# Patient Record
Sex: MALE | Race: BLACK OR AFRICAN AMERICAN | NOT HISPANIC OR LATINO
[De-identification: names, ages, dates, MRNs, and addresses within clinical notes are randomized per-mention and may not be internally consistent; named-entity substitution may affect disease eponyms.]

---

## 2022-12-13 PROBLEM — Z00.00 ENCOUNTER FOR PREVENTIVE HEALTH EXAMINATION: Status: ACTIVE | Noted: 2022-12-13

## 2023-03-08 ENCOUNTER — TRANSCRIPTION ENCOUNTER (OUTPATIENT)
Age: 68
End: 2023-03-08

## 2023-03-08 ENCOUNTER — APPOINTMENT (OUTPATIENT)
Dept: NEUROLOGY | Facility: CLINIC | Age: 68
End: 2023-03-08
Payer: MEDICARE

## 2023-03-08 VITALS
HEART RATE: 67 BPM | HEIGHT: 66 IN | OXYGEN SATURATION: 94 % | TEMPERATURE: 97.9 F | SYSTOLIC BLOOD PRESSURE: 130 MMHG | DIASTOLIC BLOOD PRESSURE: 70 MMHG

## 2023-03-08 PROCEDURE — 99205 OFFICE O/P NEW HI 60 MIN: CPT

## 2023-03-15 ENCOUNTER — APPOINTMENT (OUTPATIENT)
Dept: NEUROSURGERY | Facility: CLINIC | Age: 68
End: 2023-03-15
Payer: MEDICARE

## 2023-03-15 ENCOUNTER — APPOINTMENT (OUTPATIENT)
Dept: NEUROLOGY | Facility: CLINIC | Age: 68
End: 2023-03-15
Payer: MEDICARE

## 2023-03-15 VITALS
HEIGHT: 66 IN | DIASTOLIC BLOOD PRESSURE: 87 MMHG | SYSTOLIC BLOOD PRESSURE: 131 MMHG | OXYGEN SATURATION: 97 % | HEART RATE: 68 BPM

## 2023-03-15 VITALS
SYSTOLIC BLOOD PRESSURE: 133 MMHG | HEIGHT: 66 IN | DIASTOLIC BLOOD PRESSURE: 87 MMHG | OXYGEN SATURATION: 98 % | HEART RATE: 60 BPM | TEMPERATURE: 98.4 F

## 2023-03-15 DIAGNOSIS — G93.6 CEREBRAL EDEMA: ICD-10-CM

## 2023-03-15 PROCEDURE — 99204 OFFICE O/P NEW MOD 45 MIN: CPT

## 2023-03-15 NOTE — HISTORY OF PRESENT ILLNESS
[FreeTextEntry1] : Patient here for follow up of his parkinsons disease for 8 years. \par Slowly got worse, First time the tumor was discovered was 2019. Hospitalized in [facility] in November 26th 2023 for not being able to walk. family was told that the tumor got bigger. \par \par Parkinsons disease  - Carbidopa /levodopa increased to three times a day.  - 8 am / 2 pm and 8 pm before bed. \par Takes with a full stomach \par \par \par last seizure was in November.

## 2023-03-15 NOTE — REVIEW OF SYSTEMS
[de-identified] : does not walk. Positive falls. feeds himslef. Dressinhg neeeds 100 % help \par bathing 100% dependent/.

## 2023-03-15 NOTE — ASSESSMENT
[FreeTextEntry1] : Sunil needs better titration of medication .Increase levodopa carbidopa to qid and take on empty stomach  - 25/200 8/12/4/8\par continue entacapone for now. \par Take on empty stomach unless he does not tolerate it. \par consider parkinsosn rehab after meenioma addressed. \par \par meningoma - arranged for patient to see dr. D'Amico today on the 3rd floor. \par \par Seizure - will repeat EEG \par If better will start to reduce his keppra to 750 BID and then slowly taper.

## 2023-03-15 NOTE — PHYSICAL EXAM
[FreeTextEntry1] : mask like facies \par bradykinesia and bradyphrenia\par Rigidity legs greater than arms with cogwheeling\par UE 4/5\par LE 3 +/5 proximally and 4/5 distally

## 2023-03-15 NOTE — REASON FOR VISIT
[New Patient Visit] : a new patient visit [Referred By: _________] : Patient was referred by [facility] [Family Member] : family member

## 2023-03-16 PROBLEM — G93.6 VASOGENIC BRAIN EDEMA: Status: ACTIVE | Noted: 2023-03-15

## 2023-03-16 NOTE — HISTORY OF PRESENT ILLNESS
[< 3 months] : less than 3 months [de-identified] : 69 yo right handed male with PMH Parkinsonswith tremors Left hand tremors

## 2023-03-16 NOTE — ASSESSMENT
[FreeTextEntry1] : 68M with history of poorly controlled parkinson's with progressive inability to ambulate since november found to have large left frontal dural based lesion concerning for meningioma with associated mass effect and edema. We discussed that his decline is likely multifactorial and includes parkinson's symptoms and mass effect from the tumor. Given its size and accessibility as well as the edema, I have recommended surgical resection of the lesion and optimization of his parkinson's treatment. The family wishes to consider the options and will let us know if they wish to proceed. I also explained that the patient should come to the ER if the patient develops seizures, worsening weakness, mental status changes. The family demonstrated an excellent understanding. \par \par plan:\par \par - Will touch base with family after they discuss. \par - repeat MRI w/wo contrast if decision to not proceed with intervention. \par \par \par A total of 45 minutes was spent reviewing the labs, imaging and physical examination of the patient. We discussed the diagnosis, and the plan. The patient's questions were answered. The patient demonstrated an excellent understanding of the plan.

## 2023-03-22 ENCOUNTER — APPOINTMENT (OUTPATIENT)
Dept: NEUROSURGERY | Facility: CLINIC | Age: 68
End: 2023-03-22

## 2023-03-25 ENCOUNTER — OUTPATIENT (OUTPATIENT)
Dept: OUTPATIENT SERVICES | Facility: HOSPITAL | Age: 68
LOS: 1 days | End: 2023-03-25

## 2023-03-25 ENCOUNTER — APPOINTMENT (OUTPATIENT)
Dept: MRI IMAGING | Facility: CLINIC | Age: 68
End: 2023-03-25
Payer: MEDICARE

## 2023-03-25 PROCEDURE — 70553 MRI BRAIN STEM W/O & W/DYE: CPT | Mod: 26,MH

## 2023-03-29 ENCOUNTER — APPOINTMENT (OUTPATIENT)
Dept: NEUROSURGERY | Facility: CLINIC | Age: 68
End: 2023-03-29
Payer: MEDICARE

## 2023-03-29 PROCEDURE — 99443: CPT | Mod: 95

## 2023-03-29 NOTE — REASON FOR VISIT
[Follow-Up: _____] : a [unfilled] follow-up visit [Home] : at home, [unfilled] , at the time of the visit. [Medical Office: ([facility])___] : at the medical office located in  [Verbal consent obtained from patient] : the patient, [unfilled] [Family Member] : family member [FreeTextEntry1] : brain mass, review MRI

## 2023-03-29 NOTE — ASSESSMENT
[FreeTextEntry1] : 68M with history of poorly controlled parkinson's with progressive inability to ambulate since november found to have large left frontal dural based lesion concerning for meningioma with associated mass effect and edema. We discussed that his decline is likely multifactorial and includes parkinson's symptoms and mass effect from the tumor. Given its size and accessibility as well as the edema. Repeat MRI with worsening mass effect and increase in size. I have again recommended surgical resection of the lesion and optimization of his parkinson's treatment. The family wishes to consider surgery on April 21. I also explained that the patient should come to the ER if the patient develops seizures, worsening weakness, mental status changes. The family demonstrated an excellent understanding. \par \par After detailed imaging review and patient examination, Dr. D’Amico discussed surgical intervention with patient. Potential surgical risks vs benefits and alternatives discussed with time allotted for questions and answers given. \par Patient verbalizes understanding and wishes to proceed with surgical intervention. \par \par Date of surgery: 4/21/23\par \par Pt will need medical and cardiac clearance. \par Pre-op surgical packet and medical clearance packets reviewed and given to patient.\par Patient in understanding that covid nasal pcr test must be completed 3 days prior to surgery at [facility]. Contact phone number for testing centers provided.\par  \par \par A total of 45 minutes was spent reviewing the labs, imaging and physical examination of the patient. We discussed the diagnosis, and the plan. The patient's questions were answered. The patient demonstrated an excellent understanding of the plan.

## 2023-03-29 NOTE — HISTORY OF PRESENT ILLNESS
[FreeTextEntry1] : 67 y/o male with PMHx of idiopathic Parkinsons, found to have large left frontal dural based brain lesion during workup for progressive inability to ambulate since November 2022. \par \par Pt referred by neurologist Dr. Ambrosio and evaluated 3/15/23. \par Plan made for him to repeat MRI. \par \par TODAY pt presents for f/u and MRI review. His son is present for today's phone call f/u. \par

## 2023-04-07 ENCOUNTER — NON-APPOINTMENT (OUTPATIENT)
Age: 68
End: 2023-04-07

## 2023-04-07 ENCOUNTER — APPOINTMENT (OUTPATIENT)
Dept: INTERNAL MEDICINE | Facility: CLINIC | Age: 68
End: 2023-04-07
Payer: MEDICARE

## 2023-04-07 ENCOUNTER — OUTPATIENT (OUTPATIENT)
Dept: OUTPATIENT SERVICES | Facility: HOSPITAL | Age: 68
LOS: 1 days | End: 2023-04-07
Payer: MEDICARE

## 2023-04-07 VITALS
OXYGEN SATURATION: 98 % | TEMPERATURE: 98.9 F | SYSTOLIC BLOOD PRESSURE: 150 MMHG | RESPIRATION RATE: 16 BRPM | DIASTOLIC BLOOD PRESSURE: 82 MMHG | HEART RATE: 61 BPM

## 2023-04-07 DIAGNOSIS — G47.00 INSOMNIA, UNSPECIFIED: ICD-10-CM

## 2023-04-07 PROCEDURE — 71046 X-RAY EXAM CHEST 2 VIEWS: CPT | Mod: 26

## 2023-04-07 PROCEDURE — 93000 ELECTROCARDIOGRAM COMPLETE: CPT

## 2023-04-07 PROCEDURE — 99204 OFFICE O/P NEW MOD 45 MIN: CPT

## 2023-04-10 ENCOUNTER — NON-APPOINTMENT (OUTPATIENT)
Age: 68
End: 2023-04-10

## 2023-04-10 ENCOUNTER — APPOINTMENT (OUTPATIENT)
Dept: HEART AND VASCULAR | Facility: CLINIC | Age: 68
End: 2023-04-10
Payer: MEDICARE

## 2023-04-10 VITALS
TEMPERATURE: 97.8 F | HEIGHT: 66 IN | BODY MASS INDEX: 36.96 KG/M2 | DIASTOLIC BLOOD PRESSURE: 70 MMHG | OXYGEN SATURATION: 94 % | HEART RATE: 68 BPM | SYSTOLIC BLOOD PRESSURE: 130 MMHG | WEIGHT: 229.99 LBS

## 2023-04-10 DIAGNOSIS — R06.02 SHORTNESS OF BREATH: ICD-10-CM

## 2023-04-10 DIAGNOSIS — Z01.810 ENCOUNTER FOR PREPROCEDURAL CARDIOVASCULAR EXAMINATION: ICD-10-CM

## 2023-04-10 PROCEDURE — 93000 ELECTROCARDIOGRAM COMPLETE: CPT | Mod: NC

## 2023-04-10 PROCEDURE — 93306 TTE W/DOPPLER COMPLETE: CPT

## 2023-04-10 PROCEDURE — 99204 OFFICE O/P NEW MOD 45 MIN: CPT

## 2023-04-11 ENCOUNTER — TRANSCRIPTION ENCOUNTER (OUTPATIENT)
Age: 68
End: 2023-04-11

## 2023-04-12 LAB
ALBUMIN SERPL ELPH-MCNC: 4.5 G/DL
ALP BLD-CCNC: 64 U/L
ALT SERPL-CCNC: 22 U/L
ANION GAP SERPL CALC-SCNC: 15 MMOL/L
APTT BLD: 34.5 SEC
AST SERPL-CCNC: 16 U/L
BASOPHILS # BLD AUTO: 0.02 K/UL
BASOPHILS NFR BLD AUTO: 0.4 %
BILIRUB SERPL-MCNC: 0.6 MG/DL
BUN SERPL-MCNC: 20 MG/DL
CALCIUM SERPL-MCNC: 10.6 MG/DL
CHLORIDE SERPL-SCNC: 103 MMOL/L
CHOLEST SERPL-MCNC: 174 MG/DL
CO2 SERPL-SCNC: 26 MMOL/L
CREAT SERPL-MCNC: 1.56 MG/DL
EGFR: 47 ML/MIN/1.73M2
EOSINOPHIL # BLD AUTO: 0.21 K/UL
EOSINOPHIL NFR BLD AUTO: 4.7 %
ESTIMATED AVERAGE GLUCOSE: 131 MG/DL
GLUCOSE SERPL-MCNC: 82 MG/DL
HBA1C MFR BLD HPLC: 6.2 %
HCT VFR BLD CALC: 48.4 %
HDLC SERPL-MCNC: 54 MG/DL
HGB BLD-MCNC: 15.5 G/DL
IMM GRANULOCYTES NFR BLD AUTO: 0.2 %
INR PPP: 1.04 RATIO
LDLC SERPL CALC-MCNC: 102 MG/DL
LYMPHOCYTES # BLD AUTO: 2.49 K/UL
LYMPHOCYTES NFR BLD AUTO: 56 %
MAN DIFF?: NORMAL
MCHC RBC-ENTMCNC: 30.7 PG
MCHC RBC-ENTMCNC: 32 GM/DL
MCV RBC AUTO: 95.8 FL
MONOCYTES # BLD AUTO: 0.42 K/UL
MONOCYTES NFR BLD AUTO: 9.4 %
NEUTROPHILS # BLD AUTO: 1.3 K/UL
NEUTROPHILS NFR BLD AUTO: 29.3 %
NONHDLC SERPL-MCNC: 120 MG/DL
PLATELET # BLD AUTO: 146 K/UL
POTASSIUM SERPL-SCNC: 4.6 MMOL/L
PROT SERPL-MCNC: 7.6 G/DL
PT BLD: 12.1 SEC
RBC # BLD: 5.05 M/UL
RBC # FLD: 15 %
SODIUM SERPL-SCNC: 144 MMOL/L
TRIGL SERPL-MCNC: 91 MG/DL
WBC # FLD AUTO: 4.45 K/UL

## 2023-04-14 VITALS
HEART RATE: 60 BPM | HEIGHT: 66.14 IN | DIASTOLIC BLOOD PRESSURE: 91 MMHG | RESPIRATION RATE: 18 BRPM | TEMPERATURE: 97 F | OXYGEN SATURATION: 98 % | WEIGHT: 229.28 LBS | SYSTOLIC BLOOD PRESSURE: 151 MMHG

## 2023-04-14 LAB — SARS-COV-2 N GENE NPH QL NAA+PROBE: NOT DETECTED

## 2023-04-14 RX ORDER — CARBIDOPA AND LEVODOPA 25; 100 MG/1; MG/1
1 TABLET ORAL
Refills: 0 | DISCHARGE

## 2023-04-14 NOTE — PATIENT PROFILE ADULT - FALL HARM RISK - RISK INTERVENTIONS
nonweight-bearing Communicate Fall Risk and Risk Factors to all staff, patient, and family/Reinforce activity limits and safety measures with patient and family/Visual Cue: Yellow wristband/Bed in lowest position, wheels locked, appropriate side rails in place/Call bell, personal items and telephone in reach/Instruct patient to call for assistance before getting out of bed or chair/Non-slip footwear when patient is out of bed/[place] to call system/Physically safe environment - no spills, clutter or unnecessary equipment/Purposeful Proactive Rounding/Room/bathroom lighting operational, light cord in reach

## 2023-04-16 RX ORDER — POVIDONE-IODINE 5 %
1 AEROSOL (ML) TOPICAL ONCE
Refills: 0 | Status: COMPLETED | OUTPATIENT
Start: 2023-04-17 | End: 2023-04-17

## 2023-04-17 ENCOUNTER — APPOINTMENT (OUTPATIENT)
Dept: NEUROSURGERY | Facility: HOSPITAL | Age: 68
End: 2023-04-17

## 2023-04-17 ENCOUNTER — INPATIENT (INPATIENT)
Facility: HOSPITAL | Age: 68
LOS: 3 days | Discharge: NURSING, REHAB OR LONG-TERM CARE | DRG: 25 | End: 2023-04-21
Attending: NEUROLOGICAL SURGERY | Admitting: NEUROLOGICAL SURGERY
Payer: MEDICARE

## 2023-04-17 ENCOUNTER — RESULT REVIEW (OUTPATIENT)
Age: 68
End: 2023-04-17

## 2023-04-17 ENCOUNTER — TRANSCRIPTION ENCOUNTER (OUTPATIENT)
Age: 68
End: 2023-04-17

## 2023-04-17 LAB
ALBUMIN SERPL ELPH-MCNC: 4.2 G/DL — SIGNIFICANT CHANGE UP (ref 3.3–5)
ALP SERPL-CCNC: 61 U/L — SIGNIFICANT CHANGE UP (ref 40–120)
ALT FLD-CCNC: 6 U/L — LOW (ref 10–45)
ANION GAP SERPL CALC-SCNC: 10 MMOL/L — SIGNIFICANT CHANGE UP (ref 5–17)
APTT BLD: 35.3 SEC — SIGNIFICANT CHANGE UP (ref 27.5–35.5)
AST SERPL-CCNC: 14 U/L — SIGNIFICANT CHANGE UP (ref 10–40)
BASE EXCESS BLDA CALC-SCNC: 0.3 MMOL/L — SIGNIFICANT CHANGE UP (ref -2–3)
BASOPHILS # BLD AUTO: 0 K/UL — SIGNIFICANT CHANGE UP (ref 0–0.2)
BASOPHILS NFR BLD AUTO: 0 % — SIGNIFICANT CHANGE UP (ref 0–2)
BILIRUB SERPL-MCNC: 0.4 MG/DL — SIGNIFICANT CHANGE UP (ref 0.2–1.2)
BLD GP AB SCN SERPL QL: NEGATIVE — SIGNIFICANT CHANGE UP
BUN SERPL-MCNC: 17 MG/DL — SIGNIFICANT CHANGE UP (ref 7–23)
CA-I BLDA-SCNC: 1.18 MMOL/L — SIGNIFICANT CHANGE UP (ref 1.15–1.33)
CALCIUM SERPL-MCNC: 8.9 MG/DL — SIGNIFICANT CHANGE UP (ref 8.4–10.5)
CHLORIDE SERPL-SCNC: 108 MMOL/L — SIGNIFICANT CHANGE UP (ref 96–108)
CO2 BLDA-SCNC: 27 MMOL/L — HIGH (ref 19–24)
CO2 SERPL-SCNC: 24 MMOL/L — SIGNIFICANT CHANGE UP (ref 22–31)
COHGB MFR BLDA: 2.7 % — SIGNIFICANT CHANGE UP
CREAT SERPL-MCNC: 1.24 MG/DL — SIGNIFICANT CHANGE UP (ref 0.5–1.3)
EGFR: 63 ML/MIN/1.73M2 — SIGNIFICANT CHANGE UP
EOSINOPHIL # BLD AUTO: 0.05 K/UL — SIGNIFICANT CHANGE UP (ref 0–0.5)
EOSINOPHIL NFR BLD AUTO: 1.3 % — SIGNIFICANT CHANGE UP (ref 0–6)
GLUCOSE BLDA-MCNC: 97 MG/DL — SIGNIFICANT CHANGE UP (ref 70–99)
GLUCOSE BLDC GLUCOMTR-MCNC: 113 MG/DL — HIGH (ref 70–99)
GLUCOSE BLDC GLUCOMTR-MCNC: 125 MG/DL — HIGH (ref 70–99)
GLUCOSE SERPL-MCNC: 161 MG/DL — HIGH (ref 70–99)
HCO3 BLDA-SCNC: 25 MMOL/L — SIGNIFICANT CHANGE UP (ref 21–28)
HCT VFR BLD CALC: 46.8 % — SIGNIFICANT CHANGE UP (ref 39–50)
HGB BLD-MCNC: 15.3 G/DL — SIGNIFICANT CHANGE UP (ref 13–17)
HGB BLDA-MCNC: 14.5 G/DL — SIGNIFICANT CHANGE UP (ref 12.6–17.4)
IMM GRANULOCYTES NFR BLD AUTO: 0 % — SIGNIFICANT CHANGE UP (ref 0–0.9)
INR BLD: 1.09 — SIGNIFICANT CHANGE UP (ref 0.88–1.16)
LYMPHOCYTES # BLD AUTO: 1.41 K/UL — SIGNIFICANT CHANGE UP (ref 1–3.3)
LYMPHOCYTES # BLD AUTO: 37.8 % — SIGNIFICANT CHANGE UP (ref 13–44)
MAGNESIUM SERPL-MCNC: 2.2 MG/DL — SIGNIFICANT CHANGE UP (ref 1.6–2.6)
MCHC RBC-ENTMCNC: 30.9 PG — SIGNIFICANT CHANGE UP (ref 27–34)
MCHC RBC-ENTMCNC: 32.7 GM/DL — SIGNIFICANT CHANGE UP (ref 32–36)
MCV RBC AUTO: 94.5 FL — SIGNIFICANT CHANGE UP (ref 80–100)
METHGB MFR BLDA: 0.7 % — SIGNIFICANT CHANGE UP
MONOCYTES # BLD AUTO: 0.13 K/UL — SIGNIFICANT CHANGE UP (ref 0–0.9)
MONOCYTES NFR BLD AUTO: 3.5 % — SIGNIFICANT CHANGE UP (ref 2–14)
NEUTROPHILS # BLD AUTO: 2.14 K/UL — SIGNIFICANT CHANGE UP (ref 1.8–7.4)
NEUTROPHILS NFR BLD AUTO: 57.4 % — SIGNIFICANT CHANGE UP (ref 43–77)
NRBC # BLD: 0 /100 WBCS — SIGNIFICANT CHANGE UP (ref 0–0)
OXYHGB MFR BLDA: 96.5 % — HIGH (ref 90–95)
PCO2 BLDA: 42 MMHG — SIGNIFICANT CHANGE UP (ref 35–48)
PH BLDA: 7.39 — SIGNIFICANT CHANGE UP (ref 7.35–7.45)
PHOSPHATE SERPL-MCNC: 3.1 MG/DL — SIGNIFICANT CHANGE UP (ref 2.5–4.5)
PLATELET # BLD AUTO: 147 K/UL — LOW (ref 150–400)
PO2 BLDA: 272 MMHG — HIGH (ref 83–108)
POTASSIUM BLDA-SCNC: 3.8 MMOL/L — SIGNIFICANT CHANGE UP (ref 3.5–5.1)
POTASSIUM SERPL-MCNC: 4 MMOL/L — SIGNIFICANT CHANGE UP (ref 3.5–5.3)
POTASSIUM SERPL-SCNC: 4 MMOL/L — SIGNIFICANT CHANGE UP (ref 3.5–5.3)
PROT SERPL-MCNC: 7.1 G/DL — SIGNIFICANT CHANGE UP (ref 6–8.3)
PROTHROM AB SERPL-ACNC: 13 SEC — SIGNIFICANT CHANGE UP (ref 10.5–13.4)
RBC # BLD: 4.95 M/UL — SIGNIFICANT CHANGE UP (ref 4.2–5.8)
RBC # FLD: 14.5 % — SIGNIFICANT CHANGE UP (ref 10.3–14.5)
RH IG SCN BLD-IMP: POSITIVE — SIGNIFICANT CHANGE UP
SAO2 % BLDA: 100 % — HIGH (ref 94–98)
SODIUM BLDA-SCNC: 136 MMOL/L — SIGNIFICANT CHANGE UP (ref 136–145)
SODIUM SERPL-SCNC: 142 MMOL/L — SIGNIFICANT CHANGE UP (ref 135–145)
WBC # BLD: 3.72 K/UL — LOW (ref 3.8–10.5)
WBC # FLD AUTO: 3.72 K/UL — LOW (ref 3.8–10.5)

## 2023-04-17 PROCEDURE — 69990 MICROSURGERY ADD-ON: CPT | Mod: 59

## 2023-04-17 PROCEDURE — 99291 CRITICAL CARE FIRST HOUR: CPT

## 2023-04-17 PROCEDURE — 82962 GLUCOSE BLOOD TEST: CPT

## 2023-04-17 PROCEDURE — 99292 CRITICAL CARE ADDL 30 MIN: CPT

## 2023-04-17 PROCEDURE — 61583 CRANIOFACIAL APPROACH SKULL: CPT

## 2023-04-17 PROCEDURE — 88341 IMHCHEM/IMCYTCHM EA ADD ANTB: CPT | Mod: 26

## 2023-04-17 PROCEDURE — 61601 RESECT/EXCISE CRANIAL LESION: CPT

## 2023-04-17 PROCEDURE — 88307 TISSUE EXAM BY PATHOLOGIST: CPT | Mod: 26

## 2023-04-17 PROCEDURE — 88360 TUMOR IMMUNOHISTOCHEM/MANUAL: CPT | Mod: 26

## 2023-04-17 PROCEDURE — 71046 X-RAY EXAM CHEST 2 VIEWS: CPT

## 2023-04-17 PROCEDURE — 61781 SCAN PROC CRANIAL INTRA: CPT

## 2023-04-17 PROCEDURE — 88342 IMHCHEM/IMCYTCHM 1ST ANTB: CPT | Mod: 26,59

## 2023-04-17 PROCEDURE — 88331 PATH CONSLTJ SURG 1 BLK 1SPC: CPT | Mod: 26

## 2023-04-17 DEVICE — CEMENT HYDROSET INJ 5CC: Type: IMPLANTABLE DEVICE | Site: LEFT | Status: FUNCTIONAL

## 2023-04-17 DEVICE — SURGICEL 4 X 8": Type: IMPLANTABLE DEVICE | Site: LEFT | Status: FUNCTIONAL

## 2023-04-17 DEVICE — SCREW UN3 AXS SELF DRILL 1.5X4MM: Type: IMPLANTABLE DEVICE | Site: LEFT | Status: FUNCTIONAL

## 2023-04-17 DEVICE — DURASEAL: Type: IMPLANTABLE DEVICE | Site: LEFT | Status: FUNCTIONAL

## 2023-04-17 DEVICE — PLATE COVER BURRHOLE UN3 W/TAB 14MM: Type: IMPLANTABLE DEVICE | Site: LEFT | Status: FUNCTIONAL

## 2023-04-17 DEVICE — PLATE UN3 2 HOLE: Type: IMPLANTABLE DEVICE | Site: LEFT | Status: FUNCTIONAL

## 2023-04-17 DEVICE — MATRIX DURAGEN PLUS DURAL REGENERATION 3X3: Type: IMPLANTABLE DEVICE | Site: LEFT | Status: FUNCTIONAL

## 2023-04-17 DEVICE — LUKENS BONE WAX 2.5G: Type: IMPLANTABLE DEVICE | Site: LEFT | Status: FUNCTIONAL

## 2023-04-17 DEVICE — SURGIFOAM PAD 8CM X 12.5CM X 10MM (100): Type: IMPLANTABLE DEVICE | Site: LEFT | Status: FUNCTIONAL

## 2023-04-17 DEVICE — SURGIFLO HEMOSTATIC MATRIX KIT: Type: IMPLANTABLE DEVICE | Site: LEFT | Status: FUNCTIONAL

## 2023-04-17 RX ORDER — CARBIDOPA AND LEVODOPA 25; 100 MG/1; MG/1
1 TABLET ORAL
Refills: 0 | Status: DISCONTINUED | OUTPATIENT
Start: 2023-04-17 | End: 2023-04-17

## 2023-04-17 RX ORDER — ACETAMINOPHEN 500 MG
1000 TABLET ORAL EVERY 8 HOURS
Refills: 0 | Status: DISCONTINUED | OUTPATIENT
Start: 2023-04-17 | End: 2023-04-20

## 2023-04-17 RX ORDER — DEXAMETHASONE 0.5 MG/5ML
2 ELIXIR ORAL EVERY 6 HOURS
Refills: 0 | Status: DISCONTINUED | OUTPATIENT
Start: 2023-04-21 | End: 2023-04-21

## 2023-04-17 RX ORDER — DEXAMETHASONE 0.5 MG/5ML
2 ELIXIR ORAL EVERY 12 HOURS
Refills: 0 | Status: CANCELLED | OUTPATIENT
Start: 2023-04-23 | End: 2023-04-21

## 2023-04-17 RX ORDER — AMLODIPINE BESYLATE 2.5 MG/1
5 TABLET ORAL DAILY
Refills: 0 | Status: DISCONTINUED | OUTPATIENT
Start: 2023-04-18 | End: 2023-04-21

## 2023-04-17 RX ORDER — APREPITANT 80 MG/1
40 CAPSULE ORAL ONCE
Refills: 0 | Status: COMPLETED | OUTPATIENT
Start: 2023-04-17 | End: 2023-04-17

## 2023-04-17 RX ORDER — DEXAMETHASONE 0.5 MG/5ML
ELIXIR ORAL
Refills: 0 | Status: DISCONTINUED | OUTPATIENT
Start: 2023-04-17 | End: 2023-04-21

## 2023-04-17 RX ORDER — SENNA PLUS 8.6 MG/1
2 TABLET ORAL AT BEDTIME
Refills: 0 | Status: DISCONTINUED | OUTPATIENT
Start: 2023-04-17 | End: 2023-04-21

## 2023-04-17 RX ORDER — GABAPENTIN 400 MG/1
400 CAPSULE ORAL
Refills: 0 | Status: DISCONTINUED | OUTPATIENT
Start: 2023-04-17 | End: 2023-04-21

## 2023-04-17 RX ORDER — CARBIDOPA AND LEVODOPA 25; 100 MG/1; MG/1
1 TABLET ORAL
Refills: 0 | Status: DISCONTINUED | OUTPATIENT
Start: 2023-04-17 | End: 2023-04-21

## 2023-04-17 RX ORDER — CHLORHEXIDINE GLUCONATE 213 G/1000ML
1 SOLUTION TOPICAL ONCE
Refills: 0 | Status: COMPLETED | OUTPATIENT
Start: 2023-04-17 | End: 2023-04-17

## 2023-04-17 RX ORDER — CEFAZOLIN SODIUM 1 G
2000 VIAL (EA) INJECTION EVERY 8 HOURS
Refills: 0 | Status: COMPLETED | OUTPATIENT
Start: 2023-04-17 | End: 2023-04-18

## 2023-04-17 RX ORDER — ACETAMINOPHEN 500 MG
1000 TABLET ORAL ONCE
Refills: 0 | Status: COMPLETED | OUTPATIENT
Start: 2023-04-17 | End: 2023-04-17

## 2023-04-17 RX ORDER — DEXAMETHASONE 0.5 MG/5ML
4 ELIXIR ORAL EVERY 6 HOURS
Refills: 0 | Status: DISCONTINUED | OUTPATIENT
Start: 2023-04-17 | End: 2023-04-17

## 2023-04-17 RX ORDER — INSULIN LISPRO 100/ML
VIAL (ML) SUBCUTANEOUS
Refills: 0 | Status: DISCONTINUED | OUTPATIENT
Start: 2023-04-17 | End: 2023-04-18

## 2023-04-17 RX ORDER — LEVETIRACETAM 250 MG/1
1000 TABLET, FILM COATED ORAL DAILY
Refills: 0 | Status: DISCONTINUED | OUTPATIENT
Start: 2023-04-17 | End: 2023-04-17

## 2023-04-17 RX ORDER — ACETAMINOPHEN 500 MG
1000 TABLET ORAL EVERY 8 HOURS
Refills: 0 | Status: DISCONTINUED | OUTPATIENT
Start: 2023-04-17 | End: 2023-04-17

## 2023-04-17 RX ORDER — DEXAMETHASONE 0.5 MG/5ML
4 ELIXIR ORAL EVERY 6 HOURS
Refills: 0 | Status: COMPLETED | OUTPATIENT
Start: 2023-04-17 | End: 2023-04-19

## 2023-04-17 RX ORDER — DEXAMETHASONE 0.5 MG/5ML
4 ELIXIR ORAL EVERY 8 HOURS
Refills: 0 | Status: COMPLETED | OUTPATIENT
Start: 2023-04-20 | End: 2023-04-20

## 2023-04-17 RX ORDER — OXYCODONE HYDROCHLORIDE 5 MG/1
5 TABLET ORAL EVERY 4 HOURS
Refills: 0 | Status: DISCONTINUED | OUTPATIENT
Start: 2023-04-17 | End: 2023-04-17

## 2023-04-17 RX ORDER — SODIUM CHLORIDE 9 MG/ML
1000 INJECTION INTRAMUSCULAR; INTRAVENOUS; SUBCUTANEOUS
Refills: 0 | Status: DISCONTINUED | OUTPATIENT
Start: 2023-04-17 | End: 2023-04-17

## 2023-04-17 RX ORDER — LEVETIRACETAM 250 MG/1
1000 TABLET, FILM COATED ORAL
Refills: 0 | Status: DISCONTINUED | OUTPATIENT
Start: 2023-04-17 | End: 2023-04-21

## 2023-04-17 RX ORDER — DEXAMETHASONE 0.5 MG/5ML
2 ELIXIR ORAL EVERY 8 HOURS
Refills: 0 | Status: DISCONTINUED | OUTPATIENT
Start: 2023-04-22 | End: 2023-04-21

## 2023-04-17 RX ORDER — OXYCODONE HYDROCHLORIDE 5 MG/1
10 TABLET ORAL EVERY 4 HOURS
Refills: 0 | Status: DISCONTINUED | OUTPATIENT
Start: 2023-04-17 | End: 2023-04-17

## 2023-04-17 RX ORDER — NICARDIPINE HYDROCHLORIDE 30 MG/1
5 CAPSULE, EXTENDED RELEASE ORAL
Qty: 40 | Refills: 0 | Status: ACTIVE | OUTPATIENT
Start: 2023-04-17 | End: 2024-03-15

## 2023-04-17 RX ORDER — DEXAMETHASONE 0.5 MG/5ML
2 ELIXIR ORAL DAILY
Refills: 0 | Status: CANCELLED | OUTPATIENT
Start: 2023-04-24 | End: 2023-04-21

## 2023-04-17 RX ORDER — AMLODIPINE BESYLATE 2.5 MG/1
2.5 TABLET ORAL ONCE
Refills: 0 | Status: COMPLETED | OUTPATIENT
Start: 2023-04-17 | End: 2023-04-17

## 2023-04-17 RX ORDER — ONDANSETRON 8 MG/1
4 TABLET, FILM COATED ORAL EVERY 6 HOURS
Refills: 0 | Status: DISCONTINUED | OUTPATIENT
Start: 2023-04-17 | End: 2023-04-21

## 2023-04-17 RX ORDER — OXYCODONE HYDROCHLORIDE 5 MG/1
5 TABLET ORAL EVERY 6 HOURS
Refills: 0 | Status: DISCONTINUED | OUTPATIENT
Start: 2023-04-17 | End: 2023-04-21

## 2023-04-17 RX ORDER — PANTOPRAZOLE SODIUM 20 MG/1
40 TABLET, DELAYED RELEASE ORAL
Refills: 0 | Status: DISCONTINUED | OUTPATIENT
Start: 2023-04-17 | End: 2023-04-21

## 2023-04-17 RX ORDER — AMLODIPINE BESYLATE 2.5 MG/1
2.5 TABLET ORAL DAILY
Refills: 0 | Status: DISCONTINUED | OUTPATIENT
Start: 2023-04-17 | End: 2023-04-17

## 2023-04-17 RX ORDER — CHLORHEXIDINE GLUCONATE 213 G/1000ML
1 SOLUTION TOPICAL EVERY 12 HOURS
Refills: 0 | Status: ACTIVE | OUTPATIENT
Start: 2023-04-17 | End: 2023-04-18

## 2023-04-17 RX ORDER — POLYETHYLENE GLYCOL 3350 17 G/17G
17 POWDER, FOR SOLUTION ORAL DAILY
Refills: 0 | Status: DISCONTINUED | OUTPATIENT
Start: 2023-04-17 | End: 2023-04-21

## 2023-04-17 RX ADMIN — Medication 4 MILLIGRAM(S): at 19:19

## 2023-04-17 RX ADMIN — CHLORHEXIDINE GLUCONATE 1 APPLICATION(S): 213 SOLUTION TOPICAL at 19:19

## 2023-04-17 RX ADMIN — CARBIDOPA AND LEVODOPA 1 TABLET(S): 25; 100 TABLET ORAL at 19:18

## 2023-04-17 RX ADMIN — AMLODIPINE BESYLATE 2.5 MILLIGRAM(S): 2.5 TABLET ORAL at 22:26

## 2023-04-17 RX ADMIN — GABAPENTIN 400 MILLIGRAM(S): 400 CAPSULE ORAL at 18:18

## 2023-04-17 RX ADMIN — Medication 4 MILLIGRAM(S): at 13:38

## 2023-04-17 RX ADMIN — Medication 1 APPLICATION(S): at 07:37

## 2023-04-17 RX ADMIN — Medication 400 MILLIGRAM(S): at 13:39

## 2023-04-17 RX ADMIN — SENNA PLUS 2 TABLET(S): 8.6 TABLET ORAL at 21:20

## 2023-04-17 RX ADMIN — APREPITANT 40 MILLIGRAM(S): 80 CAPSULE ORAL at 07:27

## 2023-04-17 RX ADMIN — Medication 1000 MILLIGRAM(S): at 16:00

## 2023-04-17 RX ADMIN — Medication 100 MILLIGRAM(S): at 16:42

## 2023-04-17 RX ADMIN — Medication 1000 MILLIGRAM(S): at 20:24

## 2023-04-17 RX ADMIN — Medication 1000 MILLIGRAM(S): at 19:37

## 2023-04-17 RX ADMIN — CARBIDOPA AND LEVODOPA 1 TABLET(S): 25; 100 TABLET ORAL at 16:03

## 2023-04-17 RX ADMIN — Medication 1000 MILLIGRAM(S): at 07:27

## 2023-04-17 RX ADMIN — LEVETIRACETAM 1000 MILLIGRAM(S): 250 TABLET, FILM COATED ORAL at 18:18

## 2023-04-17 RX ADMIN — CHLORHEXIDINE GLUCONATE 1 APPLICATION(S): 213 SOLUTION TOPICAL at 07:37

## 2023-04-17 NOTE — PROGRESS NOTE ADULT - SUBJECTIVE AND OBJECTIVE BOX
***********************************************  ADULT NSICU PROGRESS NOTE  MAHDI EDGE [id number] [facility] [id number]  ***********************************************    HPI: 69 yo M with poorly controlled Parkinson's disease and inability to walk now admitted to NSICU s/p elective L. crani for resection of meningioma.      ROS: negative except per mentioned above in 24h interval events.      VITALS:    ICU Vital Signs Last 24 Hrs  T(C): 36.2 (17 Apr 2023 07:39), Max: 36.2 (17 Apr 2023 07:39)  T(F): --  HR: 60 (17 Apr 2023 07:39) (60 - 60)  BP: 151/91 (17 Apr 2023 07:39) (151/91 - 151/91)  BP(mean): --  ABP: --  ABP(mean): --  RR: 18 (17 Apr 2023 07:39) (18 - 18)  SpO2: 98% (17 Apr 2023 07:39) (98% - 98%)            I&O's Summary      EXAM:     Joe Coma Scale: 4/2/1    General: normocephalic, head wrap, laying in bed w/ eyes open not attending  Neuro     MS: Eyes open but not attending examiner, not cooperative with examination right now, hypo/bradyphonic but seems to utter his own name    CN: PERRL, gaze conjugate and midline, face symmetric, hearing grossly intact    Mot: bulk normal, (+)rigidity in bilateral UE L > R, no movement bilateral upper/lower extremities    Coord: (+)bilateral UE L > R parkinsonian tremor  Chest: nonlabored respirations, distant lung sounds in all auscultated lung fields, heart regular rate/rhythm, present S1/S2, no murmurs or rubs  Abdomen: nondistended, soft and nontender without peritoneal signs, normoactive bowel sounds  Extremities: no clubbing, well-perfused, no edema    LABORATORY DATA:    ABG - ( 17 Apr 2023 08:38 )  pH, Arterial: 7.39  pH, Blood: x     /  pCO2: 42    /  pO2: 272   / HCO3: 25    / Base Excess: 0.3   /  SaO2: x                                       15.3   3.72  )-----------( 147      ( 17 Apr 2023 11:55 )             46.8                 IMAGING DATA:    CARDIOLOGY DATA:    MICROBIOLOGY DATA:        MEDICATIONS  (STANDING):  acetaminophen     Tablet .. 1000 milliGRAM(s) Oral every 8 hours  amLODIPine   Tablet 2.5 milliGRAM(s) Oral daily  carbidopa/levodopa  25/250 1 Tablet(s) Oral <User Schedule>  ceFAZolin   IVPB 2000 milliGRAM(s) IV Intermittent every 8 hours  chlorhexidine 2% Cloths 1 Application(s) Topical every 12 hours  dexAMETHasone  Injectable 4 milliGRAM(s) IV Push every 6 hours  gabapentin 400 milliGRAM(s) Oral four times a day  levETIRAcetam 1000 milliGRAM(s) Oral daily  polyethylene glycol 3350 17 Gram(s) Oral daily  senna 2 Tablet(s) Oral at bedtime    MEDICATIONS  (PRN):  acetaminophen   IVPB .. 1000 milliGRAM(s) IV Intermittent once PRN Mild Pain (1 - 3)  ondansetron Injectable 4 milliGRAM(s) IV Push every 6 hours PRN Nausea and/or Vomiting      ***********************************************  ASSESSMENT AND PLAN  ***********************************************  This is a case of ***    NEURO - admit NSICU, Q1h neuro checks / Q1h vital signs  PULM - SpO2 goal > 92%, supplemental O2 and pulm toileting as needed  CARDIO - BP goal   GI - bowel regimen, stool count, diet:    /RENAL - monitor UOP/volume status, BUN/SCr  HEME - maintain Hb > 7.0, PLT > ***  ID - monitor for infectious s/s, fever curve, leukocytosis  ENDO - SGlu goal < 200 ***********************************************  ADULT NSICU PROGRESS NOTE  MAHDI EDGE [id number] [facility] [id number]  ***********************************************    HPI: 67 yo M with seizure disorder, HTN, poorly controlled Parkinson's disease and inability to walk now admitted to NSICU s/p elective L. crani for resection of meningioma.      ROS: negative except per mentioned above in 24h interval events.      VITALS:    ICU Vital Signs Last 24 Hrs  T(C): 36.2 (17 Apr 2023 07:39), Max: 36.2 (17 Apr 2023 07:39)  T(F): --  HR: 60 (17 Apr 2023 07:39) (60 - 60)  BP: 151/91 (17 Apr 2023 07:39) (151/91 - 151/91)  BP(mean): --  ABP: --  ABP(mean): --  RR: 18 (17 Apr 2023 07:39) (18 - 18)  SpO2: 98% (17 Apr 2023 07:39) (98% - 98%)            I&O's Summary      EXAM:     Thornton Coma Scale: 4/2/1    General: normocephalic, head wrap, laying in bed w/ eyes open not attending  Neuro     MS: Eyes open but not attending examiner, not cooperative with examination right now, hypo/bradyphonic but seems to utter his own name    CN: PERRL, gaze conjugate and midline, face symmetric, hearing grossly intact    Mot: bulk normal, (+)rigidity in bilateral UE L > R, no movement bilateral upper/lower extremities    Coord: (+)bilateral UE L > R parkinsonian tremor  Chest: nonlabored respirations, distant lung sounds in all auscultated lung fields, heart regular rate/rhythm, present S1/S2, no murmurs or rubs  Abdomen: nondistended, soft and nontender without peritoneal signs, normoactive bowel sounds  Extremities: no clubbing, well-perfused, no edema    LABORATORY DATA:    ABG - ( 17 Apr 2023 08:38 )  pH, Arterial: 7.39  pH, Blood: x     /  pCO2: 42    /  pO2: 272   / HCO3: 25    / Base Excess: 0.3   /  SaO2: x                                       15.3   3.72  )-----------( 147      ( 17 Apr 2023 11:55 )             46.8                 IMAGING DATA:    CARDIOLOGY DATA:    MICROBIOLOGY DATA:        MEDICATIONS  (STANDING):  acetaminophen     Tablet .. 1000 milliGRAM(s) Oral every 8 hours  amLODIPine   Tablet 2.5 milliGRAM(s) Oral daily  carbidopa/levodopa  25/250 1 Tablet(s) Oral <User Schedule>  ceFAZolin   IVPB 2000 milliGRAM(s) IV Intermittent every 8 hours  chlorhexidine 2% Cloths 1 Application(s) Topical every 12 hours  dexAMETHasone  Injectable 4 milliGRAM(s) IV Push every 6 hours  gabapentin 400 milliGRAM(s) Oral four times a day  levETIRAcetam 1000 milliGRAM(s) Oral daily  polyethylene glycol 3350 17 Gram(s) Oral daily  senna 2 Tablet(s) Oral at bedtime    MEDICATIONS  (PRN):  acetaminophen   IVPB .. 1000 milliGRAM(s) IV Intermittent once PRN Mild Pain (1 - 3)  ondansetron Injectable 4 milliGRAM(s) IV Push every 6 hours PRN Nausea and/or Vomiting      ***********************************************  ASSESSMENT AND PLAN  ***********************************************    #S/p L. crani for resection of meningioma  #History of epilepsy, parkinson's disease (non-ambulatory)  #History of HTN    NEURO - admit NSICU, Q1h neuro checks / Q1h vital signs, postoperative care, CTH now, MRI brain w/wo (nonurgent), home LEV, dex 4q6, f/u final path, consider neuro consultation, PD home medications  PULM - SpO2 goal > 92%, supplemental O2 and pulm toileting as needed  CARDIO - BP goal < 140  GI - bowel regimen, stool count, diet: pending swallow evaluation  /RENAL - monitor UOP/volume status, BUN/SCr, d/c matthew  HEME - maintain Hb > 7.0, PLT > 100,000  ID - monitor for infectious s/s, fever curve, leukocytosis, perioperative ancef  ENDO - SGlu goal < 200

## 2023-04-17 NOTE — PROGRESS NOTE ADULT - SUBJECTIVE AND OBJECTIVE BOX
NSCU ATTENDING -- ADDITIONAL PROGRESS NOTE    Nighttime rounds were performed -- please refer to earlier Progress Note for HPI details.      ICU Vital Signs Last 24 Hrs  T(C): 36.7 (17 Apr 2023 17:57), Max: 36.7 (17 Apr 2023 17:57)  T(F): 98.1 (17 Apr 2023 17:57), Max: 98.1 (17 Apr 2023 17:57)  HR: 82 (17 Apr 2023 20:00) (60 - 95)  BP: 116/62 (17 Apr 2023 15:00) (116/62 - 151/91)  BP(mean): 84 (17 Apr 2023 15:00) (84 - 114)  ABP: 145/67 (17 Apr 2023 20:00) (114/58 - 160/88)  ABP(mean): 93 (17 Apr 2023 20:00) (76 - 121)  RR: 18 (17 Apr 2023 20:00) (16 - 22)  SpO2: 100% (17 Apr 2023 20:00) (98% - 100%)      04-17-23 @ 07:01  -  04-17-23 @ 21:08  --------------------------------------------------------  IN: 1570 mL / OUT: 1300 mL / NET: 270 mL      EXAM:   NEURO:   MS: Eyes open, hypo/bradyphonic  CN: PERRL 3mm and reactive, gaze conjugate, no facial droop.  Motor:  (+) rigidity in bilateral UE L > R, +parkinson tremors, antigravity movement bilateral upper/lower extremities- no drift      MEDICATIONS:   acetaminophen     Tablet .. 1000 milliGRAM(s) Oral every 8 hours  amLODIPine   Tablet 2.5 milliGRAM(s) Oral daily  carbidopa/levodopa  25/250 1 Tablet(s) Oral <User Schedule>  ceFAZolin   IVPB 2000 milliGRAM(s) IV Intermittent every 8 hours  chlorhexidine 2% Cloths 1 Application(s) Topical every 12 hours  dexAMETHasone  Injectable 4 milliGRAM(s) IV Push every 6 hours  gabapentin 400 milliGRAM(s) Oral four times a day  insulin lispro (ADMELOG) corrective regimen sliding scale   SubCutaneous Before meals and at bedtime  levETIRAcetam 1000 milliGRAM(s) Oral two times a day  niCARdipine Infusion 5 mG/Hr (25 mL/Hr) IV Continuous <Continuous>  ondansetron Injectable 4 milliGRAM(s) IV Push every 6 hours PRN  pantoprazole    Tablet 40 milliGRAM(s) Oral before breakfast  polyethylene glycol 3350 17 Gram(s) Oral daily  senna 2 Tablet(s) Oral at bedtime    LABS:                       15.3   3.72  )-----------( 147      ( 17 Apr 2023 11:55 )             46.8     04-17    142  |  108  |  17  ----------------------------<  161<H>  4.0   |  24  |  1.24    Ca    8.9      17 Apr 2023 11:55  Phos  3.1     04-17  Mg     2.2     04-17    TPro  7.1  /  Alb  4.2  /  TBili  0.4  /  DBili  x   /  AST  14  /  ALT  6<L>  /  AlkPhos  61  04-17    LIVER FUNCTIONS - ( 17 Apr 2023 11:55 )  Alb: 4.2 g/dL / Pro: 7.1 g/dL / ALK PHOS: 61 U/L / ALT: 6 U/L / AST: 14 U/L / GGT: x           ABG - ( 17 Apr 2023 08:38 )  pH, Arterial: 7.39  pH, Blood: x     /  pCO2: 42    /  pO2: 272   / HCO3: 25    / Base Excess: 0.3   /  SaO2: x           ASSESSMENT:   S/P L. crani for resection of meningioma  History of epilepsy, parkinson's disease (non-ambulatory)  History of HTN    PLAN:   Post op MRI brain w/wo post op, continue Keppra, decadron, analgesia.  Continue sinemet. Neurology consult for optimization of PD medications  SBP goal <140; on cardene; increase amlodipine- wean gtt.   Passed speech/swallow.  SQL 4/18  Continue care per daytime intensivist BRIDGETTE Belcher.    Patient remains critically ill.

## 2023-04-17 NOTE — H&P ADULT - ASSESSMENT
68M hx poorly controlled PD w/ prog inability to walk since Nov 2022. Found to have large L frontal dural based lesion c/f meningioma w/ associated mass effect and edema. Presenting today for elective resection of mass.   -OR today for elective resection of brain mass  -NSCU after OR

## 2023-04-17 NOTE — H&P ADULT - HISTORY OF PRESENT ILLNESS
68M hx poorly controlled PD w/ prog inability to walk since Nov 2022. Found to have large L frontal dural based lesion c/f meningioma w/ associated mass effect and edema. Presenting today for elective resection of mass.     ICU Vital Signs Last 24 Hrs  T(C): --  T(F): --  HR: --  BP: --  BP(mean): --  ABP: --  ABP(mean): --  RR: --  SpO2: --

## 2023-04-17 NOTE — BRIEF OPERATIVE NOTE - NSICDXBRIEFPROCEDURE_GEN_ALL_CORE_FT
PROCEDURES:  Craniotomy for resection of tumor of left side of brain 17-Apr-2023 11:29:57  Yeimy Acuña

## 2023-04-17 NOTE — PROGRESS NOTE ADULT - NSPROGADDITIONALINFOA_GEN_ALL_CORE
I have personally reviewed the above clinical note and all of its components and:  [ ] agree with the above assessment and plan without modifications.  [x] agree with the above with modifications made to the assessment and/or plan of care.  [ ] disagree with the above with significant modifications as listed below:

## 2023-04-18 LAB
A1C WITH ESTIMATED AVERAGE GLUCOSE RESULT: 5.8 % — HIGH (ref 4–5.6)
ALBUMIN SERPL ELPH-MCNC: 3.6 G/DL — SIGNIFICANT CHANGE UP (ref 3.3–5)
ALP SERPL-CCNC: 53 U/L — SIGNIFICANT CHANGE UP (ref 40–120)
ALT FLD-CCNC: 7 U/L — LOW (ref 10–45)
ANION GAP SERPL CALC-SCNC: 10 MMOL/L — SIGNIFICANT CHANGE UP (ref 5–17)
AST SERPL-CCNC: 11 U/L — SIGNIFICANT CHANGE UP (ref 10–40)
BILIRUB DIRECT SERPL-MCNC: 0.2 MG/DL — SIGNIFICANT CHANGE UP (ref 0–0.3)
BILIRUB INDIRECT FLD-MCNC: 0.4 MG/DL — SIGNIFICANT CHANGE UP (ref 0.2–1)
BILIRUB SERPL-MCNC: 0.6 MG/DL — SIGNIFICANT CHANGE UP (ref 0.2–1.2)
BUN SERPL-MCNC: 18 MG/DL — SIGNIFICANT CHANGE UP (ref 7–23)
CALCIUM SERPL-MCNC: 9.2 MG/DL — SIGNIFICANT CHANGE UP (ref 8.4–10.5)
CHLORIDE SERPL-SCNC: 106 MMOL/L — SIGNIFICANT CHANGE UP (ref 96–108)
CO2 SERPL-SCNC: 24 MMOL/L — SIGNIFICANT CHANGE UP (ref 22–31)
CREAT SERPL-MCNC: 1.31 MG/DL — HIGH (ref 0.5–1.3)
EGFR: 59 ML/MIN/1.73M2 — LOW
ESTIMATED AVERAGE GLUCOSE: 120 MG/DL — HIGH (ref 68–114)
FOLATE SERPL-MCNC: 3.9 NG/ML — LOW
GLUCOSE BLDC GLUCOMTR-MCNC: 115 MG/DL — HIGH (ref 70–99)
GLUCOSE BLDC GLUCOMTR-MCNC: 128 MG/DL — HIGH (ref 70–99)
GLUCOSE BLDC GLUCOMTR-MCNC: 132 MG/DL — HIGH (ref 70–99)
GLUCOSE BLDC GLUCOMTR-MCNC: 159 MG/DL — HIGH (ref 70–99)
GLUCOSE SERPL-MCNC: 142 MG/DL — HIGH (ref 70–99)
HCT VFR BLD CALC: 40.6 % — SIGNIFICANT CHANGE UP (ref 39–50)
HCV AB S/CO SERPL IA: 0.11 S/CO — SIGNIFICANT CHANGE UP (ref 0–0.99)
HCV AB SERPL-IMP: SIGNIFICANT CHANGE UP
HGB BLD-MCNC: 13.3 G/DL — SIGNIFICANT CHANGE UP (ref 13–17)
MAGNESIUM SERPL-MCNC: 2.2 MG/DL — SIGNIFICANT CHANGE UP (ref 1.6–2.6)
MCHC RBC-ENTMCNC: 30.9 PG — SIGNIFICANT CHANGE UP (ref 27–34)
MCHC RBC-ENTMCNC: 32.8 GM/DL — SIGNIFICANT CHANGE UP (ref 32–36)
MCV RBC AUTO: 94.4 FL — SIGNIFICANT CHANGE UP (ref 80–100)
NRBC # BLD: 0 /100 WBCS — SIGNIFICANT CHANGE UP (ref 0–0)
PHOSPHATE SERPL-MCNC: 4.5 MG/DL — SIGNIFICANT CHANGE UP (ref 2.5–4.5)
PLATELET # BLD AUTO: 152 K/UL — SIGNIFICANT CHANGE UP (ref 150–400)
POTASSIUM SERPL-MCNC: 4.2 MMOL/L — SIGNIFICANT CHANGE UP (ref 3.5–5.3)
POTASSIUM SERPL-SCNC: 4.2 MMOL/L — SIGNIFICANT CHANGE UP (ref 3.5–5.3)
PROT SERPL-MCNC: 6.4 G/DL — SIGNIFICANT CHANGE UP (ref 6–8.3)
RBC # BLD: 4.3 M/UL — SIGNIFICANT CHANGE UP (ref 4.2–5.8)
RBC # FLD: 14.4 % — SIGNIFICANT CHANGE UP (ref 10.3–14.5)
SODIUM SERPL-SCNC: 140 MMOL/L — SIGNIFICANT CHANGE UP (ref 135–145)
VIT B12 SERPL-MCNC: 580 PG/ML — SIGNIFICANT CHANGE UP (ref 232–1245)
WBC # BLD: 11.06 K/UL — HIGH (ref 3.8–10.5)
WBC # FLD AUTO: 11.06 K/UL — HIGH (ref 3.8–10.5)

## 2023-04-18 PROCEDURE — 71045 X-RAY EXAM CHEST 1 VIEW: CPT | Mod: 26

## 2023-04-18 PROCEDURE — 99222 1ST HOSP IP/OBS MODERATE 55: CPT

## 2023-04-18 PROCEDURE — 99221 1ST HOSP IP/OBS SF/LOW 40: CPT

## 2023-04-18 PROCEDURE — 70553 MRI BRAIN STEM W/O & W/DYE: CPT | Mod: 26

## 2023-04-18 PROCEDURE — 99233 SBSQ HOSP IP/OBS HIGH 50: CPT

## 2023-04-18 RX ORDER — HEPARIN SODIUM 5000 [USP'U]/ML
5000 INJECTION INTRAVENOUS; SUBCUTANEOUS EVERY 8 HOURS
Refills: 0 | Status: DISCONTINUED | OUTPATIENT
Start: 2023-04-18 | End: 2023-04-18

## 2023-04-18 RX ORDER — HEPARIN SODIUM 5000 [USP'U]/ML
7500 INJECTION INTRAVENOUS; SUBCUTANEOUS EVERY 8 HOURS
Refills: 0 | Status: DISCONTINUED | OUTPATIENT
Start: 2023-04-18 | End: 2023-04-21

## 2023-04-18 RX ORDER — HEPARIN SODIUM 5000 [USP'U]/ML
7500 INJECTION INTRAVENOUS; SUBCUTANEOUS EVERY 8 HOURS
Refills: 0 | Status: DISCONTINUED | OUTPATIENT
Start: 2023-04-18 | End: 2023-04-18

## 2023-04-18 RX ORDER — DEXTROSE 50 % IN WATER 50 %
15 SYRINGE (ML) INTRAVENOUS ONCE
Refills: 0 | Status: DISCONTINUED | OUTPATIENT
Start: 2023-04-18 | End: 2023-04-18

## 2023-04-18 RX ORDER — ENTACAPONE 200 MG/1
200 TABLET, FILM COATED ORAL EVERY 8 HOURS
Refills: 0 | Status: DISCONTINUED | OUTPATIENT
Start: 2023-04-18 | End: 2023-04-21

## 2023-04-18 RX ORDER — ENOXAPARIN SODIUM 100 MG/ML
40 INJECTION SUBCUTANEOUS
Refills: 0 | Status: DISCONTINUED | OUTPATIENT
Start: 2023-04-18 | End: 2023-04-18

## 2023-04-18 RX ORDER — IPRATROPIUM/ALBUTEROL SULFATE 18-103MCG
3 AEROSOL WITH ADAPTER (GRAM) INHALATION ONCE
Refills: 0 | Status: COMPLETED | OUTPATIENT
Start: 2023-04-18 | End: 2023-04-18

## 2023-04-18 RX ORDER — INSULIN LISPRO 100/ML
VIAL (ML) SUBCUTANEOUS
Refills: 0 | Status: DISCONTINUED | OUTPATIENT
Start: 2023-04-18 | End: 2023-04-21

## 2023-04-18 RX ADMIN — Medication 1000 MILLIGRAM(S): at 19:23

## 2023-04-18 RX ADMIN — Medication 4 MILLIGRAM(S): at 05:06

## 2023-04-18 RX ADMIN — Medication 2: at 11:19

## 2023-04-18 RX ADMIN — Medication 100 MILLIGRAM(S): at 01:10

## 2023-04-18 RX ADMIN — CARBIDOPA AND LEVODOPA 1 TABLET(S): 25; 100 TABLET ORAL at 16:12

## 2023-04-18 RX ADMIN — Medication 4 MILLIGRAM(S): at 00:12

## 2023-04-18 RX ADMIN — Medication 3 MILLILITER(S): at 14:11

## 2023-04-18 RX ADMIN — Medication 1000 MILLIGRAM(S): at 10:31

## 2023-04-18 RX ADMIN — GABAPENTIN 400 MILLIGRAM(S): 400 CAPSULE ORAL at 00:12

## 2023-04-18 RX ADMIN — Medication 4 MILLIGRAM(S): at 23:24

## 2023-04-18 RX ADMIN — HEPARIN SODIUM 7500 UNIT(S): 5000 INJECTION INTRAVENOUS; SUBCUTANEOUS at 23:23

## 2023-04-18 RX ADMIN — CARBIDOPA AND LEVODOPA 1 TABLET(S): 25; 100 TABLET ORAL at 23:24

## 2023-04-18 RX ADMIN — Medication 1000 MILLIGRAM(S): at 18:50

## 2023-04-18 RX ADMIN — LEVETIRACETAM 1000 MILLIGRAM(S): 250 TABLET, FILM COATED ORAL at 18:50

## 2023-04-18 RX ADMIN — SENNA PLUS 2 TABLET(S): 8.6 TABLET ORAL at 23:24

## 2023-04-18 RX ADMIN — ENTACAPONE 200 MILLIGRAM(S): 200 TABLET, FILM COATED ORAL at 23:23

## 2023-04-18 RX ADMIN — CARBIDOPA AND LEVODOPA 1 TABLET(S): 25; 100 TABLET ORAL at 12:12

## 2023-04-18 RX ADMIN — AMLODIPINE BESYLATE 5 MILLIGRAM(S): 2.5 TABLET ORAL at 05:06

## 2023-04-18 RX ADMIN — GABAPENTIN 400 MILLIGRAM(S): 400 CAPSULE ORAL at 23:24

## 2023-04-18 RX ADMIN — GABAPENTIN 400 MILLIGRAM(S): 400 CAPSULE ORAL at 12:11

## 2023-04-18 RX ADMIN — Medication 1000 MILLIGRAM(S): at 11:46

## 2023-04-18 RX ADMIN — Medication 4 MILLIGRAM(S): at 12:11

## 2023-04-18 RX ADMIN — GABAPENTIN 400 MILLIGRAM(S): 400 CAPSULE ORAL at 18:49

## 2023-04-18 RX ADMIN — POLYETHYLENE GLYCOL 3350 17 GRAM(S): 17 POWDER, FOR SOLUTION ORAL at 12:11

## 2023-04-18 RX ADMIN — HEPARIN SODIUM 7500 UNIT(S): 5000 INJECTION INTRAVENOUS; SUBCUTANEOUS at 14:08

## 2023-04-18 RX ADMIN — PANTOPRAZOLE SODIUM 40 MILLIGRAM(S): 20 TABLET, DELAYED RELEASE ORAL at 07:19

## 2023-04-18 RX ADMIN — Medication 4 MILLIGRAM(S): at 18:50

## 2023-04-18 NOTE — OCCUPATIONAL THERAPY INITIAL EVALUATION ADULT - DIAGNOSIS, OT EVAL
Pt is s/p L frontal crani meningioma resection (4/17), with history of Parkinson's, presents with impaired balance, decreased trunk control, generalized weakness, +BUE resting and intention tremors (L>R), and decreased activity tolerance impacting overall ease of completing ADLs and functional mobility tasks at [facility].

## 2023-04-18 NOTE — PHYSICAL THERAPY INITIAL EVALUATION ADULT - IMPAIRMENTS FOUND, PT EVAL
aerobic capacity/endurance/decreased midline orientation/gait, locomotion, and balance/muscle strength/neuromotor development and sensory integration/posture

## 2023-04-18 NOTE — OCCUPATIONAL THERAPY INITIAL EVALUATION ADULT - MODALITIES TREATMENT COMMENTS
Cranial Nerves II - XII: II: PERRLA; visual fields are full to confrontation III, IV, VI: EOMI, no nystagmus appreciated V: facial sensation intact to light touch V1-V3 b/l VII: no ptosis, no facial droop, difficulty raising eyebrows 2/2 crani wrap dressing VIII: hearing intact to finger rub b/l  XI: head turning limited when turning towards (L) side and weak L shoulder shrug XII: tongue protrusion midline. Vision Quadrant Test: grossly intact, pt with difficulty following directions/attention

## 2023-04-18 NOTE — PHYSICAL THERAPY INITIAL EVALUATION ADULT - ADDITIONAL COMMENTS
History provided by son, Lauer, at bedside. Pt's son is home with patient most of the time. Pt has a hospital bed, wheelchair and has assist from son for all ADLs. Pt only leaves home for doctor's appointment. Pt primarily uses cane to ambulate and has had multiple falls, per son.

## 2023-04-18 NOTE — PHYSICAL THERAPY INITIAL EVALUATION ADULT - GAIT DEVIATIONS NOTED, PT EVAL
decreased step length/decreased stride length/decreased weight-shifting ability decreased mita/decreased step length/decreased stride length/decreased weight-shifting ability

## 2023-04-18 NOTE — PHYSICAL THERAPY INITIAL EVALUATION ADULT - MODALITIES TREATMENT COMMENTS
I spoke with patient and scheduled a f/u x2 person assistance/maximum assist (25% patients effort) Cranial Nerves II - XII: II: visual fields are full to confrontation III, IV, VI: EOMI, no nystagmus appreciated. Vision H-Test: bilateral tracking intact until R temporal field. Convergence/Divergence: not intact; Vision Quadrant Test: intact V: facial sensation intact to light touch V1-V3 b/l VII: no ptosis, no facial droop, symmetric eyebrow raise and closure VIII: hearing intact to finger rub b/l  XI:decreased L head turning and L shoulder shrug intact b/l XII: tongue protrusion midline.

## 2023-04-18 NOTE — PROGRESS NOTE ADULT - SUBJECTIVE AND OBJECTIVE BOX
HPI:  68M hx poorly controlled PD w/ prog inability to walk since Nov 2022. Found to have large L frontal dural based lesion c/f meningioma w/ associated mass effect and edema. Presenting today for elective resection of mass.        (17 Apr 2023 06:59)    Hospital course:  4/17: POD 0 L crani for tumor resection.   4/18: POD1 L crani tumor resection. Norvasc increased to 5mg daily. Neuro exam stabe.    Vital Signs Last 24 Hrs  T(C): 37.2 (17 Apr 2023 21:54), Max: 37.2 (17 Apr 2023 21:54)  T(F): 99 (17 Apr 2023 21:54), Max: 99 (17 Apr 2023 21:54)  HR: 90 (17 Apr 2023 23:00) (60 - 95)  BP: 116/62 (17 Apr 2023 15:00) (116/62 - 151/91)  BP(mean): 84 (17 Apr 2023 15:00) (84 - 114)  RR: 18 (17 Apr 2023 23:00) (16 - 22)  SpO2: 100% (17 Apr 2023 23:00) (98% - 100%)    Parameters below as of 17 Apr 2023 23:00  Patient On (Oxygen Delivery Method): room air        I&O's Summary    17 Apr 2023 07:01  -  18 Apr 2023 00:02  --------------------------------------------------------  IN: 1810 mL / OUT: 1300 mL / NET: 510 mL        PHYSICAL EXAM:  General: NAD, pt is comfortably sitting up in bed, on room air  Cardiovascular: RRR, normal S1 and S2   Respiratory: lungs CTAB, no wheezing, rhonchi, or crackles   GI: normoactive BS to auscultation, abd soft, NTND   Neuro: AAOx3, slow speech at baseline, FC  CNII-CXII: PERRL 3mm briskly reactive, EOMI b/l, face symmetric, tongue midline  Motor: RAMIREZ x4 spontaneously, 5/5 strength in UE throughout B/L, 3/5 HF B/L otherwise 5/5 throughout  Sensation intact to light touch throughout  Extremities: distal pulses 2+ x4 symmetric  Wound/incision: Left cranial incision intact. Wound covered with xeroform and gauze head wrap.     TUBES/LINES:  [] Nelson  [] Lumbar Drain  [] Wound Drains  [] Others    DIET:  [] NPO  [x] Mechanical  [] Tube feeds    LABS:                        15.3   3.72  )-----------( 147      ( 17 Apr 2023 11:55 )             46.8     04-17    142  |  108  |  17  ----------------------------<  161<H>  4.0   |  24  |  1.24    Ca    8.9      17 Apr 2023 11:55  Phos  3.1     04-17  Mg     2.2     04-17    TPro  7.1  /  Alb  4.2  /  TBili  0.4  /  DBili  x   /  AST  14  /  ALT  6<L>  /  AlkPhos  61  04-17    PT/INR - ( 17 Apr 2023 11:55 )   PT: 13.0 sec;   INR: 1.09          PTT - ( 17 Apr 2023 11:55 )  PTT:35.3 sec        CAPILLARY BLOOD GLUCOSE      POCT Blood Glucose.: 125 mg/dL (17 Apr 2023 21:13)  POCT Blood Glucose.: 113 mg/dL (17 Apr 2023 16:44)      Drug Levels: [] N/A    CSF Analysis: [] N/A      Allergies    No Known Allergies    Intolerances      MEDICATIONS:  Antibiotics:  ceFAZolin   IVPB 2000 milliGRAM(s) IV Intermittent every 8 hours    Neuro:  acetaminophen     Tablet .. 1000 milliGRAM(s) Oral every 8 hours  carbidopa/levodopa  25/250 1 Tablet(s) Oral <User Schedule>  gabapentin 400 milliGRAM(s) Oral four times a day  levETIRAcetam 1000 milliGRAM(s) Oral two times a day  ondansetron Injectable 4 milliGRAM(s) IV Push every 6 hours PRN  oxyCODONE    IR 5 milliGRAM(s) Oral every 6 hours PRN    Anticoagulation:    OTHER:  chlorhexidine 2% Cloths 1 Application(s) Topical every 12 hours  dexAMETHasone     Tablet   Oral   dexAMETHasone     Tablet 4 milliGRAM(s) Oral every 6 hours  insulin lispro (ADMELOG) corrective regimen sliding scale   SubCutaneous Before meals and at bedtime  niCARdipine Infusion 5 mG/Hr IV Continuous <Continuous>  pantoprazole    Tablet 40 milliGRAM(s) Oral before breakfast  polyethylene glycol 3350 17 Gram(s) Oral daily  senna 2 Tablet(s) Oral at bedtime    IVF:    CULTURES:    RADIOLOGY & ADDITIONAL TESTS:      ASSESSMENT:  68M hx poorly controlled Parkinson's disease w/ prog inability to walk since Nov 2022. Found to have large L frontal dural based lesion c/f meningioma w/ associated mass effect and edema. Now s/p L crani for meningioma resection (4/17).    D32.9    Handoff    Parkinsons    HTN (hypertension)    History of seizures    Brain mass    Impaired gait    Meningioma    History of insomnia    Brain tumor    Brain tumor    Craniotomy for resection of tumor of left side of brain    No significant past surgical history    SysAdmin_VstLnk      NEURO:  -neuro/vitals q1h  - decadron 4q6, taper over 1 week to off  - continue home keppra 1g BID x7d  - continue home sinemet (25/250) 4 times/day  - continue home gabapentin 400mg 4 times/day  - MR brain w/wo post op pending  - pain control, cranial ERAS (tylenol standing), oxy prn  - pending neurology evaluation    CARDIOVASCULAR:  - -140  - cardene gtt  - continue home amlodipine 5mg qd    PULMONARY:  - room air  - incentive spirometry     RENAL:  - IVL  - TOV  - strict I/Os    GI:  - ADAT  - bowel regimen  - PPI on steroids    HEME:  - SCDs for DVT ppx  - LE dopplers pending    ID:  - post op Ancef    ENDO:  - ISS    DISPO:  - full code, NSCU, pending PT/OT    Discussed with Dr. D'Amico and Dr. Grimaldo

## 2023-04-18 NOTE — OCCUPATIONAL THERAPY INITIAL EVALUATION ADULT - MODIFIED CLINICAL TEST OF SENSORY INTEGRATION IN BALANCE TEST
Pt tolerated sitting at EOB for ~10 minutes with SBA-mod A to maintain as pt with retropulsive lean as he fatigues. Max x2 assist for sit<>stand with RW, performed weight shifting in standing, pt leaning towards (R) side, unable to take any side steps as pt with difficulty weight shifting to L to maneuver RLE.

## 2023-04-18 NOTE — CONSULT NOTE ADULT - ASSESSMENT
68 year old gentleman with PMHx of Parkinson's disease diagnosed 2012, found to have a large (4.6cm) L frontal convexity dural based enhancing mass representing a frontal lob meningioma, now s/p L crani s/p meningioma resection (4/17/23).   Neurology is consulted to assess the patient for possible PD medication optimization.   He is diagnosed with PD on 2012, progressed over time, he is wheelchair bond since about 2-3 years ago. Per son he has episodes of OFF-time phenomenons around noon which caused him few falls. His Sinemet was adjusted in January from BID to 4 times a day as 8AM, 12, 4 and 8PM. He likely experiencing some REM sleep disorder as he has difficulty starting and maintaining the sleep. On exam increased course tremor of b/l UEs noticed at both rest and with intention, hypomimia, hypophonia, bradykinesia and rigidity also prominent. He is also On Keppra 1gr BID s/p Ncx.     Recommendations:   ·	Continue with current dose of sinemet 25/20mg with current schedule.   ·	Please avoid Haldol, Zyprexa, or any other medications affecting DA pathway.   ·	Patient can be a good candidate for [facility] re[facility] for parkinson's disease, and we updated movement specialist team regarding this patient.   ·	Remaining care at discretion of primary team    ***Preliminary Note***  68 year old gentleman with PMHx of Parkinson's disease diagnosed 2012, found to have a large (4.6cm) L frontal convexity dural based enhancing mass representing a frontal lob meningioma, now s/p L crani s/p meningioma resection (4/17/23).   Neurology is consulted to assess the patient for possible PD medication optimization.   He is diagnosed with PD on 2012, progressed over time, he is wheelchair bond since about 2-3 years ago. Per son he has episodes of OFF-time phenomenons around noon which caused him few falls. His Sinemet was adjusted in January from BID to 4 times a day as 8AM, 12, 4 and 8PM. He likely experiencing some REM sleep disorder as he has difficulty starting and maintaining the sleep. On exam increased course tremor of b/l UEs noticed at both rest and with intention, hypomimia, hypophonia, bradykinesia and rigidity also prominent. He is also On Keppra 1gr BID s/p Ncx.     Recommendations:   ·	Continue with current dose of sinemet 25/20mg with current schedule.   ·	Please avoid Haldol, Zyprexa, or any other medications affecting DA pathway.   ·	Patient can be a good candidate for [facility] re[facility] for parkinson's disease, and we updated movement specialist team regarding this patient.   ·	Confirm his ant PD meds since he was on Entacapone noticed on outpatient notes as well.   ·	Remaining care at discretion of primary team    ***Preliminary Note***  68 year old gentleman with PMHx of Parkinson's disease diagnosed 2012, found to have a large (4.6cm) L frontal convexity dural based enhancing mass representing a frontal lob meningioma, now s/p L crani s/p meningioma resection (4/17/23).   Neurology is consulted to assess the patient for possible PD medication optimization.   He is diagnosed with PD on 2012, progressed over time, he is wheelchair bond since about 2-3 years ago. Per son he has episodes of OFF-time phenomenons around noon which caused him few falls. His Sinemet was adjusted in January from BID to 4 times a day as 8AM, 12, 4 and 8PM. He likely experiencing some REM sleep disorder as he has difficulty starting and maintaining the sleep. On exam increased course tremor of b/l UEs noticed at both rest and with intention, hypomimia, hypophonia, bradykinesia and rigidity also prominent. He is also On Keppra 1gr BID s/p Ncx.     Recommendations:   ·	Continue with current dose of sinemet 25/20mg with current schedule.   ·	Please avoid Haldol, Zyprexa, or any other medications affecting DA pathway.   ·	Patient can be a good candidate for [facility] re[facility] for parkinson's disease, and we updated movement specialist team regarding this patient.   ·	Confirm his anti PD meds since he was on Entacapone noticed on outpatient notes as well.   ·	PT/OT/PMNR  ·	SLP eval.   ·	Remaining care at discretion of primary team   68 year old gentleman with PMHx of Parkinson's disease diagnosed 2012, found to have a large (4.6cm) L frontal convexity dural based enhancing mass representing a frontal lob meningioma, now s/p L crani s/p meningioma resection (4/17/23).   Neurology is consulted to assess the patient for possible PD medication optimization.   He is diagnosed with PD on 2012, progressed over time, he is wheelchair bond since about 2-3 years ago. Per son he has episodes of OFF-time phenomenons around noon which caused him few falls. His Sinemet was adjusted in January from BID to 4 times a day as 8AM, 12, 4 and 8PM. He likely experiencing some REM sleep disorder as he has difficulty starting and maintaining the sleep. On exam increased course tremor of b/l UEs noticed at both rest and with intention, hypomimia, hypophonia, bradykinesia and rigidity also prominent. He is also On Keppra 1gr BID s/p Ncx.     Recommendations:   ·	Continue with current dose of sinemet 25/20mg with current schedule.   ·	Please avoid Haldol, Zyprexa, or any other medications affecting DA pathway.   ·	Patient can be a good candidate for [facility] re[facility] for parkinson's disease, and we updated movement specialist team regarding this patient.   ·	Confirm his anti PD meds since he was on Entacapone noticed on outpatient notes as well.   ·	PT/OT/PMNR  ·	SLP eval.   ·	[facility] pending movement team evaluation tomorrow morning.    ·	Remaining care at discretion of primary team

## 2023-04-18 NOTE — CONSULT NOTE ADULT - SUBJECTIVE AND OBJECTIVE BOX
NEUROLOGY CONSULT    HPI:  68M hx poorly controlled PD w/ prog inability to walk since Nov 2022. Found to have large L frontal dural based lesion c/f meningioma w/ associated mass effect and edema. Presenting for elective resection of mass. now s/p L crani for meningioma resection yesterday (4/17/23).   Now s/p     ICU Vital Signs Last 24 Hrs  T(C): --  T(F): --  HR: --  BP: --  BP(mean): --  ABP: --  ABP(mean): --  RR: --  SpO2: --     (17 Apr 2023 06:59)     MEDICATIONS  Home Medications:  amLODIPine 2.5 mg oral tablet: 1 orally once a day (17 Apr 2023 06:54)  carbidopa-levodopa 25 mg-250 mg oral tablet: 1 orally 4 times a day (17 Apr 2023 06:54)  gabapentin 400 mg oral capsule: 1 orally 4 times a day (17 Apr 2023 06:54)  levETIRAcetam 1000 mg oral tablet: 1 orally once a day (17 Apr 2023 06:54)    MEDICATIONS  (STANDING):  acetaminophen     Tablet .. 1000 milliGRAM(s) Oral every 8 hours  amLODIPine   Tablet 5 milliGRAM(s) Oral daily  carbidopa/levodopa  25/250 1 Tablet(s) Oral <User Schedule>  dexAMETHasone     Tablet 4 milliGRAM(s) Oral every 6 hours  dexAMETHasone     Tablet   Oral   gabapentin 400 milliGRAM(s) Oral four times a day  heparin   Injectable 7500 Unit(s) SubCutaneous every 8 hours  insulin lispro (ADMELOG) corrective regimen sliding scale   SubCutaneous Before meals and at bedtime  levETIRAcetam 1000 milliGRAM(s) Oral two times a day  pantoprazole    Tablet 40 milliGRAM(s) Oral before breakfast  polyethylene glycol 3350 17 Gram(s) Oral daily  senna 2 Tablet(s) Oral at bedtime    MEDICATIONS  (PRN):  ondansetron Injectable 4 milliGRAM(s) IV Push every 6 hours PRN Nausea and/or Vomiting  oxyCODONE    IR 5 milliGRAM(s) Oral every 6 hours PRN Severe Pain (7 - 10)      FAMILY HISTORY:    SOCIAL HISTORY: negative for tobacco, alcohol, or ilicit drug use.    Allergies    No Known Allergies    Intolerances        GEN: NAD, pleasant, cooperative    NEURO:   MENTAL STATUS: AAOx3  LANG/SPEECH: Fluent, intact naming, repetition & comprehension  CRANIAL NERVES:  II: Pupils equal and reactive, no RAPD.  III, IV, VI: EOM intact, no gaze preference or deviation  V: normal  VII: Mild nasolabial asymmetry   VIII: normal hearing to speech  MOTOR: 4-/5 in b/l upper and 3/5 b/l LE extremities  REFLEXES: 2/4 UE, 1+ b/l LE throughout, bilateral flexor plantars  SENSORY: Normal to touch in all extremities  COORD: Slow finger to nose but not point passing and dysmetria.  Gait: Unable  Hypomimia bradykinesia of both UE and LE, b/l UE resting and intention tremor, hypophonia, increased rigidity of UEs L>R,     LABS:                        13.3   11.06 )-----------( 152      ( 18 Apr 2023 05:30 )             40.6     04-18    140  |  106  |  18  ----------------------------<  142<H>  4.2   |  24  |  1.31<H>    Ca    9.2      18 Apr 2023 05:30  Phos  4.5     04-18  Mg     2.2     04-18    TPro  6.4  /  Alb  3.6  /  TBili  0.6  /  DBili  0.2  /  AST  11  /  ALT  7<L>  /  AlkPhos  53  04-18    Hemoglobin A1C:   Vitamin B12   PT/INR - ( 17 Apr 2023 11:55 )   PT: 13.0 sec;   INR: 1.09       PTT - ( 17 Apr 2023 11:55 )  PTT:35.3 sec  CAPILLARY BLOOD GLUCOSE  POCT Blood Glucose.: 115 mg/dL (18 Apr 2023 06:06)  ABG - ( 17 Apr 2023 08:38 )  pH, Arterial: 7.39  pH, Blood: x     /  pCO2: 42    /  pO2: 272   / HCO3: 25    / Base Excess: 0.3   /  SaO2: x       Microbiology:  RADIOLOGY, EKG AND ADDITIONAL TESTS: Reviewed.  < from: MR Head w/wo IV Cont (03.25.23 @ 12:55) >  IMPRESSION:  4.6 cm left frontal convexity dural based extra-axial avidly avidly   enhancing mass, representing a frontal meningioma however the possibility   of a hemangiopericytoma  cannot be completely excluded. There is moderate   vasogenic edema and mass effect with subfalcine herniation and   left-to-right midline shift of 12 mm. These findings are grossly   unchanged since prior exam on 11/28/2022.    --- End of Report ---      < end of copied text >   NEUROLOGY CONSULT    HPI:  68M hx poorly controlled PD w/ prog inability to walk since Nov 2022. Found to have large L frontal dural based lesion c/f meningioma w/ associated mass effect and edema. Presenting for elective resection of mass. now s/p L crani for meningioma resection yesterday (4/17/23).   Now s/p        (17 Apr 2023 06:59)     MEDICATIONS  Home Medications:  amLODIPine 2.5 mg oral tablet: 1 orally once a day (17 Apr 2023 06:54)  carbidopa-levodopa 25 mg-250 mg oral tablet: 1 orally 4 times a day (17 Apr 2023 06:54)  gabapentin 400 mg oral capsule: 1 orally 4 times a day (17 Apr 2023 06:54)  levETIRAcetam 1000 mg oral tablet: 1 orally once a day (17 Apr 2023 06:54)    MEDICATIONS  (STANDING):  acetaminophen     Tablet .. 1000 milliGRAM(s) Oral every 8 hours  amLODIPine   Tablet 5 milliGRAM(s) Oral daily  carbidopa/levodopa  25/250 1 Tablet(s) Oral <User Schedule>  dexAMETHasone     Tablet 4 milliGRAM(s) Oral every 6 hours  dexAMETHasone     Tablet   Oral   gabapentin 400 milliGRAM(s) Oral four times a day  heparin   Injectable 7500 Unit(s) SubCutaneous every 8 hours  insulin lispro (ADMELOG) corrective regimen sliding scale   SubCutaneous Before meals and at bedtime  levETIRAcetam 1000 milliGRAM(s) Oral two times a day  pantoprazole    Tablet 40 milliGRAM(s) Oral before breakfast  polyethylene glycol 3350 17 Gram(s) Oral daily  senna 2 Tablet(s) Oral at bedtime    MEDICATIONS  (PRN):  ondansetron Injectable 4 milliGRAM(s) IV Push every 6 hours PRN Nausea and/or Vomiting  oxyCODONE    IR 5 milliGRAM(s) Oral every 6 hours PRN Severe Pain (7 - 10)      FAMILY HISTORY:    SOCIAL HISTORY: negative for tobacco, alcohol, or ilicit drug use.    Allergies    No Known Allergies    Intolerances        GEN: NAD, pleasant, cooperative    NEURO:   MENTAL STATUS: AAOx3  LANG/SPEECH: Fluent, intact naming, repetition & comprehension  CRANIAL NERVES:  II: Pupils equal and reactive, no RAPD.  III, IV, VI: EOM intact, no gaze preference or deviation  V: normal  VII: Mild nasolabial asymmetry   VIII: normal hearing to speech  MOTOR: 4-/5 in b/l upper and 3/5 b/l LE extremities.   REFLEXES: 2/4 UE, 1+ b/l LE throughout, bilateral flexor plantars  SENSORY: Normal to touch in all extremities  COORD: Slow finger to nose but not point passing and dysmetria.  Gait: Can stand with assist.   Hypomimia bradykinesia of both UE and LE, b/l UE resting and intention tremor, hypophonia, increased rigidity of UEs R>L.    LABS:                        13.3   11.06 )-----------( 152      ( 18 Apr 2023 05:30 )             40.6     04-18    140  |  106  |  18  ----------------------------<  142<H>  4.2   |  24  |  1.31<H>    Ca    9.2      18 Apr 2023 05:30  Phos  4.5     04-18  Mg     2.2     04-18    TPro  6.4  /  Alb  3.6  /  TBili  0.6  /  DBili  0.2  /  AST  11  /  ALT  7<L>  /  AlkPhos  53  04-18    Hemoglobin A1C:   Vitamin B12   PT/INR - ( 17 Apr 2023 11:55 )   PT: 13.0 sec;   INR: 1.09       PTT - ( 17 Apr 2023 11:55 )  PTT:35.3 sec  CAPILLARY BLOOD GLUCOSE  POCT Blood Glucose.: 115 mg/dL (18 Apr 2023 06:06)  ABG - ( 17 Apr 2023 08:38 )  pH, Arterial: 7.39  pH, Blood: x     /  pCO2: 42    /  pO2: 272   / HCO3: 25    / Base Excess: 0.3   /  SaO2: x       Microbiology:  RADIOLOGY, EKG AND ADDITIONAL TESTS: Reviewed.  < from: MR Head w/wo IV Cont (03.25.23 @ 12:55) >  IMPRESSION:  4.6 cm left frontal convexity dural based extra-axial avidly avidly   enhancing mass, representing a frontal meningioma however the possibility   of a hemangiopericytoma  cannot be completely excluded. There is moderate   vasogenic edema and mass effect with subfalcine herniation and   left-to-right midline shift of 12 mm. These findings are grossly   unchanged since prior exam on 11/28/2022.    --- End of Report ---      < end of copied text >

## 2023-04-18 NOTE — CONSULT NOTE ADULT - SUBJECTIVE AND OBJECTIVE BOX
HPI:  68M hx poorly controlled PD w/ prog inability to walk since Nov 2022. Found to have large L frontal dural based lesion c/f meningioma w/ associated mass effect and edema. Presenting today for elective resection of mass.     ICU Vital Signs Last 24 Hrs  T(C): --  T(F): --  HR: --  BP: --  BP(mean): --  ABP: --  ABP(mean): --  RR: --  SpO2: --     (17 Apr 2023 06:59)      ROS: A 10-point review of systems was otherwise negative.    PAST MEDICAL & SURGICAL HISTORY:  Parkinsons      HTN (hypertension)      History of seizures      Brain mass      Impaired gait      Meningioma      History of insomnia      No significant past surgical history        SOCIAL HISTORY:  FAMILY HISTORY:    ALLERGIES: 	  No Known Allergies          MEDICATIONS:  acetaminophen     Tablet .. 1000 milliGRAM(s) Oral every 8 hours  amLODIPine   Tablet 5 milliGRAM(s) Oral daily  carbidopa/levodopa  25/250 1 Tablet(s) Oral <User Schedule>  dexAMETHasone     Tablet   Oral   dexAMETHasone     Tablet 4 milliGRAM(s) Oral every 6 hours  gabapentin 400 milliGRAM(s) Oral four times a day  heparin   Injectable 7500 Unit(s) SubCutaneous every 8 hours  insulin lispro (ADMELOG) corrective regimen sliding scale   SubCutaneous Before meals and at bedtime  levETIRAcetam 1000 milliGRAM(s) Oral two times a day  ondansetron Injectable 4 milliGRAM(s) IV Push every 6 hours PRN  oxyCODONE    IR 5 milliGRAM(s) Oral every 6 hours PRN  pantoprazole    Tablet 40 milliGRAM(s) Oral before breakfast  polyethylene glycol 3350 17 Gram(s) Oral daily  senna 2 Tablet(s) Oral at bedtime      PHYSICAL EXAM:  T(C): 37.1 (04-18-23 @ 10:56), Max: 37.2 (04-17-23 @ 21:54)  HR: 83 (04-18-23 @ 10:56) (75 - 95)  BP: 129/64 (04-18-23 @ 10:56) (116/62 - 129/64)  RR: 17 (04-18-23 @ 10:56) (15 - 20)  SpO2: 95% (04-18-23 @ 10:56) (95% - 100%)  Wt(kg): --    GEN: Awake, comfortable. NAD.   HEENT: NCAT, PERRL, EOMI. Mucosa moist. No JVD.   RESP: CTA b/l  CV: RRR, normal s1/s2. No m/r/g.  ABD: Soft, NTND. BS+  EXT: Warm. No edema, clubbing, or cyanosis.   NEURO: AAOx3. No focal deficits.    I&O's Summary    17 Apr 2023 07:01  -  18 Apr 2023 07:00  --------------------------------------------------------  IN: 2700 mL / OUT: 2000 mL / NET: 700 mL    18 Apr 2023 07:01  -  18 Apr 2023 14:32  --------------------------------------------------------  IN: 0 mL / OUT: 250 mL / NET: -250 mL        LABS:	 	                        13.3   11.06 )-----------( 152      ( 18 Apr 2023 05:30 )             40.6     04-18    140  |  106  |  18  ----------------------------<  142<H>  4.2   |  24  |  1.31<H>    Ca    9.2      18 Apr 2023 05:30  Phos  4.5     04-18  Mg     2.2     04-18    TPro  6.4  /  Alb  3.6  /  TBili  0.6  /  DBili  0.2  /  AST  11  /  ALT  7<L>  /  AlkPhos  53  04-18        proBNP:   Lipid Profile:   HgA1c:   TSH:     TELEMETRY: 	    ECG:  	  RADIOLOGY:   ECHO:  STRESS:  CATH:   HPI: 68M with PMH of seizure disorder, HTN, and Parkinson's disease who presented to [facility] for elective resection of large L frontal meningioma with associated mass effect and edema. Cardiology consulted for observed episode of VT on telemetry.    Subjective: Patient reports headache after his surgery. He denies chest pain or palpitations now or in his post-op period.       ROS: A 10-point review of systems was otherwise negative.    PAST MEDICAL & SURGICAL HISTORY:  Parkinsons      HTN (hypertension)      History of seizures      Brain mass      Impaired gait      Meningioma      History of insomnia      No significant past surgical history        SOCIAL HISTORY:  FAMILY HISTORY:    ALLERGIES: 	  No Known Allergies          MEDICATIONS:  acetaminophen     Tablet .. 1000 milliGRAM(s) Oral every 8 hours  amLODIPine   Tablet 5 milliGRAM(s) Oral daily  carbidopa/levodopa  25/250 1 Tablet(s) Oral <User Schedule>  dexAMETHasone     Tablet   Oral   dexAMETHasone     Tablet 4 milliGRAM(s) Oral every 6 hours  gabapentin 400 milliGRAM(s) Oral four times a day  heparin   Injectable 7500 Unit(s) SubCutaneous every 8 hours  insulin lispro (ADMELOG) corrective regimen sliding scale   SubCutaneous Before meals and at bedtime  levETIRAcetam 1000 milliGRAM(s) Oral two times a day  ondansetron Injectable 4 milliGRAM(s) IV Push every 6 hours PRN  oxyCODONE    IR 5 milliGRAM(s) Oral every 6 hours PRN  pantoprazole    Tablet 40 milliGRAM(s) Oral before breakfast  polyethylene glycol 3350 17 Gram(s) Oral daily  senna 2 Tablet(s) Oral at bedtime      PHYSICAL EXAM:  T(C): 37.1 (04-18-23 @ 10:56), Max: 37.2 (04-17-23 @ 21:54)  HR: 83 (04-18-23 @ 10:56) (75 - 95)  BP: 129/64 (04-18-23 @ 10:56) (116/62 - 129/64)  RR: 17 (04-18-23 @ 10:56) (15 - 20)  SpO2: 95% (04-18-23 @ 10:56) (95% - 100%)  Wt(kg): --    GEN: Awake, head bandaged, in no distress.   HEENT: EOMI. Mucosa moist. No JVD.   RESP: CTA b/l  CV: RRR, normal s1/s2. No m/r/g.  ABD: Soft, NTND. BS+  EXT: Warm. No edema, clubbing, or cyanosis.   NEURO: No focal deficits. Arms with parkinsonian movements/tremor.    I&O's Summary    17 Apr 2023 07:01  -  18 Apr 2023 07:00  --------------------------------------------------------  IN: 2700 mL / OUT: 2000 mL / NET: 700 mL    18 Apr 2023 07:01  -  18 Apr 2023 14:32  --------------------------------------------------------  IN: 0 mL / OUT: 250 mL / NET: -250 mL        LABS:	 	                        13.3   11.06 )-----------( 152      ( 18 Apr 2023 05:30 )             40.6     04-18    140  |  106  |  18  ----------------------------<  142<H>  4.2   |  24  |  1.31<H>    Ca    9.2      18 Apr 2023 05:30  Phos  4.5     04-18  Mg     2.2     04-18    TPro  6.4  /  Alb  3.6  /  TBili  0.6  /  DBili  0.2  /  AST  11  /  ALT  7<L>  /  AlkPhos  53  04-18    HgA1c:  5.8    TELEMETRY: Reviewed. Short run of artifact appearing as wide-complex tachycardia superimposed on regular narrow QRS complexes marching through.

## 2023-04-18 NOTE — PHYSICAL THERAPY INITIAL EVALUATION ADULT - GENERAL OBSERVATIONS, REHAB EVAL
PT IE Completed. Pt received semi-supine in bed, A&Ox4, son at bedside, flat affect, c/d/i crani dressing, +RA,+heplock,  in NAD and agreeable to work with PT, PUSHPA Martin notified. Pt s/p L craniotomy on 4/17. PT exam shows decreased strength LUE/LLE more affected, L blurry vision, and poor balance sitting/standing.. Pt completed bed mob, STS and take steps with LLE; 2 person assist.  Pt left as found, +bed alarm, +call franklin within reach, PUSHPA Martin notified. Pt can benefit from PT in order to improve quality of life by increasing strength/endurance/balance with functional mobility and ADLS.

## 2023-04-18 NOTE — PHYSICAL THERAPY INITIAL EVALUATION ADULT - KNEE EXTENSION MMT, REHAB EVAL
Appointment source: New Patient  Patient name: Shon Sargent  Urology Staff: Sundar Chvaes MD    Seen at the request of Dr. Sandhu    Subjective: This is a 56 year old year old male complaining of elevated PSA.    Family history of prostate cancer (grandfather) underwent RRP but did not die of prostate cancer.    Mild/moderate symptoms of bladder outlet obstruction. Tamsulosin not tolerated.    Review of systems: a comprehensive 10 point ROS was obtained and was otherwise negative except for that outlined above.    Problem list and histories reviewed & adjusted, as indicated.  Additional history: as documented    Objective:    Examination:    Healthy male  HEENT: anicteric sclera, normal extraocular movements  Respiratory: normal, non-labored breathing  Musculoskeletal: Normal muscular movements  Skin normal temperature, no rash  Psychiatric: appropriate affect    Abdomen benign  No evidence of inguinal hernia  No evidence of inguinal adenopathy  Phallus without lesion.  Scrotal contents normal  Rectal examination normal  Prostate benign to palpation    PSA:  2019 4.76 ng/mL, 2013: 3.54 ng/mL, 2012 3.38 ng/mL, 2008 2.35 ng/mL, 2007 2.66 ng/mL    Assessment:  Mildly elevated PSA and family history of prostate cancer.    Plan:  Repeat PSA in 6 months and consider prostate MR if PSA continues to rise.         (2-) poor minus, left/(2) poor, right

## 2023-04-18 NOTE — PHYSICAL THERAPY INITIAL EVALUATION ADULT - PERTINENT HX OF CURRENT PROBLEM, REHAB EVAL
67yo M hx poorly controlled PD w/ prog inability to walk since Nov 2022. Found to have large L frontal dural based lesion c/f meningioma w/ associated mass effect and edema. Presenting today for elective resection of mass.

## 2023-04-18 NOTE — OCCUPATIONAL THERAPY INITIAL EVALUATION ADULT - GENERAL OBSERVATIONS, REHAB EVAL
Pt received semi-supine in bed, +heplock, +crani wrap C/D/I, +SCDs, in NAD and agreeable to OT. Cleared by PUSHPA Martin to see.

## 2023-04-18 NOTE — CONSULT NOTE ADULT - ATTENDING COMMENTS
Initial attending contact date   4/18/23   . See resident note written above for details. I reviewed the resident documentation. I have personally seen and examined this patient. I reviewed vitals, labs, medications, cardiac studies, and additional imaging. I agree with the above residnet's findings and plans as written above with the following additions/statements.    -Cardiology consulted for VT noted on tele earlier this am  -Review of tele strip shows artifact, not VT  -Pls reconsult as needed
patient and son report he is close to baseline in terms of parkinsonism.  appears he was not getting entecapone on admission.  would re-start home entecapone 200mg TID, continue current sinemet.  we also have reached out to movement disorders to see if he is a good candidate for the [place] PD unit

## 2023-04-18 NOTE — PHYSICAL THERAPY INITIAL EVALUATION ADULT - GAIT PATTERN USED, PT EVAL
3-point gait Pt requires manual assist to weight shift and to advance LLE./3-point gait Pt requires manual assist to weight shift and to advance LLE. Left lateral shift./3-point gait

## 2023-04-18 NOTE — CONSULT NOTE ADULT - ASSESSMENT
68M with PMH of seizure disorder, HTN, and Parkinsons presenting to [facility] for elective removal of L front meningioma. Cardiology consulted for post-op telemetry strip appearing as WCT, during which time patient was reportedly asymptomatic. Upon telemetry review, there is a short run of artifact appearing as wide-complex tachycardia superimposed on regular narrow QRS complexes marching through. No cardiac symptoms reported by patient now or in his post-op period.  - re-consult cardiology as needed

## 2023-04-18 NOTE — OCCUPATIONAL THERAPY INITIAL EVALUATION ADULT - PERTINENT HX OF CURRENT PROBLEM, REHAB EVAL
69yo M hx poorly controlled PD w/ prog inability to walk since Nov 2022. Found to have large L frontal dural based lesion c/f meningioma w/ associated mass effect and edema. Presenting today for elective resection of mass.

## 2023-04-18 NOTE — PROGRESS NOTE ADULT - SUBJECTIVE AND OBJECTIVE BOX
***********************************************  ADULT NSICU PROGRESS NOTE  MAHDI EDGE [id number] [facility] [id number]  ***********************************************    INTERVALI:     ROS: negative except per mentioned above in 24h interval events.      VITALS:    ICU Vital Signs Last 24 Hrs  T(C): 36.2 (17 Apr 2023 07:39), Max: 36.2 (17 Apr 2023 07:39)  T(F): --  HR: 60 (17 Apr 2023 07:39) (60 - 60)  BP: 151/91 (17 Apr 2023 07:39) (151/91 - 151/91)  BP(mean): --  ABP: --  ABP(mean): --  RR: 18 (17 Apr 2023 07:39) (18 - 18)  SpO2: 98% (17 Apr 2023 07:39) (98% - 98%)            I&O's Summary      EXAM:     Joe Coma Scale: 4/2/1    General: normocephalic, head wrap, laying in bed w/ eyes open not attending  Neuro     MS: Eyes open but not attending examiner, not cooperative with examination right now, hypo/bradyphonic but seems to utter his own name    CN: PERRL, gaze conjugate and midline, face symmetric, hearing grossly intact    Mot: bulk normal, (+)rigidity in bilateral UE L > R, no movement bilateral upper/lower extremities    Coord: (+)bilateral UE L > R parkinsonian tremor  Chest: nonlabored respirations, distant lung sounds in all auscultated lung fields, heart regular rate/rhythm, present S1/S2, no murmurs or rubs  Abdomen: nondistended, soft and nontender without peritoneal signs, normoactive bowel sounds  Extremities: no clubbing, well-perfused, no edema    LABORATORY DATA:    ABG - ( 17 Apr 2023 08:38 )  pH, Arterial: 7.39  pH, Blood: x     /  pCO2: 42    /  pO2: 272   / HCO3: 25    / Base Excess: 0.3   /  SaO2: x                                       15.3   3.72  )-----------( 147      ( 17 Apr 2023 11:55 )             46.8                 IMAGING DATA:    CARDIOLOGY DATA:    MICROBIOLOGY DATA:        MEDICATIONS  (STANDING):  acetaminophen     Tablet .. 1000 milliGRAM(s) Oral every 8 hours  amLODIPine   Tablet 2.5 milliGRAM(s) Oral daily  carbidopa/levodopa  25/250 1 Tablet(s) Oral <User Schedule>  ceFAZolin   IVPB 2000 milliGRAM(s) IV Intermittent every 8 hours  chlorhexidine 2% Cloths 1 Application(s) Topical every 12 hours  dexAMETHasone  Injectable 4 milliGRAM(s) IV Push every 6 hours  gabapentin 400 milliGRAM(s) Oral four times a day  levETIRAcetam 1000 milliGRAM(s) Oral daily  polyethylene glycol 3350 17 Gram(s) Oral daily  senna 2 Tablet(s) Oral at bedtime    MEDICATIONS  (PRN):  acetaminophen   IVPB .. 1000 milliGRAM(s) IV Intermittent once PRN Mild Pain (1 - 3)  ondansetron Injectable 4 milliGRAM(s) IV Push every 6 hours PRN Nausea and/or Vomiting      ***********************************************  ASSESSMENT AND PLAN  ***********************************************    #S/p L. crani for resection of meningioma  #History of epilepsy, parkinson's disease (non-ambulatory)  #History of HTN    NEURO - admit NSICU, Q1h neuro checks / Q1h vital signs, postoperative care, CTH now, MRI brain w/wo (nonurgent), home LEV, dex 4q6, f/u final path, consider neuro consultation, PD home medications  PULM - SpO2 goal > 92%, supplemental O2 and pulm toileting as needed  CARDIO - BP goal < 140  GI - bowel regimen, stool count, diet: pending swallow evaluation  /RENAL - monitor UOP/volume status, BUN/SCr, d/c matthew  HEME - maintain Hb > 7.0, PLT > 100,000  ID - monitor for infectious s/s, fever curve, leukocytosis, perioperative ancef  ENDO - SGlu goal < 200 ***********************************************  ADULT NSICU PROGRESS NOTE  MAHDI EDGE [id number] [facility] [id number] [id number]  ***********************************************    INTERVALI: episode of VT this morning, possible sustained    ROS: negative except per mentioned above in 24h interval events.      VITALS:    ICU Vital Signs Last 24 Hrs  T(C): 37.1 (18 Apr 2023 10:56), Max: 37.2 (17 Apr 2023 21:54)  T(F): 98.7 (18 Apr 2023 10:56), Max: 99 (17 Apr 2023 21:54)  HR: 83 (18 Apr 2023 10:56) (68 - 95)  BP: 129/64 (18 Apr 2023 10:56) (116/62 - 141/82)  BP(mean): 88 (18 Apr 2023 09:00) (84 - 114)  ABP: 135/66 (18 Apr 2023 07:00) (102/54 - 160/88)  ABP(mean): 90 (18 Apr 2023 07:00) (72 - 121)  RR: 17 (18 Apr 2023 10:56) (15 - 22)  SpO2: 95% (18 Apr 2023 10:56) (95% - 100%)    O2 Parameters below as of 18 Apr 2023 10:56  Patient On (Oxygen Delivery Method): room air            EXAM:     Joe Coma Scale: 4/5/6    General: normocephalic, head wrap, laying in bed w/ eyes   Neuro     MS: Eyes open, cooperative, comprehension intact, naming intact    CN: PERRL, gaze conjugate and midline, face symmetric, hearing grossly intact    Mot: bulk normal, (+)rigidity in bilateral UE L > R, antigravity in bilateral upper/lower extremities    Coord: (+)bilateral UE L > R parkinsonian tremor  Chest: nonlabored respirations, distant lung sounds in all auscultated lung fields, heart regular rate/rhythm, present S1/S2, no murmurs or rubs  Abdomen: nondistended, soft and nontender without peritoneal signs, normoactive bowel sounds  Extremities: no clubbing, well-perfused, no edema    LABORATORY DATA:    ABG - ( 17 Apr 2023 08:38 )  pH, Arterial: 7.39  pH, Blood: x     /  pCO2: 42    /  pO2: 272   / HCO3: 25    / Base Excess: 0.3   /  SaO2: x                                       13.3   11.06 )-----------( 152      ( 18 Apr 2023 05:30 )             40.6     04-18    140  |  106  |  18  ----------------------------<  142<H>  4.2   |  24  |  1.31<H>    Ca    9.2      18 Apr 2023 05:30  Phos  4.5     04-18  Mg     2.2     04-18    TPro  6.4  /  Alb  3.6  /  TBili  0.6  /  DBili  0.2  /  AST  11  /  ALT  7<L>  /  AlkPhos  53  04-18      MEDICATIONS  (STANDING):  acetaminophen     Tablet .. 1000 milliGRAM(s) Oral every 8 hours  amLODIPine   Tablet 5 milliGRAM(s) Oral daily  carbidopa/levodopa  25/250 1 Tablet(s) Oral <User Schedule>  dexAMETHasone     Tablet   Oral   dexAMETHasone     Tablet 4 milliGRAM(s) Oral every 6 hours  gabapentin 400 milliGRAM(s) Oral four times a day  heparin   Injectable 7500 Unit(s) SubCutaneous every 8 hours  insulin lispro (ADMELOG) corrective regimen sliding scale   SubCutaneous Before meals and at bedtime  levETIRAcetam 1000 milliGRAM(s) Oral two times a day  pantoprazole    Tablet 40 milliGRAM(s) Oral before breakfast  polyethylene glycol 3350 17 Gram(s) Oral daily  senna 2 Tablet(s) Oral at bedtime    MEDICATIONS  (PRN):  ondansetron Injectable 4 milliGRAM(s) IV Push every 6 hours PRN Nausea and/or Vomiting  oxyCODONE    IR 5 milliGRAM(s) Oral every 6 hours PRN Severe Pain (7 - 10)      ***********************************************  ASSESSMENT AND PLAN  ***********************************************    #S/p L. crani for resection of meningioma  #History of epilepsy, parkinson's disease (non-ambulatory)  #History of HTN  #Ventricular tachycardia, morning of 4/18/23    NEURO - admit NSICU, stepdown today, postoperative care, MRI brain w/wo (nonurgent), home LEV, dex 4q6, f/u final path, PD home medications  PULM - SpO2 goal > 92%, supplemental O2 and pulm toileting as needed  CARDIO - BP goal < 140, cards consultation for VT  GI - bowel regimen, stool count, diet: pending swallow evaluation  /RENAL - monitor UOP/volume status, BUN/SCr, d/c matthew  HEME - maintain Hb > 7.0, PLT > 100,000  ID - monitor for infectious s/s, fever curve, leukocytosis, perioperative ancef  ENDO - SGlu goal < 200      ICU stepdown Checklist:    [X] hemodynamically stable – VS WNL and stable x 24hours, UO adequate  [X] if  previously on HDA - off pressors x 24h with stable neuro exam   [X] no new symptoms x 24h (i.e. new fever, new-onset nausea/vomiting)  [X] stable labs: (i.e. WBC not rising, sodium not dropping)  [X] patient not at high risk for aspiration, if high risk then:                  [ ] should have definitive plans for trach/PEG (alternative option is to discharge from ICU to facilty)                  [ ] stepdown to bed close to nurse’s station  [X] low suctioning requirements (i.e. q4h or less)  [X] sign-off from primary RN*  [X] drains do not require ICU level of care  [X] if patient previously agitated or with behavioral issues – controlled  [X] pain controlled

## 2023-04-18 NOTE — OCCUPATIONAL THERAPY INITIAL EVALUATION ADULT - ADDITIONAL COMMENTS
Pt lives with his son in an apartment with elevator access. Prior to admit, son reports pt was able to ambulate short distances with 1 person assist using SC, owns a wheelchair (at bedside) and a hospital bed. Son states family rotates to assist in caring for patient, is never home alone. Pt requires assist for most ADLs (able to eat independently with setup), has a walk-in shower with grab bars. Pt has glasses but states they are old. Son states patient has frequent falls

## 2023-04-19 LAB
ANION GAP SERPL CALC-SCNC: 9 MMOL/L — SIGNIFICANT CHANGE UP (ref 5–17)
BUN SERPL-MCNC: 27 MG/DL — HIGH (ref 7–23)
CALCIUM SERPL-MCNC: 8.7 MG/DL — SIGNIFICANT CHANGE UP (ref 8.4–10.5)
CHLORIDE SERPL-SCNC: 105 MMOL/L — SIGNIFICANT CHANGE UP (ref 96–108)
CO2 SERPL-SCNC: 26 MMOL/L — SIGNIFICANT CHANGE UP (ref 22–31)
CREAT SERPL-MCNC: 1.36 MG/DL — HIGH (ref 0.5–1.3)
EGFR: 56 ML/MIN/1.73M2 — LOW
GLUCOSE BLDC GLUCOMTR-MCNC: 114 MG/DL — HIGH (ref 70–99)
GLUCOSE BLDC GLUCOMTR-MCNC: 117 MG/DL — HIGH (ref 70–99)
GLUCOSE BLDC GLUCOMTR-MCNC: 121 MG/DL — HIGH (ref 70–99)
GLUCOSE BLDC GLUCOMTR-MCNC: 140 MG/DL — HIGH (ref 70–99)
GLUCOSE SERPL-MCNC: 124 MG/DL — HIGH (ref 70–99)
HCT VFR BLD CALC: 41.4 % — SIGNIFICANT CHANGE UP (ref 39–50)
HGB BLD-MCNC: 13.5 G/DL — SIGNIFICANT CHANGE UP (ref 13–17)
MAGNESIUM SERPL-MCNC: 2.4 MG/DL — SIGNIFICANT CHANGE UP (ref 1.6–2.6)
MCHC RBC-ENTMCNC: 31.6 PG — SIGNIFICANT CHANGE UP (ref 27–34)
MCHC RBC-ENTMCNC: 32.6 GM/DL — SIGNIFICANT CHANGE UP (ref 32–36)
MCV RBC AUTO: 97 FL — SIGNIFICANT CHANGE UP (ref 80–100)
NRBC # BLD: 0 /100 WBCS — SIGNIFICANT CHANGE UP (ref 0–0)
PHOSPHATE SERPL-MCNC: 3.2 MG/DL — SIGNIFICANT CHANGE UP (ref 2.5–4.5)
PLATELET # BLD AUTO: 154 K/UL — SIGNIFICANT CHANGE UP (ref 150–400)
POTASSIUM SERPL-MCNC: 4.5 MMOL/L — SIGNIFICANT CHANGE UP (ref 3.5–5.3)
POTASSIUM SERPL-SCNC: 4.5 MMOL/L — SIGNIFICANT CHANGE UP (ref 3.5–5.3)
RBC # BLD: 4.27 M/UL — SIGNIFICANT CHANGE UP (ref 4.2–5.8)
RBC # FLD: 14.7 % — HIGH (ref 10.3–14.5)
SODIUM SERPL-SCNC: 140 MMOL/L — SIGNIFICANT CHANGE UP (ref 135–145)
WBC # BLD: 15.5 K/UL — HIGH (ref 3.8–10.5)
WBC # FLD AUTO: 15.5 K/UL — HIGH (ref 3.8–10.5)

## 2023-04-19 PROCEDURE — 99222 1ST HOSP IP/OBS MODERATE 55: CPT

## 2023-04-19 PROCEDURE — 99024 POSTOP FOLLOW-UP VISIT: CPT

## 2023-04-19 RX ADMIN — Medication 4 MILLIGRAM(S): at 06:57

## 2023-04-19 RX ADMIN — GABAPENTIN 400 MILLIGRAM(S): 400 CAPSULE ORAL at 06:56

## 2023-04-19 RX ADMIN — GABAPENTIN 400 MILLIGRAM(S): 400 CAPSULE ORAL at 17:59

## 2023-04-19 RX ADMIN — HEPARIN SODIUM 7500 UNIT(S): 5000 INJECTION INTRAVENOUS; SUBCUTANEOUS at 06:56

## 2023-04-19 RX ADMIN — Medication 1000 MILLIGRAM(S): at 06:57

## 2023-04-19 RX ADMIN — Medication 1000 MILLIGRAM(S): at 15:13

## 2023-04-19 RX ADMIN — GABAPENTIN 400 MILLIGRAM(S): 400 CAPSULE ORAL at 23:19

## 2023-04-19 RX ADMIN — ENTACAPONE 200 MILLIGRAM(S): 200 TABLET, FILM COATED ORAL at 23:23

## 2023-04-19 RX ADMIN — Medication 1000 MILLIGRAM(S): at 07:57

## 2023-04-19 RX ADMIN — HEPARIN SODIUM 7500 UNIT(S): 5000 INJECTION INTRAVENOUS; SUBCUTANEOUS at 23:20

## 2023-04-19 RX ADMIN — HEPARIN SODIUM 7500 UNIT(S): 5000 INJECTION INTRAVENOUS; SUBCUTANEOUS at 14:13

## 2023-04-19 RX ADMIN — Medication 1000 MILLIGRAM(S): at 23:19

## 2023-04-19 RX ADMIN — AMLODIPINE BESYLATE 5 MILLIGRAM(S): 2.5 TABLET ORAL at 06:56

## 2023-04-19 RX ADMIN — LEVETIRACETAM 1000 MILLIGRAM(S): 250 TABLET, FILM COATED ORAL at 17:59

## 2023-04-19 RX ADMIN — LEVETIRACETAM 1000 MILLIGRAM(S): 250 TABLET, FILM COATED ORAL at 06:57

## 2023-04-19 RX ADMIN — CARBIDOPA AND LEVODOPA 1 TABLET(S): 25; 100 TABLET ORAL at 12:12

## 2023-04-19 RX ADMIN — Medication 1000 MILLIGRAM(S): at 14:13

## 2023-04-19 RX ADMIN — ENTACAPONE 200 MILLIGRAM(S): 200 TABLET, FILM COATED ORAL at 14:13

## 2023-04-19 RX ADMIN — Medication 4 MILLIGRAM(S): at 12:12

## 2023-04-19 RX ADMIN — CARBIDOPA AND LEVODOPA 1 TABLET(S): 25; 100 TABLET ORAL at 08:55

## 2023-04-19 RX ADMIN — POLYETHYLENE GLYCOL 3350 17 GRAM(S): 17 POWDER, FOR SOLUTION ORAL at 12:13

## 2023-04-19 RX ADMIN — SENNA PLUS 2 TABLET(S): 8.6 TABLET ORAL at 23:19

## 2023-04-19 RX ADMIN — CARBIDOPA AND LEVODOPA 1 TABLET(S): 25; 100 TABLET ORAL at 16:26

## 2023-04-19 RX ADMIN — Medication 4 MILLIGRAM(S): at 17:59

## 2023-04-19 RX ADMIN — CARBIDOPA AND LEVODOPA 1 TABLET(S): 25; 100 TABLET ORAL at 19:17

## 2023-04-19 RX ADMIN — GABAPENTIN 400 MILLIGRAM(S): 400 CAPSULE ORAL at 12:12

## 2023-04-19 RX ADMIN — PANTOPRAZOLE SODIUM 40 MILLIGRAM(S): 20 TABLET, DELAYED RELEASE ORAL at 06:57

## 2023-04-19 NOTE — SPEECH LANGUAGE PATHOLOGY EVALUATION - SLP DIAGNOSIS
The patient's auditory comprehension, cognitive-linguistic, motor speech, reading, and writing skills appear to be intact. The pt presents with moderate nonfluent expressive aphasia marked by word retrieval deficits, sentence initiation deficits, and overall reduced ability to participate in conversation. Pt is able to communicate basic wants/needs in single words and short phrases. Pt's automatic speech and repetition skills appear to be intact. Pt benefits from phonemic cues and semantic cues for word retrieval. Recommend use of visuals when possible to optimize pt-provider communication. Pt will require outpatient speech therapy services if aphasia persists. SLP to follow up for tx and pt/family education.

## 2023-04-19 NOTE — PROGRESS NOTE ADULT - SUBJECTIVE AND OBJECTIVE BOX
HPI:  68M hx poorly controlled PD w/ prog inability to walk since Nov 2022. Found to have large L frontal dural based lesion c/f meningioma w/ associated mass effect and edema. Presenting today for elective resection of mass.     Subjective:  Patient denies any acute complaints.    Hospital course:  4/17: POD 0 L crani for tumor resection.   4/18: POD1 L crani tumor resection. Norvasc increased to 5mg daily. Neuro exam stable. Neurology consulted, reocmmended maintaining current parkinson's medication regimen. Had asymptomatic episode of VTACH read on telemetry, hemodynamically stable, returned back to baseline, EKG obtained negative. Cardiology consulted, reported rhythm strip was sinus, likely artifact from tremors in arms, NTD. Pt c/o worsened difficulty breathing, satting well on RA. Given duoneb treatment and CXR completed.  Postop MRI completed in evening.   4/19: POD2, SIRIA overnight,  neuro stable    Vital Signs Last 24 Hrs  T(C): 36.8 (18 Apr 2023 20:14), Max: 37.2 (18 Apr 2023 09:03)  T(F): 98.2 (18 Apr 2023 20:14), Max: 98.9 (18 Apr 2023 09:03)  HR: 77 (18 Apr 2023 20:14) (75 - 94)  BP: 144/76 (18 Apr 2023 20:14) (117/66 - 144/76)  BP(mean): 88 (18 Apr 2023 09:00) (87 - 88)  RR: 17 (18 Apr 2023 20:14) (16 - 18)  SpO2: 97% (18 Apr 2023 20:14) (95% - 100%)    Parameters below as of 18 Apr 2023 20:14  Patient On (Oxygen Delivery Method): room air        I&O's Summary    17 Apr 2023 07:01  -  18 Apr 2023 07:00  --------------------------------------------------------  IN: 2700 mL / OUT: 2000 mL / NET: 700 mL    18 Apr 2023 07:01  -  19 Apr 2023 02:54  --------------------------------------------------------  IN: 0 mL / OUT: 1475 mL / NET: -1475 mL        PHYSICAL EXAM:  General: patient seen laying supine in bed in NAD  Neuro: AAOx3, follows commands, opens eyes spontaneously, speech fragmented, CNII-XI grossly intact, face symmetric, no pronator drift,, strength 5/5 b/l upper extremities and 3/5 b/l lower extremities, sensation intact to light touch throughout, b/l UE tremors and rigidity  HEENT: PERRL, EOMI  Neck: supple  Cardiac: RRR, S1S2  Pulmonary: chest rise symmetric  Abdomen: soft, nontender, nondistended  Ext: perfusing well  Skin: warm, dry  Wound: headwrap c/d/i      DIET:  [] NPO  [x] Mechanical  [] Tube feeds    LABS:                        13.3   11.06 )-----------( 152      ( 18 Apr 2023 05:30 )             40.6     04-18    140  |  106  |  18  ----------------------------<  142<H>  4.2   |  24  |  1.31<H>    Ca    9.2      18 Apr 2023 05:30  Phos  4.5     04-18  Mg     2.2     04-18    TPro  6.4  /  Alb  3.6  /  TBili  0.6  /  DBili  0.2  /  AST  11  /  ALT  7<L>  /  AlkPhos  53  04-18    PT/INR - ( 17 Apr 2023 11:55 )   PT: 13.0 sec;   INR: 1.09          PTT - ( 17 Apr 2023 11:55 )  PTT:35.3 sec        CAPILLARY BLOOD GLUCOSE      POCT Blood Glucose.: 132 mg/dL (18 Apr 2023 22:43)  POCT Blood Glucose.: 128 mg/dL (18 Apr 2023 16:51)  POCT Blood Glucose.: 159 mg/dL (18 Apr 2023 10:27)  POCT Blood Glucose.: 115 mg/dL (18 Apr 2023 06:06)      Drug Levels: [] N/A    CSF Analysis: [] N/A      Allergies    No Known Allergies    Intolerances      MEDICATIONS:  Antibiotics:    Neuro:  acetaminophen     Tablet .. 1000 milliGRAM(s) Oral every 8 hours  carbidopa/levodopa  25/250 1 Tablet(s) Oral <User Schedule>  entacapone 200 milliGRAM(s) Oral every 8 hours  gabapentin 400 milliGRAM(s) Oral four times a day  levETIRAcetam 1000 milliGRAM(s) Oral two times a day  ondansetron Injectable 4 milliGRAM(s) IV Push every 6 hours PRN  oxyCODONE    IR 5 milliGRAM(s) Oral every 6 hours PRN    Anticoagulation:  heparin   Injectable 7500 Unit(s) SubCutaneous every 8 hours    OTHER:  amLODIPine   Tablet 5 milliGRAM(s) Oral daily  dexAMETHasone     Tablet   Oral   dexAMETHasone     Tablet 4 milliGRAM(s) Oral every 6 hours  insulin lispro (ADMELOG) corrective regimen sliding scale   SubCutaneous Before meals and at bedtime  pantoprazole    Tablet 40 milliGRAM(s) Oral before breakfast  polyethylene glycol 3350 17 Gram(s) Oral daily  senna 2 Tablet(s) Oral at bedtime    IVF:    CULTURES:    RADIOLOGY & ADDITIONAL TESTS:    D32.9    Handoff    MEWS Score    Parkinsons    HTN (hypertension)    History of seizures    Brain mass    Impaired gait    Meningioma    History of insomnia    Brain tumor    Brain tumor    Craniotomy for resection of tumor of left side of brain    No significant past surgical history    SysAdmin_VstLnk        ASSESSMENT:  68M hx poorly controlled Parkinson's disease w/ prog inability to walk since Nov 2022. Found to have large L frontal dural based lesion c/f meningioma w/ associated mass effect and edema. Now s/p L crani for meningioma resection (4/17).    NEURO:  -neuro/vitals q4h  - decadron taper over 1 week to off  - keppra 1g BID x7d  - continue home sinemet (25/250) 4 times/day, entacapone tid, gabapentin   - continue home gabapentin 400mg 4 times/day  - MR brain w/wo post op completed 4/18  - pain control, cranial ERAS (tylenol standing), oxy prn  - pending neurology evaluation  - pending speech consult    CARDIOVASCULAR:  - -140  - cont norvasc 5    PULMONARY:  - room air  - incentive spirometry     RENAL:  - IVL  - voiding     GI:  - regular diet   - bowel regimen  - PPI on steroids  -  4/17     HEME:  - SCDs/SQH dvt ppx    ID:  - afebrile    ENDO:  - ISS while on steroids  - a1c 5.8    DISPO:  - full code, regional, pending AR    Discussed with Dr. D'Amico     Assessment:  Present when checked    []  GCS  E   V  M     Heart Failure: []Acute, [] acute on chronic , []chronic  Heart Failure:  [] Diastolic (HFpEF), [] Systolic (HFrEF), []Combined (HFpEF and HFrEF), [] RHF, [] Pulm HTN, [] Other    [] NINFA, [] ATN, [] AIN, [] other  [] CKD1, [] CKD2, [] CKD 3, [] CKD 4, [] CKD 5, []ESRD    Encephalopathy: [] Metabolic, [] Hepatic, [] toxic, [] Neurological, [] Other    Abnormal Nurtitional Status: [] malnurtition (see nutrition note), [ ]underweight: BMI < 19, [] morbid obesity: BMI >40, [] Cachexia    [] Sepsis  [] hypovolemic shock,[] cardiogenic shock, [] hemorrhagic shock, [] neuogenic shock  [] Acute Respiratory Failure  []Cerebral edema, [] Brain compression/ herniation,   [] Functional quadriplegia  [] Acute blood loss anemia

## 2023-04-19 NOTE — SPEECH LANGUAGE PATHOLOGY EVALUATION - COMMENTS
Pt's auditory comprehension skills appear intact and consistent with prior level. Pt is R hand dominant and writing skills appear to be preserved The patient presents with moderate nonfluent expressive aphasia marked by word retrieval deficits, sentence initiation deficits, and overall reduced ability to participate in conversation. Pt's automatic speech (i.e., numbers, days of the week, months of the year) and repetition skills appear to be intact. Pt benefited from phonemic cues and semantic cues for word retrieval.   Long-Term Goal: Patient will independently communicate basic wants/needs in complete sentences.  Short-Term Verbal Expression Goal: Patient will name/describe objects/pictures with 90% accuracy and minimal phonemic/semantic cueing. Pt received upright in bed, alert and oriented x4, aphasic, room air, no report of pain, pt reports blurry vision in L eye, amenable to evaluation.

## 2023-04-19 NOTE — CONSULT NOTE ADULT - ASSESSMENT
#L frontal meningioma with mass effect and edema s/p resection 4/17  - plan per NSGY  -MR brain reviewed from 4/18 showing post resectional changes  - c/w keppra and decadron. monitor FS    #headaches  - please start tylenol 975mg q8h ATC    #HTN  - continue with amlodipine    #CKD  - Cr uptrending, follow Cr, may need dose adjustment of gabapentin if Cr continues to uptrend.    #PD with bradykinesia, rigidity and resting tremor  - continue sinemet and entacapone

## 2023-04-19 NOTE — SPEECH LANGUAGE PATHOLOGY EVALUATION - SLP PERTINENT HISTORY OF CURRENT PROBLEM
The patient is a 68 y.o. male with PMHx of poorly controlled PD with progressive inability to walk since 11/2022. Pt was found to have large L frontal dural based lesion c/f meningioma with associated mass effect and edema. Pt presented for elective L crani for tumor resection.

## 2023-04-19 NOTE — CHART NOTE - NSCHARTNOTEFT_GEN_A_CORE
POD2, SIRIA overnight,  neuro stable, SLP state may benefit from outpt speech therapy will continue to follow

## 2023-04-19 NOTE — CONSULT NOTE ADULT - SUBJECTIVE AND OBJECTIVE BOX
MEDICINE CO-MANAGEMENT CONSULT NOTE    HPI:  68M hx poorly controlled PD w/ prog inability to walk since Nov 2022. Found to have large L frontal dural based lesion c/f meningioma w/ associated mass effect and edema. Presenting for elective resection of mass.     Now s/p L crani for meningioma resection (4/17).      PAST MEDICAL & SURGICAL HISTORY:  Parkinsons  HTN (hypertension)  History of seizures  Brain mass  Impaired gait  Meningioma  History of insomnia  No significant past surgical history    Home Meds: Home Medications:  amLODIPine 2.5 mg oral tablet: 1 orally once a day (17 Apr 2023 06:54)  carbidopa-levodopa 25 mg-250 mg oral tablet: 1 orally 4 times a day (17 Apr 2023 06:54)  gabapentin 400 mg oral capsule: 1 orally 4 times a day (17 Apr 2023 06:54)  levETIRAcetam 1000 mg oral tablet: 1 orally once a day (17 Apr 2023 06:54)      FAMILY HISTORY:  HLD in both maternal and paternal      Social History:  denies smoking or alcohol use      Allergies    No Known Allergies    Intolerances        REVIEW OF SYSTEMS:    GENERAL: Denies fevers, chills, night sweats, weight loss    RESPIRATORY: Denies SOB, coughing, wheezing    CARDIOVASCULAR: Denies chest pain, palpitations, lower extremity edema    GASTROINTESTINAL: Denies abdominal pain, nausea, vomiting    NEUROLOGICAL: Denies facial drooping, slurred speech, vertigo, visual disturbances, unilateral weakness, numbness or tingling    PHYSICAL EXAM:    T(C): 36.8 (04-19-23 @ 16:20), Max: 36.8 (04-18-23 @ 20:14)  HR: 69 (04-19-23 @ 16:20) (68 - 77)  BP: 136/80 (04-19-23 @ 16:20) (136/80 - 155/82)  RR: 16 (04-19-23 @ 16:20) (16 - 17)  SpO2: 96% (04-19-23 @ 16:20) (95% - 97%)  General: NAD  HENMT: +MMM, no LAD  RESPIRATORY: no increased WOB, CTAB  CVS: RRR, no m/r/g, extremities warm and well perfused, DP 2+ bilaterally  ABD: +BS, NT/ND  EXT: no pitting edema  Neuro: alert and oriented to person, place, time and situation masked facies, bradykinesia and stuttering. also resting tremor. CN II-XII intact, 4/5 motor upper and lower extremity strength bilaterally. Sensation grossly equal and intact in upper and lower [id number]    Labs:  CAPILLARY BLOOD GLUCOSE      POCT Blood Glucose.: 121 mg/dL (19 Apr 2023 17:46)  POCT Blood Glucose.: 114 mg/dL (19 Apr 2023 12:35)  POCT Blood Glucose.: 117 mg/dL (19 Apr 2023 06:12)  POCT Blood Glucose.: 132 mg/dL (18 Apr 2023 22:43)                          13.5   15.50 )-----------( 154      ( 19 Apr 2023 08:23 )             41.4         04-19    140  |  105  |  27<H>  ----------------------------<  124<H>  4.5   |  26  |  1.36<H>      Calcium, Total Serum: 8.7 mg/dL (04-19-23 @ 08:23)      LFTs:             6.4  | 0.6  | 11       ------------------[53      ( 18 Apr 2023 05:30 )  3.6  | 0.2  | 7           Lipase:x      Amylase:x           ABG - ( 17 Apr 2023 08:38 )  pH: 7.39  /  pCO2: 42    /  pO2: 272   / HCO3: 25    / Base Excess: 0.3   /  SaO2: x             Coags:    IMAGING  < from: MR Head w/wo IV Cont (04.18.23 @ 22:19) >    IMPRESSION:  1.   Since the prior study of 03/25/2023, status post left frontal   craniotomy for resection of the left frontal lobe extra-axial mass. No   evidence of residual tumor.    2. Associated postsurgical changes with focal restricted diffusion   surrounding the surgical resection cavity compatible with devitalized   brain parenchyma.    < end of copied text >

## 2023-04-20 LAB
ANION GAP SERPL CALC-SCNC: 9 MMOL/L — SIGNIFICANT CHANGE UP (ref 5–17)
BUN SERPL-MCNC: 26 MG/DL — HIGH (ref 7–23)
CALCIUM SERPL-MCNC: 9.1 MG/DL — SIGNIFICANT CHANGE UP (ref 8.4–10.5)
CHLORIDE SERPL-SCNC: 106 MMOL/L — SIGNIFICANT CHANGE UP (ref 96–108)
CO2 SERPL-SCNC: 27 MMOL/L — SIGNIFICANT CHANGE UP (ref 22–31)
CREAT SERPL-MCNC: 1.3 MG/DL — SIGNIFICANT CHANGE UP (ref 0.5–1.3)
EGFR: 59 ML/MIN/1.73M2 — LOW
GLUCOSE BLDC GLUCOMTR-MCNC: 112 MG/DL — HIGH (ref 70–99)
GLUCOSE BLDC GLUCOMTR-MCNC: 120 MG/DL — HIGH (ref 70–99)
GLUCOSE BLDC GLUCOMTR-MCNC: 135 MG/DL — HIGH (ref 70–99)
GLUCOSE BLDC GLUCOMTR-MCNC: 95 MG/DL — SIGNIFICANT CHANGE UP (ref 70–99)
GLUCOSE SERPL-MCNC: 120 MG/DL — HIGH (ref 70–99)
HCT VFR BLD CALC: 42.2 % — SIGNIFICANT CHANGE UP (ref 39–50)
HGB BLD-MCNC: 14.2 G/DL — SIGNIFICANT CHANGE UP (ref 13–17)
MAGNESIUM SERPL-MCNC: 2.4 MG/DL — SIGNIFICANT CHANGE UP (ref 1.6–2.6)
MCHC RBC-ENTMCNC: 32.1 PG — SIGNIFICANT CHANGE UP (ref 27–34)
MCHC RBC-ENTMCNC: 33.6 GM/DL — SIGNIFICANT CHANGE UP (ref 32–36)
MCV RBC AUTO: 95.3 FL — SIGNIFICANT CHANGE UP (ref 80–100)
NRBC # BLD: 0 /100 WBCS — SIGNIFICANT CHANGE UP (ref 0–0)
PHOSPHATE SERPL-MCNC: 2.9 MG/DL — SIGNIFICANT CHANGE UP (ref 2.5–4.5)
PLATELET # BLD AUTO: 153 K/UL — SIGNIFICANT CHANGE UP (ref 150–400)
POTASSIUM SERPL-MCNC: 4.2 MMOL/L — SIGNIFICANT CHANGE UP (ref 3.5–5.3)
POTASSIUM SERPL-SCNC: 4.2 MMOL/L — SIGNIFICANT CHANGE UP (ref 3.5–5.3)
RBC # BLD: 4.43 M/UL — SIGNIFICANT CHANGE UP (ref 4.2–5.8)
RBC # FLD: 14.3 % — SIGNIFICANT CHANGE UP (ref 10.3–14.5)
SARS-COV-2 RNA SPEC QL NAA+PROBE: SIGNIFICANT CHANGE UP
SODIUM SERPL-SCNC: 142 MMOL/L — SIGNIFICANT CHANGE UP (ref 135–145)
WBC # BLD: 14.22 K/UL — HIGH (ref 3.8–10.5)
WBC # FLD AUTO: 14.22 K/UL — HIGH (ref 3.8–10.5)

## 2023-04-20 PROCEDURE — 99232 SBSQ HOSP IP/OBS MODERATE 35: CPT

## 2023-04-20 PROCEDURE — 99222 1ST HOSP IP/OBS MODERATE 55: CPT

## 2023-04-20 PROCEDURE — 99024 POSTOP FOLLOW-UP VISIT: CPT

## 2023-04-20 RX ORDER — ACETAMINOPHEN 500 MG
650 TABLET ORAL EVERY 6 HOURS
Refills: 0 | Status: DISCONTINUED | OUTPATIENT
Start: 2023-04-20 | End: 2023-04-21

## 2023-04-20 RX ADMIN — CARBIDOPA AND LEVODOPA 1 TABLET(S): 25; 100 TABLET ORAL at 12:54

## 2023-04-20 RX ADMIN — Medication 1000 MILLIGRAM(S): at 08:02

## 2023-04-20 RX ADMIN — Medication 4 MILLIGRAM(S): at 21:38

## 2023-04-20 RX ADMIN — Medication 1000 MILLIGRAM(S): at 07:02

## 2023-04-20 RX ADMIN — AMLODIPINE BESYLATE 5 MILLIGRAM(S): 2.5 TABLET ORAL at 07:00

## 2023-04-20 RX ADMIN — HEPARIN SODIUM 7500 UNIT(S): 5000 INJECTION INTRAVENOUS; SUBCUTANEOUS at 07:03

## 2023-04-20 RX ADMIN — HEPARIN SODIUM 7500 UNIT(S): 5000 INJECTION INTRAVENOUS; SUBCUTANEOUS at 13:03

## 2023-04-20 RX ADMIN — CARBIDOPA AND LEVODOPA 1 TABLET(S): 25; 100 TABLET ORAL at 19:09

## 2023-04-20 RX ADMIN — Medication 1000 MILLIGRAM(S): at 00:19

## 2023-04-20 RX ADMIN — Medication 4 MILLIGRAM(S): at 13:03

## 2023-04-20 RX ADMIN — ENTACAPONE 200 MILLIGRAM(S): 200 TABLET, FILM COATED ORAL at 13:04

## 2023-04-20 RX ADMIN — CARBIDOPA AND LEVODOPA 1 TABLET(S): 25; 100 TABLET ORAL at 07:06

## 2023-04-20 RX ADMIN — PANTOPRAZOLE SODIUM 40 MILLIGRAM(S): 20 TABLET, DELAYED RELEASE ORAL at 07:01

## 2023-04-20 RX ADMIN — HEPARIN SODIUM 7500 UNIT(S): 5000 INJECTION INTRAVENOUS; SUBCUTANEOUS at 21:40

## 2023-04-20 RX ADMIN — CARBIDOPA AND LEVODOPA 1 TABLET(S): 25; 100 TABLET ORAL at 17:30

## 2023-04-20 RX ADMIN — SENNA PLUS 2 TABLET(S): 8.6 TABLET ORAL at 21:38

## 2023-04-20 RX ADMIN — Medication 4 MILLIGRAM(S): at 07:00

## 2023-04-20 RX ADMIN — GABAPENTIN 400 MILLIGRAM(S): 400 CAPSULE ORAL at 12:54

## 2023-04-20 RX ADMIN — ENTACAPONE 200 MILLIGRAM(S): 200 TABLET, FILM COATED ORAL at 07:02

## 2023-04-20 RX ADMIN — LEVETIRACETAM 1000 MILLIGRAM(S): 250 TABLET, FILM COATED ORAL at 17:31

## 2023-04-20 RX ADMIN — ENTACAPONE 200 MILLIGRAM(S): 200 TABLET, FILM COATED ORAL at 21:37

## 2023-04-20 RX ADMIN — GABAPENTIN 400 MILLIGRAM(S): 400 CAPSULE ORAL at 17:31

## 2023-04-20 RX ADMIN — LEVETIRACETAM 1000 MILLIGRAM(S): 250 TABLET, FILM COATED ORAL at 07:02

## 2023-04-20 RX ADMIN — GABAPENTIN 400 MILLIGRAM(S): 400 CAPSULE ORAL at 07:01

## 2023-04-20 NOTE — PROGRESS NOTE ADULT - SUBJECTIVE AND OBJECTIVE BOX
S: patient seen bedside. States his headache is still present but improving. His tremors are better/at baseline. Tolerating PO. Pain is tolerated. No trouble breathing. Otherwise stable, no issues      Vital Signs Last 24 Hrs  T(C): 36.4 (20 Apr 2023 08:16), Max: 37.2 (20 Apr 2023 04:35)  T(F): 97.6 (20 Apr 2023 08:16), Max: 99 (20 Apr 2023 04:35)  HR: 59 (20 Apr 2023 08:16) (59 - 69)  BP: 146/85 (20 Apr 2023 08:16) (130/76 - 157/83)  BP(mean): --  RR: 17 (20 Apr 2023 08:16) (16 - 17)  SpO2: 97% (20 Apr 2023 08:16) (94% - 97%)    Parameters below as of 20 Apr 2023 08:16  Patient On (Oxygen Delivery Method): room air        PE  Gen: pleasant male resting in bed NAD  HEENT: EOMI  Neck: no jvd  Lungs: non labored breathing  CV: RRR S1S2  GI: +BS, non tender  LE: no edema  Psych: pleasant, cooperative          Labs reviewed

## 2023-04-20 NOTE — CONSULT NOTE ADULT - ASSESSMENT
Neurosurgery    68 y o M hx poorly controlled Parkinson's disease w/ prog inability to walk since Nov 2022. Found to have large L frontal dural based lesion c/f meningioma w/ associated mass effect and edema. Now s/p L crani for meningioma resection (4/17).    NEURO:  -neuro/vitals q4h  - decadron taper over 1 week to off  - keppra 1g BID x7d  - continue home sinemet (25/250) 4 times/day, entacapone tid, gabapentin   - continue home gabapentin 400mg 4 times/day  - MR brain w/wo post op completed 4/18  - pain control, cranial ERAS (tylenol standing), oxy prn  - neurology following for Parkinson's disease  - speech language consulted    CARDIOVASCULAR:  - -140  - cont norvasc 5    PULMONARY:  - room air  - incentive spirometry     RENAL:  - IVL  - voiding     GI:  - regular diet   - bowel regimen  - PPI on steroids  -  4/17     HEME:  - SCDs/SQH dvt ppx    ID:  - afebrile    ENDO:  - ISS while on steroids  - a1c 5.8    DISPO:  - full code, regional, pending AR

## 2023-04-20 NOTE — PROGRESS NOTE ADULT - ASSESSMENT
#L frontal meningioma with mass effect and edema s/p resection 4/17  - plan per NSGY  -MR brain reviewed from 4/18 showing post resectional changes  - c/w keppra and decadron. monitor FS  #Post op state  -Encourage IS (if cleared from neuro surg). OOB to chair as tolerated. Pain control prn. DVT ppx a/p primary      #headaches  - please start tylenol 975mg q8h ATC. Continue this through today, reassess tomorrow    #HTN  - continue with amlodipine    #CKD  - Cr stable.    #PD with bradykinesia, rigidity and resting tremor  - continue sinemet and entacapone        Dispo: pending rehab. SW consult

## 2023-04-20 NOTE — PROGRESS NOTE ADULT - SUBJECTIVE AND OBJECTIVE BOX
HPI:  68M hx poorly controlled PD w/ prog inability to walk since Nov 2022. Found to have large L frontal dural based lesion c/f meningioma w/ associated mass effect and edema. Presenting today for elective resection of mass.     Subjective:  Patient denies pain or any acute complaints.    Hospital course:  4/17: POD 0 L crani for tumor resection.   4/18: POD1 L crani tumor resection. Norvasc increased to 5mg daily. Neuro exam stable. Neurology consulted, reocmmended maintaining current parkinson's medication regimen. Had asymptomatic episode of VTACH read on telemetry, hemodynamically stable, returned back to baseline, EKG obtained negative. Cardiology consulted, reported rhythm strip was sinus, likely artifact from tremors in arms, NTD. Pt c/o worsened difficulty breathing, satting well on RA. Given duoneb treatment and CXR completed.  Postop MRI completed in evening.   4/19: POD2, SIRIA overnight,  neuro stable, SLP state may benefit from outpt speech therapy will continue to follow  4/20: POD3, SIRIA overnight    Vital Signs Last 24 Hrs  T(C): 36.7 (19 Apr 2023 20:19), Max: 36.8 (19 Apr 2023 16:20)  T(F): 98 (19 Apr 2023 20:19), Max: 98.3 (19 Apr 2023 16:20)  HR: 68 (19 Apr 2023 20:19) (68 - 71)  BP: 130/76 (19 Apr 2023 20:19) (130/76 - 155/82)  BP(mean): --  RR: 16 (19 Apr 2023 20:19) (16 - 17)  SpO2: 94% (19 Apr 2023 20:19) (94% - 96%)    Parameters below as of 19 Apr 2023 20:19  Patient On (Oxygen Delivery Method): room air        I&O's Summary    18 Apr 2023 07:01  -  19 Apr 2023 07:00  --------------------------------------------------------  IN: 0 mL / OUT: 1475 mL / NET: -1475 mL    19 Apr 2023 07:01  -  20 Apr 2023 03:29  --------------------------------------------------------  IN: 0 mL / OUT: 900 mL / NET: -900 mL        PHYSICAL EXAM:  General: patient seen laying supine in bed in NAD  Neuro: AAOx3, follows commands, opens eyes spontaneously, speech fragmented, CNII-XI grossly intact, face symmetric, no pronator drift,, strength 5/5 b/l upper extremities and 3/5 b/l lower extremities, sensation intact to light touch throughout, b/l UE tremors and rigidity  HEENT: PERRL, EOMI  Neck: supple  Cardiac: RRR, S1S2  Pulmonary: chest rise symmetric  Abdomen: soft, nontender, nondistended  Ext: perfusing well  Skin: warm, dry  Wound: L crani incision c/d/i        DIET:  [] NPO  [x] Mechanical  [] Tube feeds    LABS:                        13.5   15.50 )-----------( 154      ( 19 Apr 2023 08:23 )             41.4     04-19    140  |  105  |  27<H>  ----------------------------<  124<H>  4.5   |  26  |  1.36<H>    Ca    8.7      19 Apr 2023 08:23  Phos  3.2     04-19  Mg     2.4     04-19    TPro  6.4  /  Alb  3.6  /  TBili  0.6  /  DBili  0.2  /  AST  11  /  ALT  7<L>  /  AlkPhos  53  04-18            CAPILLARY BLOOD GLUCOSE      POCT Blood Glucose.: 140 mg/dL (19 Apr 2023 22:06)  POCT Blood Glucose.: 121 mg/dL (19 Apr 2023 17:46)  POCT Blood Glucose.: 114 mg/dL (19 Apr 2023 12:35)  POCT Blood Glucose.: 117 mg/dL (19 Apr 2023 06:12)      Drug Levels: [] N/A    CSF Analysis: [] N/A      Allergies    No Known Allergies    Intolerances      MEDICATIONS:  Antibiotics:    Neuro:  acetaminophen     Tablet .. 1000 milliGRAM(s) Oral every 8 hours  carbidopa/levodopa  25/250 1 Tablet(s) Oral <User Schedule>  entacapone 200 milliGRAM(s) Oral every 8 hours  gabapentin 400 milliGRAM(s) Oral four times a day  levETIRAcetam 1000 milliGRAM(s) Oral two times a day  ondansetron Injectable 4 milliGRAM(s) IV Push every 6 hours PRN  oxyCODONE    IR 5 milliGRAM(s) Oral every 6 hours PRN    Anticoagulation:  heparin   Injectable 7500 Unit(s) SubCutaneous every 8 hours    OTHER:  amLODIPine   Tablet 5 milliGRAM(s) Oral daily  dexAMETHasone     Tablet   Oral   dexAMETHasone     Tablet 4 milliGRAM(s) Oral every 8 hours  insulin lispro (ADMELOG) corrective regimen sliding scale   SubCutaneous Before meals and at bedtime  pantoprazole    Tablet 40 milliGRAM(s) Oral before breakfast  polyethylene glycol 3350 17 Gram(s) Oral daily  senna 2 Tablet(s) Oral at bedtime    IVF:    CULTURES:    RADIOLOGY & ADDITIONAL TESTS:    D32.9    Handoff    MEWS Score    Parkinsons    HTN (hypertension)    History of seizures    Brain mass    Impaired gait    Meningioma    History of insomnia    Brain tumor    Brain tumor    Craniotomy for resection of tumor of left side of brain    No significant past surgical history    SysAdmin_VstLnk        ASSESSMENT:  68M hx poorly controlled Parkinson's disease w/ prog inability to walk since Nov 2022. Found to have large L frontal dural based lesion c/f meningioma w/ associated mass effect and edema. Now s/p L crani for meningioma resection (4/17).    NEURO:  -neuro/vitals q4h  - decadron taper over 1 week to off  - keppra 1g BID x7d  - continue home sinemet (25/250) 4 times/day, entacapone tid, gabapentin   - continue home gabapentin 400mg 4 times/day  - MR brain w/wo post op completed 4/18  - pain control, cranial ERAS (tylenol standing), oxy prn  - neurology following for Parkinson's disease  - speech language consulted    CARDIOVASCULAR:  - -140  - cont norvasc 5    PULMONARY:  - room air  - incentive spirometry     RENAL:  - IVL  - voiding     GI:  - regular diet   - bowel regimen  - PPI on steroids  -  4/17     HEME:  - SCDs/SQH dvt ppx    ID:  - afebrile    ENDO:  - ISS while on steroids  - a1c 5.8    DISPO:  - full code, regional, pending AR    Discussed with Dr. D'Amico   Assessment:  Present when checked    []  GCS  E   V  M     Heart Failure: []Acute, [] acute on chronic , []chronic  Heart Failure:  [] Diastolic (HFpEF), [] Systolic (HFrEF), []Combined (HFpEF and HFrEF), [] RHF, [] Pulm HTN, [] Other    [] NINFA, [] ATN, [] AIN, [] other  [] CKD1, [] CKD2, [] CKD 3, [] CKD 4, [] CKD 5, []ESRD    Encephalopathy: [] Metabolic, [] Hepatic, [] toxic, [] Neurological, [] Other    Abnormal Nurtitional Status: [] malnurtition (see nutrition note), [ ]underweight: BMI < 19, [] morbid obesity: BMI >40, [] Cachexia    [] Sepsis  [] hypovolemic shock,[] cardiogenic shock, [] hemorrhagic shock, [] neuogenic shock  [] Acute Respiratory Failure  []Cerebral edema, [] Brain compression/ herniation,   [] Functional quadriplegia  [] Acute blood loss anemia

## 2023-04-20 NOTE — CONSULT NOTE ADULT - REASON FOR ADMISSION
Surgical resection of L frontal meningioma

## 2023-04-20 NOTE — SWALLOW BEDSIDE ASSESSMENT ADULT - SLP GENERAL OBSERVATIONS
Pt received awake, alert. Pt oriented to self, place, and date. Non-fluent verbal output. Pt followed 1-step directives. Bilateral UE tremor noted.

## 2023-04-20 NOTE — SWALLOW BEDSIDE ASSESSMENT ADULT - SPECIFY REASON(S)
To assess swallow function. Per RN, overnight RN reported gurgly vocal quality with water/meds and slow chewing.

## 2023-04-20 NOTE — SWALLOW BEDSIDE ASSESSMENT ADULT - SWALLOW EVAL: DIAGNOSIS
Pt presented with mild oral deficits, characterized by reduced mastication and bolus manipulation for hard solids. Recs for a modified oral diet as tolerated, with aspiration precautions. Pt will require some assistance with feeding due to UE tremor.

## 2023-04-20 NOTE — CONSULT NOTE ADULT - SUBJECTIVE AND OBJECTIVE BOX
Patient is a 68y old  Male who presents with a chief complaint of Surgical resection of L frontal meningioma (20 Apr 2023 03:29)      HPI:  68M hx poorly controlled PD w/ prog inability to walk since Nov 2022. Found to have large L frontal dural based lesion c/f meningioma w/ associated mass effect and edema. Presenting today for elective resection of mass.     PAST MEDICAL & SURGICAL HISTORY:  Parkinsons      HTN (hypertension)      History of seizures      Brain mass      Impaired gait      Meningioma      History of insomnia      No significant past surgical history        MEDICATIONS  (STANDING):  acetaminophen     Tablet .. 1000 milliGRAM(s) Oral every 8 hours  amLODIPine   Tablet 5 milliGRAM(s) Oral daily  carbidopa/levodopa  25/250 1 Tablet(s) Oral <User Schedule>  dexAMETHasone     Tablet   Oral   dexAMETHasone     Tablet 4 milliGRAM(s) Oral every 8 hours  entacapone 200 milliGRAM(s) Oral every 8 hours  gabapentin 400 milliGRAM(s) Oral four times a day  heparin   Injectable 7500 Unit(s) SubCutaneous every 8 hours  insulin lispro (ADMELOG) corrective regimen sliding scale   SubCutaneous Before meals and at bedtime  levETIRAcetam 1000 milliGRAM(s) Oral two times a day  pantoprazole    Tablet 40 milliGRAM(s) Oral before breakfast  polyethylene glycol 3350 17 Gram(s) Oral daily  senna 2 Tablet(s) Oral at bedtime    MEDICATIONS  (PRN):  ondansetron Injectable 4 milliGRAM(s) IV Push every 6 hours PRN Nausea and/or Vomiting  oxyCODONE    IR 5 milliGRAM(s) Oral every 6 hours PRN Severe Pain (7 - 10)          Social History:  denied ETOH abuse,  IVDA,  recreational drugs            -  Home Living Status :  lives with his wife and children in an elevator accessible apartment building            -  Prior Home Care Services :  none           Baseline Functional Level Prior to Admission :             - ADL's/ IADL's :    requires partial assistance            - Ambulatory status prior to admission :   walked with cane, h/o falls at home , has a manual wheelchair         FAMILY HISTORY:      CBC Full  -  ( 20 Apr 2023 05:52 )  WBC Count : 14.22 K/uL  RBC Count : 4.43 M/uL  Hemoglobin : 14.2 g/dL  Hematocrit : 42.2 %  Platelet Count - Automated : 153 K/uL  Mean Cell Volume : 95.3 fl  Mean Cell Hemoglobin : 32.1 pg  Mean Cell Hemoglobin Concentration : 33.6 gm/dL  Auto Neutrophil # : x  Auto Lymphocyte # : x  Auto Monocyte # : x  Auto Eosinophil # : x  Auto Basophil # : x  Auto Neutrophil % : x  Auto Lymphocyte % : x  Auto Monocyte % : x  Auto Eosinophil % : x  Auto Basophil % : x      04-20    142  |  106  |  26<H>  ----------------------------<  120<H>  4.2   |  27  |  1.30    Ca    9.1      20 Apr 2023 05:52  Phos  2.9     04-20  Mg     2.4     04-20            Radiology :     < from: MR Head w/wo IV Cont (04.18.23 @ 22:19) >  ACC: [id number] EXAM:  MR BRAIN WAW IC   ORDERED BY: COLLEEN LEE     PROCEDURE DATE:  04/18/2023          INTERPRETATION:  PROCEDURE INFORMATION:  Exam: MR Head Without and With Contrast  Exam date and time: 4/18/2023 9:59 PM  Age: 68 years old  Clinical indication: Pain; Other: Postop    TECHNIQUE:  Imaging protocol: Magnetic resonance imaging of the head without and with   contrast.    IV contrast: 10 mL IV Gadavist.    COMPARISON:  MR BRAIN WITHOUT AND WITH IV CONTRAST 3/25/2023 12:11 PM    FINDINGS:  Brain: Status post resection of the previously demonstrated left frontal   lobe extra-axial mass. There is a surgical cavity in the left frontal   lobe with air and blood products. There is a small left cerebral   convexity subdural fluid collection secondary to postsurgical changes.   There is focal restricted diffusion surrounding the surgical resection   cavity extending to the genu of the corpus callosum on the left   consistent with devitalized brain parenchyma from postsurgical changes.   There is no evidence of nodular enhancement to suggest residual   meningioma. There is persistent vasogenic edema in the left frontal lobe   and mass effect on the left frontal horn with stable left right midline   shift.    Bones/joints: Status post left frontal craniotomy. There is a   subcutaneous fluid collection containing air and hemorrhagic products   secondary postsurgical changes..    Paranasal sinuses: Normal as visualized. No acute sinusitis.  Mastoid air cells: Normal as visualized. No mastoid effusion.  Orbital cavities: Unremarkable.  Soft tissues: Unremarkable.    IMPRESSION:  1.   Since the prior study of 03/25/2023, status post left frontal   craniotomy for resection of the left frontal lobe extra-axial mass. No   evidence of residual tumor.    2. Associated postsurgical changes with focal restricted diffusion   surrounding the surgical resection cavity compatible with devitalized   brain parenchyma.           REVIEW OF SYSTEMS:      CONSTITUTIONAL: No fever, weight loss, or fatigue  EYES: No eye pain, visual disturbances, or discharge  ENMT:  No difficulty hearing, tinnitus, vertigo; No sinus or throat pain  NECK: No pain or stiffness  BREASTS: No pain, masses, or nipple discharge  RESPIRATORY: No cough, wheezing, chills or hemoptysis; No shortness of breath  CARDIOVASCULAR: No chest pain, palpitations, dizziness, or leg swelling  GASTROINTESTINAL: No abdominal or epigastric pain. No nausea, vomiting, or hematemesis; No diarrhea or constipation. No melena or hematochezia.  GENITOURINARY: No dysuria, frequency, hematuria, or incontinence  NEUROLOGICAL:  per hpi   SKIN: No itching, burning, rashes, or lesions   LYMPH NODES: No enlarged glands  ENDOCRINE: No heat or cold intolerance; No hair loss  MUSCULOSKELETAL: No joint pain or swelling; No muscle, back, or extremity pain  PSYCHIATRIC: No depression, anxiety, mood swings, or difficulty sleeping  HEME/LYMPH: No easy bruising, or bleeding gums  ALLERGY AND IMMUNOLOGIC: No hives or eczema  VASCULAR: no swelling , erythema          Vital Signs Last 24 Hrs  T(C): 37.2 (20 Apr 2023 04:35), Max: 37.2 (20 Apr 2023 04:35)  T(F): 99 (20 Apr 2023 04:35), Max: 99 (20 Apr 2023 04:35)  HR: 61 (20 Apr 2023 04:35) (61 - 69)  BP: 157/83 (20 Apr 2023 04:35) (130/76 - 157/83)  BP(mean): --  RR: 16 (20 Apr 2023 04:35) (16 - 16)  SpO2: 97% (20 Apr 2023 04:35) (94% - 97%)    Parameters below as of 20 Apr 2023 04:35  Patient On (Oxygen Delivery Method): room air            Physical Exam :  overwweight 68 y chano lying comfortably in semi Candelario's position , awake , alert ,  NAD     Head : normocephalic ,  L crani c/d/i     Eyes : PERRLA , EOMI , no nystagmus , sclera anicteric    ENT : neg nasal discharge , uvula midline , no oropharyngeal erythema / exudate    Neck : supple , negative JVD , negative carotid bruits , no thyromegaly    Chest : CTA bilaterally     Cardiovascular : regular rate and rhythm , neg murmurs / rubs / gallops    Abdomen : soft , non distended , non tender to palpation in all 4 quadrants ,  normal bowel sounds     Extremities : WWP , neg cyanosis /clubbing / edema  , + rigidity , + hand tremors     Neurologic Exam :    Alert and oriented to person , place , date/year , speech non fluent with word retrieval deficits with frequent pauses , follows commands     Cranial Nerves:     II :                         pupils equal , round and reactive to light , visual fields intact    III/ IV/VI :              extraocular movements intact , neg nystagmus , neg ptosis  V :                        facial sensation intact , V1-3 normal  VII :                      face symmetric , no droop , normal eye closure and smile  VIII :                     hearing intact to finger rub bilaterally  IX and X :             no hoarseness , gag intact , palate/ uvula rise symmetrically  XI :                       SCM / trapezius strength intact bilateral  XII :                      no tongue deviation    Motor Exam:        antigravity x 4 extremities        Sensation :         intact to pinch x 4 extremities                            no neglect or extinction on double simultaneous testing      DTR :                     biceps/brachioradialis : equal                              patella/ankle : equal                                                              neg Babinski       Coordination :      Finger to Nose :  hand tremors      Gait: not assessed      OT assessment : 4/19/2023      Pain Assessment/Number Scale (0-10) Adult  Presence of Pain: denies pain/discomfort (Rating = 0)  Pain Rating (0-10): Rest: 0 (no pain/absence of nonverbal indicators of pain)  Pain Rating (0-10): Activity: 0 (no pain/absence of nonverbal indicators of pain)    Safety    AM-PAC Functional Assessment: Daily Activity  Type of Assessment: Daily assessment  Putting on and taking off regular lower body clothing?: 2 = A lot of assistance  Bathing (including washing, rinsing, drying)?: 2 = A lot of assistance  Toileting, which includes using toilet, bedpan or urinal?: 2 = A lot of assistance  Putting on and taking off regular upper body clothing?: 3 = A little assistance  Take care of personal grooming such as brushing teeth?: 3 = A little assistance  Eating meals?: 3 = A little assistance  Score: 15   Row Comment: Ask the patient "How much help from another person do you currently need? (If the patient hasn't done an activity recently, how much help from another person do you think he/she needs if he/she tried?)    Cognitive/Neuro      Cognitive/Neuro/Behavioral  Level of Consciousness: alert  Arousal Level: arouses to voice  Orientation: oriented x 4  Speech: clear;  spontaneous  Mood/Behavior: calm;  cooperative    Language Assistance  Preferred Language to Address Healthcare Preferred Language to Address Healthcare: English    Therapeutic Interventions      Bed Mobility  Bed Mobility Training Sit-to-Supine: 2 person assist;  nonverbal cues (demo/gestures);  moderate assist (50% patient effort);  verbal cues  Bed Mobility Training Supine-to-Sit: minimum assist (75% patient effort);  2 person assist;  nonverbal cues (demo/gestures);  verbal cues;  HOB raised ~30 deg  Bed Mobility Training Limitations: decreased ability to use arms for pushing/pulling;  decreased ability to use legs for bridging/pushing;  impaired ability to control trunk for mobility;  decreased flexibility;  decreased ROM;  impaired balance;  impaired motor control    Sit-Stand Transfer Training  Transfer Training Sit-to-Stand Transfer: minimum assist (75% patient effort);  2 person assist;  nonverbal cues (demo/gestures);  verbal cues;  b/l HHA  Transfer Training Stand-to-Sit Transfer: minimum assist (75% patient effort);  2 person assist;  nonverbal cues (demo/gestures);  verbal cues;  b/l HHA  Sit-to-Stand Transfer Training Transfer Safety Analysis: decreased balance;  decreased weight-shifting ability;  decreased sequencing ability;  decreased flexibility;  decreased strength;  impaired balance    Therapeutic Exercise  Therapeutic Exercise Detail: Seated MMT: BUE shoulder flexion ~4/5, B/l elbow flexion/extension 4+/5, b/l  4+/5. B/L knee extension 4/5. L hip flexion 3+/5, R hip flexion 4-/5. B/l knee extension 4/5. Fx mobility training performed: mod-max x2 via b/l HHA to take ~3 side steps 2/2 impaired motor planning. Min x2 assist to ambulate forward/back ~5'x4 via b/l HHA, LLE ataxia noted with difficulty weight shifting.     Neuromuscular Re-education  Neuromuscular Re-education Detail: Seated trunk flex/ext x5 reps using BUEs to push/pull with CGA-min A provided. Able to sit unsupported at EOB static sitting for ~10 minutes. CGA for dynamic sitting activities 2/2 posterior lean.     OT Cognitive Treatment  OT Treatment: Cognitive Charges: Pt following ~100% of single step commands, requires task initiation cues as pt with delayed response at times.       PM&R Impression :   Found to have large L frontal dural based lesion c/f meningioma w/ associated mass effect and edema. Now s/p L crani for meningioma resection (4/17)        Disposition plan recommendation : Patient is an excellent candidate for acute rehab placement.    1) Patient has an acute neurologic diagnosis .    2) Patient is extremely motivated in rehab and is actively participating in PT and OT and requires ongoing multiple therapy disciplines .    3) Patient is not at baseline functional level .    4) Patient requires an intensive inpatient acute rehabilitation therapy and can tolerate > 3 hrs of combined PT and OT per day and at least 5 days per week with a reasonable expectation that the patient will make measurable functional improvement that only an acute rehab program can provide .    5) Patient requires Physiatry supervision specializing in acute inpatient rehab care .     6) Has strong family support for post acute rehab stay .

## 2023-04-20 NOTE — SWALLOW BEDSIDE ASSESSMENT ADULT - COMMENTS
SLE conducted on 4/19 revealed moderate non-fluent expressive aphasia.  Per RN, pt had missed full day of receiving Sinemet medication yesterday, is now receiving baseline dose (4x/day).  Per pt's son at bedside, pt had some mild difficulty chewing prior to admission r/t PD, no overt signs of aspiration, was tolerating regular/thin liquid diet.

## 2023-04-21 ENCOUNTER — TRANSCRIPTION ENCOUNTER (OUTPATIENT)
Age: 68
End: 2023-04-21

## 2023-04-21 ENCOUNTER — INPATIENT (INPATIENT)
Facility: HOSPITAL | Age: 68
LOS: 0 days | Discharge: ANOTHER HOSPITAL | DRG: 949 | End: 2023-04-22
Attending: PSYCHIATRY & NEUROLOGY | Admitting: PSYCHIATRY & NEUROLOGY
Payer: MEDICARE

## 2023-04-21 VITALS
SYSTOLIC BLOOD PRESSURE: 176 MMHG | TEMPERATURE: 98 F | DIASTOLIC BLOOD PRESSURE: 97 MMHG | HEIGHT: 66 IN | HEART RATE: 64 BPM | RESPIRATION RATE: 16 BRPM | WEIGHT: 190.26 LBS | OXYGEN SATURATION: 97 %

## 2023-04-21 VITALS
DIASTOLIC BLOOD PRESSURE: 77 MMHG | TEMPERATURE: 99 F | HEART RATE: 96 BPM | OXYGEN SATURATION: 100 % | SYSTOLIC BLOOD PRESSURE: 118 MMHG | RESPIRATION RATE: 17 BRPM

## 2023-04-21 DIAGNOSIS — D32.0 BENIGN NEOPLASM OF CEREBRAL MENINGES: ICD-10-CM

## 2023-04-21 PROBLEM — R26.9 UNSPECIFIED ABNORMALITIES OF GAIT AND MOBILITY: Chronic | Status: ACTIVE | Noted: 2023-04-14

## 2023-04-21 PROBLEM — G20 PARKINSON'S DISEASE: Chronic | Status: ACTIVE | Noted: 2023-04-14

## 2023-04-21 PROBLEM — D32.9 BENIGN NEOPLASM OF MENINGES, UNSPECIFIED: Chronic | Status: ACTIVE | Noted: 2023-04-14

## 2023-04-21 PROBLEM — G93.89 OTHER SPECIFIED DISORDERS OF BRAIN: Chronic | Status: ACTIVE | Noted: 2023-04-14

## 2023-04-21 PROBLEM — Z87.898 PERSONAL HISTORY OF OTHER SPECIFIED CONDITIONS: Chronic | Status: ACTIVE | Noted: 2023-04-14

## 2023-04-21 LAB
ANION GAP SERPL CALC-SCNC: 10 MMOL/L — SIGNIFICANT CHANGE UP (ref 5–17)
BUN SERPL-MCNC: 26 MG/DL — HIGH (ref 7–23)
CALCIUM SERPL-MCNC: 8.6 MG/DL — SIGNIFICANT CHANGE UP (ref 8.4–10.5)
CHLORIDE SERPL-SCNC: 103 MMOL/L — SIGNIFICANT CHANGE UP (ref 96–108)
CO2 SERPL-SCNC: 26 MMOL/L — SIGNIFICANT CHANGE UP (ref 22–31)
CREAT SERPL-MCNC: 1.22 MG/DL — SIGNIFICANT CHANGE UP (ref 0.5–1.3)
EGFR: 64 ML/MIN/1.73M2 — SIGNIFICANT CHANGE UP
GLUCOSE BLDC GLUCOMTR-MCNC: 110 MG/DL — HIGH (ref 70–99)
GLUCOSE BLDC GLUCOMTR-MCNC: 181 MG/DL — HIGH (ref 70–99)
GLUCOSE BLDC GLUCOMTR-MCNC: 98 MG/DL — SIGNIFICANT CHANGE UP (ref 70–99)
GLUCOSE SERPL-MCNC: 120 MG/DL — HIGH (ref 70–99)
HCT VFR BLD CALC: 45.8 % — SIGNIFICANT CHANGE UP (ref 39–50)
HGB BLD-MCNC: 14.9 G/DL — SIGNIFICANT CHANGE UP (ref 13–17)
MAGNESIUM SERPL-MCNC: 2.6 MG/DL — SIGNIFICANT CHANGE UP (ref 1.6–2.6)
MCHC RBC-ENTMCNC: 31 PG — SIGNIFICANT CHANGE UP (ref 27–34)
MCHC RBC-ENTMCNC: 32.5 GM/DL — SIGNIFICANT CHANGE UP (ref 32–36)
MCV RBC AUTO: 95.4 FL — SIGNIFICANT CHANGE UP (ref 80–100)
NRBC # BLD: 0 /100 WBCS — SIGNIFICANT CHANGE UP (ref 0–0)
PHOSPHATE SERPL-MCNC: 2.8 MG/DL — SIGNIFICANT CHANGE UP (ref 2.5–4.5)
PLATELET # BLD AUTO: 157 K/UL — SIGNIFICANT CHANGE UP (ref 150–400)
POTASSIUM SERPL-MCNC: 4.2 MMOL/L — SIGNIFICANT CHANGE UP (ref 3.5–5.3)
POTASSIUM SERPL-SCNC: 4.2 MMOL/L — SIGNIFICANT CHANGE UP (ref 3.5–5.3)
RBC # BLD: 4.8 M/UL — SIGNIFICANT CHANGE UP (ref 4.2–5.8)
RBC # FLD: 14.1 % — SIGNIFICANT CHANGE UP (ref 10.3–14.5)
SODIUM SERPL-SCNC: 139 MMOL/L — SIGNIFICANT CHANGE UP (ref 135–145)
WBC # BLD: 8.9 K/UL — SIGNIFICANT CHANGE UP (ref 3.8–10.5)
WBC # FLD AUTO: 8.9 K/UL — SIGNIFICANT CHANGE UP (ref 3.8–10.5)

## 2023-04-21 PROCEDURE — 86850 RBC ANTIBODY SCREEN: CPT

## 2023-04-21 PROCEDURE — 88307 TISSUE EXAM BY PATHOLOGIST: CPT

## 2023-04-21 PROCEDURE — 97166 OT EVAL MOD COMPLEX 45 MIN: CPT

## 2023-04-21 PROCEDURE — 82947 ASSAY GLUCOSE BLOOD QUANT: CPT

## 2023-04-21 PROCEDURE — 83735 ASSAY OF MAGNESIUM: CPT

## 2023-04-21 PROCEDURE — 85027 COMPLETE CBC AUTOMATED: CPT

## 2023-04-21 PROCEDURE — 86901 BLOOD TYPING SEROLOGIC RH(D): CPT

## 2023-04-21 PROCEDURE — 92610 EVALUATE SWALLOWING FUNCTION: CPT

## 2023-04-21 PROCEDURE — 71045 X-RAY EXAM CHEST 1 VIEW: CPT

## 2023-04-21 PROCEDURE — C1889: CPT

## 2023-04-21 PROCEDURE — 84295 ASSAY OF SERUM SODIUM: CPT

## 2023-04-21 PROCEDURE — 36415 COLL VENOUS BLD VENIPUNCTURE: CPT

## 2023-04-21 PROCEDURE — 99223 1ST HOSP IP/OBS HIGH 75: CPT

## 2023-04-21 PROCEDURE — 97163 PT EVAL HIGH COMPLEX 45 MIN: CPT

## 2023-04-21 PROCEDURE — 88341 IMHCHEM/IMCYTCHM EA ADD ANTB: CPT

## 2023-04-21 PROCEDURE — 84132 ASSAY OF SERUM POTASSIUM: CPT

## 2023-04-21 PROCEDURE — 85018 HEMOGLOBIN: CPT

## 2023-04-21 PROCEDURE — 82330 ASSAY OF CALCIUM: CPT

## 2023-04-21 PROCEDURE — 85730 THROMBOPLASTIN TIME PARTIAL: CPT

## 2023-04-21 PROCEDURE — 83036 HEMOGLOBIN GLYCOSYLATED A1C: CPT

## 2023-04-21 PROCEDURE — 70450 CT HEAD/BRAIN W/O DYE: CPT | Mod: 26

## 2023-04-21 PROCEDURE — 70553 MRI BRAIN STEM W/O & W/DYE: CPT

## 2023-04-21 PROCEDURE — 97530 THERAPEUTIC ACTIVITIES: CPT

## 2023-04-21 PROCEDURE — 88331 PATH CONSLTJ SURG 1 BLK 1SPC: CPT

## 2023-04-21 PROCEDURE — 99024 POSTOP FOLLOW-UP VISIT: CPT

## 2023-04-21 PROCEDURE — 88360 TUMOR IMMUNOHISTOCHEM/MANUAL: CPT

## 2023-04-21 PROCEDURE — 92523 SPEECH SOUND LANG COMPREHEN: CPT

## 2023-04-21 PROCEDURE — 97110 THERAPEUTIC EXERCISES: CPT

## 2023-04-21 PROCEDURE — 80053 COMPREHEN METABOLIC PANEL: CPT

## 2023-04-21 PROCEDURE — 82746 ASSAY OF FOLIC ACID SERUM: CPT

## 2023-04-21 PROCEDURE — 97116 GAIT TRAINING THERAPY: CPT

## 2023-04-21 PROCEDURE — A9585: CPT

## 2023-04-21 PROCEDURE — 84100 ASSAY OF PHOSPHORUS: CPT

## 2023-04-21 PROCEDURE — 86803 HEPATITIS C AB TEST: CPT

## 2023-04-21 PROCEDURE — 94640 AIRWAY INHALATION TREATMENT: CPT

## 2023-04-21 PROCEDURE — 85610 PROTHROMBIN TIME: CPT

## 2023-04-21 PROCEDURE — 93010 ELECTROCARDIOGRAM REPORT: CPT

## 2023-04-21 PROCEDURE — U0005: CPT

## 2023-04-21 PROCEDURE — 86900 BLOOD TYPING SEROLOGIC ABO: CPT

## 2023-04-21 PROCEDURE — 82607 VITAMIN B-12: CPT

## 2023-04-21 PROCEDURE — U0003: CPT

## 2023-04-21 PROCEDURE — C1713: CPT

## 2023-04-21 PROCEDURE — 82962 GLUCOSE BLOOD TEST: CPT

## 2023-04-21 PROCEDURE — 80076 HEPATIC FUNCTION PANEL: CPT

## 2023-04-21 PROCEDURE — 80048 BASIC METABOLIC PNL TOTAL CA: CPT

## 2023-04-21 RX ORDER — HYDRALAZINE HCL 50 MG
10 TABLET ORAL ONCE
Refills: 0 | Status: COMPLETED | OUTPATIENT
Start: 2023-04-21 | End: 2023-04-21

## 2023-04-21 RX ORDER — CARBIDOPA AND LEVODOPA 25; 100 MG/1; MG/1
1 TABLET ORAL
Refills: 0 | Status: DISCONTINUED | OUTPATIENT
Start: 2023-04-21 | End: 2023-04-22

## 2023-04-21 RX ORDER — DEXAMETHASONE 0.5 MG/5ML
2 ELIXIR ORAL EVERY 6 HOURS
Refills: 0 | Status: DISCONTINUED | OUTPATIENT
Start: 2023-04-21 | End: 2023-04-22

## 2023-04-21 RX ORDER — OXYCODONE HYDROCHLORIDE 5 MG/1
1 TABLET ORAL
Qty: 0 | Refills: 0 | DISCHARGE
Start: 2023-04-21

## 2023-04-21 RX ORDER — ENTACAPONE 200 MG/1
1 TABLET, FILM COATED ORAL
Qty: 0 | Refills: 0 | DISCHARGE
Start: 2023-04-21

## 2023-04-21 RX ORDER — LEVETIRACETAM 250 MG/1
1000 TABLET, FILM COATED ORAL ONCE
Refills: 0 | Status: DISCONTINUED | OUTPATIENT
Start: 2023-04-21 | End: 2023-04-21

## 2023-04-21 RX ORDER — PANTOPRAZOLE SODIUM 20 MG/1
40 TABLET, DELAYED RELEASE ORAL
Refills: 0 | Status: DISCONTINUED | OUTPATIENT
Start: 2023-04-21 | End: 2023-04-22

## 2023-04-21 RX ORDER — DEXAMETHASONE 0.5 MG/5ML
2 ELIXIR ORAL DAILY
Refills: 0 | Status: CANCELLED | OUTPATIENT
Start: 2023-04-24 | End: 2023-04-22

## 2023-04-21 RX ORDER — HEPARIN SODIUM 5000 [USP'U]/ML
7500 INJECTION INTRAVENOUS; SUBCUTANEOUS
Qty: 0 | Refills: 0 | DISCHARGE
Start: 2023-04-21

## 2023-04-21 RX ORDER — SODIUM,POTASSIUM PHOSPHATES 278-250MG
1 POWDER IN PACKET (EA) ORAL ONCE
Refills: 0 | Status: COMPLETED | OUTPATIENT
Start: 2023-04-21 | End: 2023-04-21

## 2023-04-21 RX ORDER — POLYETHYLENE GLYCOL 3350 17 G/17G
17 POWDER, FOR SOLUTION ORAL DAILY
Refills: 0 | Status: DISCONTINUED | OUTPATIENT
Start: 2023-04-21 | End: 2023-04-22

## 2023-04-21 RX ORDER — ONDANSETRON 8 MG/1
4 TABLET, FILM COATED ORAL ONCE
Refills: 0 | Status: COMPLETED | OUTPATIENT
Start: 2023-04-21 | End: 2023-04-21

## 2023-04-21 RX ORDER — ENTACAPONE 200 MG/1
200 TABLET, FILM COATED ORAL
Refills: 0 | Status: DISCONTINUED | OUTPATIENT
Start: 2023-04-21 | End: 2023-04-22

## 2023-04-21 RX ORDER — DEXAMETHASONE 0.5 MG/5ML
2 ELIXIR ORAL
Qty: 0 | Refills: 0 | DISCHARGE
Start: 2023-04-21

## 2023-04-21 RX ORDER — DEXAMETHASONE 0.5 MG/5ML
2 ELIXIR ORAL EVERY 12 HOURS
Refills: 0 | Status: CANCELLED | OUTPATIENT
Start: 2023-04-23 | End: 2023-04-22

## 2023-04-21 RX ORDER — HEPARIN SODIUM 5000 [USP'U]/ML
7500 INJECTION INTRAVENOUS; SUBCUTANEOUS EVERY 8 HOURS
Refills: 0 | Status: DISCONTINUED | OUTPATIENT
Start: 2023-04-21 | End: 2023-04-22

## 2023-04-21 RX ORDER — AMLODIPINE BESYLATE 2.5 MG/1
5 TABLET ORAL DAILY
Refills: 0 | Status: DISCONTINUED | OUTPATIENT
Start: 2023-04-21 | End: 2023-04-21

## 2023-04-21 RX ORDER — ACETAMINOPHEN 500 MG
650 TABLET ORAL EVERY 6 HOURS
Refills: 0 | Status: DISCONTINUED | OUTPATIENT
Start: 2023-04-21 | End: 2023-04-22

## 2023-04-21 RX ORDER — DEXAMETHASONE 0.5 MG/5ML
2 ELIXIR ORAL EVERY 8 HOURS
Refills: 0 | Status: DISCONTINUED | OUTPATIENT
Start: 2023-04-22 | End: 2023-04-22

## 2023-04-21 RX ORDER — POLYETHYLENE GLYCOL 3350 17 G/17G
17 POWDER, FOR SOLUTION ORAL
Qty: 0 | Refills: 0 | DISCHARGE
Start: 2023-04-21

## 2023-04-21 RX ORDER — ACETAMINOPHEN 500 MG
2 TABLET ORAL
Qty: 0 | Refills: 0 | DISCHARGE
Start: 2023-04-21

## 2023-04-21 RX ORDER — GABAPENTIN 400 MG/1
400 CAPSULE ORAL ONCE
Refills: 0 | Status: COMPLETED | OUTPATIENT
Start: 2023-04-21 | End: 2023-04-21

## 2023-04-21 RX ORDER — AMLODIPINE BESYLATE 2.5 MG/1
5 TABLET ORAL DAILY
Refills: 0 | Status: DISCONTINUED | OUTPATIENT
Start: 2023-04-21 | End: 2023-04-22

## 2023-04-21 RX ORDER — GABAPENTIN 400 MG/1
400 CAPSULE ORAL
Refills: 0 | Status: DISCONTINUED | OUTPATIENT
Start: 2023-04-21 | End: 2023-04-22

## 2023-04-21 RX ORDER — PANTOPRAZOLE SODIUM 20 MG/1
1 TABLET, DELAYED RELEASE ORAL
Qty: 0 | Refills: 0 | DISCHARGE
Start: 2023-04-21

## 2023-04-21 RX ORDER — ONDANSETRON 8 MG/1
4 TABLET, FILM COATED ORAL THREE TIMES A DAY
Refills: 0 | Status: DISCONTINUED | OUTPATIENT
Start: 2023-04-21 | End: 2023-04-22

## 2023-04-21 RX ORDER — LEVETIRACETAM 250 MG/1
1000 TABLET, FILM COATED ORAL
Refills: 0 | Status: DISCONTINUED | OUTPATIENT
Start: 2023-04-21 | End: 2023-04-22

## 2023-04-21 RX ORDER — SENNA PLUS 8.6 MG/1
2 TABLET ORAL AT BEDTIME
Refills: 0 | Status: DISCONTINUED | OUTPATIENT
Start: 2023-04-21 | End: 2023-04-22

## 2023-04-21 RX ORDER — OXYCODONE HYDROCHLORIDE 5 MG/1
2.5 TABLET ORAL ONCE
Refills: 0 | Status: DISCONTINUED | OUTPATIENT
Start: 2023-04-21 | End: 2023-04-22

## 2023-04-21 RX ADMIN — GABAPENTIN 400 MILLIGRAM(S): 400 CAPSULE ORAL at 00:59

## 2023-04-21 RX ADMIN — Medication 1 PACKET(S): at 15:02

## 2023-04-21 RX ADMIN — OXYCODONE HYDROCHLORIDE 5 MILLIGRAM(S): 5 TABLET ORAL at 06:35

## 2023-04-21 RX ADMIN — CARBIDOPA AND LEVODOPA 1 TABLET(S): 25; 100 TABLET ORAL at 19:56

## 2023-04-21 RX ADMIN — CARBIDOPA AND LEVODOPA 1 TABLET(S): 25; 100 TABLET ORAL at 08:00

## 2023-04-21 RX ADMIN — Medication 650 MILLIGRAM(S): at 15:02

## 2023-04-21 RX ADMIN — HEPARIN SODIUM 7500 UNIT(S): 5000 INJECTION INTRAVENOUS; SUBCUTANEOUS at 15:02

## 2023-04-21 RX ADMIN — GABAPENTIN 400 MILLIGRAM(S): 400 CAPSULE ORAL at 06:35

## 2023-04-21 RX ADMIN — CARBIDOPA AND LEVODOPA 1 TABLET(S): 25; 100 TABLET ORAL at 12:31

## 2023-04-21 RX ADMIN — GABAPENTIN 400 MILLIGRAM(S): 400 CAPSULE ORAL at 12:31

## 2023-04-21 RX ADMIN — HEPARIN SODIUM 7500 UNIT(S): 5000 INJECTION INTRAVENOUS; SUBCUTANEOUS at 06:35

## 2023-04-21 RX ADMIN — PANTOPRAZOLE SODIUM 40 MILLIGRAM(S): 20 TABLET, DELAYED RELEASE ORAL at 06:35

## 2023-04-21 RX ADMIN — ENTACAPONE 200 MILLIGRAM(S): 200 TABLET, FILM COATED ORAL at 06:35

## 2023-04-21 RX ADMIN — Medication 2 MILLIGRAM(S): at 06:35

## 2023-04-21 RX ADMIN — ENTACAPONE 200 MILLIGRAM(S): 200 TABLET, FILM COATED ORAL at 15:02

## 2023-04-21 RX ADMIN — Medication 10 MILLIGRAM(S): at 23:58

## 2023-04-21 RX ADMIN — Medication 2 MILLIGRAM(S): at 19:18

## 2023-04-21 RX ADMIN — ONDANSETRON 4 MILLIGRAM(S): 8 TABLET, FILM COATED ORAL at 21:49

## 2023-04-21 RX ADMIN — Medication 650 MILLIGRAM(S): at 16:12

## 2023-04-21 RX ADMIN — LEVETIRACETAM 1000 MILLIGRAM(S): 250 TABLET, FILM COATED ORAL at 19:18

## 2023-04-21 RX ADMIN — Medication 2 MILLIGRAM(S): at 12:31

## 2023-04-21 RX ADMIN — OXYCODONE HYDROCHLORIDE 5 MILLIGRAM(S): 5 TABLET ORAL at 07:35

## 2023-04-21 RX ADMIN — LEVETIRACETAM 1000 MILLIGRAM(S): 250 TABLET, FILM COATED ORAL at 06:35

## 2023-04-21 RX ADMIN — AMLODIPINE BESYLATE 5 MILLIGRAM(S): 2.5 TABLET ORAL at 06:35

## 2023-04-21 NOTE — DISCHARGE NOTE PROVIDER - NSDCCPTREATMENT_GEN_ALL_CORE_FT
PRINCIPAL PROCEDURE  Procedure: Craniotomy for resection of tumor of left side of brain  Findings and Treatment:

## 2023-04-21 NOTE — H&P ADULT - NSHPSOCIALHISTORY_GEN_ALL_CORE
SOCIAL HISTORY  Smoking - Denied  EtOH - Denied   Drugs - Denied    FUNCTIONAL HISTORY  lives with his wife and children in an elevator accessible apartment building. walked with cane at times, h/o falls at home , has a manual wheelchair   Prior Level of Function: Dependent for most  ADLs and ambulation    CURRENT FUNCTIONAL STATUS  Bed Mobility  Bed Mobility Training Sit-to-Supine: 2 person assist;  nonverbal cues (demo/gestures);  moderate assist (50% patient effort);  verbal cues  Bed Mobility Training Supine-to-Sit: minimum assist (75% patient effort);  2 person assist;  nonverbal cues (demo/gestures);  verbal cues;  HOB raised ~30 deg  Bed Mobility Training Limitations: decreased ability to use arms for pushing/pulling;  decreased ability to use legs for bridging/pushing;  impaired ability to control trunk for mobility;  decreased flexibility;  decreased ROM;  impaired balance;  impaired motor control    Sit-Stand Transfer Training  Transfer Training Sit-to-Stand Transfer: minimum assist (75% patient effort);  2 person assist;  nonverbal cues (demo/gestures);  verbal cues;  b/l HHA  Transfer Training Stand-to-Sit Transfer: minimum assist (75% patient effort);  2 person assist;  nonverbal cues (demo/gestures);  verbal cues;  b/l HHA  Sit-to-Stand Transfer Training Transfer Safety Analysis: decreased balance;  decreased weight-shifting ability;  decreased sequencing ability;  decreased flexibility;  decreased strength;  impaired balance    Therapeutic Exercise  Therapeutic Exercise Detail: Seated MMT: BUE shoulder flexion ~4/5, B/l elbow flexion/extension 4+/5, b/l  4+/5. B/L knee extension 4/5. L hip flexion 3+/5, R hip flexion 4-/5. B/l knee extension 4/5. Fx mobility training performed: mod-max x2 via b/l HHA to take ~3 side steps 2/2 impaired motor planning. Min x2 assist to ambulate forward/back ~5'x4 via b/l HHA, LLE ataxia noted with difficulty weight shifting.     Neuromuscular Re-education  Neuromuscular Re-education Detail: Seated trunk flex/ext x5 reps using BUEs to push/pull with CGA-min A provided. Able to sit unsupported at EOB static sitting for ~10 minutes. CGA for dynamic sitting activities 2/2 posterior lean.     OT Cognitive Treatment  OT Treatment: Cognitive Charges: Pt following ~100% of single step commands, requires task initiation cues as pt with delayed response at times.

## 2023-04-21 NOTE — H&P ADULT - HISTORY OF PRESENT ILLNESS
68 year old male with PMH of seizures, HTN, and Parkinson's disease diagnosed in 2012 with multiple falls who presented to [facility] on 4/17 for elective left craniotomy for meningioma resection. He had known about this tumor since 2019 but had recent follow up which showed increase in size with mass effect. He has also been wheelchair bound since about 2-3 years ago. Per son he has episodes of OFF-time around noon which caused him to have frequent falls. His Sinemet was adjusted in January from BID to 4 times a day as 8AM, 12, 4 and 8PM. He likely experiencing some REM sleep disorder as he has difficulty starting and maintaining the sleep. His hospital course was unremarkable. He remained stable and was evaluated for admission to acute inpatient rehab. He was admitted to [facility] on 4/21/23

## 2023-04-21 NOTE — DISCHARGE NOTE PROVIDER - DID THE PATIENT PRESENT WITH OR WAS TREATED FOR MALNUTRITION DURING THIS ADMISSION
Reason for Disposition   Headache  (and neurologic deficit)    Answer Assessment - Initial Assessment Questions  1. SYMPTOM: \"What is the main symptom you are concerned about? \" (e.g., weakness, numbness)      Complete numbness in left arm this morning. Now is just tingly, and painful in all extremities. Some swelling extremiteis. 2. ONSET: \"When did this start? \" (minutes, hours, days; while sleeping)      Had fall last weekend, woke up Monday with numbness in right arm and headache, pain down legs and left arm progressed through the week. Booster on given on Thursday, Starting on Wednesday, things starting getting, today woke up and left arm was completely numb, but is better now. 3. LAST NORMAL: \"When was the last time you were normal (no symptoms)? \"      Mary Patel on Thanksgiving. Felt fine until Monday. 4. PATTERN \"Does this come and go, or has it been constant since it started? \"  \"Is it present now? \"      Numbness comes and goes, but tingly pain is constant    5. CARDIAC SYMPTOMS: \"Have you had any of the following symptoms: chest pain, difficulty breathing, palpitations? \"      Denies     6. NEUROLOGIC SYMPTOMS: \"Have you had any of the following symptoms: headache, dizziness, vision loss, double vision, changes in speech, unsteady on your feet? \"      Headache    7. OTHER SYMPTOMS: \"Do you have any other symptoms? \"      Feels was nauseated from covid shot on Thursday     8. PREGNANCY: \"Is there any chance you are pregnant? \" \"When was your last menstrual period? \"      N/a    Protocols used: NEUROLOGIC DEFICIT-ADULT-AH    Received call from [address] [place] [address] with Red Flag Complaint. Brief description of triage: see above    Triage indicates for patient to go to ED now. Care advice provided, patient verbalizes understanding; denies any other questions or concerns; instructed to call back for any new or worsening symptoms. Attention Provider:   Thank you for allowing me to participate in the care of your patient. The patient was connected to triage in response to information provided to the ECC. Please do not respond through this encounter as the response is not directed to a shared pool. No

## 2023-04-21 NOTE — H&P ADULT - NSICDXPASTMEDICALHX_GEN_ALL_CORE_FT
PAST MEDICAL HISTORY:  Brain mass     History of insomnia     History of seizures     HTN (hypertension)     Impaired gait     Meningioma     Parkinsons

## 2023-04-21 NOTE — PROVIDER CONTACT NOTE (OTHER) - BACKGROUND
Pt is admitted for post craniotomy.
Pt was admitted for benign neoplasm of cerebral meninges. Status post craniotomy. Has a history of brain mass, seizure and Parkinson.
Admitted post craniotomy. Hx of brain mass, seizures, hypertension, Parkinsons Disease.

## 2023-04-21 NOTE — PATIENT PROFILE ADULT - FALL HARM RISK - HARM RISK INTERVENTIONS
Assistance with ambulation/Assistance OOB with selected safe patient handling equipment/Communicate Risk of Fall with Harm to all staff/Discuss with provider need for PT consult/Monitor gait and stability/Provide patient with walking aids - walker, cane, crutches/Reinforce activity limits and safety measures with patient and family/Sit up slowly, dangle for a short time, stand at bedside before walking/Tailored Fall Risk Interventions/Use of alarms - bed, chair and/or voice tab/Visual Cue: Yellow wristband and red socks/Bed in lowest position, wheels locked, appropriate side rails in place/Call bell, personal items and telephone in reach/Instruct patient to call for assistance before getting out of bed or chair/Non-slip footwear when patient is out of bed/[place] to call system/Physically safe environment - no spills, clutter or unnecessary equipment/Purposeful Proactive Rounding/Room/bathroom lighting operational, light cord in reach

## 2023-04-21 NOTE — H&P ADULT - NSHPREVIEWOFSYSTEMS_GEN_ALL_CORE
REVIEW OF SYSTEMS  Constitutional: No fever  Pulm: No cough  Cardio: No chest pain  GI:  No abdominal pain  Neuro: mild head pain at incision site  MSK: No joint pain, No joint swelling, No muscle pain, No Neck or back pain

## 2023-04-21 NOTE — PROVIDER CONTACT NOTE (OTHER) - ASSESSMENT
BP was 173/85 electronically, and 178/92 manually.
Pt had left cerebral hemorrhage on CT scan. Pt had tremors and twitching in the face. Pt c/o of nausea, vomiting and headache-not relieved by zofran. Vitals /92, HR 68.
Pt observed to have difficulty swallowing medications.

## 2023-04-21 NOTE — PROVIDER CONTACT NOTE (OTHER) - RECOMMENDATIONS
Administer Zofran PRN
Educated patient to swallow slow. Dr. Clark notified. Consult for swallow evaluation.
Rapid Response Called.

## 2023-04-21 NOTE — H&P ADULT - ASSESSMENT
ASSESSMENT/PLAN  68 year old male with PMH of seizures, HTN, and Parkinson's disease diagnosed in 2012 with multiple falls who presented to [facility] on 4/17 for elective left craniotomy for meningioma resection. He had known about this tumor since 2019 but had recent follow up which showed increase in size with mass effect. He has also been wheelchair bound since about 2-3 years ago. Per son he has episodes of OFF-time around noon which caused him to have frequent falls. His Sinemet was adjusted in January from BID to 4 times a day as 8AM, 12, 4 and 8PM. He likely experiencing some REM sleep disorder as he has difficulty starting and maintaining the sleep. His hospital course was unremarkable. He remained stable and was evaluated for admission to acute inpatient rehab. He was admitted to [facility] on 4/21/23.       #Menigioma s/p resection and history of seizures, now with Gait Instability, ADL impairments and Functional impairments  - Start Comprehensive Rehab Program of PT/OT/SLP  -Continue Keppra 1000mg BID for total of 7 days  -Continue gabapentin 400mg QID  -Continue decadron taper as per neurosurgery     #Parkinson's Disease  -Continue Sinemet 25/250 1 tab at 8am, 12pm, 4pm and 8pm  -Continue Entacapone 8am,  12pm, and 4pm  -Movement disorders following.     #Pain control  - Tylenol PRN    #GI/Bowel Mgmt   - Continue Senna at bedtime   - Miralax PRN    #Bladder management  - Continue to monitor PVR q 8 hours (SC if > 400)  -Monitor UO    #DVT  - Lovenox  - SCDs  - TEDs     #Skin:  -***    FEN   - Diet - Soft/bite sized+ Thins  [CCHO, DASH/TLC]    - Dysphagia  SLP - evaluation and treatment    Precautions / PROPHYLAXIS:   - Falls, Seizure   - ortho: Weight bearing status: WBAT   - Lungs: Aspiration, Incentive Spirometer   - Pressure injury/Skin: Turn Q2hrs while in bed, OOB to Chair, PT/OT        MEDICAL PROGNOSIS: GOOD              REHAB POTENTIAL: GOOD              ESTIMATED DISPOSITION: HOME WITH HOME CARE              ELOS: 10-14 Days   EXPECTED THERAPY:     P.T. 1hr/day       O.T. 1hr/day      S.L.P. 1hr/day       P&O Unnecessary       EXP FREQUENCY: 5 days per 7 day period     PRESCREEN COMPARISON:   I have reviewed the prescreen information and I have found no relevant changes between the preadmission screening and my post admission evaluation     RATIONALE FOR INPATIENT ADMISSION - Patient demonstrates the following: (check all that apply)  [X] Medically appropriate for rehabilitation admission  [X] Has attainable rehab goals with an appropriate initial discharge plan  [X] Has rehabilitation potential (expected to make a significant improvement within a reasonable period of time)   [X] Requires close medical management by a rehab physician, rehab nursing care, Hospitalist and comprehensive interdisciplinary team (including PT, OT, & or SLP, Prosthetics and Orthotics)   ASSESSMENT/PLAN  68 year old male with PMH of seizures, HTN, and Parkinson's disease diagnosed in 2012 with multiple falls who presented to [facility] on 4/17 for elective left craniotomy for meningioma resection. He had known about this tumor since 2019 but had recent follow up which showed increase in size with mass effect. He has also been wheelchair bound since about 2-3 years ago. Per son he has episodes of OFF-time around noon which caused him to have frequent falls. His Sinemet was adjusted in January from BID to 4 times a day as 8AM, 12, 4 and 8PM. He likely experiencing some REM sleep disorder as he has difficulty starting and maintaining the sleep. His hospital course was unremarkable. He remained stable and was evaluated for admission to acute inpatient rehab. He was admitted to [facility] on 4/21/23.       #Menigioma s/p resection and history of seizures, now with Gait Instability, ADL impairments and Functional impairments  - Start Comprehensive Rehab Program of PT/OT/SLP  -Continue Keppra 1000mg BID   -Continue gabapentin 400mg QID  -Continue decadron taper as per neurosurgery 2mg q6h x1 day, 2mg q8h x1 day, 2mg q12h x1 day, 2mg daily x1 day then stop     #Parkinson's Disease  -Continue Sinemet 25/250 1 tab at 8am, 12pm, 4pm and 8pm  -Continue Entacapone 8am, 12pm, and 4pm  -Movement disorders following.     #HTN  -Continue norvasc 5mg daily  -Monitor for OH     #Pain control  - Tylenol PRN    #GI/Bowel Mgmt   - Continue Senna at bedtime   - Miralax daily  -Protonix 40mg daily    #Bladder management  - Continue to monitor PVR q 8 hours (SC if > 400)  -Monitor UO    #DVT  - Heparin  - TEDs     #Skin:  -***    FEN   - Diet - Soft/bite sized+ Thins  [CCHO, DASH/TLC]    - Dysphagia  SLP - evaluation and treatment    Precautions / PROPHYLAXIS:   - Falls, Seizure   - ortho: Weight bearing status: WBAT   - Lungs: Aspiration, Incentive Spirometer   - Pressure injury/Skin: Turn Q2hrs while in bed, OOB to Chair, PT/OT        MEDICAL PROGNOSIS: GOOD              REHAB POTENTIAL: GOOD              ESTIMATED DISPOSITION: HOME WITH HOME CARE              ELOS: 10-14 Days   EXPECTED THERAPY:     P.T. 1hr/day       O.T. 1hr/day      S.L.P. 1hr/day       P&O Unnecessary       EXP FREQUENCY: 5 days per 7 day period     PRESCREEN COMPARISON:   I have reviewed the prescreen information and I have found no relevant changes between the preadmission screening and my post admission evaluation     RATIONALE FOR INPATIENT ADMISSION - Patient demonstrates the following: (check all that apply)  [X] Medically appropriate for rehabilitation admission  [X] Has attainable rehab goals with an appropriate initial discharge plan  [X] Has rehabilitation potential (expected to make a significant improvement within a reasonable period of time)   [X] Requires close medical management by a rehab physician, rehab nursing care, Hospitalist and comprehensive interdisciplinary team (including PT, OT, & or SLP, Prosthetics and Orthotics)   ASSESSMENT/PLAN  68 year old male with PMH of seizures, HTN, and Parkinson's disease diagnosed in 2012 with multiple falls who presented to [facility] on 4/17 for elective left craniotomy for meningioma resection. He had known about this tumor since 2019 but had recent follow up which showed increase in size with mass effect. He has also been wheelchair bound since about 2-3 years ago. Per son he has episodes of OFF-time around noon which caused him to have frequent falls. His Sinemet was adjusted in January from BID to 4 times a day as 8AM, 12, 4 and 8PM. He likely experiencing some REM sleep disorder as he has difficulty starting and maintaining the sleep. His hospital course was unremarkable. He remained stable and was evaluated for admission to acute inpatient rehab. He was [facility] to [facility] on 4/21[facility]23.       #Menigioma s/p resection and history of seizures, now with Gait Instability, ADL impairments and Functional impairments  - Start Comprehensive Rehab Program of PT/OT/SLP  -Continue Keppra 1000mg BID   -Continue gabapentin 400mg QID  -Continue decadron taper as per neurosurgery 2mg q6h x1 day, 2mg q8h x1 day, 2mg q12h x1 day, 2mg daily x1 day then stop     #Parkinson's Disease  -Continue Sinemet 25/250 1 tab at 8am, 12pm, 4pm and 8pm  -Continue Entacapone 8am, 12pm, and 4pm  -Movement disorders following.     #HTN  -Continue norvasc 5mg daily  -Monitor for OH     #Pain control  - Tylenol PRN    #GI/Bowel Mgmt   - Continue Senna at bedtime   - Miralax daily  -Protonix 40mg daily    #Bladder management  - Continue to monitor PVR q 8 hours (SC if > 400)  -Monitor UO    #DVT  - Heparin  - TEDs     #Skin:  -large horizontal scalp incision - c/d/i    FEN   - Diet - Soft/bite sized+ Thins  [CCHO, DASH/TLC]    - Dysphagia  SLP - evaluation and treatment    Precautions / PROPHYLAXIS:   - Falls, Seizure   - ortho: Weight bearing status: WBAT   - Lungs: Aspiration, Incentive Spirometer   - Pressure injury/Skin: Turn Q2hrs while in bed, OOB to Chair, PT/OT        MEDICAL PROGNOSIS: GOOD              REHAB POTENTIAL: GOOD              ESTIMATED DISPOSITION: HOME WITH HOME CARE              ELOS: 10-14 Days   EXPECTED THERAPY:     P.T. 1hr/day       O.T. 1hr/day      S.L.P. 1hr/day       P&O Unnecessary       EXP FREQUENCY: 5 days per 7 day period     PRESCREEN COMPARISON:   I have reviewed the prescreen information and I have found no relevant changes between the preadmission screening and my post admission evaluation     RATIONALE FOR INPATIENT ADMISSION - Patient demonstrates the following: (check all that apply)  [X] Medically appropriate for rehabilitation admission  [X] Has attainable rehab goals with an appropriate initial discharge plan  [X] Has rehabilitation potential (expected to make a significant improvement within a reasonable period of time)   [X] Requires close medical management by a rehab physician, rehab nursing care, Hospitalist and comprehensive interdisciplinary team (including PT, OT, & or SLP, Prosthetics and Orthotics)

## 2023-04-21 NOTE — PROVIDER CONTACT NOTE (OTHER) - SITUATION
Pt observed to have difficulty swallowing evening medications.
Pt had left cerebral hemorrhage on CT scan. Pt had tremors and twitching in the face. Pt c/o of nausea, vomiting and headache-not relieved by zofran.
Pt was throwing up during medication administration.

## 2023-04-21 NOTE — DISCHARGE NOTE PROVIDER - NSDCFUADDAPPT_GEN_ALL_CORE_FT
Please follow up with Dr. D'Amico on 4/26 at 2PM, call the office to confirm appointment at [phone]    Please follow up with Neurology outpatient     Please follow up with your primary care doctor outpatient

## 2023-04-21 NOTE — DISCHARGE NOTE PROVIDER - HOSPITAL COURSE
HPI:  68M hx poorly controlled PD w/ prog inability to walk since Nov 2022. Found to have large L frontal dural based lesion c/f meningioma w/ associated mass effect and edema. Presenting today for elective resection of mass.       Hospital Course:  4/17: POD 0 L crani for tumor resection.   4/18: POD1 L crani tumor resection. Norvasc increased to 5mg daily. Neuro exam stable. Neurology consulted, reocmmended maintaining current parkinson's medication regimen. Had asymptomatic episode of VTACH read on telemetry, hemodynamically stable, returned back to baseline, EKG obtained negative. Cardiology consulted, reported rhythm strip was sinus, likely artifact from tremors in arms, NTD. Pt c/o worsened difficulty breathing, satting well on RA. Given duoneb treatment and CXR completed.  Postop MRI completed in evening.   4/19: POD2, SIRIA overnight,  neuro stable, SLP state may benefit from outpt speech therapy will continue to follow  4/20: POD3, SIRIA overnight. per s/s: soft/bite size diet w/ thin liquids.  4/21: POD4, SIRIA overnight, pending [place]    Patient evaluated by PT/OT who recommended: Acute Rehab  Patient is going to [place]    Hospital course uncomplicated     Exam on day of discharge:  General: patient seen laying supine in bed in NAD  Neuro: AAOx3, follows commands, opens eyes spontaneously, +mild nonfluent expressive aphasia, CNII-XI grossly intact, face symmetric, no pronator drift, strength 5/5 b/l upper extremities and 3/5 b/l lower extremities, sensation intact to light touch throughout, b/l UE tremors and rigidity  HEENT: PERRL, EOMI  Neck: supple  Cardiac: RRR, S1S2  Pulmonary: chest rise symmetric  Abdomen: soft, nontender, nondistended  Ext: perfusing well  Skin: warm, dry  Wound: L crani incision c/d/i w/ absorbable sutures in place    Patient is neuro stable, vitals stable, afebrile, medically ready for discharge

## 2023-04-21 NOTE — PROGRESS NOTE ADULT - REASON FOR ADMISSION
Surgical resection of L frontal meningioma

## 2023-04-21 NOTE — DISCHARGE NOTE NURSING/CASE MANAGEMENT/SOCIAL WORK - PATIENT PORTAL LINK FT
You can access the FollowMyHealth Patient Portal offered by [facility] by registering at the following website: [email or web address]. By joining [facility]’s FollowMyHealth portal, you will also be able to view your health information using other applications (apps) compatible with our system.

## 2023-04-21 NOTE — H&P ADULT - NSHPPHYSICALEXAM_GEN_ALL_CORE
Vital Signs  T(C): 36.8 (04-21-23 @ 17:51), Max: 37.2 (04-21-23 @ 09:23)  HR: 64 (04-21-23 @ 17:51) (50 - 96)  BP: 176/97 (04-21-23 @ 17:51) (118/77 - 176/97)  RR: 16 (04-21-23 @ 17:51) (16 - 17)  SpO2: 97% (04-21-23 @ 17:51) (94% - 100%)    Gen - NAD, Comfortable  HEENT - EOMI, MMM  Pulm - CTAB  Cardiovascular - RRR, S1S2, No m/r/g  Abdomen - Soft, NT/ND, +BS  Extremities - No C/C/E, No calf tenderness  Neuro-     Cognitive - AAOx3     Communication - Fluent, No dysarthria, +stutter     Attention: grossly intact, able to state days of the week forwards but not backwards      Memory: difficulty with recalling objects immediately (able to recall 2/3 with multiple choice cueing)        Cranial Nerves - CN 2-12 grossly intact     Motor -                    LEFT    UE - ShAB 5/5, EF 5/5, EE 5/5, WE 5/5,  5/5                    RIGHT UE - ShAB 5/5, EF 5/5, EE 5/5, WE 5/5,  5/5                    LEFT    LE - HF 5/5, KE 5/5, DF 5/5, PF 5/5                    RIGHT LE - HF 5/5, KE 5/5, DF 5/5, PF 5/5        Sensory - Intact to LT     Pill rolling tremors  Skin: large horizontal scalp incision - c/d/i

## 2023-04-21 NOTE — DISCHARGE NOTE PROVIDER - NSDCMRMEDTOKEN_GEN_ALL_CORE_FT
acetaminophen 325 mg oral tablet: 2 tab(s) orally every 6 hours As needed Mild Pain (1 - 3)  amLODIPine 2.5 mg oral tablet: 1 orally once a day  carbidopa-levodopa 25 mg-250 mg oral tablet: 1 orally 4 times a day  dexamethasone: 2 milligram(s) orally every 6 hours TAPER:  2mg every 6 hours (4/21)  2mg every 8 hours (4/22)  2mg every 12 hours (4/23)  2mg daily (4/24)  entacapone 200 mg oral tablet: 1 tab(s) orally every 8 hours  gabapentin 400 mg oral capsule: 1 orally 4 times a day  heparin: 7,500 unit(s) subcutaneous every 8 hours  levETIRAcetam 1000 mg oral tablet: 1 orally once a day  oxyCODONE 5 mg oral tablet: 1 tab(s) orally every 6 hours As needed Severe Pain (7 - 10)  pantoprazole 40 mg oral delayed release tablet: 1 tab(s) orally once a day (before a meal) while on steroids  polyethylene glycol 3350 oral powder for reconstitution: 17 gram(s) orally once a day

## 2023-04-21 NOTE — DISCHARGE NOTE NURSING/CASE MANAGEMENT/SOCIAL WORK - NSDCPEFALRISK_GEN_ALL_CORE
For information on Fall & Injury Prevention, visit: [email or web address] OR  [email or web address] OR  https://www.cdc.gov/steadi/patient.html

## 2023-04-21 NOTE — DISCHARGE NOTE PROVIDER - CARE PROVIDER_API CALL
DAmico, Randy S (MD)  Neurosurgery  [address]  [place], [place] [address]  Phone: [phone]  Fax: [phone]  Follow Up Time:

## 2023-04-21 NOTE — H&P ADULT - NSHPLABSRESULTS_GEN_ALL_CORE
LABS:                          14.9   8.90  )-----------( 157      ( 21 Apr 2023 05:30 )             45.8     04-21    139  |  103  |  26<H>  ----------------------------<  120<H>  4.2   |  26  |  1.22    Ca    8.6      21 Apr 2023 05:30  Phos  2.8     04-21  Mg     2.6     04-21    < from: MR Head w/wo IV Cont (03.25.23 @ 12:55) >      IMPRESSION:  4.6 cm left frontal convexity dural based extra-axial avidly avidly   enhancing mass, representing a frontal meningioma however the possibility   of a hemangiopericytoma  cannot be completely excluded. There is moderate   vasogenic edema and mass effect with subfalcine herniation and   left-to-right midline shift of 12 mm. These findings are grossly   unchanged since prior exam on 11/28/2022.    < end of copied text >    < from: Xray Chest 1 View- PORTABLE-Routine (Xray Chest 1 View- PORTABLE-Routine .) (04.18.23 @ 15:00) >    IMPRESSION:  The lungs are clear.    < end of copied text >    < from: MR Head w/wo IV Cont (04.18.23 @ 22:19) >      IMPRESSION:  1.   Since the prior study of 03/25/2023, status post left frontal   craniotomy for resection of the left frontal lobe extra-axial mass. No   evidence of residual tumor.    2. Associated postsurgical changes with focal restricted diffusion   surrounding the surgical resection cavity compatible with devitalized   brain parenchyma.    Addendum created by Dr. Jasvir Ballard MD on 4/19/2023 5:09:53 AM EDT  Results of this examination were discussed with JEREMIAH Gotti at   4:08 a.m.  a.m. central standard time on 04/19/2023.    < end of copied text >

## 2023-04-21 NOTE — DISCHARGE NOTE PROVIDER - NSDCCPCAREPLAN_GEN_ALL_CORE_FT
PRINCIPAL DISCHARGE DIAGNOSIS  Diagnosis: Brain tumor  Assessment and Plan of Treatment: s/p craniotomy for resection      SECONDARY DISCHARGE DIAGNOSES  Diagnosis: Parkinson disease  Assessment and Plan of Treatment: contiue home Sinemet, Entacapone    Diagnosis: Hypertension  Assessment and Plan of Treatment: continue home Amlodipine

## 2023-04-21 NOTE — PROGRESS NOTE ADULT - SUBJECTIVE AND OBJECTIVE BOX
HPI:  68M hx poorly controlled PD w/ prog inability to walk since Nov 2022. Found to have large L frontal dural based lesion c/f meningioma w/ associated mass effect and edema. Presenting today for elective resection of mass.     Subjective:  Pt denies any acute complaints.   Pain controlled.    Hospital course:  4/17: POD 0 L crani for tumor resection.   4/18: POD1 L crani tumor resection. Norvasc increased to 5mg daily. Neuro exam stable. Neurology consulted, reocmmended maintaining current parkinson's medication regimen. Had asymptomatic episode of VTACH read on telemetry, hemodynamically stable, returned back to baseline, EKG obtained negative. Cardiology consulted, reported rhythm strip was sinus, likely artifact from tremors in arms, NTD. Pt c/o worsened difficulty breathing, satting well on RA. Given duoneb treatment and CXR completed.  Postop MRI completed in evening.   4/19: POD2, SIRIA overnight,  neuro stable, SLP state may benefit from outpt speech therapy will continue to follow  4/20: POD3, SIRIA overnight. per s/s: soft/bite size diet w/ thin liquids.  4/21: POD4, SIRIA overnight, pending [place]    Vital Signs Last 24 Hrs  T(C): 37 (20 Apr 2023 21:16), Max: 37.2 (20 Apr 2023 04:35)  T(F): 98.6 (20 Apr 2023 21:16), Max: 99 (20 Apr 2023 04:35)  HR: 50 (20 Apr 2023 21:16) (50 - 61)  BP: 145/63 (20 Apr 2023 21:16) (135/86 - 157/83)  BP(mean): --  RR: 16 (20 Apr 2023 21:16) (16 - 17)  SpO2: 98% (20 Apr 2023 21:16) (97% - 100%)    Parameters below as of 20 Apr 2023 21:16  Patient On (Oxygen Delivery Method): room air        I&O's Summary    19 Apr 2023 07:01  -  20 Apr 2023 07:00  --------------------------------------------------------  IN: 0 mL / OUT: 1900 mL / NET: -1900 mL    20 Apr 2023 07:01  -  21 Apr 2023 00:31  --------------------------------------------------------  IN: 0 mL / OUT: 500 mL / NET: -500 mL        PHYSICAL EXAM:  General: patient seen laying supine in bed in NAD  Neuro: AAOx3, follows commands, opens eyes spontaneously, +mild nonfluent expressive aphasia, CNII-XI grossly intact, face symmetric, no pronator drift, strength 5/5 b/l upper extremities and 3/5 b/l lower extremities, sensation intact to light touch throughout, b/l UE tremors and rigidity  HEENT: PERRL, EOMI  Neck: supple  Cardiac: RRR, S1S2  Pulmonary: chest rise symmetric  Abdomen: soft, nontender, nondistended  Ext: perfusing well  Skin: warm, dry  Wound: L crani incision c/d/i w/ absorbable sutures in place        DIET:  [] NPO  [x] Mechanical  [] Tube feeds    LABS:                        14.2   14.22 )-----------( 153      ( 20 Apr 2023 05:52 )             42.2     04-20    142  |  106  |  26<H>  ----------------------------<  120<H>  4.2   |  27  |  1.30    Ca    9.1      20 Apr 2023 05:52  Phos  2.9     04-20  Mg     2.4     04-20              CAPILLARY BLOOD GLUCOSE      POCT Blood Glucose.: 135 mg/dL (20 Apr 2023 22:33)  POCT Blood Glucose.: 112 mg/dL (20 Apr 2023 17:46)  POCT Blood Glucose.: 120 mg/dL (20 Apr 2023 11:35)  POCT Blood Glucose.: 95 mg/dL (20 Apr 2023 06:23)      Drug Levels: [] N/A    CSF Analysis: [] N/A      Allergies    No Known Allergies    Intolerances      MEDICATIONS:  Antibiotics:    Neuro:  acetaminophen     Tablet .. 650 milliGRAM(s) Oral every 6 hours PRN  carbidopa/levodopa  25/250 1 Tablet(s) Oral <User Schedule>  entacapone 200 milliGRAM(s) Oral every 8 hours  gabapentin 400 milliGRAM(s) Oral four times a day  levETIRAcetam 1000 milliGRAM(s) Oral two times a day  ondansetron Injectable 4 milliGRAM(s) IV Push every 6 hours PRN  oxyCODONE    IR 5 milliGRAM(s) Oral every 6 hours PRN    Anticoagulation:  heparin   Injectable 7500 Unit(s) SubCutaneous every 8 hours    OTHER:  amLODIPine   Tablet 5 milliGRAM(s) Oral daily  dexAMETHasone     Tablet   Oral   dexAMETHasone     Tablet 2 milliGRAM(s) Oral every 6 hours  insulin lispro (ADMELOG) corrective regimen sliding scale   SubCutaneous Before meals and at bedtime  pantoprazole    Tablet 40 milliGRAM(s) Oral before breakfast  polyethylene glycol 3350 17 Gram(s) Oral daily  senna 2 Tablet(s) Oral at bedtime    IVF:    CULTURES:    RADIOLOGY & ADDITIONAL TESTS:    D32.9    Handoff    MEWS Score    Parkinsons    HTN (hypertension)    History of seizures    Brain mass    Impaired gait    Meningioma    History of insomnia    Brain tumor    Brain tumor    Craniotomy for resection of tumor of left side of brain    No significant past surgical history    SysAdmin_VstLnk        ASSESSMENT:  68M hx poorly controlled Parkinson's disease w/ prog inability to walk since Nov 2022. Found to have large L frontal dural based lesion c/f meningioma w/ associated mass effect and edema. Now s/p L crani for meningioma resection (4/17).    NEURO:  -neuro/vitals q4h  - decadron taper over 1 week to off  - keppra 1g BID x7d  - continue home sinemet (25/250) 4 times/day, entacapone tid, gabapentin   - continue home gabapentin 400mg 4 times/day  - MR brain w/wo post op completed 4/18  - pain control, cranial ERAS (tylenol standing), oxy prn  - neurology following for Parkinson's disease  - speech language consulted, would likely benefit from outpt speech therapy    CARDIOVASCULAR:  - -140  - cont norvasc 5    PULMONARY:  - room air  - incentive spirometry     RENAL:  - IVL  - voiding     GI:  - soft/bite size diet, S&S following  - bowel regimen  - PPI on steroids  -  4/20    HEME:  - SCDs/SQH dvt ppx    ID:  - afebrile    ENDO:  - ISS while on steroids  - a1c 5.8    DISPO:  - full code, regional, pending [place]    Discussed with Dr. D'Amico   Assessment:  Present when checked    []  GCS  E   V  M     Heart Failure: []Acute, [] acute on chronic , []chronic  Heart Failure:  [] Diastolic (HFpEF), [] Systolic (HFrEF), []Combined (HFpEF and HFrEF), [] RHF, [] Pulm HTN, [] Other    [] NINFA, [] ATN, [] AIN, [] other  [] CKD1, [] CKD2, [] CKD 3, [] CKD 4, [] CKD 5, []ESRD    Encephalopathy: [] Metabolic, [] Hepatic, [] toxic, [] Neurological, [] Other    Abnormal Nurtitional Status: [] malnurtition (see nutrition note), [ ]underweight: BMI < 19, [] morbid obesity: BMI >40, [] Cachexia    [] Sepsis  [] hypovolemic shock,[] cardiogenic shock, [] hemorrhagic shock, [] neuogenic shock  [] Acute Respiratory Failure  []Cerebral edema, [] Brain compression/ herniation,   [] Functional quadriplegia  [] Acute blood loss anemia

## 2023-04-21 NOTE — PROVIDER CONTACT NOTE (OTHER) - ACTION/TREATMENT ORDERED:
Take the orthostatic BP standing and administer Zofran PRN.
Dr. Clark notified. Swallowing evaluation pending post admission, to be assessed tomorrow.
Pt given Hydralazine as ordered during the rapid. Placed on oxygen nasal cannula. Left IV AC inserted. Pt transferred to [facility] ICU.

## 2023-04-21 NOTE — DISCHARGE NOTE PROVIDER - NSDCFUADDINST_GEN_ALL_CORE_FT
Neurosurgery follow up appointment date/time:  - follow up in the office for a wound check  - please call the office to confirm appointment: [phone]     Wound Care:  - shower and wash hair daily with shampoo  - pat dry incision after showering  - leave incision uncovered, open to air   - no picking at incision     Devices:  - rolling walk for ambulation   Activity:  - fatigue is common after surgery, rest if you feel tired   - no bending, lifting, twisting or heavy lifting   - walking is recommended, ambulate as tolerated  - you may shower when you get home, keep your incision dry  - no soaking in a tub/pool/hot tub   - no driving within 24 hours of anesthesia or while taking prescription pain medications   - keep hydrated, drink plenty of water     Inpatient consults:  - Neurology: Follow up outpatient     Please also follow up with your primary care doctor.     Pain Expectations:  - pain after surgery is expected  - please take pain meds as prescribed     Medications:  - continue home: Amlodipine, Comtan, Sinemet  - new meds: Dexamethasone taper for inflammation, Protonix while on steroids for GI protection, Heparin 7500 units every 8 hours for DVT prevention while at rehab    Dexamethasone Taper:  2mg every 6 hours (4/21)  2mg every 8 hours (4/22)  2mg every 12 hours (4/23)  2mg daily (4/24)   - pain meds: Tylenol 500mg every 6 hours as needed for mild to moderate pain, Oxycodone 5mg every 6 hours as needed for severe pain   - pain medications can cause constipation, you should eat a high fiber diet and may take a stool softener while on pain meds   - Avoid taking Advil (ibuprofen), Motrin (naproxen), or Aspirin for pain as they can cause bleeding     Call the office or come to ED if:  - wound has drainage or bleeding, increased redness or pain at incision site, neurological change, fever (>101), chills, night sweats, syncope, nausea/vomiting      Playback:  - See playback health for a copy of your discharge paperwork      WITHIN 24 HOURS OF DISCHARGE, PLEASE CONTACT NEURO PA  WITH ANY QUESTIONS OR CONCERNS: [phone]   OTHERWISE, PLEASE CALL THE OFFICE WITH ANY QUESTIONS OR CONCERNS: [phone]

## 2023-04-22 ENCOUNTER — INPATIENT (INPATIENT)
Facility: HOSPITAL | Age: 68
LOS: 10 days | Discharge: NURSING, REHAB OR LONG-TERM CARE | DRG: 23 | End: 2023-05-03
Attending: NEUROLOGICAL SURGERY | Admitting: HOSPITALIST
Payer: MEDICARE

## 2023-04-22 VITALS
SYSTOLIC BLOOD PRESSURE: 183 MMHG | HEART RATE: 81 BPM | DIASTOLIC BLOOD PRESSURE: 102 MMHG | RESPIRATION RATE: 15 BRPM | OXYGEN SATURATION: 99 %

## 2023-04-22 VITALS
HEART RATE: 73 BPM | RESPIRATION RATE: 16 BRPM | SYSTOLIC BLOOD PRESSURE: 134 MMHG | DIASTOLIC BLOOD PRESSURE: 64 MMHG | OXYGEN SATURATION: 100 %

## 2023-04-22 DIAGNOSIS — I61.9 NONTRAUMATIC INTRACEREBRAL HEMORRHAGE, UNSPECIFIED: ICD-10-CM

## 2023-04-22 LAB
ALBUMIN SERPL ELPH-MCNC: 3.9 G/DL — SIGNIFICANT CHANGE UP (ref 3.3–5)
ALP SERPL-CCNC: 57 U/L — SIGNIFICANT CHANGE UP (ref 40–120)
ALT FLD-CCNC: 22 U/L — SIGNIFICANT CHANGE UP (ref 10–45)
ANION GAP SERPL CALC-SCNC: 14 MMOL/L — SIGNIFICANT CHANGE UP (ref 5–17)
ANION GAP SERPL CALC-SCNC: 7 MMOL/L — SIGNIFICANT CHANGE UP (ref 5–17)
ANION GAP SERPL CALC-SCNC: 7 MMOL/L — SIGNIFICANT CHANGE UP (ref 5–17)
APTT BLD: 27 SEC — LOW (ref 27.5–35.5)
AST SERPL-CCNC: 17 U/L — SIGNIFICANT CHANGE UP (ref 10–40)
BASE EXCESS BLDA CALC-SCNC: 1 MMOL/L — SIGNIFICANT CHANGE UP (ref -2–3)
BILIRUB SERPL-MCNC: 0.4 MG/DL — SIGNIFICANT CHANGE UP (ref 0.2–1.2)
BLD GP AB SCN SERPL QL: NEGATIVE — SIGNIFICANT CHANGE UP
BUN SERPL-MCNC: 21 MG/DL — SIGNIFICANT CHANGE UP (ref 7–23)
BUN SERPL-MCNC: 22 MG/DL — SIGNIFICANT CHANGE UP (ref 7–23)
BUN SERPL-MCNC: 22 MG/DL — SIGNIFICANT CHANGE UP (ref 7–23)
CALCIUM SERPL-MCNC: 8.2 MG/DL — LOW (ref 8.4–10.5)
CALCIUM SERPL-MCNC: 8.5 MG/DL — SIGNIFICANT CHANGE UP (ref 8.4–10.5)
CALCIUM SERPL-MCNC: 9.1 MG/DL — SIGNIFICANT CHANGE UP (ref 8.4–10.5)
CHLORIDE SERPL-SCNC: 105 MMOL/L — SIGNIFICANT CHANGE UP (ref 96–108)
CHLORIDE SERPL-SCNC: 111 MMOL/L — HIGH (ref 96–108)
CHLORIDE SERPL-SCNC: 99 MMOL/L — SIGNIFICANT CHANGE UP (ref 96–108)
CO2 BLDA-SCNC: 26 MMOL/L — HIGH (ref 19–24)
CO2 SERPL-SCNC: 24 MMOL/L — SIGNIFICANT CHANGE UP (ref 22–31)
CO2 SERPL-SCNC: 24 MMOL/L — SIGNIFICANT CHANGE UP (ref 22–31)
CO2 SERPL-SCNC: 25 MMOL/L — SIGNIFICANT CHANGE UP (ref 22–31)
CREAT SERPL-MCNC: 0.9 MG/DL — SIGNIFICANT CHANGE UP (ref 0.5–1.3)
CREAT SERPL-MCNC: 1.08 MG/DL — SIGNIFICANT CHANGE UP (ref 0.5–1.3)
CREAT SERPL-MCNC: 1.13 MG/DL — SIGNIFICANT CHANGE UP (ref 0.5–1.3)
EGFR: 70 ML/MIN/1.73M2 — SIGNIFICANT CHANGE UP
EGFR: 74 ML/MIN/1.73M2 — SIGNIFICANT CHANGE UP
EGFR: 92 ML/MIN/1.73M2 — SIGNIFICANT CHANGE UP
GLUCOSE BLDC GLUCOMTR-MCNC: 101 MG/DL — HIGH (ref 70–99)
GLUCOSE BLDC GLUCOMTR-MCNC: 114 MG/DL — HIGH (ref 70–99)
GLUCOSE BLDC GLUCOMTR-MCNC: 119 MG/DL — HIGH (ref 70–99)
GLUCOSE BLDC GLUCOMTR-MCNC: 154 MG/DL — HIGH (ref 70–99)
GLUCOSE SERPL-MCNC: 119 MG/DL — HIGH (ref 70–99)
GLUCOSE SERPL-MCNC: 138 MG/DL — HIGH (ref 70–99)
GLUCOSE SERPL-MCNC: 186 MG/DL — HIGH (ref 70–99)
GRAM STN FLD: SIGNIFICANT CHANGE UP
HCO3 BLDA-SCNC: 25 MMOL/L — SIGNIFICANT CHANGE UP (ref 21–28)
HCT VFR BLD CALC: 44.6 % — SIGNIFICANT CHANGE UP (ref 39–50)
HGB BLD-MCNC: 15.2 G/DL — SIGNIFICANT CHANGE UP (ref 13–17)
INR BLD: 1.09 — SIGNIFICANT CHANGE UP (ref 0.88–1.16)
MAGNESIUM SERPL-MCNC: 2.2 MG/DL — SIGNIFICANT CHANGE UP (ref 1.6–2.6)
MCHC RBC-ENTMCNC: 31.5 PG — SIGNIFICANT CHANGE UP (ref 27–34)
MCHC RBC-ENTMCNC: 34.1 GM/DL — SIGNIFICANT CHANGE UP (ref 32–36)
MCV RBC AUTO: 92.5 FL — SIGNIFICANT CHANGE UP (ref 80–100)
NRBC # BLD: 0 /100 WBCS — SIGNIFICANT CHANGE UP (ref 0–0)
PCO2 BLDA: 38 MMHG — SIGNIFICANT CHANGE UP (ref 35–48)
PH BLDA: 7.43 — SIGNIFICANT CHANGE UP (ref 7.35–7.45)
PHOSPHATE SERPL-MCNC: 2.9 MG/DL — SIGNIFICANT CHANGE UP (ref 2.5–4.5)
PLATELET # BLD AUTO: 184 K/UL — SIGNIFICANT CHANGE UP (ref 150–400)
PO2 BLDA: 85 MMHG — SIGNIFICANT CHANGE UP (ref 83–108)
POTASSIUM SERPL-MCNC: 3.2 MMOL/L — LOW (ref 3.5–5.3)
POTASSIUM SERPL-MCNC: 3.8 MMOL/L — SIGNIFICANT CHANGE UP (ref 3.5–5.3)
POTASSIUM SERPL-MCNC: 4 MMOL/L — SIGNIFICANT CHANGE UP (ref 3.5–5.3)
POTASSIUM SERPL-SCNC: 3.2 MMOL/L — LOW (ref 3.5–5.3)
POTASSIUM SERPL-SCNC: 3.8 MMOL/L — SIGNIFICANT CHANGE UP (ref 3.5–5.3)
POTASSIUM SERPL-SCNC: 4 MMOL/L — SIGNIFICANT CHANGE UP (ref 3.5–5.3)
PROT SERPL-MCNC: 7.3 G/DL — SIGNIFICANT CHANGE UP (ref 6–8.3)
PROTHROM AB SERPL-ACNC: 13 SEC — SIGNIFICANT CHANGE UP (ref 10.5–13.4)
RBC # BLD: 4.82 M/UL — SIGNIFICANT CHANGE UP (ref 4.2–5.8)
RBC # FLD: 14.1 % — SIGNIFICANT CHANGE UP (ref 10.3–14.5)
RH IG SCN BLD-IMP: POSITIVE — SIGNIFICANT CHANGE UP
SAO2 % BLDA: 97.1 % — SIGNIFICANT CHANGE UP (ref 94–98)
SODIUM SERPL-SCNC: 137 MMOL/L — SIGNIFICANT CHANGE UP (ref 135–145)
SODIUM SERPL-SCNC: 137 MMOL/L — SIGNIFICANT CHANGE UP (ref 135–145)
SODIUM SERPL-SCNC: 142 MMOL/L — SIGNIFICANT CHANGE UP (ref 135–145)
SPECIMEN SOURCE: SIGNIFICANT CHANGE UP
WBC # BLD: 16.55 K/UL — HIGH (ref 3.8–10.5)
WBC # FLD AUTO: 16.55 K/UL — HIGH (ref 3.8–10.5)

## 2023-04-22 PROCEDURE — 71045 X-RAY EXAM CHEST 1 VIEW: CPT | Mod: 26,76

## 2023-04-22 PROCEDURE — 99291 CRITICAL CARE FIRST HOUR: CPT

## 2023-04-22 PROCEDURE — 99292 CRITICAL CARE ADDL 30 MIN: CPT | Mod: 24

## 2023-04-22 PROCEDURE — 74018 RADEX ABDOMEN 1 VIEW: CPT | Mod: 26

## 2023-04-22 PROCEDURE — 99233 SBSQ HOSP IP/OBS HIGH 50: CPT

## 2023-04-22 PROCEDURE — 93970 EXTREMITY STUDY: CPT | Mod: 26

## 2023-04-22 PROCEDURE — 70496 CT ANGIOGRAPHY HEAD: CPT | Mod: 26

## 2023-04-22 PROCEDURE — 93306 TTE W/DOPPLER COMPLETE: CPT | Mod: 26

## 2023-04-22 PROCEDURE — 70450 CT HEAD/BRAIN W/O DYE: CPT | Mod: 26,59

## 2023-04-22 PROCEDURE — 36620 INSERTION CATHETER ARTERY: CPT

## 2023-04-22 RX ORDER — SODIUM CHLORIDE 5 G/100ML
500 INJECTION, SOLUTION INTRAVENOUS
Refills: 0 | Status: DISCONTINUED | OUTPATIENT
Start: 2023-04-22 | End: 2023-04-22

## 2023-04-22 RX ORDER — PIPERACILLIN AND TAZOBACTAM 4; .5 G/20ML; G/20ML
3.38 INJECTION, POWDER, LYOPHILIZED, FOR SOLUTION INTRAVENOUS EVERY 8 HOURS
Refills: 0 | Status: COMPLETED | OUTPATIENT
Start: 2023-04-22 | End: 2023-04-27

## 2023-04-22 RX ORDER — SODIUM CHLORIDE 9 MG/ML
1000 INJECTION INTRAMUSCULAR; INTRAVENOUS; SUBCUTANEOUS
Refills: 0 | Status: DISCONTINUED | OUTPATIENT
Start: 2023-04-22 | End: 2023-04-22

## 2023-04-22 RX ORDER — GABAPENTIN 400 MG/1
1 CAPSULE ORAL
Refills: 0 | DISCHARGE

## 2023-04-22 RX ORDER — DEXTROSE 50 % IN WATER 50 %
25 SYRINGE (ML) INTRAVENOUS ONCE
Refills: 0 | Status: DISCONTINUED | OUTPATIENT
Start: 2023-04-22 | End: 2023-04-22

## 2023-04-22 RX ORDER — CHLORHEXIDINE GLUCONATE 213 G/1000ML
1 SOLUTION TOPICAL DAILY
Refills: 0 | Status: DISCONTINUED | OUTPATIENT
Start: 2023-04-22 | End: 2023-05-03

## 2023-04-22 RX ORDER — NICARDIPINE HYDROCHLORIDE 30 MG/1
5 CAPSULE, EXTENDED RELEASE ORAL
Qty: 40 | Refills: 0 | Status: DISCONTINUED | OUTPATIENT
Start: 2023-04-22 | End: 2023-04-22

## 2023-04-22 RX ORDER — CHLORHEXIDINE GLUCONATE 213 G/1000ML
15 SOLUTION TOPICAL EVERY 12 HOURS
Refills: 0 | Status: DISCONTINUED | OUTPATIENT
Start: 2023-04-22 | End: 2023-04-23

## 2023-04-22 RX ORDER — SODIUM CHLORIDE 9 MG/ML
30 INJECTION INTRAMUSCULAR; INTRAVENOUS; SUBCUTANEOUS ONCE
Refills: 0 | Status: COMPLETED | OUTPATIENT
Start: 2023-04-22 | End: 2023-04-22

## 2023-04-22 RX ORDER — DEXTROSE 50 % IN WATER 50 %
25 SYRINGE (ML) INTRAVENOUS ONCE
Refills: 0 | Status: DISCONTINUED | OUTPATIENT
Start: 2023-04-22 | End: 2023-04-26

## 2023-04-22 RX ORDER — PROPOFOL 10 MG/ML
75 INJECTION, EMULSION INTRAVENOUS
Qty: 500 | Refills: 0 | Status: DISCONTINUED | OUTPATIENT
Start: 2023-04-22 | End: 2023-04-22

## 2023-04-22 RX ORDER — LEVETIRACETAM 250 MG/1
1 TABLET, FILM COATED ORAL
Refills: 0 | DISCHARGE

## 2023-04-22 RX ORDER — DEXTROSE 50 % IN WATER 50 %
15 SYRINGE (ML) INTRAVENOUS ONCE
Refills: 0 | Status: DISCONTINUED | OUTPATIENT
Start: 2023-04-22 | End: 2023-04-22

## 2023-04-22 RX ORDER — ACETAMINOPHEN 500 MG
650 TABLET ORAL EVERY 6 HOURS
Refills: 0 | Status: DISCONTINUED | OUTPATIENT
Start: 2023-04-22 | End: 2023-04-25

## 2023-04-22 RX ORDER — CARBIDOPA AND LEVODOPA 25; 100 MG/1; MG/1
1 TABLET ORAL
Refills: 0 | Status: DISCONTINUED | OUTPATIENT
Start: 2023-04-22 | End: 2023-04-25

## 2023-04-22 RX ORDER — CARBIDOPA AND LEVODOPA 25; 100 MG/1; MG/1
1 TABLET ORAL
Refills: 0 | Status: DISCONTINUED | OUTPATIENT
Start: 2023-04-22 | End: 2023-04-22

## 2023-04-22 RX ORDER — CEFAZOLIN SODIUM 1 G
2000 VIAL (EA) INJECTION ONCE
Refills: 0 | Status: COMPLETED | OUTPATIENT
Start: 2023-04-22 | End: 2023-04-22

## 2023-04-22 RX ORDER — DEXTROSE 50 % IN WATER 50 %
12.5 SYRINGE (ML) INTRAVENOUS ONCE
Refills: 0 | Status: DISCONTINUED | OUTPATIENT
Start: 2023-04-22 | End: 2023-04-22

## 2023-04-22 RX ORDER — ENTACAPONE 200 MG/1
200 TABLET, FILM COATED ORAL
Refills: 0 | Status: DISCONTINUED | OUTPATIENT
Start: 2023-04-22 | End: 2023-04-25

## 2023-04-22 RX ORDER — HYDRALAZINE HCL 50 MG
10 TABLET ORAL ONCE
Refills: 0 | Status: COMPLETED | OUTPATIENT
Start: 2023-04-22 | End: 2023-04-22

## 2023-04-22 RX ORDER — FENTANYL CITRATE 50 UG/ML
25 INJECTION INTRAVENOUS
Refills: 0 | Status: DISCONTINUED | OUTPATIENT
Start: 2023-04-22 | End: 2023-04-24

## 2023-04-22 RX ORDER — SODIUM CHLORIDE 9 MG/ML
1000 INJECTION, SOLUTION INTRAVENOUS
Refills: 0 | Status: DISCONTINUED | OUTPATIENT
Start: 2023-04-22 | End: 2023-04-22

## 2023-04-22 RX ORDER — GABAPENTIN 400 MG/1
1 CAPSULE ORAL
Qty: 0 | Refills: 0 | DISCHARGE
Start: 2023-04-22

## 2023-04-22 RX ORDER — PIPERACILLIN AND TAZOBACTAM 4; .5 G/20ML; G/20ML
3.38 INJECTION, POWDER, LYOPHILIZED, FOR SOLUTION INTRAVENOUS ONCE
Refills: 0 | Status: DISCONTINUED | OUTPATIENT
Start: 2023-04-22 | End: 2023-04-22

## 2023-04-22 RX ORDER — LEVETIRACETAM 250 MG/1
1000 TABLET, FILM COATED ORAL EVERY 12 HOURS
Refills: 0 | Status: DISCONTINUED | OUTPATIENT
Start: 2023-04-22 | End: 2023-04-23

## 2023-04-22 RX ORDER — SODIUM CHLORIDE 5 G/100ML
500 INJECTION, SOLUTION INTRAVENOUS
Refills: 0 | Status: DISCONTINUED | OUTPATIENT
Start: 2023-04-22 | End: 2023-04-23

## 2023-04-22 RX ORDER — LEVETIRACETAM 250 MG/1
10 TABLET, FILM COATED ORAL
Qty: 0 | Refills: 0 | DISCHARGE
Start: 2023-04-22

## 2023-04-22 RX ORDER — LEVETIRACETAM 250 MG/1
1000 TABLET, FILM COATED ORAL EVERY 12 HOURS
Refills: 0 | Status: DISCONTINUED | OUTPATIENT
Start: 2023-04-22 | End: 2023-04-22

## 2023-04-22 RX ORDER — GLUCAGON INJECTION, SOLUTION 0.5 MG/.1ML
1 INJECTION, SOLUTION SUBCUTANEOUS ONCE
Refills: 0 | Status: DISCONTINUED | OUTPATIENT
Start: 2023-04-22 | End: 2023-04-22

## 2023-04-22 RX ORDER — FENTANYL CITRATE 50 UG/ML
50 INJECTION INTRAVENOUS ONCE
Refills: 0 | Status: DISCONTINUED | OUTPATIENT
Start: 2023-04-22 | End: 2023-04-22

## 2023-04-22 RX ORDER — PROPOFOL 10 MG/ML
5 INJECTION, EMULSION INTRAVENOUS
Qty: 1000 | Refills: 0 | Status: DISCONTINUED | OUTPATIENT
Start: 2023-04-22 | End: 2023-04-23

## 2023-04-22 RX ORDER — ACETAMINOPHEN 500 MG
650 TABLET ORAL EVERY 6 HOURS
Refills: 0 | Status: DISCONTINUED | OUTPATIENT
Start: 2023-04-22 | End: 2023-04-22

## 2023-04-22 RX ORDER — SENNA PLUS 8.6 MG/1
2 TABLET ORAL AT BEDTIME
Refills: 0 | Status: DISCONTINUED | OUTPATIENT
Start: 2023-04-22 | End: 2023-04-22

## 2023-04-22 RX ORDER — MIDAZOLAM HYDROCHLORIDE 1 MG/ML
2 INJECTION, SOLUTION INTRAMUSCULAR; INTRAVENOUS ONCE
Refills: 0 | Status: DISCONTINUED | OUTPATIENT
Start: 2023-04-22 | End: 2023-04-22

## 2023-04-22 RX ORDER — AMLODIPINE BESYLATE 2.5 MG/1
5 TABLET ORAL DAILY
Refills: 0 | Status: DISCONTINUED | OUTPATIENT
Start: 2023-04-22 | End: 2023-04-25

## 2023-04-22 RX ORDER — SENNA PLUS 8.6 MG/1
2 TABLET ORAL AT BEDTIME
Refills: 0 | Status: DISCONTINUED | OUTPATIENT
Start: 2023-04-22 | End: 2023-04-25

## 2023-04-22 RX ORDER — ONDANSETRON 8 MG/1
4 TABLET, FILM COATED ORAL EVERY 6 HOURS
Refills: 0 | Status: DISCONTINUED | OUTPATIENT
Start: 2023-04-22 | End: 2023-05-03

## 2023-04-22 RX ORDER — ALBUMIN HUMAN 25 %
250 VIAL (ML) INTRAVENOUS ONCE
Refills: 0 | Status: COMPLETED | OUTPATIENT
Start: 2023-04-22 | End: 2023-04-22

## 2023-04-22 RX ORDER — INSULIN LISPRO 100/ML
VIAL (ML) SUBCUTANEOUS
Refills: 0 | Status: DISCONTINUED | OUTPATIENT
Start: 2023-04-22 | End: 2023-04-26

## 2023-04-22 RX ORDER — DEXAMETHASONE 0.5 MG/5ML
4 ELIXIR ORAL EVERY 6 HOURS
Refills: 0 | Status: DISCONTINUED | OUTPATIENT
Start: 2023-04-22 | End: 2023-04-23

## 2023-04-22 RX ORDER — SENNA PLUS 8.6 MG/1
2 TABLET ORAL
Qty: 0 | Refills: 0 | DISCHARGE
Start: 2023-04-22

## 2023-04-22 RX ORDER — ENTACAPONE 200 MG/1
200 TABLET, FILM COATED ORAL
Refills: 0 | Status: DISCONTINUED | OUTPATIENT
Start: 2023-04-22 | End: 2023-04-22

## 2023-04-22 RX ORDER — PIPERACILLIN AND TAZOBACTAM 4; .5 G/20ML; G/20ML
3.38 INJECTION, POWDER, LYOPHILIZED, FOR SOLUTION INTRAVENOUS ONCE
Refills: 0 | Status: COMPLETED | OUTPATIENT
Start: 2023-04-22 | End: 2023-04-22

## 2023-04-22 RX ORDER — ONDANSETRON 8 MG/1
1 TABLET, FILM COATED ORAL
Qty: 0 | Refills: 0 | DISCHARGE
Start: 2023-04-22

## 2023-04-22 RX ORDER — POTASSIUM CHLORIDE 20 MEQ
10 PACKET (EA) ORAL
Refills: 0 | Status: COMPLETED | OUTPATIENT
Start: 2023-04-22 | End: 2023-04-22

## 2023-04-22 RX ORDER — PIPERACILLIN AND TAZOBACTAM 4; .5 G/20ML; G/20ML
3.38 INJECTION, POWDER, LYOPHILIZED, FOR SOLUTION INTRAVENOUS EVERY 8 HOURS
Refills: 0 | Status: DISCONTINUED | OUTPATIENT
Start: 2023-04-22 | End: 2023-04-22

## 2023-04-22 RX ORDER — PANTOPRAZOLE SODIUM 20 MG/1
40 TABLET, DELAYED RELEASE ORAL DAILY
Refills: 0 | Status: DISCONTINUED | OUTPATIENT
Start: 2023-04-22 | End: 2023-04-26

## 2023-04-22 RX ORDER — FENTANYL CITRATE 50 UG/ML
25 INJECTION INTRAVENOUS ONCE
Refills: 0 | Status: DISCONTINUED | OUTPATIENT
Start: 2023-04-22 | End: 2023-04-22

## 2023-04-22 RX ADMIN — NICARDIPINE HYDROCHLORIDE 25 MG/HR: 30 CAPSULE, EXTENDED RELEASE ORAL at 08:37

## 2023-04-22 RX ADMIN — LEVETIRACETAM 600 MILLIGRAM(S): 250 TABLET, FILM COATED ORAL at 06:49

## 2023-04-22 RX ADMIN — FENTANYL CITRATE 50 MICROGRAM(S): 50 INJECTION INTRAVENOUS at 06:45

## 2023-04-22 RX ADMIN — ENTACAPONE 200 MILLIGRAM(S): 200 TABLET, FILM COATED ORAL at 13:06

## 2023-04-22 RX ADMIN — Medication 4 MILLIGRAM(S): at 23:47

## 2023-04-22 RX ADMIN — NICARDIPINE HYDROCHLORIDE 25 MG/HR: 30 CAPSULE, EXTENDED RELEASE ORAL at 05:18

## 2023-04-22 RX ADMIN — FENTANYL CITRATE 50 MICROGRAM(S): 50 INJECTION INTRAVENOUS at 06:59

## 2023-04-22 RX ADMIN — FENTANYL CITRATE 25 MICROGRAM(S): 50 INJECTION INTRAVENOUS at 11:15

## 2023-04-22 RX ADMIN — PANTOPRAZOLE SODIUM 40 MILLIGRAM(S): 20 TABLET, DELAYED RELEASE ORAL at 13:06

## 2023-04-22 RX ADMIN — Medication 100 MILLIGRAM(S): at 05:40

## 2023-04-22 RX ADMIN — CHLORHEXIDINE GLUCONATE 1 APPLICATION(S): 213 SOLUTION TOPICAL at 13:07

## 2023-04-22 RX ADMIN — Medication 100 MILLIEQUIVALENT(S): at 08:37

## 2023-04-22 RX ADMIN — PIPERACILLIN AND TAZOBACTAM 25 GRAM(S): 4; .5 INJECTION, POWDER, LYOPHILIZED, FOR SOLUTION INTRAVENOUS at 23:47

## 2023-04-22 RX ADMIN — PROPOFOL 3.12 MICROGRAM(S)/KG/MIN: 10 INJECTION, EMULSION INTRAVENOUS at 05:18

## 2023-04-22 RX ADMIN — Medication 100 MILLIEQUIVALENT(S): at 07:15

## 2023-04-22 RX ADMIN — ENTACAPONE 200 MILLIGRAM(S): 200 TABLET, FILM COATED ORAL at 10:50

## 2023-04-22 RX ADMIN — Medication 10 MILLIGRAM(S): at 00:56

## 2023-04-22 RX ADMIN — Medication 2: at 08:07

## 2023-04-22 RX ADMIN — PROPOFOL 3.12 MICROGRAM(S)/KG/MIN: 10 INJECTION, EMULSION INTRAVENOUS at 09:38

## 2023-04-22 RX ADMIN — SODIUM CHLORIDE 80 MILLILITER(S): 5 INJECTION, SOLUTION INTRAVENOUS at 16:37

## 2023-04-22 RX ADMIN — FENTANYL CITRATE 25 MICROGRAM(S): 50 INJECTION INTRAVENOUS at 05:17

## 2023-04-22 RX ADMIN — CARBIDOPA AND LEVODOPA 1 TABLET(S): 25; 100 TABLET ORAL at 17:19

## 2023-04-22 RX ADMIN — Medication 125 MILLILITER(S): at 23:46

## 2023-04-22 RX ADMIN — SENNA PLUS 2 TABLET(S): 8.6 TABLET ORAL at 21:07

## 2023-04-22 RX ADMIN — Medication 100 MILLIEQUIVALENT(S): at 10:36

## 2023-04-22 RX ADMIN — Medication 4 MILLIGRAM(S): at 13:06

## 2023-04-22 RX ADMIN — PIPERACILLIN AND TAZOBACTAM 25 GRAM(S): 4; .5 INJECTION, POWDER, LYOPHILIZED, FOR SOLUTION INTRAVENOUS at 16:37

## 2023-04-22 RX ADMIN — ENTACAPONE 200 MILLIGRAM(S): 200 TABLET, FILM COATED ORAL at 17:19

## 2023-04-22 RX ADMIN — LEVETIRACETAM 600 MILLIGRAM(S): 250 TABLET, FILM COATED ORAL at 17:18

## 2023-04-22 RX ADMIN — CARBIDOPA AND LEVODOPA 1 TABLET(S): 25; 100 TABLET ORAL at 13:06

## 2023-04-22 RX ADMIN — CHLORHEXIDINE GLUCONATE 15 MILLILITER(S): 213 SOLUTION TOPICAL at 17:19

## 2023-04-22 RX ADMIN — Medication 10 MILLIGRAM(S): at 00:30

## 2023-04-22 RX ADMIN — CHLORHEXIDINE GLUCONATE 15 MILLILITER(S): 213 SOLUTION TOPICAL at 05:41

## 2023-04-22 RX ADMIN — PIPERACILLIN AND TAZOBACTAM 200 GRAM(S): 4; .5 INJECTION, POWDER, LYOPHILIZED, FOR SOLUTION INTRAVENOUS at 09:41

## 2023-04-22 RX ADMIN — SODIUM CHLORIDE 70 MILLILITER(S): 9 INJECTION INTRAMUSCULAR; INTRAVENOUS; SUBCUTANEOUS at 14:43

## 2023-04-22 RX ADMIN — FENTANYL CITRATE 25 MICROGRAM(S): 50 INJECTION INTRAVENOUS at 10:47

## 2023-04-22 RX ADMIN — Medication 4 MILLIGRAM(S): at 17:18

## 2023-04-22 RX ADMIN — CARBIDOPA AND LEVODOPA 1 TABLET(S): 25; 100 TABLET ORAL at 20:54

## 2023-04-22 RX ADMIN — FENTANYL CITRATE 25 MICROGRAM(S): 50 INJECTION INTRAVENOUS at 05:37

## 2023-04-22 RX ADMIN — CARBIDOPA AND LEVODOPA 1 TABLET(S): 25; 100 TABLET ORAL at 10:50

## 2023-04-22 RX ADMIN — SODIUM CHLORIDE 30 MILLILITER(S): 5 INJECTION, SOLUTION INTRAVENOUS at 14:43

## 2023-04-22 RX ADMIN — PROPOFOL 38.8 MICROGRAM(S)/KG/MIN: 10 INJECTION, EMULSION INTRAVENOUS at 01:47

## 2023-04-22 RX ADMIN — SODIUM CHLORIDE 30 MILLILITER(S): 9 INJECTION INTRAMUSCULAR; INTRAVENOUS; SUBCUTANEOUS at 05:13

## 2023-04-22 NOTE — H&P ADULT - NSHPPHYSICALEXAM_GEN_ALL_CORE
General: NAD, intubated on full vent support   Cardiovascular: regular rate and rhythm   Respiratory: nonlabored breathing, normal chest rise   GI: abdomen soft, nontender, nondistended  Neuro: intubated, sedated   Pupils 4mm briskly reactive   No motor response to noxious stimuli d/t paralytics during intubation   Sensation intact to light touch throughout b/l  Extremities: distal pulses 2+ x4  Incision/wound: crani incision c/d/i

## 2023-04-22 NOTE — PROGRESS NOTE ADULT - SUBJECTIVE AND OBJECTIVE BOX
***********************************************  ADULT NSICU PROGRESS NOTE  MAHDI EDGE [id number] [facility] [id number]  ***********************************************    24H INTERVAL EVENTS:  - Re-admission overnight ICH-3, thought to be secondary to venous bleeding.  EVD placed, remains intubated.    ROS: negative except per mentioned above in 24h interval events.      VITALS:    ICU Vital Signs Last 24 Hrs  T(C): 37.1 (22 Apr 2023 05:48), Max: 37.2 (21 Apr 2023 21:11)  T(F): 98.8 (22 Apr 2023 05:48), Max: 98.9 (21 Apr 2023 21:11)  HR: 66 (22 Apr 2023 09:00) (60 - 124)  BP: 111/56 (22 Apr 2023 09:00) (111/56 - 188/100)  BP(mean): 78 (22 Apr 2023 09:00) (78 - 97)  ABP: 128/58 (22 Apr 2023 09:00) (128/58 - 149/68)  ABP(mean): 80 (22 Apr 2023 09:00) (80 - 93)  RR: 16 (22 Apr 2023 08:00) (12 - 16)  SpO2: 100% (22 Apr 2023 09:00) (94% - 100%)    O2 Parameters below as of 22 Apr 2023 10:00  Patient On (Oxygen Delivery Method): ventilator    O2 Concentration (%): 50        Mode: AC/ CMV (Assist Control/ Continuous Mandatory Ventilation)  RR (machine): 16  TV (machine): 450  FiO2: 50  PEEP: 5  ITime: 1  MAP: 12  PIP: 21      I&O's Summary    21 Apr 2023 07:01  -  22 Apr 2023 07:00  --------------------------------------------------------  IN: 274.8 mL / OUT: 575 mL / NET: -300.2 mL    22 Apr 2023 07:01  -  22 Apr 2023 09:41  --------------------------------------------------------  IN: 84.3 mL / OUT: 75 mL / NET: 9.3 mL        EXAM:     Joe Coma Scale: 1/1T/5 = 7    General: normocephalic, EVD, laying in bed supine, prominent LUE parkinsonian tremor  Neuro     MS: eyes closed, does not attend, does not follow commands, localizes to noxious stimuli    CN: PERRL, gaze is conjugate, face symmetric, hearing grossly intact    Mot: bulk normal, (+)bilateral rigid tone, power UPPER 3 and localizes bilaterally, LOWER 1/0  Chest: mechanically ventilated with coarse breath sounds, heart regular rate/rhythm, present S1/S2, no murmurs or rubs  Abdomen: distended, soft and nontender without peritoneal signs  Extremities: no clubbing, well-perfused, no edema    LABORATORY DATA:                            15.2   16.55 )-----------( 184      ( 22 Apr 2023 03:46 )             44.6     04-22    137  |  99  |  22  ----------------------------<  186<H>  3.2<L>   |  24  |  1.13    Ca    9.1      22 Apr 2023 03:46  Phos  2.9     04-22  Mg     2.2     04-22    TPro  7.3  /  Alb  3.9  /  TBili  0.4  /  DBili  x   /  AST  17  /  ALT  22  /  AlkPhos  57  04-22    LIVER FUNCTIONS - ( 22 Apr 2023 03:46 )  Alb: 3.9 g/dL / Pro: 7.3 g/dL / ALK PHOS: 57 U/L / ALT: 22 U/L / AST: 17 U/L / GGT: x           PT/INR - ( 22 Apr 2023 03:46 )   PT: 13.0 sec;   INR: 1.09          PTT - ( 22 Apr 2023 03:46 )  PTT:27.0 sec    IMAGING DATA:    CARDIOLOGY DATA:    MICROBIOLOGY DATA:        MEDICATIONS  (STANDING):  carbidopa/levodopa  25/250 1 Tablet(s) Oral <User Schedule>  chlorhexidine 0.12% Liquid 15 milliLiter(s) Oral Mucosa every 12 hours  chlorhexidine 2% Cloths 1 Application(s) Topical daily  dexAMETHasone  Injectable 4 milliGRAM(s) IV Push every 6 hours  dextrose 5%. 1000 milliLiter(s) (100 mL/Hr) IV Continuous <Continuous>  dextrose 5%. 1000 milliLiter(s) (50 mL/Hr) IV Continuous <Continuous>  dextrose 50% Injectable 25 Gram(s) IV Push once  dextrose 50% Injectable 12.5 Gram(s) IV Push once  dextrose 50% Injectable 25 Gram(s) IV Push once  entacapone 200 milliGRAM(s) Oral <User Schedule>  glucagon  Injectable 1 milliGRAM(s) IntraMuscular once  insulin lispro (ADMELOG) corrective regimen sliding scale   SubCutaneous three times a day before meals  levETIRAcetam  IVPB 1000 milliGRAM(s) IV Intermittent every 12 hours  niCARdipine Infusion 5 mG/Hr (25 mL/Hr) IV Continuous <Continuous>  pantoprazole  Injectable 40 milliGRAM(s) IV Push daily  piperacillin/tazobactam IVPB.- 3.375 Gram(s) IV Intermittent once  piperacillin/tazobactam IVPB.- 3.375 Gram(s) IV Intermittent once  piperacillin/tazobactam IVPB.. 3.375 Gram(s) IV Intermittent every 8 hours  potassium chloride  10 mEq/100 mL IVPB 10 milliEquivalent(s) IV Intermittent every 1 hour  propofol Infusion 5 MICROgram(s)/kG/Min (3.12 mL/Hr) IV Continuous <Continuous>  senna 2 Tablet(s) Oral at bedtime  sodium chloride 0.9%. 1000 milliLiter(s) (100 mL/Hr) IV Continuous <Continuous>    MEDICATIONS  (PRN):  acetaminophen     Tablet .. 650 milliGRAM(s) Oral every 6 hours PRN Temp greater or equal to 38C (100.4F), Mild Pain (1 - 3)  bisacodyl 5 milliGRAM(s) Oral daily PRN Constipation  dextrose Oral Gel 15 Gram(s) Oral once PRN Blood Glucose LESS THAN 70 milliGRAM(s)/deciliter  fentaNYL    Injectable 25 MICROGram(s) IV Push every 2 hours PRN Severe Pain (7 - 10)  fentaNYL    Injectable 50 MICROGram(s) IV Push once PRN EVD placement  midazolam Injectable 2 milliGRAM(s) IV Push once PRN evd placement  ondansetron Injectable 4 milliGRAM(s) IV Push every 6 hours PRN Nausea and/or Vomiting      ***********************************************  ASSESSMENT AND PLAN  ***********************************************      #L. frontal ICH-3 with IVH, suspected venous bleed  #History of elective L. crani for meningioma resection (4/17/23, Dr. D'Amico)  #History of seizures, Parkinson's disease, multiple falls and wheelchair bound  #Abdominal distention      NEURO  - Admit NSICU, Q1h neuro checks / Q1h vital signs  - LEV, dex  - R. frontal EVD   - Propofol, RASS 0 to -1  - OR planning per Dr. D'Amico  - PD meds: sinemet, entacapone    PULM  - Volume control/assist control, lung protective ventilation  - SpO2 goal > 92%, supplemental O2 and pulm toileting as needed    CARDIO  - BP goal: less than 160  - Amlodipine, a-line    GI  - Diet: NPO, sal[place] sump placed  - Stress ulcer prophylaxis:     /RENAL  - Monitor UOP/volume status, BUN/SCr  - Sodium goal 145-150, trend BMP    HEME  - Maintain Hb > 7.0, PLT > 100,000  - SCDs, holding DVT chemoppx in setting of ICH    ID  - Pip-tazo for empiric aspiration PNA coverage  - Monitor for infectious s/s, fever curve, leukocytosis    ENDO  - Goal euglycemia, RASSI    04-22-23 @ 09:46

## 2023-04-22 NOTE — H&P ADULT - NSICDXPASTMEDICALHX_GEN_ALL_CORE_FT
PAST MEDICAL HISTORY:  Brain mass     History of insomnia     History of seizures     HTN (hypertension)     Impaired gait     Intracerebral hemorrhage     Meningioma     Parkinsons

## 2023-04-22 NOTE — DISCHARGE NOTE PROVIDER - NSDCMRMEDTOKEN_GEN_ALL_CORE_FT
acetaminophen 325 mg oral tablet: 2 tab(s) orally every 6 hours As needed Mild Pain (1 - 3)  amLODIPine 2.5 mg oral tablet: 1 orally once a day  carbidopa-levodopa 25 mg-250 mg oral tablet: 1 orally 4 times a day  dexamethasone: 2 milligram(s) orally every 6 hours TAPER:  2mg every 6 hours ()  2mg every 8 hours ()  2mg every 12 hours ()  2mg daily ()  entacapone 200 mg oral tablet: 1 tab(s) orally every 8 hours  gabapentin 400 mg oral capsule: 1 cap(s) orally 4 times a day  levETIRAcetam 100 mg/mL intravenous solution: 10 milliliter(s) intravenous every 12 hours  ondansetron 4 mg oral tablet, disintegratin tab(s) orally 3 times a day As needed Nausea and/or Vomiting  oxyCODONE 5 mg oral tablet: 1 tab(s) orally every 6 hours As needed Severe Pain (7 - 10)  pantoprazole 40 mg oral delayed release tablet: 1 tab(s) orally once a day (before a meal) while on steroids  polyethylene glycol 3350 oral powder for reconstitution: 17 gram(s) orally once a day  senna leaf extract oral tablet: 2 tab(s) orally once a day (at bedtime)

## 2023-04-22 NOTE — PROGRESS NOTE ADULT - SUBJECTIVE AND OBJECTIVE BOX
HPI:    Patient is a 68 year old male with PMH of seizures, HTN, and Parkinson's disease diagnosed in 2012 with multiple falls who presented to [facility] on 4/17 for elective left craniotomy for meningioma resection. He had known about this tumor since 2019 but had recent follow up which showed increase in size with mass effect. He has also been wheelchair bound since about 2-3 years ago and has frequent falls.    Underwent L crani for meningioma resection on 4/17. Hospital course uncomplicated.   Discharged to [facility] on 4/21 in stable condition.   On arrival to rehab, pt c/o incisional headache and had episodes of n/v during ambulance ride.  Pt given, zofran, oxycodone 2.5mg for pain, however, headache returned with persistent nausea and vomiting.   CTH obtained STAT which showed new large left frontal hemorrhage with IVH, cerebral edema with midline shift and some effacement of L frontal horn.  Pt started on cardene, given Keppra and transferred to [facility]. Prior to transfer, mental status declined (GCS 13-> 9) with increased secretions, therefore attempted intubation; LMA airway placed.      On admission, the patient was:  GCS:   Chavez-Hou:  modified Garcia:  ICH score: 3  NIHSS:    *** HIGH RISK OF DVT PRESENT ON ADMISSION ***      ICU Vital Signs Last 24 Hrs  T(C): 37.2 (21 Apr 2023 21:11), Max: 37.2 (21 Apr 2023 09:23)  T(F): 98.9 (21 Apr 2023 21:11), Max: 99 (21 Apr 2023 09:23)  HR: 81 (22 Apr 2023 00:30) (57 - 96)  BP: 183/102 (22 Apr 2023 00:30) (118/77 - 188/100)  RR: 15 (22 Apr 2023 00:30) (15 - 17)  SpO2: 99% (22 Apr 2023 00:30) (94% - 100%)      EXAMINATION:  General: Sedated and intubated. S/P paralytics for EMS transport and intubation  HEENT:  MMM, ETT in place  Neuro:  Pupils 4mm and briskly reactive  Cards: S1/S2, RRR  Respiratory:  Coarse breath sounds B/L  Abdomen: Soft, non-tender though severely distended protuberant  : Nelson in place  Extremities: No edema  Skin: Warm/dry    MEDICATIONS:   acetaminophen     Tablet .. 650 milliGRAM(s) Oral every 6 hours PRN  bisacodyl 5 milliGRAM(s) Oral daily PRN  chlorhexidine 0.12% Liquid 15 milliLiter(s) Oral Mucosa every 12 hours  chlorhexidine 2% Cloths 1 Application(s) Topical daily  dextrose 5%. 1000 milliLiter(s) (100 mL/Hr) IV Continuous <Continuous>  dextrose 5%. 1000 milliLiter(s) (50 mL/Hr) IV Continuous <Continuous>  dextrose 50% Injectable 25 Gram(s) IV Push once  dextrose 50% Injectable 12.5 Gram(s) IV Push once  dextrose 50% Injectable 25 Gram(s) IV Push once  dextrose Oral Gel 15 Gram(s) Oral once PRN  glucagon  Injectable 1 milliGRAM(s) IntraMuscular once  insulin lispro (ADMELOG) corrective regimen sliding scale   SubCutaneous three times a day before meals  levETIRAcetam  IVPB 1000 milliGRAM(s) IV Intermittent every 12 hours  ondansetron Injectable 4 milliGRAM(s) IV Push every 6 hours PRN  pantoprazole  Injectable 40 milliGRAM(s) IV Push daily  senna 2 Tablet(s) Oral at bedtime                          15.2   16.55 )-----------( 184      ( 22 Apr 2023 03:46 )             44.6     04-22    137  |  99  |  22  ----------------------------<  186<H>  3.2<L>   |  24  |  1.13    Ca    9.1      22 Apr 2023 03:46  Phos  2.9     04-22  Mg     2.2     04-22    TPro  7.3  /  Alb  3.9  /  TBili  0.4  /  DBili  x   /  AST  17  /  ALT  22  /  AlkPhos  57  04-22    LIVER FUNCTIONS - ( 22 Apr 2023 03:46 )  Alb: 3.9 g/dL / Pro: 7.3 g/dL / ALK PHOS: 57 U/L / ALT: 22 U/L / AST: 17 U/L / GGT: x                    =========================  NSICU ATTENDING PROGRESS NOTE  =========================    HPI:  Patient is a 68 year old male with PMH of seizures, HTN, and Parkinson's disease diagnosed in 2012 with multiple falls who initially presented to [facility] on 4/17 for elective left craniotomy for meningioma resection. He has also been wheelchair bound since about 2-3 years ago and has frequent falls related to his Parkinson's disease.  Underwent L crani for meningioma resection on 4/17. Hospital course was uncomplicated.   Discharged to [facility] on 4/21 in stable condition.     On arrival to rehab, pt c/o incisional headache with episodes of n/v during ambulance ride to rehab.  Pt given zofran and oxycodone, however, headache returned with persistent nausea and vomiting.   RRT called for worsened mental status. CT head obtained stat which showed new large left frontal hemorrhage with IVH, cerebral edema with midline shift and some effacement of L frontal horn.  Pt started on cardene, given Keppra and transferred to [facility]. Prior to transfer, mental status declined (GCS 13-> 9) with increased secretions, therefore attempted intubation; LMA airway placed.      On admission, the patient was:  GCS:   Chavez-Hou:  modified Garcia:  ICH score: 3  NIHSS:    *** HIGH RISK OF DVT PRESENT ON ADMISSION ***      ICU Vital Signs Last 24 Hrs  T(C): 37.2 (21 Apr 2023 21:11), Max: 37.2 (21 Apr 2023 09:23)  T(F): 98.9 (21 Apr 2023 21:11), Max: 99 (21 Apr 2023 09:23)  HR: 81 (22 Apr 2023 00:30) (57 - 96)  BP: 183/102 (22 Apr 2023 00:30) (118/77 - 188/100)  RR: 15 (22 Apr 2023 00:30) (15 - 17)  SpO2: 99% (22 Apr 2023 00:30) (94% - 100%)      EXAMINATION:  General: Sedated and intubated. S/P paralytics for EMS transport and intubation  HEENT:  MMM, ETT in place  Neuro:  Pupils 4mm and briskly reactive, +cough/ gag  Flaccid x 4 as recently given paralytics  Cards: S1/S2, RRR  Respiratory:  Coarse breath sounds B/L  Abdomen: Soft, non-tender though severely distended protuberant  : Nelson in place  Extremities: No edema  Skin: Warm/dry    MEDICATIONS:   acetaminophen     Tablet .. 650 milliGRAM(s) Oral every 6 hours PRN  bisacodyl 5 milliGRAM(s) Oral daily PRN  chlorhexidine 0.12% Liquid 15 milliLiter(s) Oral Mucosa every 12 hours  chlorhexidine 2% Cloths 1 Application(s) Topical daily  dextrose 5%. 1000 milliLiter(s) (100 mL/Hr) IV Continuous <Continuous>  dextrose 5%. 1000 milliLiter(s) (50 mL/Hr) IV Continuous <Continuous>  dextrose 50% Injectable 25 Gram(s) IV Push once  dextrose 50% Injectable 12.5 Gram(s) IV Push once  dextrose 50% Injectable 25 Gram(s) IV Push once  dextrose Oral Gel 15 Gram(s) Oral once PRN  glucagon  Injectable 1 milliGRAM(s) IntraMuscular once  insulin lispro (ADMELOG) corrective regimen sliding scale   SubCutaneous three times a day before meals  levETIRAcetam  IVPB 1000 milliGRAM(s) IV Intermittent every 12 hours  ondansetron Injectable 4 milliGRAM(s) IV Push every 6 hours PRN  pantoprazole  Injectable 40 milliGRAM(s) IV Push daily  senna 2 Tablet(s) Oral at bedtime    LABS:                       15.2   16.55 )-----------( 184      ( 22 Apr 2023 03:46 )             44.6     04-22    137  |  99  |  22  ----------------------------<  186<H>  3.2<L>   |  24  |  1.13    Ca    9.1      22 Apr 2023 03:46  Phos  2.9     04-22  Mg     2.2     04-22    TPro  7.3  /  Alb  3.9  /  TBili  0.4  /  DBili  x   /  AST  17  /  ALT  22  /  AlkPhos  57  04-22    LIVER FUNCTIONS - ( 22 Apr 2023 03:46 )  Alb: 3.9 g/dL / Pro: 7.3 g/dL / ALK PHOS: 57 U/L / ALT: 22 U/L / AST: 17 U/L / GGT: x                    =========================  NSICU ATTENDING PROGRESS NOTE  =========================    HPI:  Patient is a 68 year old male with PMH of seizures, HTN, and Parkinson's disease diagnosed in 2012 with multiple falls who initially presented to [facility] on 4/17 for elective left craniotomy for meningioma resection. He has also been wheelchair bound since about 2-3 years ago and has frequent falls related to his Parkinson's disease.  Underwent L crani for meningioma resection on 4/17. Hospital course was uncomplicated.   Discharged to [facility] on 4/21 in stable condition.     On arrival to rehab, pt c/o incisional headache with episodes of n/v during ambulance ride to rehab.  Pt given zofran and oxycodone, however, headache returned with persistent nausea and vomiting.   RRT called for worsened mental status. CT head obtained stat which showed new large left frontal hemorrhage with IVH, cerebral edema with midline shift and some effacement of L frontal horn.  Pt started on cardene, given Keppra and transferred to [facility]. Prior to transfer, mental status declined (GCS 13-> 9) with increased secretions, therefore attempted intubation; LMA airway placed.      On admission, the patient was:  Chavez-Hou:  modified Garcia:  ICH score: 3  NIHSS:    *** HIGH RISK OF DVT PRESENT ON ADMISSION ***      ICU Vital Signs Last 24 Hrs  T(C): 37.2 (21 Apr 2023 21:11), Max: 37.2 (21 Apr 2023 09:23)  T(F): 98.9 (21 Apr 2023 21:11), Max: 99 (21 Apr 2023 09:23)  HR: 81 (22 Apr 2023 00:30) (57 - 96)  BP: 183/102 (22 Apr 2023 00:30) (118/77 - 188/100)  RR: 15 (22 Apr 2023 00:30) (15 - 17)  SpO2: 99% (22 Apr 2023 00:30) (94% - 100%)    [facility] vent settings  16/450/50/8    EXAMINATION:  General: Sedated and intubated. S/P paralytics for EMS transport and intubation  HEENT:  MMM, ETT in place  Neuro:  Pupils 4mm and briskly reactive, +cough/ gag  Flaccid x 4 as recently given paralytics  Cards: S1/S2, RRR  Respiratory:  Coarse breath sounds B/L  Abdomen: Soft, non-tender though severely distended protuberant  : Nelson in place  Extremities: No edema  Skin: Warm/dry    MEDICATIONS:   acetaminophen     Tablet .. 650 milliGRAM(s) Oral every 6 hours PRN  bisacodyl 5 milliGRAM(s) Oral daily PRN  chlorhexidine 0.12% Liquid 15 milliLiter(s) Oral Mucosa every 12 hours  chlorhexidine 2% Cloths 1 Application(s) Topical daily  dextrose 5%. 1000 milliLiter(s) (100 mL/Hr) IV Continuous <Continuous>  dextrose 5%. 1000 milliLiter(s) (50 mL/Hr) IV Continuous <Continuous>  dextrose 50% Injectable 25 Gram(s) IV Push once  dextrose 50% Injectable 12.5 Gram(s) IV Push once  dextrose 50% Injectable 25 Gram(s) IV Push once  dextrose Oral Gel 15 Gram(s) Oral once PRN  glucagon  Injectable 1 milliGRAM(s) IntraMuscular once  insulin lispro (ADMELOG) corrective regimen sliding scale   SubCutaneous three times a day before meals  levETIRAcetam  IVPB 1000 milliGRAM(s) IV Intermittent every 12 hours  ondansetron Injectable 4 milliGRAM(s) IV Push every 6 hours PRN  pantoprazole  Injectable 40 milliGRAM(s) IV Push daily  senna 2 Tablet(s) Oral at bedtime    LABS:                       15.2   16.55 )-----------( 184      ( 22 Apr 2023 03:46 )             44.6     04-22    137  |  99  |  22  ----------------------------<  186<H>  3.2<L>   |  24  |  1.13    Ca    9.1      22 Apr 2023 03:46  Phos  2.9     04-22  Mg     2.2     04-22    TPro  7.3  /  Alb  3.9  /  TBili  0.4  /  DBili  x   /  AST  17  /  ALT  22  /  AlkPhos  57  04-22    LIVER FUNCTIONS - ( 22 Apr 2023 03:46 )  Alb: 3.9 g/dL / Pro: 7.3 g/dL / ALK PHOS: 57 U/L / ALT: 22 U/L / AST: 17 U/L / GGT: x

## 2023-04-22 NOTE — CHART NOTE - NSCHARTNOTEFT_GEN_A_CORE
Responded to RRT.  Newly admitted pt in rehab, apparently with lethargy, nausea, vomiting, ever since pt arrived here around 7 PM.  /95  Pulse 68 O2 sat 97%  Pt is lethargic but easily arousable and responsive  Pt had a Stat CT Head w/c showed interval development of moderate amount of hemorrhage in the left frontal lobe post operative site with intraventricular extension and new mild increased size of the body of the right lateral ventricle. Slightly increased rightward midline shift anteriorly. Postoperative changes left frontal craniotomy, residual vasogenic edema in the left frontal lobe and chronic microvascular changes similar to prior exam.  A/P:  As above, new hemorrhage, left frontal lobe with intraventricular extension  Hypertensive emergency  Will give hydralazine 10 mg IVP now   To keep -150  Neurosurg -[facility] to be contacted ASAP for urgent/emergent transfer  Pt already received 1000mg Keppra at [facility] prior to transfer here  Pt's family will also be contacted  D/w Rehab resident and nursing Responded to RRT.  Newly admitted pt in rehab, apparently with lethargy, nausea, vomiting, ever since pt arrived here around 7 PM.  Pt received Zofran and stat Head CT was ordered.  /95  Pulse 68 O2 sat 97%  Pt is lethargic but easily arousable and responsive.  Mr Burns is a 68M hx poorly controlled Parkinson's disease w/ prog inability to walk since Nov 2022, found to have large L frontal dural based lesion c/f meningioma w/ associated mass effect and edema, was s/p L crani for meningioma resection at [facility] on 4/17/2023.     Tonight, had a Stat CT Head w/c showed interval development of moderate amount of hemorrhage in the left frontal lobe post operative site with intraventricular extension and new mild increased size of the body of the right lateral ventricle. Slightly increased rightward midline shift anteriorly. Postoperative changes left frontal craniotomy, residual vasogenic edema in the left frontal lobe and chronic microvascular changes similar to prior exam.  A/P:  As above, new hemorrhage, left frontal lobe with intraventricular extension  Hypertensive emergency  Will give hydralazine 10 mg IVP now   To keep -150  Neurosurg -[facility] to be contacted ASAP for urgent/emergent transfer  Pt already received 1000mg Keppra at [facility] prior to transfer here  Pt's family will also be contacted  D/w Rehab resident and nursing Responded to RRT.  Newly admitted pt in rehab, apparently with lethargy, nausea, vomiting, ever since pt arrived here around 7 PM.  Pt received Zofran and stat Head CT was ordered.  /95  Pulse 68 O2 sat 97%  Pt is lethargic but easily arousable and responsive.  Mr Burns is a 68M hx poorly controlled Parkinson's disease w/ prog inability to walk since Nov 2022, found to have large L frontal dural based lesion c/f meningioma w/ associated mass effect and edema, was s/p L craniotomy, meningioma resection at [facility] on 4/17/2023.     Tonight, had a Stat CT Head w/c showed interval development of moderate amount of hemorrhage in the left frontal lobe post operative site with intraventricular extension and new mild increased size of the body of the right lateral ventricle. Slightly increased rightward midline shift anteriorly. Postoperative changes left frontal craniotomy, residual vasogenic edema in the left frontal lobe and chronic microvascular changes similar to prior exam.  A/P:  As above, new hemorrhage, left frontal lobe with intraventricular extension, s/p L craniotomy, left frontal meningioma resection at [facility] on 4/17/2023.   Hypertensive emergency  Will give hydralazine 10 mg IVP now   To keep -150  Neurosurg -[facility] to be contacted ASAP for urgent/emergent transfer  Pt already received 1000mg Keppra at [facility] prior to transfer here  Pt's family will also be contacted  D/w Rehab resident and nursing Responded to RRT.  Newly admitted pt in rehab, apparently with lethargy, nausea, vomiting, ever since pt arrived here around 7 PM.  Pt received Zofran and stat Head CT was ordered.  /95  Pulse 68 O2 sat 97%  Pt is lethargic but easily arousable and responsive.  Mr Burns is a 68M hx poorly controlled Parkinson's disease w/ prog inability to walk since Nov 2022, found to have large L frontal dural based lesion c/f meningioma w/ associated mass effect and edema, was s/p L craniotomy, meningioma resection at [facility] on 4/17/2023.     Tonight, had a Stat CT Head w/c showed interval development of moderate amount of hemorrhage in the left frontal lobe post operative site with intraventricular extension and new mild increased size of the body of the right lateral ventricle. Slightly increased rightward midline shift anteriorly. Postoperative changes left frontal craniotomy, residual vasogenic edema in the left frontal lobe and chronic microvascular changes similar to prior exam.  A/P:  As above, new hemorrhage, left frontal lobe with intraventricular extension, s/p L craniotomy, left frontal meningioma resection at [facility] on 4/17/2023.   Hypertensive emergency  Will give hydralazine 10 mg IVP now   To keep -150  Neurosurg (Dr. D'Amico's team) - [facility] to be contacted ASAP for urgent/emergent transfer  Pt already received 1000mg Keppra at [facility] prior to transfer here  Pt's family will also be contacted  D/w Rehab resident and nursing

## 2023-04-22 NOTE — DISCHARGE NOTE PROVIDER - NSDCFUSCHEDAPPT_GEN_ALL_CORE_FT
Poornima Grimaldo  [facility] PreAdmits  Scheduled Appointment: 04/22/2023    D'Amico, Randy [facility]  NEUROSURG [address]  Scheduled Appointment: 04/26/2023

## 2023-04-22 NOTE — PROGRESS NOTE ADULT - ASSESSMENT
ASSESSMENT/PLAN:  67 y/o M POH S/P  Presenting with large left frontal intraparenchymal hemorrhage with IVH- likely venous bleed  ICH score 3    NEURO:  Q1 neurochecks  Repeat CT for stability, CTA   IVH: Pending EVD placement  Sedation: Propofol RASS goal 0-neg 1  Analgesia-  Keppra  Continue antiparkinsonism medication  Target Na goal 145-150  Monitor possible need for surgical decompression/evacuation  Activity: [] mobilize as tolerated [] Bedrest [] PT [] OT [] PMNR    PULM: Intubated for airway protection    CV:  SBP goal 100-140  Cardene- wean as tolerated  TTE, EKG  [place]     RENAL:  Fluids: NS@100cc/hr  Nelson catheter- strict Is/Os  Serum osm    GI:  Diet: NPO  Enteric access  GI prophylaxis [] not indicated [x] PPI [] other:  Bowel regimen [] colace [x] senna [] other:    ENDO:   Goal euglycemia (-180)  Stroke core measures    HEME/ONC:  VTE prophylaxis: [] SCDs [] chemoprophylaxis [] hold chemoprophylaxis due to: [] high risk of DVT/PE on admission due to:    ID:    MISC:    SOCIAL/FAMILY:  [] awaiting [] updated at bedside [] family meeting    CODE STATUS:  [] Full Code [] DNR [] DNI [] Palliative/Comfort Care    DISPOSITION:  [] ICU [] Stroke Unit [] Floor [] EMU [] RCU [] PCU    [] Patient is at high risk of neurologic deterioration/death due to:     Time seen:  Time spent: ___ [] critical care minutes    Contact: [phone] ASSESSMENT/PLAN:  67 y/o M POH S/P  Presenting with large left frontal intraparenchymal hemorrhage with IVH- likely venous bleed  ICH score 3    NEURO:  Q1 neurochecks  Repeat CT for stability, CTA   IVH: Pending EVD placement  Sedation: Propofol RASS goal 0-neg 1  Analgesia- fentanyl prn  Keppra, decadron  Continue antiparkinsonism medications: Sinemet, entacapone  Target Na goal 145-150  Monitor possible need for surgical decompression/evacuation  Activity: [] mobilize as tolerated [] Bedrest [] PT [] OT [] PMNR    PULM: Intubated for airway protection  CXR, ABG  VAP bundle    CV:  SBP goal 100-140  Cardene- wean as tolerated  Resume amlodipine  TTE, EKG  [place]     RENAL: Hypokalemia  Fluids: NS@100cc/hr; s/p 23.4%  Na goal 145-150  Nelson catheter- strict Is/Os  Serum osm  Replete electrolytes    GI: Abdominal distension  Diet: NPO  Enteric access- [place] sump for decompression  GI prophylaxis [] not indicated [x] PPI [] other:  Bowel regimen [] colace [x] senna [] other:    ENDO:   Goal euglycemia (-180)  Stroke core measures A1c, lipid, TSH    HEME/ONC:  VTE prophylaxis: [x] SCDs   [x] hold chemoprophylaxis due to: acute bleed  Check coags, H/H    ID: Leukocytosis   Afebrile though with copious secretions. Concern for aspiration pneumonitis/PNA  Start piperacillin- tazobactam empirically  Sputum culture    MISC:    SOCIAL/FAMILY:  [x] awaiting [] updated by phone [] family meeting    CODE STATUS:  [x] Full Code [] DNR [] DNI [] Palliative/Comfort Care    DISPOSITION:  [x] ICU [] Stroke Unit [] Floor    Time spent: 75 critical care minutes     ASSESSMENT/PLAN:  67 y/o M POD 4 S/P L crani for meningioma resection  Presenting with large left frontal intraparenchymal hemorrhage with IVH- likely venous bleed  ICH score 3    NEURO:  Q1 neurochecks  Repeat CT for stability, CTA   IVH: Pending EVD placement  Sedation: Propofol RASS goal 0-neg 1  Analgesia- fentanyl prn  Keppra, decadron  Continue antiparkinsonism medications: Sinemet, entacapone  Target Na goal 145-150  Monitor possible need for surgical decompression/evacuation  Activity: [] mobilize as tolerated [x] Bedrest [] PT [] OT [] PMNR    PULM: Intubated for airway protection  CXR, ABG  VAP bundle  CPAP as tolerated    CV:  SBP goal 100-140  Cardene- wean as tolerated  Resume amlodipine  TTE, EKG  [place]     RENAL: Hypokalemia  Fluids: NS@100cc/hr; s/p 23.4%  Na goal 145-150. Repeat BMP 4hours  Nelson catheter- strict Is/Os  Serum osm  Replete electrolytes    GI: Abdominal distension  Diet: NPO  Enteric access- [place] sump for decompression  GI prophylaxis [] not indicated [x] PPI [] other:  Bowel regimen [] colace [x] senna [] other:    ENDO:   Goal euglycemia (-180)  Stroke core measures A1c (5.8), lipid, TSH    HEME/ONC:  VTE prophylaxis: [x] SCDs   [x] hold chemoprophylaxis due to: acute bleed  Check coags, H/H  LE dopplers    ID: Leukocytosis   Afebrile though with copious secretions. Concern for aspiration pneumonitis/PNA  Start piperacillin- tazobactam empirically   Sputum culture    MISC:    SOCIAL/FAMILY:  [x] awaiting [] updated by phone [] family meeting    CODE STATUS:  [x] Full Code [] DNR [] DNI [] Palliative/Comfort Care    DISPOSITION:  [x] ICU [] Stroke Unit [] Floor    Time spent: 75 critical care minutes

## 2023-04-22 NOTE — DISCHARGE NOTE PROVIDER - HOSPITAL COURSE
68 year old male with PMH of seizures, HTN, and Parkinson's disease diagnosed in 2012 with multiple falls who presented to [facility] on 4/17 for elective left craniotomy for meningioma resection. He had known about this tumor since 2019 but had recent follow up which showed increase in size with mass effect. He has also been wheelchair bound since about 2-3 years ago. Per son he has episodes of OFF-time around noon which caused him to have frequent falls. His Sinemet was adjusted in January from BID to 4 times a day as 8AM, 12, 4 and 8PM. He likely experiencing some REM sleep disorder as he has difficulty starting and maintaining the sleep. His hospital course was unremarkable. He remained stable and was evaluated for admission to acute inpatient rehab. He was admitted to [facility] on 4/21/23.    On arrival pt c/o incisional headache and had episodes of n/v during ambulance ride here. After initial admission assessment, zofran, oxycodone 2.5mg for pain, pt headache returned and persistent n/v. CTH obtained STAT showed new left frontal hemorrhage. [facility] transfer center and Neurosurgical Team--Dr. D'Amico contacted. Patient Emergently transferred back to [facility] NSICU. On 4/22.     GCS 12-13 at time of departure.   68 year old male with PMH of seizures, HTN, and Parkinson's disease diagnosed in 2012 with multiple falls who presented to [facility] on 4/17 for elective left craniotomy for meningioma resection. He had known about this tumor since 2019 but had recent follow up which showed increase in size with mass effect. He has also been wheelchair bound since about 2-3 years ago. Per son he has episodes of OFF-time around noon which caused him to have frequent falls. His Sinemet was adjusted in January from BID to 4 times a day as 8AM, 12, 4 and 8PM. He likely experiencing some REM sleep disorder as he has difficulty starting and maintaining the sleep. His hospital course was unremarkable. He remained stable and was evaluated for admission to acute inpatient rehab. He was admitted to [facility] on 4/21/23.    On arrival pt c/o incisional headache and had episodes of n/v during ambulance ride here. After initial admission assessment, zofran, oxycodone 2.5mg for pain, pt headache returned and persistent n/v. CTH obtained STAT showed new left frontal hemorrhage. [facility] transfer center and Neurosurgical Team--Dr. D'Amico contacted. Patient Emergently transferred back to [facility] NSICU. On 4/22.     GCS 9-10 at time of departure. Intubated by EMS for airway protection.   68 year old male with PMH of seizures, HTN, and Parkinson's disease diagnosed in 2012 with multiple falls who presented to [facility] on 4/17 for elective left craniotomy for meningioma resection. He had known about this tumor since 2019 but had recent follow up which showed increase in size with mass effect. He has also been wheelchair bound since about 2-3 years ago. Per son he has episodes of OFF-time around noon which caused him to have frequent falls. His Sinemet was adjusted in January from BID to 4 times a day as 8AM, 12, 4 and 8PM. He likely experiencing some REM sleep disorder as he has difficulty starting and maintaining the sleep. His hospital course was unremarkable. He remained stable and was evaluated for admission to acute inpatient rehab. He was admitted to [facility] on 4/21/23.    On arrival pt c/o incisional headache and had episodes of n/v during ambulance ride here. After initial admission assessment, zofran, oxycodone 2.5mg for pain, pt headache returned and persistent n/v. CTH obtained STAT showed new left frontal hemorrhage. [facility] transfer center and Neurosurgical Team--Dr. D'Amico contacted. Patient Emergently transferred back to [facility] NSICU. On 4/22. Patient's daughter notified.     GCS 9-10 at time of departure. Intubated by EMS for airway protection.

## 2023-04-22 NOTE — H&P ADULT - PROBLEM SELECTOR PLAN 1
- admit to NSICU, Dr. D'Amico  - neuro/vital q1hr  - keppra  - decadron  - EVD placement  - possible OR  - d/w Dr. D'Amico

## 2023-04-22 NOTE — H&P ADULT - HISTORY OF PRESENT ILLNESS
Patient is a 68 year old male with PMH of seizures, HTN, and Parkinson's disease diagnosed in 2012 with multiple falls who initially presented to [facility] on 4/17 for elective left craniotomy for meningioma resection. He has also been wheelchair bound since about 2-3 years ago and has frequent falls related to his Parkinson's disease.  Underwent L crani for meningioma resection on 4/17. Hospital course was uncomplicated.   Discharged to [facility] on 4/21 in stable condition.     On arrival to rehab, pt c/o incisional headache with episodes of n/v during ambulance ride to rehab.  Pt given zofran and oxycodone, however, headache returned with persistent nausea and vomiting.   RRT called for worsened mental status. CT head obtained stat which showed new large left frontal hemorrhage with IVH, cerebral edema with midline shift and some effacement of L frontal horn.  Pt started on cardene, given Keppra and transferred to [facility]. Prior to transfer, mental status declined (GCS 13-> 9) with increased secretions, therefore attempted intubation; LMA airway placed.   Patient is a 68 year old male with PMH of seizures, HTN, and Parkinson's disease diagnosed in 2012 with multiple falls who initially presented to [facility] on 4/17 for elective left craniotomy for meningioma resection. He has also been wheelchair bound since about 2-3 years ago and has frequent falls related to his Parkinson's disease.  Underwent L crani for meningioma resection on 4/17. Hospital course was uncomplicated.   Discharged to [facility] on 4/21 in stable condition.     On arrival to rehab, pt c/o incisional headache with episodes of n/v during ambulance ride to rehab.  Pt given zofran and oxycodone, however, headache returned with persistent nausea and vomiting.   RRT called for worsened mental status. CT head obtained stat which showed new large left frontal hemorrhage with IVH, cerebral edema with midline shift and some effacement of L frontal horn.  Pt started on cardene, given Keppra and transferred to [facility]. Prior to transfer, mental status declined (GCS 13-> 9) with increased secretions, therefore attempted intubation; LMA airway placed.      ICH 3  Patient is a 68 year old male with PMH of seizures, HTN, and Parkinson's disease diagnosed in 2012 with multiple falls who initially presented to [facility] on 4/17 for elective left craniotomy for meningioma resection. He has also been wheelchair bound since about 2-3 years ago and has frequent falls related to his Parkinson's disease.  Underwent L crani for meningioma resection on 4/17. Hospital course was uncomplicated.   Discharged to [facility] on 4/21 in stable condition.     On arrival to rehab, pt c/o incisional headache with episodes of n/v during ambulance ride to rehab.  Pt given zofran and oxycodone, however, headache returned with persistent nausea and vomiting.   RRT called for worsened mental status. CT head obtained stat which showed new large left frontal hemorrhage with IVH, cerebral edema with midline shift and some effacement of L frontal horn.  Pt started on cardene, given Keppra and transferred to [facility]. Prior to transfer, mental status declined (GCS 13-> 9) with increased secretions, therefore attempted intubation; LMA airway placed.      ICH 3   NIHSS 23

## 2023-04-22 NOTE — H&P ADULT - ASSESSMENT
68M hx poorly controlled Parkinson's disease w/ prog inability to walk since Nov 2022. Found to have large L frontal dural based lesion c/f meningioma w/ associated mass effect and edema. S/p L crani for meningioma resection (4/17). Discharged to [facility] on 4/21. On arrival to rehab c/o headache with persistgent nausea/vomiting. Rapid response called for worsening mental status. CTH performed reveals new large left frontal IPH with IVH, with edema and midline shift. Intubated for airway protection. Readmitted to [facility] for further management.              68M hx poorly controlled Parkinson's disease w/ prog inability to walk since Nov 2022. Found to have large L frontal dural based lesion c/f meningioma w/ associated mass effect and edema. S/p L crani for meningioma resection (4/17). Discharged to [facility] on 4/21. On arrival to rehab c/o headache with persistgent nausea/vomiting. Rapid response called for worsening mental status. CTH performed reveals new large left frontal IPH with IVH, with edema and midline shift. Intubated for airway protection. Readmitted to [facility] for further management. ICH 3.

## 2023-04-22 NOTE — DISCHARGE NOTE PROVIDER - CARE PROVIDER_API CALL
DAmico, Randy S (MD)  Neurosurgery  [address]  [place], [place] [address]  Phone: [phone]  Fax: [phone]  Established Patient  Follow Up Time:

## 2023-04-22 NOTE — PROGRESS NOTE ADULT - SUBJECTIVE AND OBJECTIVE BOX
NSCU ATTENDING -- ADDITIONAL PROGRESS NOTE    Nighttime rounds were performed -- please refer to earlier Progress Note for HPI details.      ICU Vital Signs Last 24 Hrs  T(C): 37.2 (22 Apr 2023 21:35), Max: 37.3 (22 Apr 2023 17:30)  T(F): 99 (22 Apr 2023 21:35), Max: 99.1 (22 Apr 2023 17:30)  HR: 59 (22 Apr 2023 21:00) (56 - 124)  BP: 119/63 (22 Apr 2023 21:00) (100/58 - 183/102)  BP(mean): 85 (22 Apr 2023 21:00) (74 - 101)  ABP: 137/66 (22 Apr 2023 21:00) (114/58 - 162/81)  ABP(mean): 92 (22 Apr 2023 21:00) (75 - 111)  RR: 16 (22 Apr 2023 21:00) (12 - 18)  SpO2: 100% (22 Apr 2023 21:00) (95% - 100%)      04-21-23 @ 07:01  -  04-22-23 @ 07:00  --------------------------------------------------------  IN: 274.8 mL / OUT: 575 mL / NET: -300.2 mL    04-22-23 @ 07:01  -  04-22-23 @ 22:00  --------------------------------------------------------  IN: 2462 mL / OUT: 1097 mL / NET: 1365 mL      Mode: AC/ CMV (Assist Control/ Continuous Mandatory Ventilation), RR (machine): 16, TV (machine): 450, FiO2: 40, PEEP: 8, ITime: 1, MAP: 11, PIP: 18      EXAMINATION:  General: Sedation paused  HEENT:  MMM, ETT in place  Neuro: With sedation held, restless, tremulous secondary to Parkinson's  Pupils 3mm and brisk  In [place]- antigravity on B/L uppers though more strongly so on LUE  LE wiggles toes B/L  Cards: S1/S2, RRR  Respiratory:  Coarse breath sounds B/L  Abdomen: Soft, non-tender, mildly distended  : Nelson in place  Extremities: No edema  Skin: Warm/dry      MEDICATIONS:   acetaminophen     Tablet .. 650 milliGRAM(s) Oral every 6 hours PRN  bisacodyl 5 milliGRAM(s) Oral daily PRN  carbidopa/levodopa  25/250 1 Tablet(s) Oral <User Schedule>  chlorhexidine 0.12% Liquid 15 milliLiter(s) Oral Mucosa every 12 hours  chlorhexidine 2% Cloths 1 Application(s) Topical daily  dexAMETHasone  Injectable 4 milliGRAM(s) IV Push every 6 hours  dextrose 5%. 1000 milliLiter(s) (100 mL/Hr) IV Continuous <Continuous>  dextrose 5%. 1000 milliLiter(s) (50 mL/Hr) IV Continuous <Continuous>  dextrose 50% Injectable 25 Gram(s) IV Push once  dextrose 50% Injectable 12.5 Gram(s) IV Push once  dextrose 50% Injectable 25 Gram(s) IV Push once  dextrose Oral Gel 15 Gram(s) Oral once PRN  entacapone 200 milliGRAM(s) Oral <User Schedule>  fentaNYL    Injectable 25 MICROGram(s) IV Push every 2 hours PRN  glucagon  Injectable 1 milliGRAM(s) IntraMuscular once  insulin lispro (ADMELOG) corrective regimen sliding scale   SubCutaneous three times a day before meals  levETIRAcetam  IVPB 1000 milliGRAM(s) IV Intermittent every 12 hours  niCARdipine Infusion 5 mG/Hr (25 mL/Hr) IV Continuous <Continuous>  ondansetron Injectable 4 milliGRAM(s) IV Push every 6 hours PRN  pantoprazole  Injectable 40 milliGRAM(s) IV Push daily  piperacillin/tazobactam IVPB.. 3.375 Gram(s) IV Intermittent every 8 hours  propofol Infusion 5 MICROgram(s)/kG/Min (3.12 mL/Hr) IV Continuous <Continuous>  senna 2 Tablet(s) Oral at bedtime  sodium chloride 0.9%. 1000 milliLiter(s) (20 mL/Hr) IV Continuous <Continuous>  sodium chloride 3%. 500 milliLiter(s) (80 mL/Hr) IV Continuous <Continuous>    LABS:                   15.2   16.55 )-----------( 184      ( 22 Apr 2023 03:46 )             44.6     04-22    142  |  111<H>  |  21  ----------------------------<  138<H>  4.0   |  24  |  0.90    Ca    8.2<L>      22 Apr 2023 20:35  Phos  2.9     04-22  Mg     2.2     04-22    TPro  7.3  /  Alb  3.9  /  TBili  0.4  /  DBili  x   /  AST  17  /  ALT  22  /  AlkPhos  57  04-22    LIVER FUNCTIONS - ( 22 Apr 2023 03:46 )  Alb: 3.9 g/dL / Pro: 7.3 g/dL / ALK PHOS: 57 U/L / ALT: 22 U/L / AST: 17 U/L / GGT: x           ABG - ( 22 Apr 2023 11:07 )  pH, Arterial: 7.43  pH, Blood: x     /  pCO2: 38    /  pO2: 85    / HCO3: 25    / Base Excess: 1.0   /  SaO2: 97.1        ASSESSMENT/PLAN:  69 y/o M POD 5 S/P L crani for meningioma resection  Presenting with large left frontal intraparenchymal hemorrhage with IVH- likely venous bleed  ICH score 3    NEURO:  Q1 neurochecks  IVH: S/P EVD at 83ynL6T. Monitor ICPs and output. Repeat CT scan stable  Sedation: Propofol RASS goal 0-neg 1-> precedex  Analgesia- fentanyl prn  Keppra, decadron  Continue antiparkinsonism medications: Sinemet, entacapone  PULM: Intubated for airway protection.  L pleural effusion, RML/RLL infiltrate  CXR, ABG- PEEP 8. CPAP as tolerated  CARDS: SBP goal 100-140. Cardene- wean as tolerated. Resume amlodipine. TTE EF-70%. Normal systolic and diastolic  RENAL: Fluids: 3% at 80cc/hr.  Na goal 145-150. Repeat BMP 6hrs.  Nelson catheter- strict Is/Os. Monitor closely  GI: Abdominal distension- improved. No ileus.   Diet: NPO. [place] sump, PPI Bowel regimen [] colace [x] senna [] other:  LBM 4/22  ENDO: Stroke core measures A1c (5.8), lipid, TSH  HEME: VTE prophylaxis: [x] SCDs,  hold chemoprophylaxis due to: acute bleed. LE dopplers negative.  ID: Leukocytosis . Afebrile though with copious secretions. On piperacillin- tazobactam empirically   Sputum culture- gram stain positive; follow up speciation.    CODE STATUS:  [x] Full Code [] DNR [] DNI [] Palliative/Comfort Care    DISPOSITION:  [x] ICU [] Stroke Unit [] Floor    Continue care per daytime intensivist Ye LEUNG     Time spent: 45 critical care minutes

## 2023-04-22 NOTE — DISCHARGE NOTE PROVIDER - NSDCCPCAREPLAN_GEN_ALL_CORE_FT
PRINCIPAL DISCHARGE DIAGNOSIS  Diagnosis: ICH (intracerebral hemorrhage)  Assessment and Plan of Treatment: Left frontal hemorrhage. Pt is s/p crani for left frontal meningioma resection.

## 2023-04-23 LAB
A1C WITH ESTIMATED AVERAGE GLUCOSE RESULT: 5.8 % — HIGH (ref 4–5.6)
ANION GAP SERPL CALC-SCNC: 10 MMOL/L — SIGNIFICANT CHANGE UP (ref 5–17)
ANION GAP SERPL CALC-SCNC: 6 MMOL/L — SIGNIFICANT CHANGE UP (ref 5–17)
ANION GAP SERPL CALC-SCNC: 8 MMOL/L — SIGNIFICANT CHANGE UP (ref 5–17)
BASE EXCESS BLDA CALC-SCNC: -1.1 MMOL/L — SIGNIFICANT CHANGE UP (ref -2–3)
BUN SERPL-MCNC: 24 MG/DL — HIGH (ref 7–23)
BUN SERPL-MCNC: 24 MG/DL — HIGH (ref 7–23)
BUN SERPL-MCNC: 25 MG/DL — HIGH (ref 7–23)
CALCIUM SERPL-MCNC: 8 MG/DL — LOW (ref 8.4–10.5)
CALCIUM SERPL-MCNC: 8.2 MG/DL — LOW (ref 8.4–10.5)
CALCIUM SERPL-MCNC: 8.5 MG/DL — SIGNIFICANT CHANGE UP (ref 8.4–10.5)
CHLORIDE SERPL-SCNC: 116 MMOL/L — HIGH (ref 96–108)
CHLORIDE SERPL-SCNC: 117 MMOL/L — HIGH (ref 96–108)
CHLORIDE SERPL-SCNC: 120 MMOL/L — HIGH (ref 96–108)
CHOLEST SERPL-MCNC: 151 MG/DL — SIGNIFICANT CHANGE UP
CO2 BLDA-SCNC: 24 MMOL/L — SIGNIFICANT CHANGE UP (ref 19–24)
CO2 SERPL-SCNC: 21 MMOL/L — LOW (ref 22–31)
CO2 SERPL-SCNC: 24 MMOL/L — SIGNIFICANT CHANGE UP (ref 22–31)
CO2 SERPL-SCNC: 25 MMOL/L — SIGNIFICANT CHANGE UP (ref 22–31)
CREAT SERPL-MCNC: 1.02 MG/DL — SIGNIFICANT CHANGE UP (ref 0.5–1.3)
CREAT SERPL-MCNC: 1.04 MG/DL — SIGNIFICANT CHANGE UP (ref 0.5–1.3)
CREAT SERPL-MCNC: 1.06 MG/DL — SIGNIFICANT CHANGE UP (ref 0.5–1.3)
EGFR: 76 ML/MIN/1.73M2 — SIGNIFICANT CHANGE UP
EGFR: 77 ML/MIN/1.73M2 — SIGNIFICANT CHANGE UP
EGFR: 79 ML/MIN/1.73M2 — SIGNIFICANT CHANGE UP
ESTIMATED AVERAGE GLUCOSE: 120 MG/DL — HIGH (ref 68–114)
GLUCOSE BLDC GLUCOMTR-MCNC: 102 MG/DL — HIGH (ref 70–99)
GLUCOSE BLDC GLUCOMTR-MCNC: 102 MG/DL — HIGH (ref 70–99)
GLUCOSE BLDC GLUCOMTR-MCNC: 115 MG/DL — HIGH (ref 70–99)
GLUCOSE SERPL-MCNC: 125 MG/DL — HIGH (ref 70–99)
GLUCOSE SERPL-MCNC: 131 MG/DL — HIGH (ref 70–99)
GLUCOSE SERPL-MCNC: 138 MG/DL — HIGH (ref 70–99)
HCO3 BLDA-SCNC: 23 MMOL/L — SIGNIFICANT CHANGE UP (ref 21–28)
HCT VFR BLD CALC: 36.1 % — LOW (ref 39–50)
HDLC SERPL-MCNC: 55 MG/DL — SIGNIFICANT CHANGE UP
HGB BLD-MCNC: 11.7 G/DL — LOW (ref 13–17)
LIPID PNL WITH DIRECT LDL SERPL: 73 MG/DL — SIGNIFICANT CHANGE UP
MAGNESIUM SERPL-MCNC: 2.4 MG/DL — SIGNIFICANT CHANGE UP (ref 1.6–2.6)
MCHC RBC-ENTMCNC: 31.1 PG — SIGNIFICANT CHANGE UP (ref 27–34)
MCHC RBC-ENTMCNC: 32.4 GM/DL — SIGNIFICANT CHANGE UP (ref 32–36)
MCV RBC AUTO: 96 FL — SIGNIFICANT CHANGE UP (ref 80–100)
NON HDL CHOLESTEROL: 96 MG/DL — SIGNIFICANT CHANGE UP
NRBC # BLD: 0 /100 WBCS — SIGNIFICANT CHANGE UP (ref 0–0)
PCO2 BLDA: 34 MMHG — LOW (ref 35–48)
PH BLDA: 7.43 — SIGNIFICANT CHANGE UP (ref 7.35–7.45)
PHOSPHATE SERPL-MCNC: 2.9 MG/DL — SIGNIFICANT CHANGE UP (ref 2.5–4.5)
PLATELET # BLD AUTO: 137 K/UL — LOW (ref 150–400)
PO2 BLDA: 117 MMHG — HIGH (ref 83–108)
POTASSIUM SERPL-MCNC: 4 MMOL/L — SIGNIFICANT CHANGE UP (ref 3.5–5.3)
POTASSIUM SERPL-MCNC: 4 MMOL/L — SIGNIFICANT CHANGE UP (ref 3.5–5.3)
POTASSIUM SERPL-MCNC: 4.2 MMOL/L — SIGNIFICANT CHANGE UP (ref 3.5–5.3)
POTASSIUM SERPL-SCNC: 4 MMOL/L — SIGNIFICANT CHANGE UP (ref 3.5–5.3)
POTASSIUM SERPL-SCNC: 4 MMOL/L — SIGNIFICANT CHANGE UP (ref 3.5–5.3)
POTASSIUM SERPL-SCNC: 4.2 MMOL/L — SIGNIFICANT CHANGE UP (ref 3.5–5.3)
RBC # BLD: 3.76 M/UL — LOW (ref 4.2–5.8)
RBC # FLD: 14.6 % — HIGH (ref 10.3–14.5)
SAO2 % BLDA: 99 % — HIGH (ref 94–98)
SODIUM SERPL-SCNC: 148 MMOL/L — HIGH (ref 135–145)
SODIUM SERPL-SCNC: 148 MMOL/L — HIGH (ref 135–145)
SODIUM SERPL-SCNC: 151 MMOL/L — HIGH (ref 135–145)
TRIGL SERPL-MCNC: 115 MG/DL — SIGNIFICANT CHANGE UP
TSH SERPL-MCNC: 0.4 UIU/ML — SIGNIFICANT CHANGE UP (ref 0.27–4.2)
TSH SERPL-MCNC: 0.4 UIU/ML — SIGNIFICANT CHANGE UP (ref 0.27–4.2)
WBC # BLD: 10.2 K/UL — SIGNIFICANT CHANGE UP (ref 3.8–10.5)
WBC # FLD AUTO: 10.2 K/UL — SIGNIFICANT CHANGE UP (ref 3.8–10.5)

## 2023-04-23 PROCEDURE — 71045 X-RAY EXAM CHEST 1 VIEW: CPT | Mod: 26

## 2023-04-23 PROCEDURE — 99291 CRITICAL CARE FIRST HOUR: CPT | Mod: 24

## 2023-04-23 PROCEDURE — 99292 CRITICAL CARE ADDL 30 MIN: CPT | Mod: 24

## 2023-04-23 RX ORDER — LEVETIRACETAM 250 MG/1
500 TABLET, FILM COATED ORAL ONCE
Refills: 0 | Status: COMPLETED | OUTPATIENT
Start: 2023-04-23 | End: 2023-04-23

## 2023-04-23 RX ORDER — LEVETIRACETAM 250 MG/1
500 TABLET, FILM COATED ORAL EVERY 12 HOURS
Refills: 0 | Status: DISCONTINUED | OUTPATIENT
Start: 2023-04-23 | End: 2023-04-23

## 2023-04-23 RX ORDER — SODIUM CHLORIDE 9 MG/ML
500 INJECTION INTRAMUSCULAR; INTRAVENOUS; SUBCUTANEOUS ONCE
Refills: 0 | Status: COMPLETED | OUTPATIENT
Start: 2023-04-23 | End: 2023-04-23

## 2023-04-23 RX ORDER — ACETYLCYSTEINE 200 MG/ML
4 VIAL (ML) MISCELLANEOUS EVERY 6 HOURS
Refills: 0 | Status: DISCONTINUED | OUTPATIENT
Start: 2023-04-23 | End: 2023-04-28

## 2023-04-23 RX ORDER — LEVETIRACETAM 250 MG/1
500 TABLET, FILM COATED ORAL ONCE
Refills: 0 | Status: DISCONTINUED | OUTPATIENT
Start: 2023-04-23 | End: 2023-04-23

## 2023-04-23 RX ORDER — DEXAMETHASONE 0.5 MG/5ML
2 ELIXIR ORAL EVERY 12 HOURS
Refills: 0 | Status: COMPLETED | OUTPATIENT
Start: 2023-04-28 | End: 2023-04-29

## 2023-04-23 RX ORDER — SODIUM CHLORIDE 9 MG/ML
1000 INJECTION INTRAMUSCULAR; INTRAVENOUS; SUBCUTANEOUS
Refills: 0 | Status: DISCONTINUED | OUTPATIENT
Start: 2023-04-23 | End: 2023-04-24

## 2023-04-23 RX ORDER — ACETAMINOPHEN 500 MG
1000 TABLET ORAL ONCE
Refills: 0 | Status: COMPLETED | OUTPATIENT
Start: 2023-04-23 | End: 2023-04-23

## 2023-04-23 RX ORDER — DEXAMETHASONE 0.5 MG/5ML
4 ELIXIR ORAL EVERY 8 HOURS
Refills: 0 | Status: COMPLETED | OUTPATIENT
Start: 2023-04-24 | End: 2023-04-25

## 2023-04-23 RX ORDER — DEXAMETHASONE 0.5 MG/5ML
2 ELIXIR ORAL EVERY 24 HOURS
Refills: 0 | Status: COMPLETED | OUTPATIENT
Start: 2023-04-30 | End: 2023-04-30

## 2023-04-23 RX ORDER — ENOXAPARIN SODIUM 100 MG/ML
40 INJECTION SUBCUTANEOUS EVERY 12 HOURS
Refills: 0 | Status: DISCONTINUED | OUTPATIENT
Start: 2023-04-23 | End: 2023-04-24

## 2023-04-23 RX ORDER — IPRATROPIUM/ALBUTEROL SULFATE 18-103MCG
3 AEROSOL WITH ADAPTER (GRAM) INHALATION EVERY 6 HOURS
Refills: 0 | Status: DISCONTINUED | OUTPATIENT
Start: 2023-04-23 | End: 2023-04-26

## 2023-04-23 RX ORDER — DEXAMETHASONE 0.5 MG/5ML
4 ELIXIR ORAL EVERY 6 HOURS
Refills: 0 | Status: COMPLETED | OUTPATIENT
Start: 2023-04-23 | End: 2023-04-24

## 2023-04-23 RX ORDER — SODIUM CHLORIDE 5 G/100ML
500 INJECTION, SOLUTION INTRAVENOUS
Refills: 0 | Status: DISCONTINUED | OUTPATIENT
Start: 2023-04-23 | End: 2023-04-24

## 2023-04-23 RX ORDER — SODIUM CHLORIDE 5 G/100ML
500 INJECTION, SOLUTION INTRAVENOUS
Refills: 0 | Status: DISCONTINUED | OUTPATIENT
Start: 2023-04-23 | End: 2023-04-23

## 2023-04-23 RX ORDER — LEVETIRACETAM 250 MG/1
1000 TABLET, FILM COATED ORAL EVERY 12 HOURS
Refills: 0 | Status: DISCONTINUED | OUTPATIENT
Start: 2023-04-23 | End: 2023-04-24

## 2023-04-23 RX ORDER — DEXAMETHASONE 0.5 MG/5ML
4 ELIXIR ORAL EVERY 12 HOURS
Refills: 0 | Status: COMPLETED | OUTPATIENT
Start: 2023-04-26 | End: 2023-04-26

## 2023-04-23 RX ORDER — DEXAMETHASONE 0.5 MG/5ML
ELIXIR ORAL
Refills: 0 | Status: COMPLETED | OUTPATIENT
Start: 2023-04-23 | End: 2023-05-01

## 2023-04-23 RX ORDER — DEXAMETHASONE 0.5 MG/5ML
2 ELIXIR ORAL EVERY 8 HOURS
Refills: 0 | Status: COMPLETED | OUTPATIENT
Start: 2023-04-27 | End: 2023-04-27

## 2023-04-23 RX ADMIN — PANTOPRAZOLE SODIUM 40 MILLIGRAM(S): 20 TABLET, DELAYED RELEASE ORAL at 12:07

## 2023-04-23 RX ADMIN — Medication 650 MILLIGRAM(S): at 12:22

## 2023-04-23 RX ADMIN — SODIUM CHLORIDE 500 MILLILITER(S): 9 INJECTION INTRAMUSCULAR; INTRAVENOUS; SUBCUTANEOUS at 05:10

## 2023-04-23 RX ADMIN — Medication 4 MILLIGRAM(S): at 17:59

## 2023-04-23 RX ADMIN — CARBIDOPA AND LEVODOPA 1 TABLET(S): 25; 100 TABLET ORAL at 07:20

## 2023-04-23 RX ADMIN — PIPERACILLIN AND TAZOBACTAM 25 GRAM(S): 4; .5 INJECTION, POWDER, LYOPHILIZED, FOR SOLUTION INTRAVENOUS at 23:25

## 2023-04-23 RX ADMIN — ENOXAPARIN SODIUM 40 MILLIGRAM(S): 100 INJECTION SUBCUTANEOUS at 22:12

## 2023-04-23 RX ADMIN — Medication 3 MILLILITER(S): at 21:53

## 2023-04-23 RX ADMIN — FENTANYL CITRATE 25 MICROGRAM(S): 50 INJECTION INTRAVENOUS at 09:37

## 2023-04-23 RX ADMIN — Medication 650 MILLIGRAM(S): at 12:08

## 2023-04-23 RX ADMIN — PROPOFOL 3.12 MICROGRAM(S)/KG/MIN: 10 INJECTION, EMULSION INTRAVENOUS at 00:12

## 2023-04-23 RX ADMIN — Medication 4 MILLIGRAM(S): at 23:25

## 2023-04-23 RX ADMIN — ENTACAPONE 200 MILLIGRAM(S): 200 TABLET, FILM COATED ORAL at 07:20

## 2023-04-23 RX ADMIN — LEVETIRACETAM 600 MILLIGRAM(S): 250 TABLET, FILM COATED ORAL at 17:59

## 2023-04-23 RX ADMIN — AMLODIPINE BESYLATE 5 MILLIGRAM(S): 2.5 TABLET ORAL at 06:32

## 2023-04-23 RX ADMIN — PIPERACILLIN AND TAZOBACTAM 25 GRAM(S): 4; .5 INJECTION, POWDER, LYOPHILIZED, FOR SOLUTION INTRAVENOUS at 07:41

## 2023-04-23 RX ADMIN — Medication 1000 MILLIGRAM(S): at 19:31

## 2023-04-23 RX ADMIN — SENNA PLUS 2 TABLET(S): 8.6 TABLET ORAL at 22:12

## 2023-04-23 RX ADMIN — ENTACAPONE 200 MILLIGRAM(S): 200 TABLET, FILM COATED ORAL at 12:08

## 2023-04-23 RX ADMIN — Medication 4 MILLILITER(S): at 21:53

## 2023-04-23 RX ADMIN — Medication 4 MILLIGRAM(S): at 06:31

## 2023-04-23 RX ADMIN — SODIUM CHLORIDE 500 MILLILITER(S): 9 INJECTION INTRAMUSCULAR; INTRAVENOUS; SUBCUTANEOUS at 02:05

## 2023-04-23 RX ADMIN — ENTACAPONE 200 MILLIGRAM(S): 200 TABLET, FILM COATED ORAL at 16:47

## 2023-04-23 RX ADMIN — CARBIDOPA AND LEVODOPA 1 TABLET(S): 25; 100 TABLET ORAL at 12:08

## 2023-04-23 RX ADMIN — Medication 4 MILLIGRAM(S): at 12:07

## 2023-04-23 RX ADMIN — CARBIDOPA AND LEVODOPA 1 TABLET(S): 25; 100 TABLET ORAL at 19:34

## 2023-04-23 RX ADMIN — LEVETIRACETAM 400 MILLIGRAM(S): 250 TABLET, FILM COATED ORAL at 18:36

## 2023-04-23 RX ADMIN — FENTANYL CITRATE 25 MICROGRAM(S): 50 INJECTION INTRAVENOUS at 09:13

## 2023-04-23 RX ADMIN — Medication 400 MILLIGRAM(S): at 18:52

## 2023-04-23 RX ADMIN — LEVETIRACETAM 600 MILLIGRAM(S): 250 TABLET, FILM COATED ORAL at 06:32

## 2023-04-23 RX ADMIN — SODIUM CHLORIDE 50 MILLILITER(S): 5 INJECTION, SOLUTION INTRAVENOUS at 07:07

## 2023-04-23 RX ADMIN — CARBIDOPA AND LEVODOPA 1 TABLET(S): 25; 100 TABLET ORAL at 16:47

## 2023-04-23 RX ADMIN — SODIUM CHLORIDE 80 MILLILITER(S): 5 INJECTION, SOLUTION INTRAVENOUS at 05:02

## 2023-04-23 RX ADMIN — PIPERACILLIN AND TAZOBACTAM 25 GRAM(S): 4; .5 INJECTION, POWDER, LYOPHILIZED, FOR SOLUTION INTRAVENOUS at 16:47

## 2023-04-23 RX ADMIN — CHLORHEXIDINE GLUCONATE 15 MILLILITER(S): 213 SOLUTION TOPICAL at 06:32

## 2023-04-23 NOTE — PATIENT PROFILE ADULT - HAVE YOU HAD COVID IN THE LAST 60 DAYS?
CC: Follow-up resting tremor    HPI:  Willy Saenz is a  78 y.o.  right-handed male presents today for follow-up namely regarding a resting tremor.  Patient's other past medical history is notable for hypertension, hyperlipidemia, hypothyroidism, prostate cancer (2010) status post surgical excision, osteoarthritis, degenerative disc disease with some chronic back pain, GERD, allergies anxiety and depression.  Of note he has also been seen in our office previously back in 2019, apparently he was experiencing some dizziness/vertigo, which did spontaneously resolved.  He had an MRI of his brain at this time (9/17/2018) which showed some diffuse atrophy and mild small vessel ischemic disease.  Additionally there was a finding of some focal indention of the medulla by the right vertebral artery, which was concluded to be of no clinical significance.  He was last seen in the office on 10/22/2021, summary of his condition is taken from previous note with amendments and additions as needed     Patient reports he began noticing his tremor around May 2021.  He states it only involves his right hand and particularly just his thumb.  He states he notices it mostly at rest he notes that it does seem to suppress with postural movements or intention.  He has not noticed any significant weakness in his hands or incoordination in terms of fine motor movements.  He states it does not seem to be interfering whatsoever with ADLs.  Additionally he denies any gait changes, he ambulates independently without any assistive device, reports no problems with balance or stability, no recent falls.  Does not endorse any bradykinesia or rigidity.  He states he does have some urinary incontinence but reports this is longstanding since his prostatectomy.  He also admits to irregular bowel movements but specifically denies constipation.  There has been no change in his sense of taste or smell.  No symptoms of dizziness or lightheadedness upon  standing.  No numbness and tingling in hands or feet.  Patient reports cognition remained stable, he denies trouble with memory.      He does have some chronic back pain previously with radiation down his left leg into his lateral ankle -now it is mostly just across his low back.  This has been present for several years, patient is followed by pain management and has had multiple interventions including epidurals and also nerve ablations -he is actually scheduled next week with Dr. Vee, likely will have another intervention.  He reports no history of significant head trauma or spinal trauma.  Additionally denies any previous seizure, stroke or syncope.  His family history is remarkable for Parkinson's disease in his sister, he suspects she was diagnosed in her late 60s, he remembers she did have some  shuffling gait, postural changes and tremor but states her primary symptom seem to be dementia with hallucinations which started perhaps 5 years after onset of motor symptoms. Otherwise patient reports no significant family history of any neurological condition.  Noted he does have an identical twin brother who does not have tremor or any other Parkinson-like symptoms. Patient is a former smoker.     Interim history  Since patient was seen last a little over 6 months ago he reports that his tremor actually seems to be improved if not resolved.  He states he hardly notices it anymore nor does his wife noticed anything particularly while he is at rest.  Additionally he does not report any changes with his gait, there is no significant stiffness or rigidity, no bradykinesia, no problems with balance.  He denies any trouble with memory or cognitive processing.  Note that shortly after he was seen by us patient states that he actually sought a second opinion from a neurologist down in [place] (patient resides in [facility] about half the year during winter months and returns to [place] in May - stays through  October).  He was seen by Dr. Eric Pelyao; I am unable to see these notes but patient indicates that Dr. Pelayo suspected patient's tremor/other clinical findings were not consistent with Parkinson's disease.  He states he ordered a JANETH scan in January 2021, again I am unable to view these results in our system but patient states that results were normal.       Past Medical History:   Diagnosis Date   • Anxiety    • Arthritis    • Back pain    • Depression    • Disease of thyroid gland    • Environmental allergies    • Hyperlipidemia    • Prostate cancer (HCC)          Past Surgical History:   Procedure Laterality Date   • COLONOSCOPY N/A 7/13/2020    Procedure: COLONOSCOPY TO CECUM with bx;  Surgeon: Dinesh Starr MD;  Location:  [facility];  Service: General;  Laterality: N/A;  PREOP/ melena, diarrhea  POSTOP/ diverticulosis, non specific colitis, proctocolitis   • ENDOSCOPY N/A 7/13/2020    Procedure: ESOPHAGOGASTRODUODENOSCOPY WITH BIOPSY;  Surgeon: Dinesh Starr MD;  Location: [facility];  Service: General;  Laterality: N/A;  PREOP/ abdominal pain  POSTOP/ gastritis, duodenitis, esophagitis   • PROSTATECTOMY     • TONSILLECTOMY             Current Outpatient Medications:   •  aspirin 325 MG tablet, Take 325 mg by mouth daily., Disp: , Rfl:   •  carvedilol (COREG) 6.25 MG tablet, Take 6.25 mg by mouth 2 (Two) Times a Day., Disp: , Rfl: 2  •  citalopram (CeleXA) 20 MG tablet, Take  by mouth., Disp: , Rfl:   •  ezetimibe (ZETIA) 10 MG tablet, Take 10 mg by mouth daily., Disp: , Rfl:   •  levocetirizine (XYZAL) 5 MG tablet, Take 5 mg by mouth every evening., Disp: , Rfl:   •  levothyroxine (SYNTHROID, LEVOTHROID) 50 MCG tablet, Take  by mouth., Disp: , Rfl:   •  rosuvastatin (CRESTOR) 40 MG tablet, Take 40 mg by mouth Daily., Disp: , Rfl:   •  traMADol (ULTRAM) 50 MG tablet, tramadol 50 mg tablet  Take 2 tablets 3 times a day by oral route., Disp: , Rfl:   •  ALPRAZolam (XANAX) 0.5 MG tablet,  alprazolam 0.5 mg tablet  TAKE 1 TABLET EVERY DAY FOR ANXIETY ATTACK, Disp: , Rfl:   •  Beclomethasone Diprop, Nasal, (QNASL NA), into each nostril., Disp: , Rfl:   •  carbamide peroxide (DEBROX) 6.5 % otic solution, Debrox 6.5 % ear drops  INSTILL 5 DROPS INTO AFFECTED EAR(S) BY OTIC ROUTE 2 TIMES PER DAY, Disp: , Rfl:   •  diclofenac (VOLTAREN) 1 % gel gel, diclofenac 1 % topical gel, Disp: , Rfl:   •  levoFLOXacin (LEVAQUIN) 500 MG tablet, Take 1 tablet by mouth Daily., Disp: 6 tablet, Rfl: 0  •  OMEPRAZOLE PO, omeprazole 20 mg-clarithromycin 500 mg-amoxicill 500 mg(40) combo pack  Take by oral route., Disp: , Rfl:   •  Testosterone Cypionate 200 MG/ML solution, testosterone cypionate 200 mg/mL intramuscular oil, Disp: , Rfl:   •  triamcinolone (KENALOG) 0.025 % ointment, triamcinolone acetonide 0.025 % topical ointment, Disp: , Rfl:       Family History   Problem Relation Age of Onset   • No Known Problems Mother    • Arthritis Father    • Cancer Father    • Heart disease Father    • Parkinsonism Father    • No Known Problems Maternal Grandmother    • No Known Problems Maternal Grandfather    • No Known Problems Paternal Grandmother    • No Known Problems Paternal Grandfather          Social History     Socioeconomic History   • Marital status:    Tobacco Use   • Smoking status: Former Smoker     Start date:      Quit date: 1970     Years since quittin.4   • Smokeless tobacco: Never Used   Substance and Sexual Activity   • Alcohol use: Yes     Comment: 2 drinks daily   • Drug use: No   • Sexual activity: Defer         Allergies   Allergen Reactions   • Dust Mite Extract    • Grass    • Molds & Smuts Other (See Comments)         ROS:  Review of Systems   Neurological: Negative for dizziness, tremors, seizures, syncope, facial asymmetry, speech difficulty, weakness, light-headedness, numbness and headaches.     I have reviewed the above ROS put in by the medical assistant and am in agreement.  "    Physical Exam:  Vitals:    05/16/22 1313   BP: 118/70   BP Location: Left arm   Patient Position: Sitting   Cuff Size: Adult   Pulse: 50   SpO2: 99%   Weight: 84.4 kg (186 lb)   Height: 177.8 cm (70\")       Body mass index is 26.69 kg/m².    Physical Exam  General: Well-developed elderly white male, no acute distress  HEENT: Head is normocephalic, atraumatic  Neck: Neck is supple with no masses  Heart: Bradycardia rate, regular rhythm  Extremities: Distal pulses palpable and equal.  No pedal edema    Neurological Exam:   Mental Status: Awake, alert, oriented to person, place and time.  Conversant without evidence of an affective disorder, thought disorder, delusions or hallucinations.  Attention span and concentration are normal.  HCF: No aphasia, apraxia or dysarthria.  Recent and remote memory intact.  Knowledge of recent events intact.  CN: I:   II: Visual fields full without left inattention   III, IV, VI: Eye movements intact without nystagmus or ptosis.  Pupils equal round and reactive to light.   V,VII: Light touch and pinprick intact all 3 divisions of V.  Facial muscles symmetrical.   VIII: Hearing intact to finger rub   IX,X: Soft palate elevates symmetrically   XI: Sternomastoid and trapezius are strong.   XII: Tongue midline without atrophy or fasciculations  Motor: Normal tone and bulk in the upper and lower extremities   Power testing: Patient still with mild APB weakness bilaterally but otherwise proximally he is quite strong in his upper extremities.  No focal weakness detected lower extremities.  Reflexes: Upper extremities: +2 diffusely        Lower extremities: +2 at knees, +1 at ankles  Sensory: Light touch:        Pinprick  Cerebellar: Finger-to-nose: Intact, there is no postural tremor noted           Rapid movement: Intact           Heel-to-shin: Intact  Gait and Station: Patient comes to stand easily and without difficulty.  His casual walk actually appears quite normal today, armswing is " appropriate and equal on both sides, there is no activation of his tremor.  Patient admits that he is very cognizant of the way he is swinging his arms during my evaluation today and reports he is making an effort to do this.  He is able to walk on his toes and his heels effectively.  Tandem walk is minimally impaired.  Negative Romberg, negative drift    Results:      Lab Results   Component Value Date    GLUCOSE 102 (H) 08/13/2018    BUN 15 08/13/2018    CREATININE 1.48 (H) 08/13/2018    EGFRIFNONA 46 (L) 08/13/2018    BCR 10.1 08/13/2018    CO2 28.7 08/13/2018    CALCIUM 8.6 08/13/2018    ALBUMIN 3.80 08/13/2018    AST 23 08/13/2018    ALT 27 08/13/2018       Lab Results   Component Value Date    WBC 11.27 (H) 08/13/2018    HGB 13.1 (L) 08/13/2018    HCT 41.2 08/13/2018    MCV 95.6 08/13/2018     08/13/2018         .No results found for: RPR      No results found for: TSH, P1EGNOP, T0MSPJT, THYROIDAB      No results found for: RUNAVGCV51      No results found for: FOLATE      No results found for: HGBA1C      Lab Results   Component Value Date    GLUCOSE 102 (H) 08/13/2018    BUN 15 08/13/2018    CREATININE 1.48 (H) 08/13/2018    EGFRIFNONA 46 (L) 08/13/2018    BCR 10.1 08/13/2018    K 4.0 08/13/2018    CO2 28.7 08/13/2018    CALCIUM 8.6 08/13/2018    ALBUMIN 3.80 08/13/2018    AST 23 08/13/2018    ALT 27 08/13/2018         Lab Results   Component Value Date    WBC 11.27 (H) 08/13/2018    HGB 13.1 (L) 08/13/2018    HCT 41.2 08/13/2018    MCV 95.6 08/13/2018     08/13/2018             Assessment:   1.  Resting tremor and reduced arm swing; improved per patient's account      Plan:  1.  Patient previously seen with relatively isolated upper extremity resting tremor resembling early Parkinson's disease.  He also had some mild gait abnormality seen with decreased arm swing noted.  Since we have seen him last he is sought out a second opinion from a neurologist in [facility] who did not agree with  the diagnosis of PD and did order a JANETH scan.  Per patient's report: scan apparently was normal did not show evidence of dopaminergic deficit  - We will see if we can get copies of JANETH scan from Dr. Pelayo office  - Patient states today that he feels like tremor has actually improved and in fact states he rarely even notices any more.  I do not appreciate any tremor during our exam today and additionally his gait looks normal though patient admits he is working hard to swing his arms out and probably is suppressing his tremor.   - Certainly today I do not appreciate enough evidence to discern any sort of Parkinson type syndrome. I advised him to continue to watch for any resurgence of the tremor or other emerging PD symptoms; sometimes patients especially with normal JANETH scans will only develop tremor and fail to evolve over time into more generalized PD.   - Finally we discussed that he really only needs to follow-up with 1 neurologist.  Since he spends half of the time in [place] and half the time here if he would like to just follow-up with Dr. Pelayo on an annual basis I am sure that would be okay, we are happy to see him if he is ever in [place] and in need of neurological care    I spent 30 minutes face-to-face with patient/family today, 20 minutes of that time was counseling patient on disease course and plan of care and answering any questions that they had.           Dictated utilizing Dragon dictation.    No

## 2023-04-23 NOTE — PROGRESS NOTE ADULT - SUBJECTIVE AND OBJECTIVE BOX
***********************************************  ADULT NSICU PROGRESS NOTE  MAHDI EDGE [id number] [facility] [id number]  ***********************************************    24H INTERVAL EVENTS:    ROS: negative except per mentioned above in 24h interval events.      VITALS:    ICU Vital Signs Last 24 Hrs  T(C): 37.1 (22 Apr 2023 05:48), Max: 37.2 (21 Apr 2023 21:11)  T(F): 98.8 (22 Apr 2023 05:48), Max: 98.9 (21 Apr 2023 21:11)  HR: 66 (22 Apr 2023 09:00) (60 - 124)  BP: 111/56 (22 Apr 2023 09:00) (111/56 - 188/100)  BP(mean): 78 (22 Apr 2023 09:00) (78 - 97)  ABP: 128/58 (22 Apr 2023 09:00) (128/58 - 149/68)  ABP(mean): 80 (22 Apr 2023 09:00) (80 - 93)  RR: 16 (22 Apr 2023 08:00) (12 - 16)  SpO2: 100% (22 Apr 2023 09:00) (94% - 100%)    O2 Parameters below as of 22 Apr 2023 10:00  Patient On (Oxygen Delivery Method): ventilator    O2 Concentration (%): 50        Mode: AC/ CMV (Assist Control/ Continuous Mandatory Ventilation)  RR (machine): 16  TV (machine): 450  FiO2: 50  PEEP: 5  ITime: 1  MAP: 12  PIP: 21      I&O's Summary    21 Apr 2023 07:01  -  22 Apr 2023 07:00  --------------------------------------------------------  IN: 274.8 mL / OUT: 575 mL / NET: -300.2 mL    22 Apr 2023 07:01  -  22 Apr 2023 09:41  --------------------------------------------------------  IN: 84.3 mL / OUT: 75 mL / NET: 9.3 mL        EXAM:     Joe Coma Scale: 1/1T/5 = 7    General: normocephalic, EVD, laying in bed supine, prominent LUE parkinsonian tremor  Neuro     MS: eyes closed, does not attend, does not follow commands, localizes to noxious stimuli    CN: PERRL, gaze is conjugate, face symmetric, hearing grossly intact    Mot: bulk normal, (+)bilateral rigid tone, power UPPER 3 and localizes bilaterally, LOWER 1/0  Chest: mechanically ventilated with coarse breath sounds, heart regular rate/rhythm, present S1/S2, no murmurs or rubs  Abdomen: distended, soft and nontender without peritoneal signs  Extremities: no clubbing, well-perfused, no edema    LABORATORY DATA:                            15.2   16.55 )-----------( 184      ( 22 Apr 2023 03:46 )             44.6     04-22    137  |  99  |  22  ----------------------------<  186<H>  3.2<L>   |  24  |  1.13    Ca    9.1      22 Apr 2023 03:46  Phos  2.9     04-22  Mg     2.2     04-22    TPro  7.3  /  Alb  3.9  /  TBili  0.4  /  DBili  x   /  AST  17  /  ALT  22  /  AlkPhos  57  04-22    LIVER FUNCTIONS - ( 22 Apr 2023 03:46 )  Alb: 3.9 g/dL / Pro: 7.3 g/dL / ALK PHOS: 57 U/L / ALT: 22 U/L / AST: 17 U/L / GGT: x           PT/INR - ( 22 Apr 2023 03:46 )   PT: 13.0 sec;   INR: 1.09          PTT - ( 22 Apr 2023 03:46 )  PTT:27.0 sec    IMAGING DATA:    CARDIOLOGY DATA:    MICROBIOLOGY DATA:        MEDICATIONS  (STANDING):  carbidopa/levodopa  25/250 1 Tablet(s) Oral <User Schedule>  chlorhexidine 0.12% Liquid 15 milliLiter(s) Oral Mucosa every 12 hours  chlorhexidine 2% Cloths 1 Application(s) Topical daily  dexAMETHasone  Injectable 4 milliGRAM(s) IV Push every 6 hours  dextrose 5%. 1000 milliLiter(s) (100 mL/Hr) IV Continuous <Continuous>  dextrose 5%. 1000 milliLiter(s) (50 mL/Hr) IV Continuous <Continuous>  dextrose 50% Injectable 25 Gram(s) IV Push once  dextrose 50% Injectable 12.5 Gram(s) IV Push once  dextrose 50% Injectable 25 Gram(s) IV Push once  entacapone 200 milliGRAM(s) Oral <User Schedule>  glucagon  Injectable 1 milliGRAM(s) IntraMuscular once  insulin lispro (ADMELOG) corrective regimen sliding scale   SubCutaneous three times a day before meals  levETIRAcetam  IVPB 1000 milliGRAM(s) IV Intermittent every 12 hours  niCARdipine Infusion 5 mG/Hr (25 mL/Hr) IV Continuous <Continuous>  pantoprazole  Injectable 40 milliGRAM(s) IV Push daily  piperacillin/tazobactam IVPB.- 3.375 Gram(s) IV Intermittent once  piperacillin/tazobactam IVPB.- 3.375 Gram(s) IV Intermittent once  piperacillin/tazobactam IVPB.. 3.375 Gram(s) IV Intermittent every 8 hours  potassium chloride  10 mEq/100 mL IVPB 10 milliEquivalent(s) IV Intermittent every 1 hour  propofol Infusion 5 MICROgram(s)/kG/Min (3.12 mL/Hr) IV Continuous <Continuous>  senna 2 Tablet(s) Oral at bedtime  sodium chloride 0.9%. 1000 milliLiter(s) (100 mL/Hr) IV Continuous <Continuous>    MEDICATIONS  (PRN):  acetaminophen     Tablet .. 650 milliGRAM(s) Oral every 6 hours PRN Temp greater or equal to 38C (100.4F), Mild Pain (1 - 3)  bisacodyl 5 milliGRAM(s) Oral daily PRN Constipation  dextrose Oral Gel 15 Gram(s) Oral once PRN Blood Glucose LESS THAN 70 milliGRAM(s)/deciliter  fentaNYL    Injectable 25 MICROGram(s) IV Push every 2 hours PRN Severe Pain (7 - 10)  fentaNYL    Injectable 50 MICROGram(s) IV Push once PRN EVD placement  midazolam Injectable 2 milliGRAM(s) IV Push once PRN evd placement  ondansetron Injectable 4 milliGRAM(s) IV Push every 6 hours PRN Nausea and/or Vomiting      ***********************************************  ASSESSMENT AND PLAN  ***********************************************      #L. frontal ICH-3 with IVH, suspected venous bleed  #History of elective L. crani for meningioma resection (4/17/23, Dr. D'Amico)  #History of seizures, Parkinson's disease, multiple falls and wheelchair bound  #Abdominal distention      NEURO  - Admit NSICU, Q1h neuro checks / Q1h vital signs  - LEV, dex  - R. frontal EVD   - Propofol, RASS 0 to -1  - OR planning per Dr. D'Amico  - PD meds: sinemet, entacapone    PULM  - Volume control/assist control, lung protective ventilation  - SpO2 goal > 92%, supplemental O2 and pulm toileting as needed    CARDIO  - BP goal: less than 160  - Amlodipine, a-line    GI  - Diet: NPO, [place] sump placed  - Stress ulcer prophylaxis:     /RENAL  - Monitor UOP/volume status, BUN/SCr  - Sodium goal 145-150, trend BMP    HEME  - Maintain Hb > 7.0, PLT > 100,000  - SCDs, holding DVT chemoppx in setting of ICH    ID  - Pip-tazo for empiric aspiration PNA coverage  - Monitor for infectious s/s, fever curve, leukocytosis    ENDO  - Goal euglycemia, RASSI    04-22-23 @ 09:46         ***********************************************  ADULT NSICU PROGRESS NOTE  MAHDI EDGE [id number] [facility] 08EA [id number]  ***********************************************    24H INTERVAL EVENTS: no acute overnight events.  Patient arousable and following commands this morning.    ROS: negative except per mentioned above in 24h interval events.      VITALS:      ICU Vital Signs Last 24 Hrs  T(C): 37.1 (23 Apr 2023 09:00), Max: 37.3 (22 Apr 2023 17:30)  T(F): 98.8 (23 Apr 2023 09:00), Max: 99.1 (22 Apr 2023 17:30)  HR: 66 (23 Apr 2023 10:00) (50 - 90)  BP: 149/73 (23 Apr 2023 09:00) (100/58 - 149/73)  BP(mean): 103 (23 Apr 2023 09:00) (74 - 103)  ABP: 140/64 (23 Apr 2023 10:00) (114/58 - 162/81)  ABP(mean): 92 (23 Apr 2023 10:00) (76 - 111)  RR: 14 (23 Apr 2023 10:00) (14 - 19)  SpO2: 100% (23 Apr 2023 10:00) (92% - 100%)    O2 Parameters below as of 23 Apr 2023 10:00  Patient On (Oxygen Delivery Method): ventilator    O2 Concentration (%): 40          EXAM:     Joe Coma Scale: 3/1T/6 = 10    General: normocephalic, EVD, laying in bed supine, no distress  Neuro     MS: awake, alert, cooperative with exam, follows commands, no neglect    CN: PERRL, gaze is conjugate, face symmetric, hearing grossly intact    Mot: bulk normal, (+)bilateral rigid tone, power UPPER 3 and localizes bilaterally, LOWER antigravity bilaterally  Chest: mechanically ventilated with coarse breath sounds, heart regular rate/rhythm, present S1/S2, no murmurs or rubs  Abdomen: distended, soft and nontender without peritoneal signs  Extremities: no clubbing, well-perfused, no edema    LABORATORY DATA:    ABG - ( 23 Apr 2023 05:25 )  pH, Arterial: 7.43  pH, Blood: x     /  pCO2: 34    /  pO2: 117   / HCO3: 23    / Base Excess: -1.1  /  SaO2: 99.0                                    11.7   10.20 )-----------( 137      ( 23 Apr 2023 05:24 )             36.1     04-23    148<H>  |  116<H>  |  24<H>  ----------------------------<  138<H>  4.0   |  24  |  1.04    Ca    8.2<L>      23 Apr 2023 05:24  Phos  2.9     04-23  Mg     2.4     04-23    TPro  7.3  /  Alb  3.9  /  TBili  0.4  /  DBili  x   /  AST  17  /  ALT  22  /  AlkPhos  57  04-22      MEDICATIONS  (STANDING):  amLODIPine   Tablet 5 milliGRAM(s) Oral daily  carbidopa/levodopa  25/250 1 Tablet(s) Oral <User Schedule>  chlorhexidine 0.12% Liquid 15 milliLiter(s) Oral Mucosa every 12 hours  chlorhexidine 2% Cloths 1 Application(s) Topical daily  dexAMETHasone  Injectable 4 milliGRAM(s) IV Push every 6 hours  dextrose 50% Injectable 25 Gram(s) IV Push once  enoxaparin Injectable 40 milliGRAM(s) SubCutaneous every 12 hours  entacapone 200 milliGRAM(s) Oral <User Schedule>  insulin lispro (ADMELOG) corrective regimen sliding scale   SubCutaneous three times a day before meals  levETIRAcetam  IVPB 500 milliGRAM(s) IV Intermittent every 12 hours  pantoprazole  Injectable 40 milliGRAM(s) IV Push daily  piperacillin/tazobactam IVPB.. 3.375 Gram(s) IV Intermittent every 8 hours  propofol Infusion 5 MICROgram(s)/kG/Min (3.12 mL/Hr) IV Continuous <Continuous>  senna 2 Tablet(s) Oral at bedtime  sodium chloride 3%. 500 milliLiter(s) (50 mL/Hr) IV Continuous <Continuous>    MEDICATIONS  (PRN):  acetaminophen   Oral Liquid .. 650 milliGRAM(s) Oral every 6 hours PRN Temp greater or equal to 38C (100.4F), Mild Pain (1 - 3)  fentaNYL    Injectable 25 MICROGram(s) IV Push every 2 hours PRN Severe Pain (7 - 10)  ondansetron Injectable 4 milliGRAM(s) IV Push every 6 hours PRN Nausea and/or Vomiting      ***********************************************  ASSESSMENT AND PLAN  ***********************************************      #L. frontal ICH-3 with IVH, suspected venous bleed  #History of elective L. crani for meningioma resection (4/17/23, Dr. D'Amico)  #History of seizures, Parkinson's disease, multiple falls and wheelchair bound  #Abdominal distention      NEURO  - Admit NSICU, Q1h neuro checks / Q1h vital signs  - LEV, dex  - R. frontal EVD   - Propofol, RASS 0 to -1, extubate today (sedation off)  - OR planning per Dr. D'Amico  - PD meds: sinemet, entacapone    PULM  - Pressure support, lung protective ventilation  - SpO2 goal > 92%, supplemental O2 and pulm toileting as needed    CARDIO  - BP goal: less than 160  - Amlodipine    GI  - Diet: NPO, [place] sump placed  - Stress ulcer prophylaxis: discontinue    /RENAL  - Monitor UOP/volume status, BUN/SCr  - Sodium goal 145-150, trend BMP    HEME  - Maintain Hb > 7.0, PLT > 100,000  - SCDs, SQL    ID  - Pip-tazo for empiric aspiration PNA coverage  - Monitor for infectious s/s, fever curve, leukocytosis    ENDO  - Goal euglycemia, RASSI

## 2023-04-23 NOTE — PROGRESS NOTE ADULT - SUBJECTIVE AND OBJECTIVE BOX
HPI:  Patient is a 68 year old male with PMH of seizures, HTN, and Parkinson's disease diagnosed in 2012 with multiple falls who initially presented to [facility] on 4/17 for elective left craniotomy for meningioma resection. He has also been wheelchair bound since about 2-3 years ago and has frequent falls related to his Parkinson's disease.  Underwent L crani for meningioma resection on 4/17. Hospital course was uncomplicated.   Discharged to [facility] on 4/21 in stable condition.     On arrival to rehab, pt c/o incisional headache with episodes of n/v during ambulance ride to rehab.  Pt given zofran and oxycodone, however, headache returned with persistent nausea and vomiting.   RRT called for worsened mental status. CT head obtained stat which showed new large left frontal hemorrhage with IVH, cerebral edema with midline shift and some effacement of L frontal horn.  Pt started on cardene, given Keppra and transferred to [facility]. Prior to transfer, mental status declined (GCS 13-> 9) with increased secretions, therefore attempted intubation; LMA airway placed.      ICH 3   NIHSS 23 (22 Apr 2023 06:28)    INTERVAL EVENTS:  Following commands in Hausa with son at bedside. Remains intubated, on low dose propofol. UOP decreasing.     HOSPITAL COURSE:  4/22: readmitted with new large left frontal IPH with IVH, with edema and midline shift with large left frontal IPH. EVD placed, opened at 10. CTH post EVD complete. [place] sump placed for GI decompression. Started 3%@80 for na goal 145-150. KUB showing small loops of gas, will start TF tomorrow. TTE mild aortic sclerosis w/ no stenosis, mild AR, left pleural effusion EF 71%. LE dopplers negative.  4/23: POD 6. EVD @ 10. POCUS with RLL consolidation, trace B lines, L lung clear, collapsable IVC. 250cc albumin bolus in setting of decreasing UOP.      Vital Signs Last 24 Hrs  T(C): 36.9 (23 Apr 2023 01:00), Max: 37.3 (22 Apr 2023 17:30)  T(F): 98.5 (23 Apr 2023 01:00), Max: 99.1 (22 Apr 2023 17:30)  HR: 52 (23 Apr 2023 01:01) (52 - 124)  BP: 124/65 (23 Apr 2023 01:01) (100/58 - 144/75)  BP(mean): 87 (23 Apr 2023 01:01) (74 - 101)  RR: 17 (23 Apr 2023 01:01) (12 - 18)  SpO2: 100% (23 Apr 2023 01:01) (95% - 100%)    Parameters below as of 23 Apr 2023 01:01  Patient On (Oxygen Delivery Method): ventilator    O2 Concentration (%): 40    I&O's Summary    21 Apr 2023 07:01  -  22 Apr 2023 07:00  --------------------------------------------------------  IN: 274.8 mL / OUT: 575 mL / NET: -300.2 mL    22 Apr 2023 07:01  -  23 Apr 2023 01:12  --------------------------------------------------------  IN: 2899.4 mL / OUT: 1252 mL / NET: 1647.4 mL        PHYSICAL EXAM: with son at bedside speaking Hausa  General: NAD, intubated on full vent support   Cardiovascular: regular rhythm, slow rhythm  Respiratory: nonlabored breathing, normal chest rise   GI: abdomen soft, nontender, nondistended  Neuro: intubated, sedated   Pupils 3mm briskly reactive, not opening eyes  +cough/gag  Follows some commands (lifts arms, wiggles toes)  RUE 3/5, LUE 5/5 (localizes), BLEs wiggles toes, briskly withdraws b/l  Extremities: distal pulses 2+ x4  Incision/wound: L crani incision c/d/i        LABS:                        15.2   16.55 )-----------( 184      ( 22 Apr 2023 03:46 )             44.6     04-22    142  |  111<H>  |  21  ----------------------------<  138<H>  4.0   |  24  |  0.90    Ca    8.2<L>      22 Apr 2023 20:35  Phos  2.9     04-22  Mg     2.2     04-22    TPro  7.3  /  Alb  3.9  /  TBili  0.4  /  DBili  x   /  AST  17  /  ALT  22  /  AlkPhos  57  04-22    PT/INR - ( 22 Apr 2023 03:46 )   PT: 13.0 sec;   INR: 1.09          PTT - ( 22 Apr 2023 03:46 )  PTT:27.0 sec        CAPILLARY BLOOD GLUCOSE      POCT Blood Glucose.: 119 mg/dL (22 Apr 2023 21:49)  POCT Blood Glucose.: 114 mg/dL (22 Apr 2023 17:07)  POCT Blood Glucose.: 101 mg/dL (22 Apr 2023 12:19)  POCT Blood Glucose.: 154 mg/dL (22 Apr 2023 08:02)      Drug Levels: [] N/A    CSF Analysis: [] N/A      Allergies    No Known Allergies    Intolerances      MEDICATIONS:  Antibiotics:  piperacillin/tazobactam IVPB.. 3.375 Gram(s) IV Intermittent every 8 hours    Neuro:  acetaminophen   Oral Liquid .. 650 milliGRAM(s) Oral every 6 hours PRN  carbidopa/levodopa  25/250 1 Tablet(s) Oral <User Schedule>  entacapone 200 milliGRAM(s) Oral <User Schedule>  fentaNYL    Injectable 25 MICROGram(s) IV Push every 2 hours PRN  levETIRAcetam  IVPB 1000 milliGRAM(s) IV Intermittent every 12 hours  ondansetron Injectable 4 milliGRAM(s) IV Push every 6 hours PRN  propofol Infusion 5 MICROgram(s)/kG/Min IV Continuous <Continuous>    Anticoagulation:    OTHER:  amLODIPine   Tablet 5 milliGRAM(s) Oral daily  chlorhexidine 0.12% Liquid 15 milliLiter(s) Oral Mucosa every 12 hours  chlorhexidine 2% Cloths 1 Application(s) Topical daily  dexAMETHasone  Injectable 4 milliGRAM(s) IV Push every 6 hours  dextrose 50% Injectable 25 Gram(s) IV Push once  insulin lispro (ADMELOG) corrective regimen sliding scale   SubCutaneous three times a day before meals  pantoprazole  Injectable 40 milliGRAM(s) IV Push daily  senna 2 Tablet(s) Oral at bedtime    IVF:  sodium chloride 3%. 500 milliLiter(s) IV Continuous <Continuous>    CULTURES:    RADIOLOGY & ADDITIONAL TESTS:      ASSESSMENT:  68M hx poorly controlled Parkinson's disease w/ prog inability to walk since Nov 2022. Found to have large L frontal dural based lesion c/f meningioma w/ associated mass effect and edema. S/p L crani for meningioma resection (4/17). Discharged to [facility] on 4/21. On arrival to rehab c/o headache with persistgent nausea/vomiting. CTH performed reveals new large left frontal IPH with IVH, with edema and midline shift. Intubated for airway protection. Readmitted to [facility] for further management, NIHSS 23, ICH 3.    NEURO:  - neuro/vitals q1hr  - EVD @10cm H2O, monitor ICP, outptu  - Keppra 1g BID  - Decadron 4mg q6h  - propofol gtt, fentanyl PRN   - continue home sinemet (25/250mg) 4 times/day, entacapone 200mg q8h  - hold home gabapentin 400mg 4 times/day   - repeat CTH performed on arrival, CTH post EVD stable 4/22    CARDIOVASCULAR:  - -160  - amlodipine 5mg qd  - TTE 4/22 mild aortic sclerosis w/ no stenosis, mild AR, left pleural effusion EF 71%    PULMONARY:  - intubated, AC/VC 40/450/16/8    RENAL:  - Na goal 145-150  - s/p 23.4% 4/22  - 3%@ 80  - [place] (4/22)     GI:  - NPO for now; NGT in place  - bowel regimen, last BM 4/22   - protonix 40mg qd for GI ppx while intubated/on steroids  - KUB 4/22 small loops of gas     HEME:  - SCDs for DVT ppx  - LE dopplers 4/22 negative     ID:  - leukocytosis and RLL infiltrate  - Zosyn (4/22-4/27), f/u sputum culture     ENDO:  - ISS while on steroids  - A1c 5.8    DISPO:  - NSICU, full code, family updated    Discussed with Dr. D'Amico and Dr. Grimaldo

## 2023-04-23 NOTE — PROGRESS NOTE ADULT - SUBJECTIVE AND OBJECTIVE BOX
***********************************************  ADULT NSICU PM PROGRESS NOTE  MAHDI EDGE [id number] [facility] 08EA [id number]  ***********************************************    24H INTERVAL EVENTS: no acute overnight events as of documentation time of this note.    ROS: negative except per mentioned above in 24h interval events.      VITALS:    ICU Vital Signs Last 24 Hrs  T(C): 37.2 (23 Apr 2023 21:00), Max: 37.6 (23 Apr 2023 14:00)  T(F): 98.9 (23 Apr 2023 21:00), Max: 99.7 (23 Apr 2023 14:00)  HR: 58 (23 Apr 2023 21:00) (50 - 90)  BP: 144/69 (23 Apr 2023 21:00) (107/58 - 155/75)  BP(mean): 99 (23 Apr 2023 21:00) (77 - 110)  ABP: 157/70 (23 Apr 2023 12:00) (118/60 - 158/75)  ABP(mean): 102 (23 Apr 2023 12:00) (81 - 104)  RR: 20 (23 Apr 2023 21:00) (14 - 20)  SpO2: 98% (23 Apr 2023 21:00) (92% - 100%)    O2 Parameters below as of 23 Apr 2023 21:00  Patient On (Oxygen Delivery Method): nasal cannula, high flow  O2 Flow (L/min): 40  O2 Concentration (%): 40          EXAM:     [place] Coma Scale: 3/4/6 = 13    General: normocephalic, EVD, laying in bed supine, no distress  Neuro     MS: awake, alert, cooperative with exam, follows commands, no neglect    CN: PERRL, gaze is conjugate, face symmetric, hearing grossly intact    Mot: bulk normal, (+)bilateral rigid tone, power UPPER 4/4 proximally and distally, LOWER antigravity bilaterally  Chest: lungs are CTA, heart regular rate/rhythm, present S1/S2, no murmurs or rubs  Abdomen: distended, soft and nontender without peritoneal signs  Extremities: no clubbing, well-perfused, no edema    LABORATORY DATA:    ABG - ( 23 Apr 2023 05:25 )  pH, Arterial: 7.43  pH, Blood: x     /  pCO2: 34    /  pO2: 117   / HCO3: 23    / Base Excess: -1.1  /  SaO2: 99.0                                    11.7   10.20 )-----------( 137      ( 23 Apr 2023 05:24 )             36.1     04-23    148<H>  |  117<H>  |  24<H>  ----------------------------<  125<H>  4.2   |  25  |  1.02    Ca    8.5      23 Apr 2023 19:45  Phos  2.9     04-23  Mg     2.4     04-23    TPro  7.3  /  Alb  3.9  /  TBili  0.4  /  DBili  x   /  AST  17  /  ALT  22  /  AlkPhos  57  04-22      MEDICATIONS  (STANDING):  acetylcysteine 20%  Inhalation 4 milliLiter(s) Inhalation every 6 hours  albuterol/ipratropium for Nebulization 3 milliLiter(s) Nebulizer every 6 hours  amLODIPine   Tablet 5 milliGRAM(s) Oral daily  carbidopa/levodopa  25/250 1 Tablet(s) Oral <User Schedule>  chlorhexidine 2% Cloths 1 Application(s) Topical daily  dexAMETHasone  Injectable   IV Push   dexAMETHasone  Injectable 4 milliGRAM(s) IV Push every 6 hours  dextrose 50% Injectable 25 Gram(s) IV Push once  enoxaparin Injectable 40 milliGRAM(s) SubCutaneous every 12 hours  entacapone 200 milliGRAM(s) Oral <User Schedule>  insulin lispro (ADMELOG) corrective regimen sliding scale   SubCutaneous three times a day before meals  levETIRAcetam  IVPB 1000 milliGRAM(s) IV Intermittent every 12 hours  pantoprazole  Injectable 40 milliGRAM(s) IV Push daily  piperacillin/tazobactam IVPB.. 3.375 Gram(s) IV Intermittent every 8 hours  senna 2 Tablet(s) Oral at bedtime  sodium chloride 0.9%. 1000 milliLiter(s) (50 mL/Hr) IV Continuous <Continuous>  sodium chloride 3%. 500 milliLiter(s) (30 mL/Hr) IV Continuous <Continuous>    MEDICATIONS  (PRN):  acetaminophen   Oral Liquid .. 650 milliGRAM(s) Oral every 6 hours PRN Temp greater or equal to 38C (100.4F), Mild Pain (1 - 3)  fentaNYL    Injectable 25 MICROGram(s) IV Push every 2 hours PRN Severe Pain (7 - 10)  ondansetron Injectable 4 milliGRAM(s) IV Push every 6 hours PRN Nausea and/or Vomiting        ***********************************************  ASSESSMENT AND PLAN  ***********************************************      #L. frontal ICH-3 with IVH, suspected venous bleed  #History of elective L. crani for meningioma resection (4/17/23, Dr. D'Amico)  #History of seizures, Parkinson's disease, multiple falls and wheelchair bound  #Abdominal distention      NEURO  - Admit NSICU, Q1h neuro checks / Q1h vital signs  - LEV, dex  - R. frontal EVD   - Extubated 4/23  - no plans for OR at this time per Dr. D'Amico  - PD meds: sinemet, entacapone    PULM  - SpO2 goal > 92%, supplemental O2 and pulm toileting as needed    CARDIO  - BP goal: less than 160  - Amlodipine    GI  - Diet: NPO, [place] sump placed, will restart TFs tomorrow  - Stress ulcer prophylaxis: discontinue    /RENAL  - Monitor UOP/volume status, BUN/SCr  - Sodium goal 145-150, trend BMP    HEME  - Maintain Hb > 7.0, PLT > 100,000  - SCDs, SQL    ID  - Pip-tazo for empiric aspiration PNA coverage  - Monitor for infectious s/s, fever curve, leukocytosis    ENDO  - Goal euglycemia, RASSI

## 2023-04-23 NOTE — PATIENT PROFILE ADULT - FALL HARM RISK - RISK INTERVENTIONS

## 2023-04-24 ENCOUNTER — NON-APPOINTMENT (OUTPATIENT)
Age: 68
End: 2023-04-24

## 2023-04-24 DIAGNOSIS — G93.6 CEREBRAL EDEMA: ICD-10-CM

## 2023-04-24 DIAGNOSIS — I12.9 HYPERTENSIVE CHRONIC KIDNEY DISEASE WITH STAGE 1 THROUGH STAGE 4 CHRONIC KIDNEY DISEASE, OR UNSPECIFIED CHRONIC KIDNEY DISEASE: ICD-10-CM

## 2023-04-24 DIAGNOSIS — N18.9 CHRONIC KIDNEY DISEASE, UNSPECIFIED: ICD-10-CM

## 2023-04-24 DIAGNOSIS — G20 PARKINSON'S DISEASE: ICD-10-CM

## 2023-04-24 DIAGNOSIS — Z99.3 DEPENDENCE ON WHEELCHAIR: ICD-10-CM

## 2023-04-24 DIAGNOSIS — G40.909 EPILEPSY, UNSPECIFIED, NOT INTRACTABLE, WITHOUT STATUS EPILEPTICUS: ICD-10-CM

## 2023-04-24 DIAGNOSIS — D32.0 BENIGN NEOPLASM OF CEREBRAL MENINGES: ICD-10-CM

## 2023-04-24 DIAGNOSIS — D32.9 BENIGN NEOPLASM OF MENINGES, UNSPECIFIED: ICD-10-CM

## 2023-04-24 DIAGNOSIS — G93.5 COMPRESSION OF BRAIN: ICD-10-CM

## 2023-04-24 DIAGNOSIS — I47.20 VENTRICULAR TACHYCARDIA, UNSPECIFIED: ICD-10-CM

## 2023-04-24 LAB
ALBUMIN SERPL ELPH-MCNC: 2.4 G/DL — LOW (ref 3.3–5)
ANION GAP SERPL CALC-SCNC: 19 MMOL/L — HIGH (ref 5–17)
ANION GAP SERPL CALC-SCNC: 7 MMOL/L — SIGNIFICANT CHANGE UP (ref 5–17)
BUN SERPL-MCNC: 22 MG/DL — SIGNIFICANT CHANGE UP (ref 7–23)
BUN SERPL-MCNC: 26 MG/DL — HIGH (ref 7–23)
CALCIUM SERPL-MCNC: 6.8 MG/DL — LOW (ref 8.4–10.5)
CALCIUM SERPL-MCNC: 8.7 MG/DL — SIGNIFICANT CHANGE UP (ref 8.4–10.5)
CHLORIDE SERPL-SCNC: 112 MMOL/L — HIGH (ref 96–108)
CHLORIDE SERPL-SCNC: 117 MMOL/L — HIGH (ref 96–108)
CO2 SERPL-SCNC: 19 MMOL/L — LOW (ref 22–31)
CO2 SERPL-SCNC: 25 MMOL/L — SIGNIFICANT CHANGE UP (ref 22–31)
CREAT SERPL-MCNC: 0.91 MG/DL — SIGNIFICANT CHANGE UP (ref 0.5–1.3)
CREAT SERPL-MCNC: 1.01 MG/DL — SIGNIFICANT CHANGE UP (ref 0.5–1.3)
CULTURE RESULTS: SIGNIFICANT CHANGE UP
EGFR: 80 ML/MIN/1.73M2 — SIGNIFICANT CHANGE UP
EGFR: 91 ML/MIN/1.73M2 — SIGNIFICANT CHANGE UP
GLUCOSE BLDC GLUCOMTR-MCNC: 111 MG/DL — HIGH (ref 70–99)
GLUCOSE BLDC GLUCOMTR-MCNC: 137 MG/DL — HIGH (ref 70–99)
GLUCOSE BLDC GLUCOMTR-MCNC: 98 MG/DL — SIGNIFICANT CHANGE UP (ref 70–99)
GLUCOSE BLDC GLUCOMTR-MCNC: 99 MG/DL — SIGNIFICANT CHANGE UP (ref 70–99)
GLUCOSE SERPL-MCNC: 111 MG/DL — HIGH (ref 70–99)
GLUCOSE SERPL-MCNC: 133 MG/DL — HIGH (ref 70–99)
HCT VFR BLD CALC: 34.9 % — LOW (ref 39–50)
HGB BLD-MCNC: 11.1 G/DL — LOW (ref 13–17)
MAGNESIUM SERPL-MCNC: 2.1 MG/DL — SIGNIFICANT CHANGE UP (ref 1.6–2.6)
MCHC RBC-ENTMCNC: 31.4 PG — SIGNIFICANT CHANGE UP (ref 27–34)
MCHC RBC-ENTMCNC: 31.8 GM/DL — LOW (ref 32–36)
MCV RBC AUTO: 98.9 FL — SIGNIFICANT CHANGE UP (ref 80–100)
NRBC # BLD: 0 /100 WBCS — SIGNIFICANT CHANGE UP (ref 0–0)
PHOSPHATE SERPL-MCNC: 2.2 MG/DL — LOW (ref 2.5–4.5)
PLATELET # BLD AUTO: 126 K/UL — LOW (ref 150–400)
POTASSIUM SERPL-MCNC: 3.6 MMOL/L — SIGNIFICANT CHANGE UP (ref 3.5–5.3)
POTASSIUM SERPL-MCNC: 4.2 MMOL/L — SIGNIFICANT CHANGE UP (ref 3.5–5.3)
POTASSIUM SERPL-SCNC: 3.6 MMOL/L — SIGNIFICANT CHANGE UP (ref 3.5–5.3)
POTASSIUM SERPL-SCNC: 4.2 MMOL/L — SIGNIFICANT CHANGE UP (ref 3.5–5.3)
RBC # BLD: 3.53 M/UL — LOW (ref 4.2–5.8)
RBC # FLD: 15.2 % — HIGH (ref 10.3–14.5)
SODIUM SERPL-SCNC: 144 MMOL/L — SIGNIFICANT CHANGE UP (ref 135–145)
SODIUM SERPL-SCNC: 155 MMOL/L — HIGH (ref 135–145)
SPECIMEN SOURCE: SIGNIFICANT CHANGE UP
WBC # BLD: 10.72 K/UL — HIGH (ref 3.8–10.5)
WBC # FLD AUTO: 10.72 K/UL — HIGH (ref 3.8–10.5)

## 2023-04-24 PROCEDURE — 99292 CRITICAL CARE ADDL 30 MIN: CPT | Mod: 24

## 2023-04-24 PROCEDURE — 99024 POSTOP FOLLOW-UP VISIT: CPT

## 2023-04-24 PROCEDURE — 70450 CT HEAD/BRAIN W/O DYE: CPT | Mod: 26

## 2023-04-24 PROCEDURE — 71045 X-RAY EXAM CHEST 1 VIEW: CPT | Mod: 26

## 2023-04-24 PROCEDURE — 99291 CRITICAL CARE FIRST HOUR: CPT

## 2023-04-24 RX ORDER — ENOXAPARIN SODIUM 100 MG/ML
40 INJECTION SUBCUTANEOUS
Refills: 0 | Status: DISCONTINUED | OUTPATIENT
Start: 2023-04-24 | End: 2023-04-30

## 2023-04-24 RX ORDER — LEVETIRACETAM 250 MG/1
500 TABLET, FILM COATED ORAL EVERY 12 HOURS
Refills: 0 | Status: DISCONTINUED | OUTPATIENT
Start: 2023-04-24 | End: 2023-04-24

## 2023-04-24 RX ORDER — POTASSIUM CHLORIDE 20 MEQ
40 PACKET (EA) ORAL ONCE
Refills: 0 | Status: COMPLETED | OUTPATIENT
Start: 2023-04-24 | End: 2023-04-24

## 2023-04-24 RX ORDER — SODIUM CHLORIDE 9 MG/ML
1000 INJECTION INTRAMUSCULAR; INTRAVENOUS; SUBCUTANEOUS
Refills: 0 | Status: DISCONTINUED | OUTPATIENT
Start: 2023-04-24 | End: 2023-04-24

## 2023-04-24 RX ORDER — SODIUM CHLORIDE 5 G/100ML
500 INJECTION, SOLUTION INTRAVENOUS
Refills: 0 | Status: DISCONTINUED | OUTPATIENT
Start: 2023-04-24 | End: 2023-04-25

## 2023-04-24 RX ORDER — LEVETIRACETAM 250 MG/1
1000 TABLET, FILM COATED ORAL EVERY 12 HOURS
Refills: 0 | Status: DISCONTINUED | OUTPATIENT
Start: 2023-04-25 | End: 2023-04-26

## 2023-04-24 RX ORDER — HYDRALAZINE HCL 50 MG
10 TABLET ORAL EVERY 4 HOURS
Refills: 0 | Status: DISCONTINUED | OUTPATIENT
Start: 2023-04-24 | End: 2023-04-26

## 2023-04-24 RX ORDER — TRAMADOL HYDROCHLORIDE 50 MG/1
25 TABLET ORAL ONCE
Refills: 0 | Status: DISCONTINUED | OUTPATIENT
Start: 2023-04-24 | End: 2023-04-24

## 2023-04-24 RX ORDER — LEVETIRACETAM 250 MG/1
1000 TABLET, FILM COATED ORAL EVERY 12 HOURS
Refills: 0 | Status: DISCONTINUED | OUTPATIENT
Start: 2023-04-24 | End: 2023-04-24

## 2023-04-24 RX ORDER — HYDRALAZINE HCL 50 MG
10 TABLET ORAL ONCE
Refills: 0 | Status: COMPLETED | OUTPATIENT
Start: 2023-04-24 | End: 2023-04-24

## 2023-04-24 RX ORDER — SODIUM CHLORIDE 5 G/100ML
500 INJECTION, SOLUTION INTRAVENOUS
Refills: 0 | Status: DISCONTINUED | OUTPATIENT
Start: 2023-04-24 | End: 2023-04-24

## 2023-04-24 RX ORDER — SODIUM CHLORIDE 5 G/100ML
250 INJECTION, SOLUTION INTRAVENOUS ONCE
Refills: 0 | Status: DISCONTINUED | OUTPATIENT
Start: 2023-04-24 | End: 2023-04-24

## 2023-04-24 RX ORDER — POTASSIUM PHOSPHATE, MONOBASIC POTASSIUM PHOSPHATE, DIBASIC 236; 224 MG/ML; MG/ML
15 INJECTION, SOLUTION INTRAVENOUS ONCE
Refills: 0 | Status: COMPLETED | OUTPATIENT
Start: 2023-04-24 | End: 2023-04-24

## 2023-04-24 RX ORDER — LEVETIRACETAM 250 MG/1
500 TABLET, FILM COATED ORAL ONCE
Refills: 0 | Status: COMPLETED | OUTPATIENT
Start: 2023-04-24 | End: 2023-04-24

## 2023-04-24 RX ADMIN — Medication 4 MILLILITER(S): at 05:41

## 2023-04-24 RX ADMIN — TRAMADOL HYDROCHLORIDE 25 MILLIGRAM(S): 50 TABLET ORAL at 11:00

## 2023-04-24 RX ADMIN — Medication 4 MILLIGRAM(S): at 21:51

## 2023-04-24 RX ADMIN — PIPERACILLIN AND TAZOBACTAM 25 GRAM(S): 4; .5 INJECTION, POWDER, LYOPHILIZED, FOR SOLUTION INTRAVENOUS at 15:26

## 2023-04-24 RX ADMIN — Medication 4 MILLILITER(S): at 18:08

## 2023-04-24 RX ADMIN — SENNA PLUS 2 TABLET(S): 8.6 TABLET ORAL at 21:53

## 2023-04-24 RX ADMIN — LEVETIRACETAM 400 MILLIGRAM(S): 250 TABLET, FILM COATED ORAL at 18:29

## 2023-04-24 RX ADMIN — Medication 3 MILLILITER(S): at 05:40

## 2023-04-24 RX ADMIN — CHLORHEXIDINE GLUCONATE 1 APPLICATION(S): 213 SOLUTION TOPICAL at 05:35

## 2023-04-24 RX ADMIN — Medication 650 MILLIGRAM(S): at 08:19

## 2023-04-24 RX ADMIN — CARBIDOPA AND LEVODOPA 1 TABLET(S): 25; 100 TABLET ORAL at 19:48

## 2023-04-24 RX ADMIN — Medication 650 MILLIGRAM(S): at 17:07

## 2023-04-24 RX ADMIN — SODIUM CHLORIDE 75 MILLILITER(S): 5 INJECTION, SOLUTION INTRAVENOUS at 22:35

## 2023-04-24 RX ADMIN — Medication 3 MILLILITER(S): at 18:07

## 2023-04-24 RX ADMIN — CARBIDOPA AND LEVODOPA 1 TABLET(S): 25; 100 TABLET ORAL at 15:26

## 2023-04-24 RX ADMIN — Medication 650 MILLIGRAM(S): at 17:50

## 2023-04-24 RX ADMIN — Medication 40 MILLIEQUIVALENT(S): at 05:32

## 2023-04-24 RX ADMIN — CARBIDOPA AND LEVODOPA 1 TABLET(S): 25; 100 TABLET ORAL at 07:54

## 2023-04-24 RX ADMIN — Medication 1 DROP(S): at 17:08

## 2023-04-24 RX ADMIN — Medication 10 MILLIGRAM(S): at 05:51

## 2023-04-24 RX ADMIN — Medication 3 MILLILITER(S): at 21:08

## 2023-04-24 RX ADMIN — AMLODIPINE BESYLATE 5 MILLIGRAM(S): 2.5 TABLET ORAL at 05:35

## 2023-04-24 RX ADMIN — CARBIDOPA AND LEVODOPA 1 TABLET(S): 25; 100 TABLET ORAL at 11:44

## 2023-04-24 RX ADMIN — LEVETIRACETAM 400 MILLIGRAM(S): 250 TABLET, FILM COATED ORAL at 05:30

## 2023-04-24 RX ADMIN — Medication 4 MILLILITER(S): at 11:04

## 2023-04-24 RX ADMIN — TRAMADOL HYDROCHLORIDE 25 MILLIGRAM(S): 50 TABLET ORAL at 10:20

## 2023-04-24 RX ADMIN — Medication 3 MILLILITER(S): at 11:04

## 2023-04-24 RX ADMIN — Medication 4 MILLIGRAM(S): at 05:31

## 2023-04-24 RX ADMIN — Medication 4 MILLIGRAM(S): at 11:42

## 2023-04-24 RX ADMIN — ENOXAPARIN SODIUM 40 MILLIGRAM(S): 100 INJECTION SUBCUTANEOUS at 21:52

## 2023-04-24 RX ADMIN — Medication 650 MILLIGRAM(S): at 08:13

## 2023-04-24 RX ADMIN — SODIUM CHLORIDE 50 MILLILITER(S): 9 INJECTION INTRAMUSCULAR; INTRAVENOUS; SUBCUTANEOUS at 15:29

## 2023-04-24 RX ADMIN — ENTACAPONE 200 MILLIGRAM(S): 200 TABLET, FILM COATED ORAL at 11:42

## 2023-04-24 RX ADMIN — PIPERACILLIN AND TAZOBACTAM 25 GRAM(S): 4; .5 INJECTION, POWDER, LYOPHILIZED, FOR SOLUTION INTRAVENOUS at 07:08

## 2023-04-24 RX ADMIN — LEVETIRACETAM 400 MILLIGRAM(S): 250 TABLET, FILM COATED ORAL at 17:07

## 2023-04-24 RX ADMIN — Medication 4 MILLILITER(S): at 21:09

## 2023-04-24 RX ADMIN — PANTOPRAZOLE SODIUM 40 MILLIGRAM(S): 20 TABLET, DELAYED RELEASE ORAL at 11:43

## 2023-04-24 RX ADMIN — Medication 1 DROP(S): at 21:56

## 2023-04-24 RX ADMIN — Medication 1 DROP(S): at 13:20

## 2023-04-24 RX ADMIN — POTASSIUM PHOSPHATE, MONOBASIC POTASSIUM PHOSPHATE, DIBASIC 62.5 MILLIMOLE(S): 236; 224 INJECTION, SOLUTION INTRAVENOUS at 07:15

## 2023-04-24 RX ADMIN — ENTACAPONE 200 MILLIGRAM(S): 200 TABLET, FILM COATED ORAL at 15:29

## 2023-04-24 RX ADMIN — ENTACAPONE 200 MILLIGRAM(S): 200 TABLET, FILM COATED ORAL at 07:54

## 2023-04-24 NOTE — PHYSICAL THERAPY INITIAL EVALUATION ADULT - LEVEL OF INDEPENDENCE: SIT/STAND, REHAB EVAL
Therapist deferred 2/2 patient with reports of increased dizziness while dangling EOB ~10 minutes (elevated MAP noted as well); MD Grimaldo (NSICU) at bedside and made aware; patient's symptoms resolved upon semi-supine rest with decreased BP and MAP noted

## 2023-04-24 NOTE — DIETITIAN INITIAL EVALUATION ADULT - OTHER CALCULATIONS
IBW used for calculations as pt >120% of IBW (157%). Needs adjusted for advanced age, obesity, and wound healing recent post-op

## 2023-04-24 NOTE — DIETITIAN INITIAL EVALUATION ADULT - PERTINENT LABORATORY DATA
04-24    155<H>  |  117<H>  |  22  ----------------------------<  111<H>  3.6   |  19<L>  |  0.91    Ca    6.8<L>      24 Apr 2023 02:35  Phos  2.2     04-24  Mg     2.1     04-24    TPro  x   /  Alb  2.4<L>  /  TBili  x   /  DBili  x   /  AST  x   /  ALT  x   /  AlkPhos  x   04-24  POCT Blood Glucose.: 99 mg/dL (04-24-23 @ 06:02)  A1C with Estimated Average Glucose Result: 5.8 % (04-23-23 @ 05:24)  A1C with Estimated Average Glucose Result: 5.8 % (04-18-23 @ 05:30)

## 2023-04-24 NOTE — SWALLOW BEDSIDE ASSESSMENT ADULT - SLP PERTINENT HISTORY OF CURRENT PROBLEM
Parkinson's since 2012, multiple falls, s/p L craniotomy for meningioma resection, large L frontal hemorrhage while on way to rehab with IVH, cerebral edema and midline shift and effacement of L frontal horn; ICH 3; NIHSS 23, s/p intubation

## 2023-04-24 NOTE — PROGRESS NOTE ADULT - SUBJECTIVE AND OBJECTIVE BOX
{\rtf1\asuxhq34538\ansi\pvltjuw2333\ftnbj\uc1\deff0  {\fonttbl{\f0 \fnil Segoe UI;}{\f1 \fnil \fcharset0 Segoe UI;}{\f2 \fnil Times New Chon;}}  {\colortbl ;\eng291\ptsdn259\phbc861 ;\red0\green0\blue0 ;\red0\green0\utzf875 ;\red0\green0\blue0 ;}  {\stylesheet{\f0\fs20 Normal;}{\cs1 Default Paragraph Font;}{\cs2\f0\fs16 Line Number;}{\cs3\f2\fs24\ul\cf3 Hyperlink;}}  {\*\revtbl{Unknown;}}  \mzomvo56366\rpsxjn17660\owkzn4992\mxtqw4060\iemok5163\kskam1990\cuhjrmv616\fwbkxqf516\nogrowautofit\crqjtr146\formshade\nofeaturethrottle1\dntblnsbdb\fet4\aendnotes\aftnnrlc\pgbrdrhead\pgbrdrfoot  \sectd\cqnvob49717\qlbuuz67158\guttersxn0\zzqaivjf3380\biocfgmr7322\ungbfjqh3884\yxvjfqfw5762\imgcvqi775\powgfxm608\sbkpage\pgncont\pgndec  \plain\plain\f0\fs24\pard\plain\f0\fs24\plain\f0\fs20\yneq0607\hich\f0\dbch\f0\loch\f0\fs20\par  Physical Medicine and Rehabilitation Progress Note :   \par  \par  Patient is a 68y old  Male who presents with a chief complaint of D32.9\par  \par  INTRACRANIAL HEMORRHAGE\par  \par   (24 Apr 2023 11:06)\par  \par  \par  HPI:\par  Patient is a 68 year old male with PMH of seizures, HTN, and Parkinson's disease diagnosed in 2012 with multiple falls who initially presented to [facility] on 4/17 for elective left craniotomy for meningioma resection. He has also been wheelchair bound since   about 2-3 years ago and has frequent falls related to his Parkinson's disease.  Underwent L crani for meningioma resection on 4/17. Hospital course was uncomplicated. \par  Discharged to [facility] on 4/21 in stable condition. \par  \par  On arrival to rehab, pt c/o incisional headache with episodes of n/v during ambulance ride to rehab.\par  Pt given zofran and oxycodone, however, headache returned with persistent nausea and vomiting. \par  RRT called for worsened mental status. CT head obtained stat which showed new large left frontal hemorrhage with IVH, cerebral edema with midline shift and some effacement of L frontal horn.\par  Pt started on cardene, given Keppra and transferred to [facility]. Prior to transfer, mental status declined (GCS 13-> 9) with increased secretions, therefore attempted intubation; LMA airway placed.  \par  \par  ICH 3 \par  NIHSS 23 (22 Apr 2023 06:28)\par  \par  \par           \par             11.1 \par  10.72 )-----------( 126      ( 24 Apr 2023 02:35 )\par             34.9 \par  \par  \par  04-24\par  \par  155<H>  |  117<H>  |  22\par  ----------------------------<  111<H>\par  3.6   |  19<L>  |  0.91\par  \par  Ca    6.8<L>      24 Apr 2023 02:35\par  Phos  2.2     04-24\par  Mg     2.1     04-24\par  \par  TPro  x   /  Alb  2.4<L>  /  TBili  x   /  DBili  x   /  AST  x   /  ALT  x   /  AlkPhos  x   04-24\par  \par  Vital Signs Last 24 Hrs\par  T(C): 37.3 (24 Apr 2023 09:20), Max: 37.6 (23 Apr 2023 14:00)\par  T(F): 99.1 (24 Apr 2023 09:20), Max: 99.7 (23 Apr 2023 14:00)\par  HR: 58 (24 Apr 2023 12:00) (45 - 84)\par  BP: 147/70 (24 Apr 2023 12:00) (127/72 - 164/70)\par  BP(mean): 101 (24 Apr 2023 12:00) (85 - 112)\par  RR: 17 (24 Apr 2023 12:00) (16 - 22)\par  SpO2: 99% (24 Apr 2023 12:00) (98% - 100%)\par  \par  Parameters below as of 24 Apr 2023 12:00\par  \par  O2 Flow (L/min): 40\par  O2 Concentration (%): 40\par  \par  MEDICATIONS  (STANDING):\par  acetylcysteine 20%  Inhalation 4 milliLiter(s) Inhalation every 6 hours\par  albuterol/ipratropium for Nebulization 3 milliLiter(s) Nebulizer every 6 hours\par  amLODIPine   Tablet 5 milliGRAM(s) Oral daily\par  artificial  tears Solution 1 Drop(s) Both EYES every 4 hours\par  carbidopa/levodopa  25/250 1 Tablet(s) Oral <User Schedule>\par  chlorhexidine 2% Cloths 1 Application(s) Topical daily\par  dexAMETHasone  Injectable 4 milliGRAM(s) IV Push every 8 hours\par  dexAMETHasone  Injectable   IV Push \par  dextrose 50% Injectable 25 Gram(s) IV Push once\par  enoxaparin Injectable 40 milliGRAM(s) SubCutaneous <User Schedule>\par  entacapone 200 milliGRAM(s) Oral <User Schedule>\par  insulin lispro (ADMELOG) corrective regimen sliding scale   SubCutaneous every 6 hours\par  levETIRAcetam  IVPB 500 milliGRAM(s) IV Intermittent every 12 hours\par  pantoprazole  Injectable 40 milliGRAM(s) IV Push daily\par  piperacillin/tazobactam IVPB.. 3.375 Gram(s) IV Intermittent every 8 hours\par  senna 2 Tablet(s) Oral at bedtime\par  sodium chloride 0.9%. 1000 milliLiter(s) (80 mL/Hr) IV Continuous <Continuous>\par  \par  MEDICATIONS  (PRN):\par  acetaminophen   Oral Liquid .. 650 milliGRAM(s) Oral every 6 hours PRN Temp greater or equal to 38C (100.4F), Mild Pain (1 - 3)\par  hydrALAZINE Injectable 10 milliGRAM(s) IV Push every 4 hours PRN SBP >160\par  ondansetron Injectable 4 milliGRAM(s) IV Push every 6 hours PRN Nausea and/or Vomiting\par  \par  \par    Initial Functional Status Assessment :   \par  \par  \ql\plain\f0\fs24{\*\bkmkstart [id number]}{\*\bkmkend [id number]}\plain\f1\fs20\syoz2979\hich\f1\dbch\f1\loch\f1\cf2\fs20\ul Previous Level of Function:\plain\f0\fs20\tioy9134\hich\f0\dbch\f0\loch\f0\fs20  \par  \par  \trowd\tqnial50\lastrow\dywvyvs85\trpaddfl3\odcuxpw72\trpaddfr3\trpaddt0\trpaddft3\trpaddb0\trpaddfb3\trleft0  \clvertalt\tbofve89\clpadft3\gurnme17\clpadfr3\clpadl0\clpadfl3\clpadb0\clpadfb3\ezlzg0998  \clvertalt\xdpadr68\clpadft3\gwevrg41\clpadfr3\clpadl0\clpadfl3\clpadb0\clpadfb3\juktz5282  \pard\intbl\ssparaaux0\s0\fi-120\li120\ql\plain\f0\fs24{\*\bkmkstart [id number]}{\*\bkmkend [id number]}\plain\f0\fs20\setp9028\hich\f0\dbch\f0\loch\f0\fs20 \'b7 Additional Comments\cell  \pard\intbl\ssparaaux0\s0\ql\plain\f0\fs24\plain\f0\fs20\kvwc3429\hich\f0\dbch\f0\loch\f0\fs20 Patient recently discharged to [facility] from [facility] and last PT session on 4/20/23 at [facility], patient demo ambulating very short distances (~5 feet x 2) with two   person assist. Prior to last admission, patient was ambulatory with straight cane although owns wheelchair (for doctor appointments) and hospital bed. Son reported multiple falls per patient's son. Patient is (R)hand dominant.\cell  \intbl\row  \pard\ssparaaux0\s0\ql\plain\f0\fs24\plain\f0\fs20\iajh4206\hich\f0\dbch\f0\loch\f0\fs20\par  {\*\bkmkstart [id number]}{\*\bkmkend [id number]}\plain\f1\fs20\qvff5113\hich\f1\dbch\f1\loch\f1\cf2\fs20\ul Cognitive Status Examination:\plain\f0\fs20\ymvu0202\hich\f0\dbch\f0\loch\f0\fs20  \par  \trowd\fighmc93\tgkppkc35\trpaddfl3\vbkfnvd94\trpaddfr3\trpaddt0\trpaddft3\trpaddb0\trpaddfb3\trleft0  \clvertalt\xtzonu58\clpadft3\wmvtnf01\clpadfr3\clpadl0\clpadfl3\clpadb0\clpadfb3\hszgs5786  \clvertalt\shvbtb60\clpadft3\olroqd27\clpadfr3\clpadl0\clpadfl3\clpadb0\clpadfb3\wmdyf5100  \pard\intbl\ssparaaux0\s0\fi-120\li120\ql\plain\f0\fs24{\*\bkmkstart [id number]}{\*\bkmkend ff7261832943}\plain\f0\fs20\vgvr8814\hich\f0\dbch\f0\loch\f0\fs20 \'b7 Orientation\cell  \pard\intbl\ssparaaux0\s0\ql\plain\f0\fs24\plain\f0\fs20\aojy8900\hich\f0\dbch\f0\loch\f0\fs20 person; place; correctly states 'hospital' (re-oriented to [facility]); unable to provide correct time (re-oriented to April 24, 2023)\cell  \intbl\row  \pard\intbl\ssparaaux0\s0\fi-120\li120\ql\plain\f0\fs24{\*\bkmkstart [id number]}{\*\bkmkend if8415225223}\plain\f0\fs20\qrqs9822\hich\f0\dbch\f0\loch\f0\fs20 \'b7 Level of Consciousness\cell  \pard\intbl\ssparaaux0\s0\ql\plain\f0\fs24\plain\f0\fs20\dmhc8426\hich\f0\dbch\f0\loch\f0\fs20 alert\cell  \intbl\row  \pard\intbl\ssparaaux0\s0\fi-120\li120\ql\plain\f0\fs24{\*\bkmkstart [id number]}{\*\bkmkend ih5028681343}\plain\f0\fs20\eaqb9929\hich\f0\dbch\f0\loch\f0\fs20 \'b7 Follows Commands and Answers Questions\cell  \pard\intbl\ssparaaux0\s0\ql\plain\f0\fs24\plain\f0\fs20\rhug0364\hich\f0\dbch\f0\loch\f0\fs20 100% of the time; able to follow single-step instructions\cell  \intbl\row  \trowd\wtcori83\lastrow\pblnjwg38\trpaddfl3\byifznu21\trpaddfr3\trpaddt0\trpaddft3\trpaddb0\trpaddfb3\trleft0  \clvertalt\eblhmi78\clpadft3\nsrojt11\clpadfr3\clpadl0\clpadfl3\clpadb0\clpadfb3\mhwzm0646  \clvertalt\eyjwpt80\clpadft3\levxet36\clpadfr3\clpadl0\clpadfl3\clpadb0\clpadfb3\etgsx4587  \pard\intbl\ssparaaux0\s0\fi-120\li120\ql\plain\f0\fs24{\*\bkmkstart [id number]}{\*\bkmkend [id number]}\plain\f0\fs20\qsxe6661\hich\f0\dbch\f0\loch\f0\fs20 \'b7 Personal Safety and Judgment\cell  \pard\intbl\ssparaaux0\s0\ql\plain\f0\fs24\plain\f0\fs20\ddpr6985\hich\f0\dbch\f0\loch\f0\fs20 intact\cell  \intbl\row  \pard\ssparaaux0\s0\ql\plain\f0\fs24\plain\f0\fs20\lbcl9104\hich\f0\dbch\f0\loch\f0\fs20\par  {\*\bkmkstart cb18652657409}{\*\bkmkend [id number]}\plain\f1\fs20\kqxj6848\hich\f1\dbch\f1\loch\f1\cf2\fs20\ul Peripheral Neurovascular:\plain\f0\fs20\qjpb6091\hich\f0\dbch\f0\loch\f0\fs20  \par  \par  \plain\f1\fs20\ldxt0406\hich\f1\dbch\f1\loch\f1\cf2\fs20\ul Neurovascular Assessment:\plain\f0\fs20\ayoc0126\hich\f0\dbch\f0\loch\f0\fs20  \par  \trowd\rzlvtl59\cwclkge11\trpaddfl3\ecnurck57\trpaddfr3\trpaddt0\trpaddft3\trpaddb0\trpaddfb3\trleft0  \clvertalt\tendbb79\clpadft3\rxldoe71\clpadfr3\clpadl0\clpadfl3\clpadb0\clpadfb3\tpgsi3673  \clvertalt\ylmjsq89\clpadft3\xfxrml38\clpadfr3\clpadl0\clpadfl3\clpadb0\clpadfb3\xsjpt7975  \pard\intbl\ssparaaux0\s0\fi-120\li120\ql\plain\f0\fs24{\*\bkmkstart [id number]}{\*\bkmkend [id number]}\plain\f0\fs20\lzrz4057\hich\f0\dbch\f0\loch\f0\fs20 \'b7 LLE\cell  \pard\intbl\ssparaaux0\s0\ql\plain\f0\fs24\plain\f0\fs20\mdev3802\hich\f0\dbch\f0\loch\f0\fs20 warm; no discoloration; no numbness; no tingling\cell  \intbl\row  \trowd\otumjq12\lastrow\dlxogua93\trpaddfl3\yoqmulr03\trpaddfr3\trpaddt0\trpaddft3\trpaddb0\trpaddfb3\trleft0  \clvertalt\xtahll19\clpadft3\ivzlqj43\clpadfr3\clpadl0\clpadfl3\clpadb0\clpadfb3\hrfmq0082  \clvertalt\iywnqo60\clpadft3\jluuxy78\clpadfr3\clpadl0\clpadfl3\clpadb0\clpadfb3\epdhb1061  \pard\intbl\ssparaaux0\s0\fi-120\li120\ql\plain\f0\fs24{\*\bkmkstart [id number]}{\*\bkmkend xx22662090513}\plain\f0\fs20\simo6508\hich\f0\dbch\f0\loch\f0\fs20 \'b7 RLE\cell  \pard\intbl\ssparaaux0\s0\ql\plain\f0\fs24\plain\f0\fs20\btdm2562\hich\f0\dbch\f0\loch\f0\fs20 warm; no discoloration; no numbness; no tingling\cell  \intbl\row  \pard\ssparaaux0\s0\ql\plain\f0\fs24\plain\f0\fs20\pjaq8082\hich\f0\dbch\f0\loch\f0\fs20\par  {\*\bkmkstart tt6074377287}{\*\bkmkend wn5070756387}\plain\f1\fs20\mbmz6092\hich\f1\dbch\f1\loch\f1\cf2\fs20\ul Range of Motion Exam:\plain\f0\fs20\lign1000\hich\f0\dbch\f0\loch\f0\fs20  \par  \trowd\vcvjsn97\lastrow\jonhmlg75\trpaddfl3\lswnyds51\trpaddfr3\trpaddt0\trpaddft3\trpaddb0\trpaddfb3\trleft0  \clvertalt\fpfxdg89\clpadft3\gyjysu68\clpadfr3\clpadl0\clpadfl3\clpadb0\clpadfb3\eujjz8504  \clvertalt\nnpaag84\clpadft3\pudcwe20\clpadfr3\clpadl0\clpadfl3\clpadb0\clpadfb3\eodia7144  \pard\intbl\ssparaaux0\s0\fi-120\li120\ql\plain\f0\fs24{\*\bkmkstart qv2479725242}{\*\bkmkend fk4014356124}\plain\f0\fs20\wqyv2452\hich\f0\dbch\f0\loch\f0\fs20 \'b7 Active Range of Motion Examination\cell  \pard\intbl\ssparaaux0\s0\ql\plain\f0\fs24\plain\f0\fs20\hasa4435\hich\f0\dbch\f0\loch\f0\fs20 bilateral upper extremity Active ROM was WFL (within functional limits); bilateral  lower extremity Active ROM was WFL (within functional limits)\cell  \intbl\row  \pard\ssparaaux0\s0\ql\plain\f0\fs24\plain\f0\fs20\cewx1397\hich\f0\dbch\f0\loch\f0\fs20\par  {\*\bkmkstart xf0037322443}{\*\bkmkend sh6930136628}\plain\f1\fs20\wzjb5449\hich\f1\dbch\f1\loch\f1\cf2\fs20\ul Manual Muscle Testing:\plain\f0\fs20\pqck9980\hich\f0\dbch\f0\loch\f0\fs20  \par  \trowd\pwiyms82\lastrow\fcjmoyn36\trpaddfl3\gtwsvzn93\trpaddfr3\trpaddt0\trpaddft3\trpaddb0\trpaddfb3\trleft0  \clvertalt\gpotqh02\clpadft3\vswmfu10\clpadfr3\clpadl0\clpadfl3\clpadb0\clpadfb3\vtggt4280  \clvertalt\wcqvxk35\clpadft3\mczokl35\clpadfr3\clpadl0\clpadfl3\clpadb0\clpadfb3\wkicg8143  \pard\intbl\ssparaaux0\s0\fi-120\li120\ql\plain\f0\fs24{\*\bkmkstart vi5561691911}{\*\bkmkend rf9555865426}\plain\f0\fs20\uzxi1231\hich\f0\dbch\f0\loch\f0\fs20 \'b7 Manual Muscle Testing Results\cell  \pard\intbl\ssparaaux0\s0\ql\plain\f0\fs24\plain\f0\fs20\fbse5332\hich\f0\dbch\f0\loch\f0\fs20 MMT performed: (L)UE/LE 5/5 for all major pivots; (R)shld flex 4/5, (R)elbow flx 4/5, (R)elbow ext 4/5, (R)wrist flx 4/5, (R)wrist ext 4/5; (R)hip flx 4-/5,   (R) knee ext 4-/5, (R) knee flex 4-/5, (R)ankle DF 4-/5, (R)ankle PF 4-/5\cell  \intbl\row  \pard\ssparaaux0\s0\ql\plain\f0\fs24\plain\f0\fs20\uakt3058\hich\f0\dbch\f0\loch\f0\fs20\par  {\*\bkmkstart uj6500652622}{\*\bkmkend re1381543412}\plain\f1\fs20\rgtu3020\hich\f1\dbch\f1\loch\f1\cf2\fs20\ul Bed Mobility: Rolling/Turning:\plain\f0\fs20\zyel9063\hich\f0\dbch\f0\loch\f0\fs20  \par  \par  \trowd\ildraj72\aqiqgtz37\trpaddfl3\jjqchjp62\trpaddfr3\trpaddt0\trpaddft3\trpaddb0\trpaddfb3\trleft0  \clvertalt\dgzyhh23\clpadft3\ebzxvs29\clpadfr3\clpadl0\clpadfl3\clpadb0\clpadfb3\ajmbw4332  \clvertalt\znholg03\clpadft3\jyhdwq71\clpadfr3\clpadl0\clpadfl3\clpadb0\clpadfb3\udumv3876  \pard\intbl\ssparaaux0\s0\fi-120\li120\ql\plain\f0\fs24{\*\bkmkstart mw2938541029}{\*\bkmkend mp2838065747}\plain\f0\fs20\wnwi2807\hich\f0\dbch\f0\loch\f0\fs20 \'b7 Level of Dillon\cell  \pard\intbl\ssparaaux0\s0\ql\plain\f0\fs24\plain\f0\fs20\dmzi5870\hich\f0\dbch\f0\loch\f0\fs20 moderate assist (50% patients effort)\cell  \intbl\row  \trowd\sgycin72\lastrow\moqujdn12\trpaddfl3\kpsbqub14\trpaddfr3\trpaddt0\trpaddft3\trpaddb0\trpaddfb3\trleft0  \clvertalt\vnvwob05\clpadft3\dhdihj07\clpadfr3\clpadl0\clpadfl3\clpadb0\clpadfb3\orfvs7384  \clvertalt\trvvtq28\clpadft3\hzkgyj32\clpadfr3\clpadl0\clpadfl3\clpadb0\clpadfb3\xjprw1379  \pard\intbl\ssparaaux0\s0\fi-120\li120\ql\plain\f0\fs24{\*\bkmkstart kx8613917953}{\*\bkmkend mg4871564730}\plain\f0\fs20\lvvj4631\hich\f0\dbch\f0\loch\f0\fs20 \'b7 Physical Assist/Nonphysical Assist\cell  \pard\intbl\ssparaaux0\s0\ql\plain\f0\fs24\plain\f0\fs20\pzta4084\hich\f0\dbch\f0\loch\f0\fs20 2 person assist; nonverbal cues (demo/gestures); verbal cues; rolling to (R) side\cell  \intbl\row  \pard\ssparaaux0\s0\ql\plain\f0\fs24\plain\f0\fs20\vesl6880\hich\f0\dbch\f0\loch\f0\fs20\par  {\*\bkmkstart pb9143269259}{\*\bkmkend pz8042394166}\plain\f1\fs20\wowt3112\hich\f1\dbch\f1\loch\f1\cf2\fs20\ul Bed Mobility: Scooting/Bridging:\plain\f0\fs20\eyaf7834\hich\f0\dbch\f0\loch\f0\fs20  \par  \par  \trowd\hacmwo69\mjtzpzt39\trpaddfl3\bijunju03\trpaddfr3\trpaddt0\trpaddft3\trpaddb0\trpaddfb3\trleft0  \clvertalt\rsuqhr99\clpadft3\xbtiee34\clpadfr3\clpadl0\clpadfl3\clpadb0\clpadfb3\fqyze4687  \clvertalt\ggujcp81\clpadft3\inloev70\clpadfr3\clpadl0\clpadfl3\clpadb0\clpadfb3\fhbql5726  \pard\intbl\ssparaaux0\s0\fi-120\li120\ql\plain\f0\fs24{\*\bkmkstart qq5532841050}{\*\bkmkend eg7085005849}\plain\f0\fs20\ggfa7384\hich\f0\dbch\f0\loch\f0\fs20 \'b7 Level of Dillon\cell  \pard\intbl\ssparaaux0\s0\ql\plain\f0\fs24\plain\f0\fs20\pcna9484\hich\f0\dbch\f0\loch\f0\fs20 maximum assist (25% patients effort)\cell  \intbl\row  \trowd\wojekf89\lastrow\thcejqs92\trpaddfl3\qkxekrp15\trpaddfr3\trpaddt0\trpaddft3\trpaddb0\trpaddfb3\trleft0  \clvertalt\elhmpe85\clpadft3\wravwj05\clpadfr3\clpadl0\clpadfl3\clpadb0\clpadfb3\tsanf9208  \clvertalt\wbrdgy82\clpadft3\gxttpa15\clpadfr3\clpadl0\clpadfl3\clpadb0\clpadfb3\cykkg6283  \pard\intbl\ssparaaux0\s0\fi-120\li120\ql\plain\f0\fs24{\*\bkmkstart ny9440036211}{\*\bkmkend az0533324113}\plain\f0\fs20\elsb3102\hich\f0\dbch\f0\loch\f0\fs20 \'b7 Physical Assist/Nonphysical Assist\cell  \pard\intbl\ssparaaux0\s0\ql\plain\f0\fs24\plain\f0\fs20\ptng8568\hich\f0\dbch\f0\loch\f0\fs20 2 person assist; nonverbal cues (demo/gestures); verbal cues; scooting to EOB in sitting with shayy-pad assist\cell  \intbl\row  \pard\ssparaaux0\s0\ql\plain\f0\fs24\plain\f0\fs20\tbey3094\hich\f0\dbch\f0\loch\f0\fs20\par  {\*\bkmkstart wv3388167430}{\*\bkmkend oi6874037588}\plain\f1\fs20\rjne6069\hich\f1\dbch\f1\loch\f1\cf2\fs20\ul Bed Mobility: Sit to Supine:\plain\f0\fs20\arbw9066\hich\f0\dbch\f0\loch\f0\fs20  \par  \par  \trowd\uucwnm88\qdbuydb96\trpaddfl3\ykpxflo82\trpaddfr3\trpaddt0\trpaddft3\trpaddb0\trpaddfb3\trleft0  \clvertalt\ugqzyf22\clpadft3\lloeva20\clpadfr3\clpadl0\clpadfl3\clpadb0\clpadfb3\kduiv2154  \clvertalt\ghmziz34\clpadft3\vlkads77\clpadfr3\clpadl0\clpadfl3\clpadb0\clpadfb3\ipfid6691  \pard\intbl\ssparaaux0\s0\fi-120\li120\ql\plain\f0\fs24{\*\bkmkstart ng0242080567}{\*\bkmkend mu9320658160}\plain\f0\fs20\uxpq6999\hich\f0\dbch\f0\loch\f0\fs20 \'b7 Level of Dillon\cell  \pard\intbl\ssparaaux0\s0\ql\plain\f0\fs24\plain\f0\fs20\kfeo0037\hich\f0\dbch\f0\loch\f0\fs20 maximum assist (25% patients effort)\cell  \intbl\row  \pard\intbl\ssparaaux0\s0\fi-120\li120\ql\plain\f0\fs24{\*\bkmkstart [id number]}{\*\bkmkend cr9879776385}\plain\f0\fs20\fhje4641\hich\f0\dbch\f0\loch\f0\fs20 \'b7 Physical Assist/Nonphysical Assist\cell  \pard\intbl\ssparaaux0\s0\ql\plain\f0\fs24\plain\f0\fs20\frjl7225\hich\f0\dbch\f0\loch\f0\fs20 2 person assist; nonverbal cues (demo/gestures); verbal cues; decreased eccentric trunk control; *increased assist for B/L LEs onto bed surface\cell  \intbl\row  \trowd\ofxiog98\lastrow\hlllijr71\trpaddfl3\mlvenmy74\trpaddfr3\trpaddt0\trpaddft3\trpaddb0\trpaddfb3\trleft0  \clvertalt\nkjcrw10\clpadft3\xkwnjf55\clpadfr3\clpadl0\clpadfl3\clpadb0\clpadfb3\iganh2415  \clvertalt\hscjyn04\clpadft3\giuthb85\clpadfr3\clpadl0\clpadfl3\clpadb0\clpadfb3\wxzmq9590  \pard\intbl\ssparaaux0\s0\fi-120\li120\ql\plain\f0\fs24{\*\bkmkstart [id number]}{\*\bkmkend [id number]}\plain\f0\fs20\zrsb1561\hich\f0\dbch\f0\loch\f0\fs20 \'b7 Assistive Device\cell  \pard\intbl\ssparaaux0\s0\ql\plain\f0\fs24\plain\f0\fs20\aobw3008\hich\f0\dbch\f0\loch\f0\fs20 HOB < 20 degrees\cell  \intbl\row  \pard\ssparaaux0\s0\ql\plain\f0\fs24\plain\f0\fs20\ynbj3068\hich\f0\dbch\f0\loch\f0\fs20\par  {\*\bkmkstart [id number]}{\*\bkmkend co1652660651}\plain\f1\fs20\daal8373\hich\f1\dbch\f1\loch\f1\cf2\fs20\ul Bed Mobility: Supine to Sit:\plain\f0\fs20\badx9981\hich\f0\dbch\f0\loch\f0\fs20  \par  \par  \trowd\kwrpxx27\ofqzqzo46\trpaddfl3\ucozqkj54\trpaddfr3\trpaddt0\trpaddft3\trpaddb0\trpaddfb3\trleft0  \clvertalt\ldkpbd54\clpadft3\sgqfev64\clpadfr3\clpadl0\clpadfl3\clpadb0\clpadfb3\ryqff9793  \clvertalt\mhrpvg67\clpadft3\dkdskh64\clpadfr3\clpadl0\clpadfl3\clpadb0\clpadfb3\rdfbw2286  \pard\intbl\ssparaaux0\s0\fi-120\li120\ql\plain\f0\fs24{\*\bkmkstart [id number]}{\*\bkmkend [id number]}\plain\f0\fs20\cfpx9744\hich\f0\dbch\f0\loch\f0\fs20 \'b7 Level of [place]\cell  \pard\intbl\ssparaaux0\s0\ql\plain\f0\fs24\plain\f0\fs20\ugrk2198\hich\f0\dbch\f0\loch\f0\fs20 moderate assist (50% patients effort)\cell  \intbl\row  \pard\intbl\ssparaaux0\s0\fi-120\li120\ql\plain\f0\fs24{\*\bkmkstart [id number]}{\*\bkmkend [id number]}\plain\f0\fs20\wglp4979\hich\f0\dbch\f0\loch\f0\fs20 \'b7 Physical Assist/Nonphysical Assist\cell  \pard\intbl\ssparaaux0\s0\ql\plain\f0\fs24\plain\f0\fs20\hhrt9470\hich\f0\dbch\f0\loch\f0\fs20 2 person assist; nonverbal cues (demo/gestures); verbal cues; able to bring B/L LEs to EOB with ~50% effort; *increased assist for trunk mobility\cell  \intbl\row  \trowd\tikpoq22\lastrow\xmulbkj44\trpaddfl3\zyjrqjo34\trpaddfr3\trpaddt0\trpaddft3\trpaddb0\trpaddfb3\trleft0  \clvertalt\gtxrme44\clpadft3\cfcube92\clpadfr3\clpadl0\clpadfl3\clpadb0\clpadfb3\rxaji1186  \clvertalt\bqjryp05\clpadft3\ywbduw93\clpadfr3\clpadl0\clpadfl3\clpadb0\clpadfb3\srdcf7369  \pard\intbl\ssparaaux0\s0\fi-120\li120\ql\plain\f0\fs24{\*\bkmkstart [id number]}{\*\bkmkend [id number]}\plain\f0\fs20\lbge7957\hich\f0\dbch\f0\loch\f0\fs20 \'b7 Assistive Device\cell  \pard\intbl\ssparaaux0\s0\ql\plain\f0\fs24\plain\f0\fs20\ntnk2753\hich\f0\dbch\f0\loch\f0\fs20 HOB elevated ~45 degrees\cell  \intbl\row  \pard\ssparaaux0\s0\ql\plain\f0\fs24\plain\f0\fs20\enek4617\hich\f0\dbch\f0\loch\f0\fs20\par  {\*\bkmkstart [id number]}{\*\bkmkend tn5564225467}\plain\f1\fs20\igkh6189\hich\f1\dbch\f1\loch\f1\cf2\fs20\ul Bed Mobility Analysis:\plain\f0\fs20\jzbb6662\hich\f0\dbch\f0\loch\f0\fs20  \par  \par  \trowd\zsflfy74\lastrow\qvwrnwj81\trpaddfl3\ngzoxve68\trpaddfr3\trpaddt0\trpaddft3\trpaddb0\trpaddfb3\trleft0  \clvertalt\honpml93\clpadft3\gpdtjp38\clpadfr3\clpadl0\clpadfl3\clpadb0\clpadfb3\nfcmp7211  \clvertalt\bwivxo17\clpadft3\hatuiv09\clpadfr3\clpadl0\clpadfl3\clpadb0\clpadfb3\qmunq4765  \pard\intbl\ssparaaux0\s0\fi-120\li120\ql\plain\f0\fs24{\*\bkmkstart [id number]}{\*\bkmkend [id number]}\plain\f0\fs20\bkad8757\hich\f0\dbch\f0\loch\f0\fs20 \'b7 Impairments Contributing to Impaired Bed Mobility\cell  \pard\intbl\ssparaaux0\s0\ql\plain\f0\fs24\plain\f0\fs20\kony4810\hich\f0\dbch\f0\loch\f0\fs20 impaired balance; decreased flexibility; impaired postural control; decreased ROM; decreased strength\cell  \intbl\row  \pard\ssparaaux0\s0\ql\plain\f0\fs24\plain\f0\fs20\ukic0560\hich\f0\dbch\f0\loch\f0\fs20\par  {\*\bkmkstart [id number]}{\*\bkmkend [id number]}\plain\f1\fs20\akat4346\hich\f1\dbch\f1\loch\f1\cf2\fs20\ul Transfer: Sit to Stand:\plain\f0\fs20\zopl4733\hich\f0\dbch\f0\loch\f0\fs20  \par  \par  \trowd\ywyixp07\lastrow\febiypz50\trpaddfl3\jyabtfq14\trpaddfr3\trpaddt0\trpaddft3\trpaddb0\trpaddfb3\trleft0  \clvertalt\ggjlpz54\clpadft3\mjosoq41\clpadfr3\clpadl0\clpadfl3\clpadb0\clpadfb3\jbchw8303  \clvertalt\aqqllw21\clpadft3\khlwqg64\clpadfr3\clpadl0\clpadfl3\clpadb0\clpadfb3\ibhbm7451  \pard\intbl\ssparaaux0\s0\fi-120\li120\ql\plain\f0\fs24{\*\bkmkstart [id number]}{\*\bkmkend [id number]}\plain\f0\fs20\lbbu5717\hich\f0\dbch\f0\loch\f0\fs20 \'b7 Level of [place]\cell  \pard\intbl\ssparaaux0\s0\ql\plain\f0\fs24\plain\f0\fs20\tjxq4964\hich\f0\dbch\f0\loch\f0\fs20 Therapist deferred 2/2 patient with reports of increased dizziness while dangling EOB ~10 minutes (elevated MAP noted as well); MD Grimaldo ([facility]) at bedside   and made aware; patient's symptoms resolved upon semi-supine rest with decreased BP and MAP noted\cell  \intbl\row  \pard\ssparaaux0\s0\ql\plain\f0\fs24\plain\f0\fs20\layt6504\hich\f0\dbch\f0\loch\f0\fs20\par  {\*\bkmkstart [id number]}{\*\bkmkend [id number]}\plain\f1\fs20\kbtv0867\hich\f1\dbch\f1\loch\f1\cf2\fs20\ul Balance Skills Assessment:\plain\f0\fs20\tfso9226\hich\f0\dbch\f0\loch\f0\fs20  \par  \par  \trowd\xskavv33\abebybb35\trpaddfl3\imxwjnk47\trpaddfr3\trpaddt0\trpaddft3\trpaddb0\trpaddfb3\trleft0  \clvertalt\fvphnq31\clpadft3\ztdyfi21\clpadfr3\clpadl0\clpadfl3\clpadb0\clpadfb3\uuqyo2321  \clvertalt\hvcovu65\clpadft3\kbrqdz41\clpadfr3\clpadl0\clpadfl3\clpadb0\clpadfb3\qmhpp3341  \pard\intbl\ssparaaux0\s0\fi-120\li120\ql\plain\f0\fs24{\*\bkmkstart qa2335604609}{\*\bkmkend [id number]}\plain\f0\fs20\sbvh8292\hich\f0\dbch\f0\loch\f0\fs20 \'b7 Sitting Balance: Static\cell  \pard\intbl\ssparaaux0\s0\ql\plain\f0\fs24\plain\f0\fs20\njma2893\hich\f0\dbch\f0\loch\f0\fs20 mod assist x 1\cell  \intbl\row  \pard\intbl\ssparaaux0\s0\fi-120\li120\ql\plain\f0\fs24{\*\bkmkstart [id number]}{\*\bkmkend [id number]}\plain\f0\fs20\elmr9103\hich\f0\dbch\f0\loch\f0\fs20 \'b7 Sitting Balance: Dynamic\cell  \pard\intbl\ssparaaux0\s0\ql\plain\f0\fs24\plain\f0\fs20\skcy5262\hich\f0\dbch\f0\loch\f0\fs20 max assist x 1 with mild (R)leaning upon functional tasks\cell  \intbl\row  \trowd\vpdsta59\lastrow\ztdeghz35\trpaddfl3\nwcnsbg64\trpaddfr3\trpaddt0\trpaddft3\trpaddb0\trpaddfb3\trleft0  \clvertalt\iyheef70\clpadft3\gidrrp52\clpadfr3\clpadl0\clpadfl3\clpadb0\clpadfb3\gfktp8332  \clvertalt\ripvpi63\clpadft3\znjege57\clpadfr3\clpadl0\clpadfl3\clpadb0\clpadfb3\zrfed5774  \pard\intbl\ssparaaux0\s0\fi-120\li120\ql\plain\f0\fs24{\*\bkmkstart [id number]}{\*\bkmkend bj1497097573}\plain\f0\fs20\hzrg1258\hich\f0\dbch\f0\loch\f0\fs20 \'b7 Sit-to-Stand Balance\cell  \pard\intbl\ssparaaux0\s0\ql\plain\f0\fs24\plain\f0\fs20\qphp3275\hich\f0\dbch\f0\loch\f0\fs20 to be assessed\cell  \intbl\row  \pard\ssparaaux0\s0\ql\plain\f0\fs24\plain\f0\fs20\cgsh5367\hich\f0\dbch\f0\loch\f0\fs20\par  {\*\bkmkstart [id number]}{\*\bkmkend fk77624284907}\plain\f1\fs20\uqgh9223\hich\f1\dbch\f1\loch\f1\cf2\fs20\ul Sensory Examination:\plain\f0\fs20\mhui9078\hich\f0\dbch\f0\loch\f0\fs20  \par  \plain\f1\fs20\xnhv7373\hich\f1\dbch\f1\loch\f1\cf2\fs20\ul{\field{\*\fldinst HYPERLINK [id number] }{\fldrslt Sensory Examination:}}\plain\f0\fs20\gouc7603\hich\f0\dbch\f0\loch\f0\fs20\ql\par  \par  \plain\f1\fs20\qxlc4084\hich\f1\dbch\f1\loch\f1\cf2\fs20\ul Light Touch Sensation:\plain\f0\fs20\wyde8734\hich\f0\dbch\f0\loch\f0\fs20  \par  \trowd\iygtmf55\bmyawzs01\trpaddfl3\ydlhotm70\trpaddfr3\trpaddt0\trpaddft3\trpaddb0\trpaddfb3\trleft0  \clvertalt\idgfmj60\clpadft3\zkfwed60\clpadfr3\clpadl0\clpadfl3\clpadb0\clpadfb3\mjwka1225  \clvertalt\wubyta05\clpadft3\sjpdpb25\clpadfr3\clpadl0\clpadfl3\clpadb0\clpadfb3\eorzf5432  \pard\intbl\ssparaaux0\s0\fi-120\li120\ql\plain\f0\fs24{\*\bkmkstart [id number]}{\*\bkmkend [id number]}\plain\f0\fs20\chou5067\hich\f0\dbch\f0\loch\f0\fs20 \'b7 Left UE\cell  \pard\intbl\ssparaaux0\s0\ql\plain\f0\fs24\plain\f0\fs20\nfgw7019\hich\f0\dbch\f0\loch\f0\fs20 within normal limits\cell  \intbl\row  \pard\intbl\ssparaaux0\s0\fi-120\li120\ql\plain\f0\fs24{\*\bkmkstart [id number]}{\*\bkmkend oc85456626885}\plain\f0\fs20\zwae6871\hich\f0\dbch\f0\loch\f0\fs20 \'b7 Right UE\cell  \pard\intbl\ssparaaux0\s0\ql\plain\f0\fs24\plain\f0\fs20\nfpg2924\hich\f0\dbch\f0\loch\f0\fs20 mild impairment\cell  \intbl\row  \pard\intbl\ssparaaux0\s0\fi-120\li120\ql\plain\f0\fs24{\*\bkmkstart [id number]}{\*\bkmkend [id number]}\plain\f0\fs20\aofi1385\hich\f0\dbch\f0\loch\f0\fs20 \'b7 Left LE\cell  \pard\intbl\ssparaaux0\s0\ql\plain\f0\fs24\plain\f0\fs20\qjib0793\hich\f0\dbch\f0\loch\f0\fs20 within normal limits\cell  \intbl\row  \pard\intbl\ssparaaux0\s0\fi-120\li120\ql\plain\f0\fs24{\*\bkmkstart [id number]}{\*\bkmkend [id number]}\plain\f0\fs20\wmwz9336\hich\f0\dbch\f0\loch\f0\fs20 \'b7 Right LE\cell  \pard\intbl\ssparaaux0\s0\ql\plain\f0\fs24\plain\f0\fs20\uzhr1565\hich\f0\dbch\f0\loch\f0\fs20 mild impairment\cell  \intbl\row  \pard\intbl\ssparaaux0\s0\fi-120\li120\ql\plain\f0\fs24{\*\bkmkstart [id number]}{\*\bkmkend fs99159707231}\plain\f0\fs20\eehr2593\hich\f0\dbch\f0\loch\f0\fs20 \'b7 Head/Neck\cell  \pard\intbl\ssparaaux0\s0\ql\plain\f0\fs24\plain\f0\fs20\pnph7819\hich\f0\dbch\f0\loch\f0\fs20 within normal limits\cell  \intbl\row  \trowd\jhrhsl17\lastrow\braayqv76\trpaddfl3\bvxjeta90\trpaddfr3\trpaddt0\trpaddft3\trpaddb0\trpaddfb3\trleft0  \clvertalt\dxsnyv56\clpadft3\tgzfpt37\clpadfr3\clpadl0\clpadfl3\clpadb0\clpadfb3\nrjqv4940  \clvertalt\cmqgea35\clpadft3\thdzfz06\clpadfr3\clpadl0\clpadfl3\clpadb0\clpadfb3\gvsxe2247  \pard\intbl\ssparaaux0\s0\fi-120\li120\ql\plain\f0\fs24{\*\bkmkstart md31365634326}{\*\bkmkend [id number]}\plain\f0\fs20\vlch7679\hich\f0\dbch\f0\loch\f0\fs20 \'b7 Trunk\cell  \pard\intbl\ssparaaux0\s0\ql\plain\f0\fs24\plain\f0\fs20\whyx4150\hich\f0\dbch\f0\loch\f0\fs20 within normal limits\cell  \intbl\row  \pard\ssparaaux0\s0\ql\plain\f0\fs24\plain\f0\fs20\lhjb8600\hich\f0\dbch\f0\loch\f0\fs20\par  \trowd\vqubnj90\lastrow\xhlovux96\trpaddfl3\rrnlkwa89\trpaddfr3\trpaddt0\trpaddft3\trpaddb0\trpaddfb3\trleft0  \clvertalt\ifdtqa76\clpadft3\rbepwn36\clpadfr3\clpadl0\clpadfl3\clpadb0\clpadfb3\sligm4060  \clvertalt\mngrqn12\clpadft3\fdpfrm44\clpadfr3\clpadl0\clpadfl3\clpadb0\clpadfb3\qgddx8675  \pard\intbl\ssparaaux0\s0\fi-120\li120\ql\plain\f0\fs24{\*\bkmkstart [id number]}{\*\bkmkend ff97234598274}\plain\f0\fs20\xmjm4919\hich\f0\dbch\f0\loch\f0\fs20 \'b7 Coordination Assessed\cell  \pard\intbl\ssparaaux0\s0\ql\plain\f0\fs24\plain\f0\fs20\cnsr6323\hich\f0\dbch\f0\loch\f0\fs20 finger to nose; intact bilaterally although decreased speed noted 5/5 reps\cell  \intbl\row  \pard\ssparaaux0\s0\ql\plain\f0\fs24\plain\f0\fs20\msho3715\hich\f0\dbch\f0\loch\f0\fs20\par  \par  \plain\f1\fs20\vfsl1332\hich\f1\dbch\f1\loch\f1\cf2\fs20\ul Proprioception:\plain\f0\fs20\xihn5303\hich\f0\dbch\f0\loch\f0\fs20  \par  \trowd\krmfut18\lastrow\difiaoh01\trpaddfl3\eaylubp58\trpaddfr3\trpaddt0\trpaddft3\trpaddb0\trpaddfb3\trleft0  \clvertalt\wokysf54\clpadft3\abcxon82\clpadfr3\clpadl0\clpadfl3\clpadb0\clpadfb3\soynf5116  \clvertalt\eamjzn71\clpadft3\jtdtiw68\clpadfr3\clpadl0\clpadfl3\clpadb0\clpadfb3\moypy3005  \pard\intbl\ssparaaux0\s0\fi-120\li120\ql\plain\f0\fs24{\*\bkmkstart [id number]}{\*\bkmkend [id number]}\plain\f0\fs20\zwbc3389\hich\f0\dbch\f0\loch\f0\fs20 \'b7 Coordination Assessed\cell  \pard\intbl\ssparaaux0\s0\ql\plain\f0\fs24\plain\f0\fs20\cdqe2965\hich\f0\dbch\f0\loch\f0\fs20 finger to nose; intact bilaterally although decreased speed noted 5/5 reps\cell  \intbl\row  \pard\ssparaaux0\s0\ql\plain\f0\fs24\plain\f0\fs20\hbty6597\hich\f0\dbch\f0\loch\f0\fs20\par  \trowd\buavrt43\xgldjfk32\trpaddfl3\ijuyvrk60\trpaddfr3\trpaddt0\trpaddft3\trpaddb0\trpaddfb3\trleft0  \clvertalt\vgbmgg68\clpadft3\ffbosh57\clpadfr3\clpadl0\clpadfl3\clpadb0\clpadfb3\tdqdl1944  \clvertalt\msqdak57\clpadft3\xjoawk98\clpadfr3\clpadl0\clpadfl3\clpadb0\clpadfb3\xihwd9640  \pard\intbl\ssparaaux0\s0\fi-120\li120\ql\plain\f0\fs24{\*\bkmkstart zo28431526584}{\*\bkmkend rb26384294657}\plain\f0\fs20\iffv9248\hich\f0\dbch\f0\loch\f0\fs20 \'b7 Sensory Tests\cell  \pard\intbl\ssparaaux0\s0\ql\plain\f0\fs24\plain\f0\fs20\mxth2146\hich\f0\dbch\f0\loch\f0\fs20 (R)hand dominant; (L) hand  5/5, (R) hand  5/5. CN Testing: B/L Frontalis intact; B/L buccinator intact; smile symmetrical; tongue protrusion at midline;   B/L eyes open/close intact; Shoulder elevation: intact bilaterally; Vision H-Test: bilateral tracking intact; Convergence/Divergence: N/T; Vision Quadrant Test: grossly intact although difficulty maintaining forward focus for exam (does report diplopia);   Rapid alternating movements: N/T\cell  \intbl\row  \trowd\lebutl00\lastrow\pkgzfhr78\trpaddfl3\ozhzqyv90\trpaddfr3\trpaddt0\trpaddft3\trpaddb0\trpaddfb3\trleft0  \clvertalt\thjdtc50\clpadft3\fnenof13\clpadfr3\clpadl0\clpadfl3\clpadb0\clpadfb3\yrwfa3712  \clvertalt\llqwic95\clpadft3\rhnigi13\clpadfr3\clpadl0\clpadfl3\clpadb0\clpadfb3\quojq7815  \pard\intbl\ssparaaux0\s0\ql\plain\f0\fs24\plain\f0\fs20\hroh1520\hich\f0\dbch\f0\loch\f0\fs20\cell  \pard\intbl\ssparaaux0\s0\ql\plain\f0\fs24\plain\f1\fs20\itjk5475\hich\f1\dbch\f1\loch\f1\cf2\fs20\strike\plain\f0\fs20\uhaf9625\hich\f0\dbch\f0\loch\f0\fs20\cell  \intbl\row  \pard\ssparaaux0\s0\ql\plain\f0\fs24\plain\f0\fs20\ywpf3291\hich\f0\dbch\f0\loch\f0\fs20\par  {\*\bkmkstart zz6484670817}{\*\bkmkend ok7019956980}\plain\f1\fs20\cdce2301\hich\f1\dbch\f1\loch\f1\cf2\fs20\ul Treatment Location:\plain\f0\fs20\jdvk2183\hich\f0\dbch\f0\loch\f0\fs20  \par  \trowd\ilpiwz90\lastrow\iwvaoza50\trpaddfl3\bhwqiss85\trpaddfr3\trpaddt0\trpaddft3\trpaddb0\trpaddfb3\trleft0  \clvertalt\jbpkcv87\clpadft3\cyoczc28\clpadfr3\clpadl0\clpadfl3\clpadb0\clpadfb3\lenjr4675  \clvertalt\ldkwqz45\clpadft3\wuhaai51\clpadfr3\clpadl0\clpadfl3\clpadb0\clpadfb3\kslcl4610  \pard\intbl\ssparaaux0\s0\fi-120\li120\ql\plain\f0\fs24{\*\bkmkstart as5523734917}{\*\bkmkend in8466144592}\plain\f0\fs20\neij6280\hich\f0\dbch\f0\loch\f0\fs20 \'b7 Comments\cell  \pard\intbl\ssparaaux0\s0\ql\plain\f0\fs24\plain\f0\fs20\iywd0666\hich\f0\dbch\f0\loch\f0\fs20 Patient maintains (R)gaze preference with (R)cervical rotation although acknowledges (L) side with moderate verbal cueing.{\*\bkmkstart bkcommentCR}{\*\bkmkend   bkcommentCR}\cell  \intbl\row  \pard\ssparaaux0\s0\ql\plain\f0\fs24\plain\f0\fs20\rbtm4738\hich\f0\dbch\f0\loch\f0\fs20\par  {\*\bkmkstart xb6522085036}{\*\bkmkend vz0925681621}\plain\f1\fs20\yivf9074\hich\f1\dbch\f1\loch\f1\cf2\fs20\ul Clinical Impressions:\plain\f0\fs20\cgnf2818\hich\f0\dbch\f0\loch\f0\fs20  \par  \trowd\oersvv81\valmetv10\trpaddfl3\zwljaen35\trpaddfr3\trpaddt0\trpaddft3\trpaddb0\trpaddfb3\trleft0  \clvertalt\fwfnue08\clpadft3\klfffk51\clpadfr3\clpadl0\clpadfl3\clpadb0\clpadfb3\ikequ4449  \clvertalt\pbokqf98\clpadft3\iwgeed92\clpadfr3\clpadl0\clpadfl3\clpadb0\clpadfb3\slyrt3412  \pard\intbl\ssparaaux0\s0\fi-120\li120\ql\plain\f0\fs24{\*\bkmkstart [id number]}{\*\bkmkend rc6320110920}\plain\f0\fs20\xdww0561\hich\f0\dbch\f0\loch\f0\fs20 \'b7 Criteria for Skilled Therapeutic Interventions\cell  \pard\intbl\ssparaaux0\s0\ql\plain\f0\fs24\plain\f0\fs20\wgrl2640\hich\f0\dbch\f0\loch\f0\fs20 impairments found; functional limitations in following categories; rehab potential; therapy frequency\cell  \intbl\row  \pard\intbl\ssparaaux0\s0\fi-120\li120\ql\plain\f0\fs24{\*\bkmkstart [id number]}{\*\bkmkend vb1211043451}\plain\f0\fs20\rgnx0312\hich\f0\dbch\f0\loch\f0\fs20 \'b7 Impairments Found (describe specific impairments)\cell  \pard\intbl\ssparaaux0\s0\ql\plain\f0\fs24\plain\f0\fs20\wgjc4174\hich\f0\dbch\f0\loch\f0\fs20 gait, locomotion, and balance; muscle strength; endurance; gross motor; ROM; decreased midline orientation; arousal, attention, and cognition\cell  \intbl\row  \trowd\mpwmkd24\lastrow\ezlhhkz47\trpaddfl3\xnuamzn04\trpaddfr3\trpaddt0\trpaddft3\trpaddb0\trpaddfb3\trleft0  \clvertalt\eatkry34\clpadft3\lxrawk07\clpadfr3\clpadl0\clpadfl3\clpadb0\clpadfb3\upgln9346  \clvertalt\jfygra01\clpadft3\xpkyuw38\clpadfr3\clpadl0\clpadfl3\clpadb0\clpadfb3\fteul4914  \pard\intbl\ssparaaux0\s0\fi-120\li120\ql\plain\f0\fs24{\*\bkmkstart gk2419960020}{\*\bkmkend cl3162409383}\plain\f0\fs20\tybn8931\hich\f0\dbch\f0\loch\f0\fs20 \'b7 Functional Limitations in Following Categories (describe specific limitations)\cell  \pard\intbl\ssparaaux0\s0\ql\plain\f0\fs24\plain\f0\fs20\pbyx4442\hich\f0\dbch\f0\loch\f0\fs20 self-care; home management\cell  \intbl\row  \pard\plain\f0\fs24\plain\f0\fs20\wgos8251\hich\f0\dbch\f0\loch\f0\fs20\par  \par  \ql\plain\f0\fs24\plain\f1\fs20\nbcc1749\hich\f1\dbch\f1\loch\f1\cf2\fs20\ul Visual Assessment:\plain\f0\fs20\nhcz4779\hich\f0\dbch\f0\loch\f0\fs20  \par  \trowd\znbvwr02\yywtwts26\trpaddfl3\lfvrsye93\trpaddfr3\trpaddt0\trpaddft3\trpaddb0\trpaddfb3\trleft0  \clvertalt\qrjyuq77\clpadft3\bryuff97\clpadfr3\clpadl0\clpadfl3\clpadb0\clpadfb3\zihaj9902  \clvertalt\djwbpr58\clpadft3\tbnczg34\clpadfr3\clpadl0\clpadfl3\clpadb0\clpadfb3\rarvx9902  \pard\intbl\ssparaaux0\s0\fi-120\li120\ql\plain\f0\fs24{\*\bkmkstart il26229474856}{\*\bkmkend ta29498624376}\plain\f0\fs20\aakf2479\hich\f0\dbch\f0\loch\f0\fs20 \'b7 Visual Tracking\cell  \pard\intbl\ssparaaux0\s0\ql\plain\f0\fs24\plain\f0\fs20\uzuj0654\hich\f0\dbch\f0\loch\f0\fs20 normal\cell  \intbl\row  \pard\intbl\ssparaaux0\s0\fi-120\li120\ql\plain\f0\fs24{\*\bkmkstart so53045644416}{\*\bkmkend [id number]}\plain\f0\fs20\fbut4371\hich\f0\dbch\f0\loch\f0\fs20 \'b7 Visual Neglect\cell  \pard\intbl\ssparaaux0\s0\ql\plain\f0\fs24\plain\f0\fs20\avgl3060\hich\f0\dbch\f0\loch\f0\fs20 none\cell  \intbl\row  \pard\intbl\ssparaaux0\s0\fi-120\li120\ql\plain\f0\fs24{\*\bkmkstart tx22939796332}{\*\bkmkend pl61025012883}\plain\f0\fs20\jthw6755\hich\f0\dbch\f0\loch\f0\fs20 \'b7 Visual Field Cuts\cell  \pard\intbl\ssparaaux0\s0\ql\plain\f0\fs24\plain\f0\fs20\kkze7190\hich\f0\dbch\f0\loch\f0\fs20 none\cell  \intbl\row  \pard\intbl\ssparaaux0\s0\fi-120\li120\ql\plain\f0\fs24{\*\bkmkstart [id number]}{\*\bkmkend ku19365525151}\plain\f0\fs20\wvgf2636\hich\f0\dbch\f0\loch\f0\fs20 \'b7 Visual Scanning\cell  \pard\intbl\ssparaaux0\s0\ql\plain\f0\fs24\plain\f0\fs20\mqkx7426\hich\f0\dbch\f0\loch\f0\fs20 WFL\cell  \intbl\row  \trowd\ydpuye30\lastrow\spppsdu90\trpaddfl3\jpphknr52\trpaddfr3\trpaddt0\trpaddft3\trpaddb0\trpaddfb3\trleft0  \clvertalt\ybevbl04\clpadft3\pzenli49\clpadfr3\clpadl0\clpadfl3\clpadb0\clpadfb3\xtczw3603  \clvertalt\mgcfkf03\clpadft3\bvpkui96\clpadfr3\clpadl0\clpadfl3\clpadb0\clpadfb3\aydwn9293  \pard\intbl\ssparaaux0\s0\fi-120\li120\ql\plain\f0\fs24{\*\bkmkstart [id number]}{\*\bkmkend ic04237302139}\plain\f0\fs20\ciiz3805\hich\f0\dbch\f0\loch\f0\fs20 \'b7 Visual Convergence\cell  \pard\intbl\ssparaaux0\s0\ql\plain\f0\fs24\plain\f0\fs20\utxo9887\hich\f0\dbch\f0\loch\f0\fs20 normal\cell  \intbl\row  \pard\ssparaaux0\s0\ql\plain\f0\fs24\plain\f0\fs20\lnrw2000\hich\f0\dbch\f0\loch\f0\fs20\par  {\*\bkmkstart kg8726931438}{\*\bkmkend yn3178399741}\plain\f1\fs20\wyla1751\hich\f1\dbch\f1\loch\f1\cf2\fs20\ul Fine Motor Coordination:\plain\f0\fs20\xfkk2545\hich\f0\dbch\f0\loch\f0\fs20  \par  \par  \plain\f1\fs20\wczm6561\hich\f1\dbch\f1\loch\f1\cf2\fs20\ul Grossly Intact:\plain\f0\fs20\xhko3191\hich\f0\dbch\f0\loch\f0\fs20  \par  \trowd\kqnnhn24\lastrow\wgxwbuv90\trpaddfl3\zpepdcc44\trpaddfr3\trpaddt0\trpaddft3\trpaddb0\trpaddfb3\trleft0  \clvertalt\ughrcq87\clpadft3\giossp90\clpadfr3\clpadl0\clpadfl3\clpadb0\clpadfb3\hrrle3487  \clvertalt\scqvjn80\clpadft3\hmrebq09\clpadfr3\clpadl0\clpadfl3\clpadb0\clpadfb3\xndtj6927  \pard\intbl\ssparaaux0\s0\fi-120\li120\ql\plain\f0\fs24{\*\bkmkstart [id number]}{\*\bkmkend gz1756159580}\plain\f0\fs20\rtvi6357\hich\f0\dbch\f0\loch\f0\fs20 \'b7 Grossly Intact\cell  \pard\intbl\ssparaaux0\s0\ql\plain\f0\fs24\plain\f0\fs20\chap1828\hich\f0\dbch\f0\loch\f0\fs20 Left UE; Right UE\cell  \intbl\row  \pard\ssparaaux0\s0\ql\plain\f0\fs24\plain\f0\fs20\mxqi9545\hich\f0\dbch\f0\loch\f0\fs20\par  \par  \plain\f1\fs20\qckk5516\hich\f1\dbch\f1\loch\f1\cf2\fs20\ul Fine Motor Coordination:\plain\f0\fs20\hake6967\hich\f0\dbch\f0\loch\f0\fs20  \par  \trowd\mtngkh55\ruocujm99\trpaddfl3\bonryhi15\trpaddfr3\trpaddt0\trpaddft3\trpaddb0\trpaddfb3\trleft0  \clvertalt\sbqnsv79\clpadft3\smggpm10\clpadfr3\clpadl0\clpadfl3\clpadb0\clpadfb3\gysfs1066  \clvertalt\rhgyky98\clpadft3\wqmewc51\clpadfr3\clpadl0\clpadfl3\clpadb0\clpadfb3\muzzn4417  \pard\intbl\ssparaaux0\s0\fi-120\li120\ql\plain\f0\fs24{\*\bkmkstart [id number]}{\*\bkmkend dw8689791854}\plain\f0\fs20\mjur1669\hich\f0\dbch\f0\loch\f0\fs20 \'b7 Left Hand, Finger to Nose\cell  \pard\intbl\ssparaaux0\s0\ql\plain\f0\fs24\plain\f0\fs20\klfy0132\hich\f0\dbch\f0\loch\f0\fs20 normal performance  decreased speed \cell  \intbl\row  \pard\intbl\ssparaaux0\s0\fi-120\li120\ql\plain\f0\fs24{\*\bkmkstart pu8723592786}{\*\bkmkend mv6329229526}\plain\f0\fs20\aifq7632\hich\f0\dbch\f0\loch\f0\fs20 \'b7 Right Hand, Finger to Nose\cell  \pard\intbl\ssparaaux0\s0\ql\plain\f0\fs24\plain\f0\fs20\jlhb7597\hich\f0\dbch\f0\loch\f0\fs20 normal performance  decreased speed \cell  \intbl\row  \pard\intbl\ssparaaux0\s0\fi-120\li120\ql\plain\f0\fs24{\*\bkmkstart mt4359388976}{\*\bkmkend tq4562814607}\plain\f0\fs20\ghso2801\hich\f0\dbch\f0\loch\f0\fs20 \'b7 Left Hand Thumb/Finger Opposition Skills\cell  \pard\intbl\ssparaaux0\s0\ql\plain\f0\fs24\plain\f0\fs20\nucn3346\hich\f0\dbch\f0\loch\f0\fs20 normal performance  decreased speed \cell  \intbl\row  \pard\intbl\ssparaaux0\s0\fi-120\li120\ql\plain\f0\fs24{\*\bkmkstart gg6349195018}{\*\bkmkend yz4876500538}\plain\f0\fs20\oglj6064\hich\f0\dbch\f0\loch\f0\fs20 \'b7 Right Hand Thumb/Finger Opposition Skills\cell  \pard\intbl\ssparaaux0\s0\ql\plain\f0\fs24\plain\f0\fs20\ptda4830\hich\f0\dbch\f0\loch\f0\fs20 mild impairment  decreased speed and strength \cell  \intbl\row  \pard\intbl\ssparaaux0\s0\fi-120\li120\ql\plain\f0\fs24{\*\bkmkstart lz4487099396}{\*\bkmkend xm1710457134}\plain\f0\fs20\rssy3236\hich\f0\dbch\f0\loch\f0\fs20 \'b7 Left Hand, Manipulation of Objects\cell  \pard\intbl\ssparaaux0\s0\ql\plain\f0\fs24\plain\f0\fs20\qumd9240\hich\f0\dbch\f0\loch\f0\fs20 normal performance \cell  \intbl\row  \trowd\zdhupn04\lastrow\detikrr52\trpaddfl3\rzxrted23\trpaddfr3\trpaddt0\trpaddft3\trpaddb0\trpaddfb3\trleft0  \clvertalt\vlgmok42\clpadft3\vkrfrs74\clpadfr3\clpadl0\clpadfl3\clpadb0\clpadfb3\chvdi2662  \clvertalt\llgdnk17\clpadft3\gcupoq03\clpadfr3\clpadl0\clpadfl3\clpadb0\clpadfb3\vnzfe6333  \pard\intbl\ssparaaux0\s0\fi-120\li120\ql\plain\f0\fs24{\*\bkmkstart [id number]}{\*\bkmkend [id number]}\plain\f0\fs20\qnlm2017\hich\f0\dbch\f0\loch\f0\fs20 \'b7 Right Hand, Manipulation of Objects\cell  \pard\intbl\ssparaaux0\s0\ql\plain\f0\fs24\plain\f0\fs20\rvuv2826\hich\f0\dbch\f0\loch\f0\fs20 normal performance \cell  \intbl\row  \pard\ssparaaux0\s0\ql\plain\f0\fs24\plain\f0\fs20\ofzv3570\hich\f0\dbch\f0\loch\f0\fs20\par  {\*\bkmkstart [id number]}{\*\bkmkend [id number]}\plain\f1\fs20\kafw0557\hich\f1\dbch\f1\loch\f1\cf2\fs20\ul Upper Body Dressing Training:\plain\f0\fs20\onkc7962\hich\f0\dbch\f0\loch\f0\fs20  \par  \par  \trowd\qcatlb06\ziqsryq69\trpaddfl3\ydlkhnc47\trpaddfr3\trpaddt0\trpaddft3\trpaddb0\trpaddfb3\trleft0  \clvertalt\jifbxl94\clpadft3\uxmlwv18\clpadfr3\clpadl0\clpadfl3\clpadb0\clpadfb3\zondt1661  \clvertalt\emdxox81\clpadft3\xyhslr03\clpadfr3\clpadl0\clpadfl3\clpadb0\clpadfb3\qhoix4736  \pard\intbl\ssparaaux0\s0\fi-120\li120\ql\plain\f0\fs24{\*\bkmkstart [id number]}{\*\bkmkend [id number]}\plain\f0\fs20\beve4792\hich\f0\dbch\f0\loch\f0\fs20 \'b7 Level of Dillon\cell  \pard\intbl\ssparaaux0\s0\ql\plain\f0\fs24\plain\f0\fs20\olao9042\hich\f0\dbch\f0\loch\f0\fs20 moderate assist (50% patients effort); donning back gown\cell  \intbl\row  \trowd\paqlgk29\lastrow\ychduhl93\trpaddfl3\gfdbiav40\trpaddfr3\trpaddt0\trpaddft3\trpaddb0\trpaddfb3\trleft0  \clvertalt\empsgq98\clpadft3\vlflbj45\clpadfr3\clpadl0\clpadfl3\clpadb0\clpadfb3\slomw2561  \clvertalt\kdrxmm85\clpadft3\ffutvm32\clpadfr3\clpadl0\clpadfl3\clpadb0\clpadfb3\qhwrx8947  \pard\intbl\ssparaaux0\s0\fi-120\li120\ql\plain\f0\fs24{\*\bkmkstart [id number]}{\*\bkmkend [id number]}\plain\f0\fs20\vikn2281\hich\f0\dbch\f0\loch\f0\fs20 \'b7 Physical Assist/Nonphysical Assist\cell  \pard\intbl\ssparaaux0\s0\ql\plain\f0\fs24\plain\f0\fs20\bzmh3093\hich\f0\dbch\f0\loch\f0\fs20 1 person assist; nonverbal cues (demo/gestures); verbal cues\cell  \intbl\row  \pard\plain\f0\fs24\plain\f0\fs20\pdbj3659\hich\f0\dbch\f0\loch\f0\fs20\par  \par  \par  \par  PM&R Impression : as above\par  \par  \ql\plain\f0\fs24\plain\f0\fs20\xcbd3677\hich\f0\dbch\f0\loch\f0\fs20 Current Disposition Plan Recommendations :     acute rehab placement\par  \par  \par  \par  }

## 2023-04-24 NOTE — PHYSICAL THERAPY INITIAL EVALUATION ADULT - THERAPY FREQUENCY, PT EVAL
Patient and patient's son (Laure) educated on frequency of inpatient physical therapy at [facility], son verbalized understanding./3-5x/week

## 2023-04-24 NOTE — OCCUPATIONAL THERAPY INITIAL EVALUATION ADULT - SENSORY TESTS
(L) hand grasp 5/5, (R) hand grasp 4/5, patient c/o of double vision bilaterally, H and Quad test grossly intact- unable to follow directions, Eye movements are intact without nystagmus, Pupils equally round and reactive to light, facial sensation V1-V3 equal and intact, face is symmetric with normal eye closure and smile, tongue protrudes midlines, shoulder shrugs intact, puffing cheeks intact, b/l eyebrow shrugs intact, hearing bilaterally with rubs intact

## 2023-04-24 NOTE — DIETITIAN INITIAL EVALUATION ADULT - NSFNSGIIOFT_GEN_A_CORE
04-23-23 @ 07:01  -  04-24-23 @ 07:00  --------------------------------------------------------  OUT:    Nasogastric/Oral tube (mL): 120 mL  Total OUT: 120 mL    Total NET: -120 mL

## 2023-04-24 NOTE — OCCUPATIONAL THERAPY INITIAL EVALUATION ADULT - GENERAL OBSERVATIONS, REHAB EVAL
PT Genny present. Patient son present. Patient received semisupine in bed +tele, +EVD (clamped by PUSHPA Cerrato), +HFNC (FiO2 40%), NGT (off wall suction), +b/l SCD's, +[place]as cath, +IV, NAD.

## 2023-04-24 NOTE — DIETITIAN INITIAL EVALUATION ADULT - NS FNS DIET ORDER
Diet, NPO with Tube Feed:   Tube Feeding Modality: Nasogastric  Jevity 1.2 Luis (JEVITY1.2RTH)  Total Volume for 24 Hours (mL): 480  Total Number of Cans: 2  Continuous  Starting Tube Feed Rate {mL per Hour}: 20  Until Goal Tube Feed Rate (mL per Hour): 20  Tube Feed Duration (in Hours): 24  Tube Feed Start Time: 10:45 (04-24-23 @ 10:39)

## 2023-04-24 NOTE — PHYSICAL THERAPY INITIAL EVALUATION ADULT - GENERAL OBSERVATIONS, REHAB EVAL
PT IE completed. Chart reviewed. MRS:5. Patient without complaints of pain at rest, agreeable to PT. Patient received semi-supine, NAD, +tele, +EVD (clamped per RN Pat), +HFNC (FiO2:40%), +NGT (off wall suction), +bilateral IVs, +[place] cath, +B/L SCDs, patient's son (Laure) at bedside, JEREMIAH Parks (nsx) cleared patient for treatment. PT IE completed. Chart reviewed. MRS:5. Patient without complaints of pain at rest, agreeable to PT. Patient received semi-supine, NAD, +tele, +EVD (clamped per RN Pat), +HFNC (FiO2:40%), +NGT (off wall suction), +bilateral IVs, +[place] cath, +B/L SCDs, +B/L wrist restraints, patient's son (Laure) at bedside, JEREMIAH Parks (nsx) cleared patient for treatment.

## 2023-04-24 NOTE — OCCUPATIONAL THERAPY INITIAL EVALUATION ADULT - PERTINENT HX OF CURRENT PROBLEM, REHAB EVAL
68M hx seizures, HTN, poorly controlled Parkinson's disease w/ prog inability to walk since Nov 2022. Found to have large L frontal dural based lesion c/f meningioma w/ associated mass effect and edema. S/p L crani for meningioma resection (4/17). Discharged to [facility] on 4/21. On arrival to rehab c/o headache with persistgent nausea/vomiting. CTH performed reveals new large left frontal IPH with IVH, with edema and midline shift. Intubated for airway protection. Readmitted to [facility] for further management, NIHSS 23, ICH 3.

## 2023-04-24 NOTE — SWALLOW BEDSIDE ASSESSMENT ADULT - COMMENTS
Pt awake follows some simple commands, perseverative, limited ability to express complex needs and wants

## 2023-04-24 NOTE — PHYSICAL THERAPY INITIAL EVALUATION ADULT - MANUAL MUSCLE TESTING RESULTS, REHAB EVAL
MMT performed: (L)UE/LE 5/5 for all major pivots; (R)shld flex 4/5, (R)elbow flx 4/5, (R)elbow ext 4/5, (R)wrist flx 4/5, (R)wrist ext 4/5; (R)hip flx 4-/5, (R) knee ext 4-/5, (R) knee flex 4-/5, (R)ankle DF 4-/5, (R)ankle PF 4-/5

## 2023-04-24 NOTE — SWALLOW BEDSIDE ASSESSMENT ADULT - ASR SWALLOW LINGUAL MOBILITY
fasciculations during protrusion/impaired protrusion/impaired anterior elevation/impaired left lateral movement/impaired right lateral movement

## 2023-04-24 NOTE — DIETITIAN INITIAL EVALUATION ADULT - ENTERAL
If pt fails SLP eval and team wishes to advance EN, recommend Jevity 1.2 @ 60 ml/hr x24hrs via NGT to provide 1440 ml TV, 1728 kcal, 80g pro, 1162 ml free water  >> Recommend advance 20 ml q4hrs until goal is met  >> FWF per team  >> Maintain aspiration precautions at all times

## 2023-04-24 NOTE — CHART NOTE - NSCHARTNOTEFT_GEN_A_CORE
POD 7. Extubated yesterday to HFNC, tolerating HFNC. AM Na 155, stopped 3%, fentanyl d/c'd.  Repeat CTH with no significant interval change from CTH on 4/22. Trickle feeds started. PT/OT recommending AR. C/o headache, given tramadol with resolution. S&S recommend pureed diet with thin liquids. Weaning to 6L NC.

## 2023-04-24 NOTE — PROGRESS NOTE ADULT - ASSESSMENT
ASSESSMENT AND PLAN  L. frontal ICH-3 with IVH, suspected venous bleed  History of elective L. crani for meningioma resection (4/17/23, Dr. D'Amico)  History of seizures, Parkinson's disease, multiple falls and wheelchair bound  Abdominal distention      NEURO:  Q1h neuro checks  Continue Keppra (decrease to 500mg bid)  Decadron with taper  R. frontal EVD at 30tdJ5B (monitor ICPs/CPP/ output)  CT head repeat 4/24am  Continue anti Parkinson's meds: Sinemet, entacapone  Activity: PT/OT as tolerated.     PULM: Extubated to HFNC, L pleural effusion  Wean as tolerated  SpO2 goal > 92%, supplemental O2 and pulm toileting as needed    CARDIAC:   BP goal 100- 160  Continue Amlodipine  TTE: EF 71%. Mild AR    GI:  Diet: TFs-> speech swallow  Stress ulcer prophylaxis: discontinue    /RENAL:  Sodium goal 145-150 (currently 155)  Trend BMPs Q6  Is/Os    HEME:  Maintain Hb > 7.0, PLT > 100,000  SCDs, SQL (decrease to 40mg daily)  Check antiXa level    ID:  Piperacillin tazobactam for empiric aspiration PNA coverage  Follow up sputum culture  Monitor temp curve, WBC.    ENDO:  Goal euglycemia, RASSI      MISC:    SOCIAL/FAMILY:  [] awaiting [x] updated son Kenneth at bedside [] family meeting    CODE STATUS:  [x] Full Code [] DNR [] DNI [] Palliative/Comfort Care    DISPOSITION:  [x] ICU [] Stroke Unit [] Floor [] EMU [] RCU [] PCU    Time spent: 60critical care minutes     ASSESSMENT AND PLAN  L. frontal ICH-3 with IVH, suspected venous bleed  History of elective L. crani for meningioma resection (4/17/23, Dr. D'Amico)  History of seizures, Parkinson's disease, multiple falls and wheelchair bound  Abdominal distention      NEURO:  Q1h neuro checks  Continue Keppra (decrease to 500mg bid)  Decadron with taper per NSGY  R. frontal EVD at 55gnI3D (monitor ICPs/CPP/ color/output)  CT head repeat 4/24am  Continue anti Parkinson's meds: Sinemet, entacapone  Activity: PT/OT as tolerated.     PULM: Extubated to HFNC, L pleural effusion  Wean as tolerated  SpO2 goal > 92%  Chest PT    CARDIAC:   BP goal 100- 160  Continue Amlodipine  TTE: EF 71%. Mild AR    GI:  Diet: TFs. Pending speech swallow  Stress ulcer prophylaxis: PPI while on steroids    /RENAL:  Sodium goal 145-150 (currently 155). No longer on hypertonics  Trend BMPs Q6  Is/Os  NS@ 80cc/hr    HEME:  Maintain Hb > 7.0, PLT > 100,000  SCDs, SQL (decrease to 40mg daily)  Check antiXa level    ID:  Piperacillin tazobactam for empiric aspiration PNA coverage  Follow up sputum culture  Monitor temp curve, WBC.    ENDO:  Goal euglycemia, ISS    MISC:    SOCIAL/FAMILY:  [] awaiting [x] updated son Kenneth at bedside [] family meeting    CODE STATUS:  [x] Full Code [] DNR [] DNI [] Palliative/Comfort Care    DISPOSITION:  [x] ICU [] Stroke Unit [] Floor [] EMU [] RCU [] PCU    Time spent: 60critical care minutes

## 2023-04-24 NOTE — PHYSICAL THERAPY INITIAL EVALUATION ADULT - PHYSICAL ASSIST/NONPHYSICAL ASSIST: SCOOT/BRIDGE, REHAB EVAL
scooting to EOB in sitting with shayy-pad assist/verbal cues/nonverbal cues (demo/gestures)/2 person assist

## 2023-04-24 NOTE — OCCUPATIONAL THERAPY INITIAL EVALUATION ADULT - ADDITIONAL COMMENTS
Patient reports living with his son in an elevator access apartment building with no DANIELITO. Patient from [facility] recently d/c from [facility] last seen by OT on 4/19/23. At rehab patient was mobilizing short distances with 2 person assist and receiving assistance for all ADL's. Prior to last admission patient was mobilizing with SC however also has a w/c and hospital bed. Patient's son reports multiple falls prior to admission. Patient is R hand dominant.

## 2023-04-24 NOTE — PROGRESS NOTE ADULT - ASSESSMENT
Neurosurgery    68 y o M hx seizures, HTN, poorly controlled Parkinson's disease w/ prog inability to walk since Nov 2022. Found to have large L frontal dural based lesion c/f meningioma w/ associated mass effect and edema. S/p L crani for meningioma resection (4/17). Discharged to [facility] on 4/21. On arrival to rehab c/o headache with persistgent nausea/vomiting. CTH performed reveals new large left frontal IPH with IVH, with edema and midline shift. Intubated for airway protection. Readmitted to [facility] for further management, NIHSS 23, ICH 3.    NEURO:  - neuro/vitals q1hr  - EVD @10cm H2O, monitor ICP, outptu  - Keppra 1g BID (hx sedizures)  - Decadron 4mg q6h taper 1 week to off   - continue home sinemet (25/250mg) 4 times/day, entacapone 200mg q8h  - hold home gabapentin 400mg 4 times/day   - repeat CTH performed on arrival, CTH post EVD stable 4/22    CARDIOVASCULAR:  - -160  - amlodipine 5mg qd  - TTE 4/22 mild aortic sclerosis w/ no stenosis, mild AR, left pleural effusion EF 71%    PULMONARY:  - HFNC, extubated 4/23  - nebs q6h. chest PT    RENAL:  - Na goal 145-150  - 3%@ 30; NS @50  - voiding     GI:  - NPO for now; NGT in place  - bowel regimen, last BM 4/22   - protonix 40mg qd for GI ppx while on steroids    HEME:  - SCDs for DVT ppx, SQL 40 BID weight based   - LE dopplers 4/22 negative     ID:  - leukocytosis and RLL infiltrate  - Zosyn (4/22-4/27), f/u sputum culture     ENDO:  - ISS while on steroids  - A1c 5.8    DISPO:  - NSICU, full code, family updated, PM&R / PT/OT  : rec acute rehab

## 2023-04-24 NOTE — PROGRESS NOTE ADULT - SUBJECTIVE AND OBJECTIVE BOX
HPI:  Patient is a 68 year old male with PMH of seizures, HTN, and Parkinson's disease diagnosed in 2012 with multiple falls who initially presented to [facility] on 4/17 for elective left craniotomy for meningioma resection. He has also been wheelchair bound since about 2-3 years ago and has frequent falls related to his Parkinson's disease.  Underwent L crani for meningioma resection on 4/17. Hospital course was uncomplicated.   Discharged to [facility] on 4/21 in stable condition.     On arrival to rehab, pt c/o incisional headache with episodes of n/v during ambulance ride to rehab.  Pt given zofran and oxycodone, however, headache returned with persistent nausea and vomiting.   RRT called for worsened mental status. CT head obtained stat which showed new large left frontal hemorrhage with IVH, cerebral edema with midline shift and some effacement of L frontal horn.  Pt started on cardene, given Keppra and transferred to [facility]. Prior to transfer, mental status declined (GCS 13-> 9) with increased secretions, therefore attempted intubation; LMA airway placed.      ICH 3   NIHSS 23 (22 Apr 2023 06:28)    INTERVAL EVENTS:  Extubated to HFNC, neuro exam improving     HOSPITAL COURSE:  4/22: readmitted with new large left frontal IPH with IVH, with edema and midline shift with large left frontal IPH. EVD placed, opened at 10. CTH post EVD complete. [place] sump placed for GI decompression. Started 3%@80 for na goal 145-150. KUB showing small loops of gas, will start TF tomorrow. TTE mild aortic sclerosis w/ no stenosis, mild AR, left pleural effusion EF 71%. LE dopplers negative.  4/23: POD 6. EVD @ 10. POCUS with RLL consolidation, trace B lines, L lung clear, collapsable IVC. 250cc albumin and 500cc NS bolus x2 in setting of decreasing UOP. Started on SQL 40 BID, weight based. Off propofol, on CPAP 8/5, plan to extubate. Removed matthew, f/u TOV. Removed a-line. Extubated to HFNC. Decreased 3% to 30. Passed TOV.   4/24: POD 7. Tolerating HFNC. Start TF today.     Vital Signs Last 24 Hrs  T(C): 37.2 (24 Apr 2023 00:55), Max: 37.6 (23 Apr 2023 14:00)  T(F): 98.9 (24 Apr 2023 00:55), Max: 99.7 (23 Apr 2023 14:00)  HR: 46 (24 Apr 2023 00:00) (45 - 90)  BP: 149/71 (24 Apr 2023 00:00) (130/63 - 155/75)  BP(mean): 102 (24 Apr 2023 00:00) (88 - 110)  RR: 20 (24 Apr 2023 00:00) (14 - 20)  SpO2: 100% (24 Apr 2023 00:00) (92% - 100%)    Parameters below as of 24 Apr 2023 00:00  Patient On (Oxygen Delivery Method): nasal cannula, high flow  O2 Flow (L/min): 40  O2 Concentration (%): 40    I&O's Summary    22 Apr 2023 07:01  -  23 Apr 2023 07:00  --------------------------------------------------------  IN: 4867.5 mL / OUT: 1586 mL / NET: 3281.5 mL    23 Apr 2023 07:01  -  24 Apr 2023 01:04  --------------------------------------------------------  IN: 1583.7 mL / OUT: 1275 mL / NET: 308.7 mL        PHYSICAL EXAM:  General: NAD   Cardiovascular: regular rhythm, slow rhythm  Respiratory: nonlabored breathing, normal chest rise; HFNC   GI: abdomen soft, nontender, nondistended; +NGT  Neuro: OE, tracks, PERRL, EOMI, intention tremors, slowed speech, AOx1, follows commands, RUE 4+/5, LUE 5/5, RLE 3/5 prox, 4/5 distally, LLE 4-/5 prox, 5/5 distally  Extremities: distal pulses 2+ x4  Incision/wound: L crani incision c/d/i; R EVD site c/d/i    LABS:                        11.7   10.20 )-----------( 137      ( 23 Apr 2023 05:24 )             36.1     04-23    148<H>  |  117<H>  |  24<H>  ----------------------------<  125<H>  4.2   |  25  |  1.02    Ca    8.5      23 Apr 2023 19:45  Phos  2.9     04-23  Mg     2.4     04-23    TPro  7.3  /  Alb  3.9  /  TBili  0.4  /  DBili  x   /  AST  17  /  ALT  22  /  AlkPhos  57  04-22    PT/INR - ( 22 Apr 2023 03:46 )   PT: 13.0 sec;   INR: 1.09          PTT - ( 22 Apr 2023 03:46 )  PTT:27.0 sec        CAPILLARY BLOOD GLUCOSE      POCT Blood Glucose.: 102 mg/dL (23 Apr 2023 16:19)  POCT Blood Glucose.: 115 mg/dL (23 Apr 2023 10:53)  POCT Blood Glucose.: 102 mg/dL (23 Apr 2023 06:59)      Drug Levels: [] N/A    CSF Analysis: [] N/A      Allergies    No Known Allergies    Intolerances      MEDICATIONS:  Antibiotics:  piperacillin/tazobactam IVPB.. 3.375 Gram(s) IV Intermittent every 8 hours    Neuro:  acetaminophen   Oral Liquid .. 650 milliGRAM(s) Oral every 6 hours PRN  carbidopa/levodopa  25/250 1 Tablet(s) Oral <User Schedule>  entacapone 200 milliGRAM(s) Oral <User Schedule>  fentaNYL    Injectable 25 MICROGram(s) IV Push every 2 hours PRN  levETIRAcetam  IVPB 1000 milliGRAM(s) IV Intermittent every 12 hours  ondansetron Injectable 4 milliGRAM(s) IV Push every 6 hours PRN    Anticoagulation:  enoxaparin Injectable 40 milliGRAM(s) SubCutaneous every 12 hours    OTHER:  acetylcysteine 20%  Inhalation 4 milliLiter(s) Inhalation every 6 hours  albuterol/ipratropium for Nebulization 3 milliLiter(s) Nebulizer every 6 hours  amLODIPine   Tablet 5 milliGRAM(s) Oral daily  chlorhexidine 2% Cloths 1 Application(s) Topical daily  dexAMETHasone  Injectable 4 milliGRAM(s) IV Push every 8 hours  dexAMETHasone  Injectable   IV Push   dexAMETHasone  Injectable 4 milliGRAM(s) IV Push every 6 hours  dextrose 50% Injectable 25 Gram(s) IV Push once  insulin lispro (ADMELOG) corrective regimen sliding scale   SubCutaneous three times a day before meals  pantoprazole  Injectable 40 milliGRAM(s) IV Push daily  senna 2 Tablet(s) Oral at bedtime    IVF:  sodium chloride 0.9%. 1000 milliLiter(s) IV Continuous <Continuous>  sodium chloride 3%. 500 milliLiter(s) IV Continuous <Continuous>    CULTURES:  Culture Results:   Normal Respiratory Neli present to date (04-22 @ 06:20)    RADIOLOGY & ADDITIONAL TESTS:      ASSESSMENT:  68M hx seizures, HTN, poorly controlled Parkinson's disease w/ prog inability to walk since Nov 2022. Found to have large L frontal dural based lesion c/f meningioma w/ associated mass effect and edema. S/p L crani for meningioma resection (4/17). Discharged to [facility] on 4/21. On arrival to rehab c/o headache with persistgent nausea/vomiting. CTH performed reveals new large left frontal IPH with IVH, with edema and midline shift. Intubated for airway protection. Readmitted to [facility] for further management, NIHSS 23, ICH 3.    NEURO:  - neuro/vitals q1hr  - EVD @10cm H2O, monitor ICP, outptu  - Keppra 1g BID (hx sedizures)  - Decadron 4mg q6h taper 1 week to off   - continue home sinemet (25/250mg) 4 times/day, entacapone 200mg q8h  - hold home gabapentin 400mg 4 times/day   - repeat CTH performed on arrival, CTH post EVD stable 4/22    CARDIOVASCULAR:  - -160  - amlodipine 5mg qd  - TTE 4/22 mild aortic sclerosis w/ no stenosis, mild AR, left pleural effusion EF 71%    PULMONARY:  - HFNC, extubated 4/23  - nebs q6h. chest PT    RENAL:  - Na goal 145-150  - 3%@ 30; NS @50  - voiding     GI:  - NPO for now; NGT in place  - bowel regimen, last BM 4/22   - protonix 40mg qd for GI ppx while on steroids    HEME:  - SCDs for DVT ppx, SQL 40 BID weight based   - LE dopplers 4/22 negative     ID:  - leukocytosis and RLL infiltrate  - Zosyn (4/22-4/27), f/u sputum culture     ENDO:  - ISS while on steroids  - A1c 5.8    DISPO:  - NSICU, full code, family updated, PT/OT pending    Discussed with Dr. D'Amico and Dr. Belcher

## 2023-04-24 NOTE — PHYSICAL THERAPY INITIAL EVALUATION ADULT - SENSORY TESTS
(R)hand dominant; (L) hand  5/5, (R) hand  5/5. CN Testing: B/L Frontalis intact; B/L buccinator intact; smile symmetrical; tongue protrusion at midline; B/L eyes open/close intact; Shoulder elevation: intact bilaterally; Vision H-Test: bilateral tracking intact; Convergence/Divergence: N/T; Vision Quadrant Test: grossly intact although difficulty maintaining forward focus for exam; Rapid alternating movements: N/T (R)hand dominant; (L) hand  5/5, (R) hand  5/5. CN Testing: B/L Frontalis intact; B/L buccinator intact; smile symmetrical; tongue protrusion at midline; B/L eyes open/close intact; Shoulder elevation: intact bilaterally; Vision H-Test: bilateral tracking intact; Convergence/Divergence: N/T; Vision Quadrant Test: grossly intact although difficulty maintaining forward focus for exam (does report diplopia); Rapid alternating movements: N/T

## 2023-04-24 NOTE — PHYSICAL THERAPY INITIAL EVALUATION ADULT - MODALITIES TREATMENT COMMENTS
Patient maintains (R)gaze preference with (R)cervical rotation although acknowledges (L) side with moderate verbal cueing.

## 2023-04-24 NOTE — PHYSICAL THERAPY INITIAL EVALUATION ADULT - ADDITIONAL COMMENTS
Patient recently discharged to [facility] from [facility] and last PT session on 4/20/23 at [facility], patient demo ambulating very short distances (~5 feet x 2) with two person assist. Prior to last admission, patient was ambulatory with straight cane although owns wheelchair (for doctor appointments) and hospital bed. Son reported multiple falls per patient's son. Patient is (R)hand dominant.

## 2023-04-24 NOTE — SWALLOW BEDSIDE ASSESSMENT ADULT - NS SPL SWALLOW CLINIC TRIAL FT
Oral stage is significant for inefficient bolus containment with R lateral bolus loss with thin liquids, prolonged bolus processing and formation and delayed transport. Pharyngeal stage is significant for suspected delay in swallow initiation. There were no overt signs of airway protection deficits on this exam. Given oral deficits, Pt would benefit from puree diet. This service will monitor closely and determine candidacy for upgrade.

## 2023-04-24 NOTE — DIETITIAN INITIAL EVALUATION ADULT - DIET TYPE
Recommend SLP evaluation prior to PO intake to assess for safest, least restrictive texture modification/regular

## 2023-04-24 NOTE — DIETITIAN INITIAL EVALUATION ADULT - PERTINENT MEDS FT
MEDICATIONS  (STANDING):  acetylcysteine 20%  Inhalation 4 milliLiter(s) Inhalation every 6 hours  albuterol/ipratropium for Nebulization 3 milliLiter(s) Nebulizer every 6 hours  amLODIPine   Tablet 5 milliGRAM(s) Oral daily  artificial  tears Solution 1 Drop(s) Both EYES every 4 hours  carbidopa/levodopa  25/250 1 Tablet(s) Oral <User Schedule>  chlorhexidine 2% Cloths 1 Application(s) Topical daily  dexAMETHasone  Injectable 4 milliGRAM(s) IV Push every 8 hours  dexAMETHasone  Injectable   IV Push   dexAMETHasone  Injectable 4 milliGRAM(s) IV Push every 6 hours  dextrose 50% Injectable 25 Gram(s) IV Push once  enoxaparin Injectable 40 milliGRAM(s) SubCutaneous <User Schedule>  entacapone 200 milliGRAM(s) Oral <User Schedule>  insulin lispro (ADMELOG) corrective regimen sliding scale   SubCutaneous every 6 hours  levETIRAcetam  IVPB 500 milliGRAM(s) IV Intermittent every 12 hours  pantoprazole  Injectable 40 milliGRAM(s) IV Push daily  piperacillin/tazobactam IVPB.. 3.375 Gram(s) IV Intermittent every 8 hours  senna 2 Tablet(s) Oral at bedtime  sodium chloride 0.9%. 1000 milliLiter(s) (80 mL/Hr) IV Continuous <Continuous>    MEDICATIONS  (PRN):  acetaminophen   Oral Liquid .. 650 milliGRAM(s) Oral every 6 hours PRN Temp greater or equal to 38C (100.4F), Mild Pain (1 - 3)  hydrALAZINE Injectable 10 milliGRAM(s) IV Push every 4 hours PRN SBP >160  ondansetron Injectable 4 milliGRAM(s) IV Push every 6 hours PRN Nausea and/or Vomiting

## 2023-04-24 NOTE — OCCUPATIONAL THERAPY INITIAL EVALUATION ADULT - DIAGNOSIS, OT EVAL
MRS 5. Patient from rehab, readmitted to [facility] with new large left frontal IPH and IVH with edema and midline shift with L left frontal IPH, presents with decreased RUE/RLE strength, c/o of double vision in bilateral eyes, deficits with overall balance, strength, activity tolerance, c/o of dizziness upon sitting at EOB impacting independence with functional activities and mobility.

## 2023-04-24 NOTE — PROGRESS NOTE ADULT - SUBJECTIVE AND OBJECTIVE BOX
***********************************************  ADULT NSICU PM PROGRESS NOTE  MAHDI EDGE [id number] [facility] [id number]  ***********************************************    24H INTERVAL EVENTS: no acute overnight events as of documentation time of this note.    ROS: negative except per mentioned above in 24h interval events.      VITALS:    ICU Vital Signs Last 24 Hrs  T(C): 37.8 (24 Apr 2023 17:18), Max: 37.8 (24 Apr 2023 17:18)  T(F): 100 (24 Apr 2023 17:18), Max: 100 (24 Apr 2023 17:18)  HR: 70 (24 Apr 2023 18:15) (45 - 84)  BP: 138/70 (24 Apr 2023 18:00) (127/72 - 164/70)  BP(mean): 98 (24 Apr 2023 18:00) (85 - 112)  ABP: --  ABP(mean): --  RR: 16 (24 Apr 2023 18:15) (16 - 22)  SpO2: 100% (24 Apr 2023 18:15) (98% - 100%)    O2 Parameters below as of 24 Apr 2023 18:15  Patient On (Oxygen Delivery Method): nasal cannula w/ humidification  O2 Flow (L/min): 6            EXAM:     Lawler Coma Scale: 3/4/6 = 13    General: normocephalic, EVD, laying in bed supine, no distress  Neuro     MS: awake, alert, cooperative with exam, follows commands, no neglect    CN: PERRL, gaze is conjugate, face symmetric, hearing grossly intact    Mot: bulk normal, (+)bilateral rigid tone, power UPPER 4/4 proximally and distally, LOWER antigravity bilaterally  Chest: lungs are CTA, heart regular rate/rhythm, present S1/S2, no murmurs or rubs  Abdomen: distended, soft and nontender without peritoneal signs  Extremities: no clubbing, well-perfused, no edema    LABORATORY DATA:    ABG - ( 23 Apr 2023 05:25 )  pH, Arterial: 7.43  pH, Blood: x     /  pCO2: 34    /  pO2: 117   / HCO3: 23    / Base Excess: -1.1  /  SaO2: 99.0                                    11.1   10.72 )-----------( 126      ( 24 Apr 2023 02:35 )             34.9     04-24    144  |  112<H>  |  26<H>  ----------------------------<  133<H>  4.2   |  25  |  1.01    Ca    8.7      24 Apr 2023 18:06  Phos  2.2     04-24  Mg     2.1     04-24    TPro  x   /  Alb  2.4<L>  /  TBili  x   /  DBili  x   /  AST  x   /  ALT  x   /  AlkPhos  x   04-24      MEDICATIONS  (STANDING):  acetylcysteine 20%  Inhalation 4 milliLiter(s) Inhalation every 6 hours  albuterol/ipratropium for Nebulization 3 milliLiter(s) Nebulizer every 6 hours  amLODIPine   Tablet 5 milliGRAM(s) Oral daily  artificial  tears Solution 1 Drop(s) Both EYES every 4 hours  carbidopa/levodopa  25/250 1 Tablet(s) Oral <User Schedule>  chlorhexidine 2% Cloths 1 Application(s) Topical daily  dexAMETHasone  Injectable 4 milliGRAM(s) IV Push every 8 hours  dexAMETHasone  Injectable   IV Push   dextrose 50% Injectable 25 Gram(s) IV Push once  enoxaparin Injectable 40 milliGRAM(s) SubCutaneous <User Schedule>  entacapone 200 milliGRAM(s) Oral <User Schedule>  insulin lispro (ADMELOG) corrective regimen sliding scale   SubCutaneous every 6 hours  pantoprazole  Injectable 40 milliGRAM(s) IV Push daily  piperacillin/tazobactam IVPB.. 3.375 Gram(s) IV Intermittent every 8 hours  senna 2 Tablet(s) Oral at bedtime  sodium chloride 3%. 500 milliLiter(s) (30 mL/Hr) IV Continuous <Continuous>    MEDICATIONS  (PRN):  acetaminophen   Oral Liquid .. 650 milliGRAM(s) Oral every 6 hours PRN Temp greater or equal to 38C (100.4F), Mild Pain (1 - 3)  hydrALAZINE Injectable 10 milliGRAM(s) IV Push every 4 hours PRN SBP >160  ondansetron Injectable 4 milliGRAM(s) IV Push every 6 hours PRN Nausea and/or Vomiting      ***********************************************  ASSESSMENT AND PLAN  ***********************************************      #L. frontal ICH-3 with IVH, suspected venous bleed  #History of elective L. crani for meningioma resection (4/17/23, Dr. D'Amico)  #History of seizures, Parkinson's disease, multiple falls and wheelchair bound  #Abdominal distention    Frequent neuro checks/vital signs  Keppra, sinemet, entacapone  Trickle feeds, PO status       ***********************************************  ADULT NSICU PM PROGRESS NOTE  MAHDI EDGE [id number] [facility] [id number]  ***********************************************    24H INTERVAL EVENTS: no acute overnight events as of documentation time of this note.    ROS: negative except per mentioned above in 24h interval events.      VITALS:    ICU Vital Signs Last 24 Hrs  T(C): 37.8 (24 Apr 2023 17:18), Max: 37.8 (24 Apr 2023 17:18)  T(F): 100 (24 Apr 2023 17:18), Max: 100 (24 Apr 2023 17:18)  HR: 70 (24 Apr 2023 18:15) (45 - 84)  BP: 138/70 (24 Apr 2023 18:00) (127/72 - 164/70)  BP(mean): 98 (24 Apr 2023 18:00) (85 - 112)  ABP: --  ABP(mean): --  RR: 16 (24 Apr 2023 18:15) (16 - 22)  SpO2: 100% (24 Apr 2023 18:15) (98% - 100%)    O2 Parameters below as of 24 Apr 2023 18:15  Patient On (Oxygen Delivery Method): nasal cannula w/ humidification  O2 Flow (L/min): 6            EXAM:     [place] Coma Scale: 3/4/6 = 13    General: normocephalic, EVD, laying in bed supine, no distress  Neuro     MS: awake, alert and oriented x2, cooperative with exam, follows commands, no neglect    CN: PERRL, gaze is conjugate, face symmetric, hearing grossly intact    Mot: bulk normal, (+)bilateral rigid tone, power UPPER 5/5 proximally and distally, LOWER 2/2 proximally  Chest: lungs are CTA, heart regular rate/rhythm, present S1/S2, no murmurs or rubs  Abdomen: distended, soft and nontender without peritoneal signs  Extremities: no clubbing, well-perfused, no edema    LABORATORY DATA:    ABG - ( 23 Apr 2023 05:25 )  pH, Arterial: 7.43  pH, Blood: x     /  pCO2: 34    /  pO2: 117   / HCO3: 23    / Base Excess: -1.1  /  SaO2: 99.0                                    11.1   10.72 )-----------( 126      ( 24 Apr 2023 02:35 )             34.9     04-24    144  |  112<H>  |  26<H>  ----------------------------<  133<H>  4.2   |  25  |  1.01    Ca    8.7      24 Apr 2023 18:06  Phos  2.2     04-24  Mg     2.1     04-24    TPro  x   /  Alb  2.4<L>  /  TBili  x   /  DBili  x   /  AST  x   /  ALT  x   /  AlkPhos  x   04-24      MEDICATIONS  (STANDING):  acetylcysteine 20%  Inhalation 4 milliLiter(s) Inhalation every 6 hours  albuterol/ipratropium for Nebulization 3 milliLiter(s) Nebulizer every 6 hours  amLODIPine   Tablet 5 milliGRAM(s) Oral daily  artificial  tears Solution 1 Drop(s) Both EYES every 4 hours  carbidopa/levodopa  25/250 1 Tablet(s) Oral <User Schedule>  chlorhexidine 2% Cloths 1 Application(s) Topical daily  dexAMETHasone  Injectable 4 milliGRAM(s) IV Push every 8 hours  dexAMETHasone  Injectable   IV Push   dextrose 50% Injectable 25 Gram(s) IV Push once  enoxaparin Injectable 40 milliGRAM(s) SubCutaneous <User Schedule>  entacapone 200 milliGRAM(s) Oral <User Schedule>  insulin lispro (ADMELOG) corrective regimen sliding scale   SubCutaneous every 6 hours  pantoprazole  Injectable 40 milliGRAM(s) IV Push daily  piperacillin/tazobactam IVPB.. 3.375 Gram(s) IV Intermittent every 8 hours  senna 2 Tablet(s) Oral at bedtime  sodium chloride 3%. 500 milliLiter(s) (30 mL/Hr) IV Continuous <Continuous>    MEDICATIONS  (PRN):  acetaminophen   Oral Liquid .. 650 milliGRAM(s) Oral every 6 hours PRN Temp greater or equal to 38C (100.4F), Mild Pain (1 - 3)  hydrALAZINE Injectable 10 milliGRAM(s) IV Push every 4 hours PRN SBP >160  ondansetron Injectable 4 milliGRAM(s) IV Push every 6 hours PRN Nausea and/or Vomiting      ***********************************************  ASSESSMENT AND PLAN  ***********************************************      #L. frontal ICH-3 with IVH, suspected venous bleed  #History of elective L. crani for meningioma resection (4/17/23, Dr. D'Amico)  #History of seizures, Parkinson's disease, multiple falls and wheelchair bound  #Abdominal distention    Frequent neuro checks/vital signs  Keppra, sinemet, entacapone  Trickle feeds, PO status

## 2023-04-24 NOTE — OCCUPATIONAL THERAPY INITIAL EVALUATION ADULT - LEVEL OF INDEPENDENCE: SIT/STAND, REHAB EVAL
Therapists deferred 2/2 poor static sitting balance and patient c/o of dizziness when dangling at EOB for 10 mins, VSS,

## 2023-04-24 NOTE — PHYSICAL THERAPY INITIAL EVALUATION ADULT - IMPAIRMENTS FOUND, PT EVAL
endurance/arousal, attention, and cognition/decreased midline orientation/gait, locomotion, and balance/gross motor/muscle strength/ROM

## 2023-04-24 NOTE — PHYSICAL THERAPY INITIAL EVALUATION ADULT - ORIENTATION, REHAB EVAL
correctly states 'hospital' (re-oriented to [facility]); unable to provide correct time (re-oriented to April 24, 2023)/person/place

## 2023-04-24 NOTE — PHYSICAL THERAPY INITIAL EVALUATION ADULT - PHYSICAL ASSIST/NONPHYSICAL ASSIST: SUPINE/SIT, REHAB EVAL
able to bring B/L LEs to EOB with ~50% effort; *increased assist for trunk mobility/verbal cues/nonverbal cues (demo/gestures)/2 person assist

## 2023-04-24 NOTE — PROGRESS NOTE ADULT - SUBJECTIVE AND OBJECTIVE BOX
==========================  ADULT NSICU PROGRESS NOTE  ==========================    HPI:  Patient is a 68 year old male with PMH of seizures, HTN, and Parkinson's disease diagnosed in 2012 with multiple falls who initially presented to [facility] on 4/17 for elective left craniotomy for meningioma resection. He has also been wheelchair bound since about 2-3 years ago and has frequent falls related to his Parkinson's disease.  Underwent L crani for meningioma resection on 4/17. Hospital course was uncomplicated.   Discharged to [facility] on 4/21 in stable condition.     On arrival to rehab, pt c/o incisional headache with episodes of n/v during ambulance ride to rehab.  Pt given zofran and oxycodone, however, headache returned with persistent nausea and vomiting.   RRT called for worsened mental status. CT head obtained stat which showed new large left frontal hemorrhage with IVH, cerebral edema with midline shift and some effacement of L frontal horn.  Pt started on cardene, given Keppra and transferred to [facility]. Prior to transfer, mental status declined (GCS 13-> 9) with increased secretions, therefore attempted intubation; LMA airway placed.      On admission, the patient was:  GCS:  Chavez-Hou:  modified Garcia:  ICH score: 3  NIHSS: 23      ICU Vital Signs Last 24 Hrs  T(C): 36.6 (24 Apr 2023 06:14), Max: 37.6 (23 Apr 2023 14:00)  T(F): 97.9 (24 Apr 2023 06:14), Max: 99.7 (23 Apr 2023 14:00)  HR: 68 (24 Apr 2023 08:00) (45 - 90)  BP: 138/73 (24 Apr 2023 08:00) (127/72 - 164/70)  BP(mean): 99 (24 Apr 2023 08:00) (85 - 110)  ABP: 157/70 (23 Apr 2023 12:00) (140/64 - 157/70)  ABP(mean): 102 (23 Apr 2023 12:00) (92 - 102)  RR: 20 (24 Apr 2023 08:00) (14 - 22)  SpO2: 100% (24 Apr 2023 08:00) (98% - 100%)      04-23-23 @ 07:01  -  04-24-23 @ 07:00  --------------------------------------------------------  IN: 2413.7 mL / OUT: 2292 mL / NET: 121.7 mL    04-24-23 @ 07:01  -  04-24-23 @ 08:52  --------------------------------------------------------  IN: 80 mL / OUT: 12 mL / NET: 68 mL      EXAM:   General: normocephalic, EVD, laying in bed supine, no distress  Neuro:    MS: Awake, alert, oriented to 2 (person and place), perseverates that it is 2003, cooperative with exam, follows commands, no neglect    CN: PERRL 3mm and reactive, gaze is conjugate, face symmetric, hearing grossly intact    Mot: Power 5/5 in all extremities  Chest: Lungs clear, heart regular rate/rhythm, present S1/S2, no murmurs or rubs  Abdomen: distended, soft and nontender without peritoneal signs  Extremities: No edema      MEDICATIONS:   acetaminophen   Oral Liquid .. 650 milliGRAM(s) Oral every 6 hours PRN  acetylcysteine 20%  Inhalation 4 milliLiter(s) Inhalation every 6 hours  albuterol/ipratropium for Nebulization 3 milliLiter(s) Nebulizer every 6 hours  amLODIPine   Tablet 5 milliGRAM(s) Oral daily  carbidopa/levodopa  25/250 1 Tablet(s) Oral <User Schedule>  chlorhexidine 2% Cloths 1 Application(s) Topical daily  dexAMETHasone  Injectable 4 milliGRAM(s) IV Push every 8 hours  dexAMETHasone  Injectable   IV Push   dexAMETHasone  Injectable 4 milliGRAM(s) IV Push every 6 hours  dextrose 50% Injectable 25 Gram(s) IV Push once  enoxaparin Injectable 40 milliGRAM(s) SubCutaneous every 12 hours  entacapone 200 milliGRAM(s) Oral <User Schedule>  fentaNYL    Injectable 25 MICROGram(s) IV Push every 2 hours PRN  hydrALAZINE Injectable 10 milliGRAM(s) IV Push every 4 hours PRN  insulin lispro (ADMELOG) corrective regimen sliding scale   SubCutaneous every 6 hours  levETIRAcetam  IVPB 1000 milliGRAM(s) IV Intermittent every 12 hours  ondansetron Injectable 4 milliGRAM(s) IV Push every 6 hours PRN  pantoprazole  Injectable 40 milliGRAM(s) IV Push daily  piperacillin/tazobactam IVPB.. 3.375 Gram(s) IV Intermittent every 8 hours  senna 2 Tablet(s) Oral at bedtime  sodium chloride 0.9%. 1000 milliLiter(s) (80 mL/Hr) IV Continuous <Continuous>     LABS:                      11.1   10.72 )-----------( 126      ( 24 Apr 2023 02:35 )             34.9     04-24    155<H>  |  117<H>  |  22  ----------------------------<  111<H>  3.6   |  19<L>  |  0.91    Ca    6.8<L>      24 Apr 2023 02:35  Phos  2.2     04-24  Mg     2.1     04-24    TPro  x   /  Alb  2.4<L>  /  TBili  x   /  DBili  x   /  AST  x   /  ALT  x   /  AlkPhos  x   04-24    LIVER FUNCTIONS - ( 24 Apr 2023 02:35 )  Alb: 2.4 g/dL / Pro: x     / ALK PHOS: x     / ALT: x     / AST: x     / GGT: x           ABG - ( 23 Apr 2023 05:25 )  pH, Arterial: 7.43  pH, Blood: x     /  pCO2: 34    /  pO2: 117   / HCO3: 23    / Base Excess: -1.1  /  SaO2: 99.0                             ==========================  ADULT NSICU PROGRESS NOTE  ==========================  HPI:  Patient is a 68 year old male with PMH of seizures, HTN, and Parkinson's disease diagnosed in 2012 with multiple falls who initially presented to [facility] on 4/17 for elective left craniotomy for meningioma resection. He has also been wheelchair bound since about 2-3 years ago and has frequent falls related to his Parkinson's disease.  Underwent L crani for meningioma resection on 4/17. Hospital course was uncomplicated.   Discharged to [facility] on 4/21 in stable condition.     On arrival to rehab, pt c/o incisional headache with episodes of n/v during ambulance ride to rehab.  Pt given zofran and oxycodone, however, headache returned with persistent nausea and vomiting.   RRT called for worsened mental status. CT head obtained stat which showed new large left frontal hemorrhage with IVH, cerebral edema with midline shift and some effacement of L frontal horn.  Pt started on cardene, given Keppra and transferred to [facility]. Prior to transfer, mental status declined (GCS 13-> 9) with increased secretions, therefore attempted intubation; LMA airway placed.      On admission, the patient was:  GCS:  Chavez-Hou:  modified Garcia:  ICH score: 3  NIHSS: 23      ICU Vital Signs Last 24 Hrs  T(C): 36.6 (24 Apr 2023 06:14), Max: 37.6 (23 Apr 2023 14:00)  T(F): 97.9 (24 Apr 2023 06:14), Max: 99.7 (23 Apr 2023 14:00)  HR: 68 (24 Apr 2023 08:00) (45 - 90)  BP: 138/73 (24 Apr 2023 08:00) (127/72 - 164/70)  BP(mean): 99 (24 Apr 2023 08:00) (85 - 110)  ABP: 157/70 (23 Apr 2023 12:00) (140/64 - 157/70)  ABP(mean): 102 (23 Apr 2023 12:00) (92 - 102)  RR: 20 (24 Apr 2023 08:00) (14 - 22)  SpO2: 100% (24 Apr 2023 08:00) (98% - 100%)      04-23-23 @ 07:01  -  04-24-23 @ 07:00  --------------------------------------------------------  IN: 2413.7 mL / OUT: 2292 mL / NET: 121.7 mL    04-24-23 @ 07:01  -  04-24-23 @ 08:52  --------------------------------------------------------  IN: 80 mL / OUT: 12 mL / NET: 68 mL      EXAM:   General: normocephalic, EVD, laying in bed supine, no distress  Neuro:    MS: Awake, alert, oriented to 2 (person and place), perseverates that it is 2003, cooperative with exam, follows commands, no neglect    CN: PERRL 3mm and reactive, gaze is conjugate, face symmetric, hearing grossly intact    Mot: Power 5/5 in all extremities  Chest: Lungs clear, heart regular rate/rhythm, present S1/S2, no murmurs or rubs  Abdomen: distended, soft and nontender without peritoneal signs  Extremities: No edema      MEDICATIONS:   acetaminophen   Oral Liquid .. 650 milliGRAM(s) Oral every 6 hours PRN  acetylcysteine 20%  Inhalation 4 milliLiter(s) Inhalation every 6 hours  albuterol/ipratropium for Nebulization 3 milliLiter(s) Nebulizer every 6 hours  amLODIPine   Tablet 5 milliGRAM(s) Oral daily  carbidopa/levodopa  25/250 1 Tablet(s) Oral <User Schedule>  chlorhexidine 2% Cloths 1 Application(s) Topical daily  dexAMETHasone  Injectable 4 milliGRAM(s) IV Push every 8 hours  dexAMETHasone  Injectable   IV Push   dexAMETHasone  Injectable 4 milliGRAM(s) IV Push every 6 hours  dextrose 50% Injectable 25 Gram(s) IV Push once  enoxaparin Injectable 40 milliGRAM(s) SubCutaneous every 24 hours  entacapone 200 milliGRAM(s) Oral <User Schedule>  hydrALAZINE Injectable 10 milliGRAM(s) IV Push every 4 hours PRN  insulin lispro (ADMELOG) corrective regimen sliding scale   SubCutaneous every 6 hours  levETIRAcetam  IVPB 1000 milliGRAM(s) IV Intermittent every 12 hours  ondansetron Injectable 4 milliGRAM(s) IV Push every 6 hours PRN  pantoprazole  Injectable 40 milliGRAM(s) IV Push daily  piperacillin/tazobactam IVPB.. 3.375 Gram(s) IV Intermittent every 8 hours  senna 2 Tablet(s) Oral at bedtime  sodium chloride 0.9%. 1000 milliLiter(s) (80 mL/Hr) IV Continuous <Continuous>     LABS:                      11.1   10.72 )-----------( 126      ( 24 Apr 2023 02:35 )             34.9     04-24    155<H>  |  117<H>  |  22  ----------------------------<  111<H>  3.6   |  19<L>  |  0.91    Ca    6.8<L>      24 Apr 2023 02:35  Phos  2.2     04-24  Mg     2.1     04-24    TPro  x   /  Alb  2.4<L>  /  TBili  x   /  DBili  x   /  AST  x   /  ALT  x   /  AlkPhos  x   04-24    LIVER FUNCTIONS - ( 24 Apr 2023 02:35 )  Alb: 2.4 g/dL / Pro: x     / ALK PHOS: x     / ALT: x     / AST: x     / GGT: x           ABG - ( 23 Apr 2023 05:25 )  pH, Arterial: 7.43  pH, Blood: x     /  pCO2: 34    /  pO2: 117   / HCO3: 23    / Base Excess: -1.1  /  SaO2: 99.0

## 2023-04-24 NOTE — PHYSICAL THERAPY INITIAL EVALUATION ADULT - PERTINENT HX OF CURRENT PROBLEM, REHAB EVAL
68 year old male with PMH of seizures, HTN, and Parkinson's disease diagnosed in 2012 with multiple falls who initially presented to [facility] on 4/17 for elective left craniotomy for meningioma resection. He has also been wheelchair bound since about 2-3 years ago and has frequent falls related to his Parkinson's disease.  Underwent L crani for meningioma resection on 4/17. Hospital course was uncomplicated. Discharged to [facility] on 4/21 in stable condition.  On arrival to rehab, pt c/o incisional headache with episodes of n/v during ambulance ride to rehab. Pt given zofran and oxycodone, however, headache returned with persistent nausea and vomiting. RRT called for worsened mental status. CT head obtained stat which showed new large left frontal hemorrhage with IVH, cerebral edema with midline shift and some effacement of L frontal horn. Pt started on cardene, given Keppra and transferred to [facility]. Prior to transfer, mental status declined (GCS 13-> 9) with increased secretions, therefore attempted intubation; LMA airway placed. Please refer to H&P on [place] for remaining.

## 2023-04-24 NOTE — DIETITIAN INITIAL EVALUATION ADULT - OTHER INFO
68M hx seizures, HTN, poorly controlled Parkinson's disease w/ prog inability to walk since Nov 2022. Found to have large L frontal dural based lesion c/f meningioma w/ associated mass effect and edema. S/p L crani for meningioma resection (4/17). Discharged to [facility] on 4/21. On arrival to rehab c/o headache with persistgent nausea/vomiting. CTH performed reveals new large left frontal IPH with IVH, with edema and midline shift. Intubated for airway protection. Readmitted to [facility] for further management, NIHSS 23, ICH 3.    On assessment, pt resitng in bed. Extubated and weaned to HF/NC. Tmax 97.9F. MAP 99. Currently NPO with EN trickle feeds started this am. Per disc with team, plan for SLP eval this morning. If unable to advance PO diet, team to advance EN to meet 100% of est needs. No n/v/d/c. No abd distention or discomfort. Skin surgical incision, becky score 18. +1 generalized pitting edema. No pain noted at this time. Obtaining hx was limited this am- RD to follow. NKFA per EMR. Education deferred at this time. RD to follow.

## 2023-04-25 LAB
ANION GAP SERPL CALC-SCNC: 10 MMOL/L — SIGNIFICANT CHANGE UP (ref 5–17)
ANION GAP SERPL CALC-SCNC: 10 MMOL/L — SIGNIFICANT CHANGE UP (ref 5–17)
ANION GAP SERPL CALC-SCNC: 6 MMOL/L — SIGNIFICANT CHANGE UP (ref 5–17)
BUN SERPL-MCNC: 25 MG/DL — HIGH (ref 7–23)
BUN SERPL-MCNC: 27 MG/DL — HIGH (ref 7–23)
BUN SERPL-MCNC: 30 MG/DL — HIGH (ref 7–23)
CALCIUM SERPL-MCNC: 8.4 MG/DL — SIGNIFICANT CHANGE UP (ref 8.4–10.5)
CALCIUM SERPL-MCNC: 8.8 MG/DL — SIGNIFICANT CHANGE UP (ref 8.4–10.5)
CALCIUM SERPL-MCNC: 8.8 MG/DL — SIGNIFICANT CHANGE UP (ref 8.4–10.5)
CHLORIDE SERPL-SCNC: 111 MMOL/L — HIGH (ref 96–108)
CHLORIDE SERPL-SCNC: 112 MMOL/L — HIGH (ref 96–108)
CHLORIDE SERPL-SCNC: 116 MMOL/L — HIGH (ref 96–108)
CO2 SERPL-SCNC: 24 MMOL/L — SIGNIFICANT CHANGE UP (ref 22–31)
CO2 SERPL-SCNC: 24 MMOL/L — SIGNIFICANT CHANGE UP (ref 22–31)
CO2 SERPL-SCNC: 25 MMOL/L — SIGNIFICANT CHANGE UP (ref 22–31)
CREAT SERPL-MCNC: 1.08 MG/DL — SIGNIFICANT CHANGE UP (ref 0.5–1.3)
CREAT SERPL-MCNC: 1.15 MG/DL — SIGNIFICANT CHANGE UP (ref 0.5–1.3)
CREAT SERPL-MCNC: 1.19 MG/DL — SIGNIFICANT CHANGE UP (ref 0.5–1.3)
EGFR: 66 ML/MIN/1.73M2 — SIGNIFICANT CHANGE UP
EGFR: 68 ML/MIN/1.73M2 — SIGNIFICANT CHANGE UP
EGFR: 74 ML/MIN/1.73M2 — SIGNIFICANT CHANGE UP
GLUCOSE BLDC GLUCOMTR-MCNC: 101 MG/DL — HIGH (ref 70–99)
GLUCOSE BLDC GLUCOMTR-MCNC: 116 MG/DL — HIGH (ref 70–99)
GLUCOSE BLDC GLUCOMTR-MCNC: 131 MG/DL — HIGH (ref 70–99)
GLUCOSE BLDC GLUCOMTR-MCNC: 134 MG/DL — HIGH (ref 70–99)
GLUCOSE SERPL-MCNC: 126 MG/DL — HIGH (ref 70–99)
GLUCOSE SERPL-MCNC: 150 MG/DL — HIGH (ref 70–99)
GLUCOSE SERPL-MCNC: 164 MG/DL — HIGH (ref 70–99)
HCT VFR BLD CALC: 44.2 % — SIGNIFICANT CHANGE UP (ref 39–50)
HGB BLD-MCNC: 13.7 G/DL — SIGNIFICANT CHANGE UP (ref 13–17)
MAGNESIUM SERPL-MCNC: 2.8 MG/DL — HIGH (ref 1.6–2.6)
MCHC RBC-ENTMCNC: 30.5 PG — SIGNIFICANT CHANGE UP (ref 27–34)
MCHC RBC-ENTMCNC: 31 GM/DL — LOW (ref 32–36)
MCV RBC AUTO: 98.4 FL — SIGNIFICANT CHANGE UP (ref 80–100)
NRBC # BLD: 0 /100 WBCS — SIGNIFICANT CHANGE UP (ref 0–0)
NT-PROBNP SERPL-SCNC: 1492 PG/ML — HIGH (ref 0–300)
PHOSPHATE SERPL-MCNC: 3.2 MG/DL — SIGNIFICANT CHANGE UP (ref 2.5–4.5)
PLATELET # BLD AUTO: 179 K/UL — SIGNIFICANT CHANGE UP (ref 150–400)
POTASSIUM SERPL-MCNC: 4.2 MMOL/L — SIGNIFICANT CHANGE UP (ref 3.5–5.3)
POTASSIUM SERPL-MCNC: 4.3 MMOL/L — SIGNIFICANT CHANGE UP (ref 3.5–5.3)
POTASSIUM SERPL-MCNC: 4.3 MMOL/L — SIGNIFICANT CHANGE UP (ref 3.5–5.3)
POTASSIUM SERPL-SCNC: 4.2 MMOL/L — SIGNIFICANT CHANGE UP (ref 3.5–5.3)
POTASSIUM SERPL-SCNC: 4.3 MMOL/L — SIGNIFICANT CHANGE UP (ref 3.5–5.3)
POTASSIUM SERPL-SCNC: 4.3 MMOL/L — SIGNIFICANT CHANGE UP (ref 3.5–5.3)
RBC # BLD: 4.49 M/UL — SIGNIFICANT CHANGE UP (ref 4.2–5.8)
RBC # FLD: 15.1 % — HIGH (ref 10.3–14.5)
SODIUM SERPL-SCNC: 145 MMOL/L — SIGNIFICANT CHANGE UP (ref 135–145)
SODIUM SERPL-SCNC: 146 MMOL/L — HIGH (ref 135–145)
SODIUM SERPL-SCNC: 147 MMOL/L — HIGH (ref 135–145)
SURGICAL PATHOLOGY STUDY: SIGNIFICANT CHANGE UP
WBC # BLD: 11.17 K/UL — HIGH (ref 3.8–10.5)
WBC # FLD AUTO: 11.17 K/UL — HIGH (ref 3.8–10.5)

## 2023-04-25 PROCEDURE — 99291 CRITICAL CARE FIRST HOUR: CPT

## 2023-04-25 PROCEDURE — 99292 CRITICAL CARE ADDL 30 MIN: CPT | Mod: 24

## 2023-04-25 RX ORDER — AMLODIPINE BESYLATE 2.5 MG/1
5 TABLET ORAL DAILY
Refills: 0 | Status: DISCONTINUED | OUTPATIENT
Start: 2023-04-25 | End: 2023-04-26

## 2023-04-25 RX ORDER — ENTACAPONE 200 MG/1
200 TABLET, FILM COATED ORAL
Refills: 0 | Status: DISCONTINUED | OUTPATIENT
Start: 2023-04-25 | End: 2023-05-03

## 2023-04-25 RX ORDER — CARBIDOPA AND LEVODOPA 25; 100 MG/1; MG/1
1 TABLET ORAL
Refills: 0 | Status: DISCONTINUED | OUTPATIENT
Start: 2023-04-25 | End: 2023-05-03

## 2023-04-25 RX ORDER — SENNA PLUS 8.6 MG/1
2 TABLET ORAL AT BEDTIME
Refills: 0 | Status: DISCONTINUED | OUTPATIENT
Start: 2023-04-25 | End: 2023-04-29

## 2023-04-25 RX ORDER — SODIUM CHLORIDE 5 G/100ML
500 INJECTION, SOLUTION INTRAVENOUS
Refills: 0 | Status: DISCONTINUED | OUTPATIENT
Start: 2023-04-25 | End: 2023-04-25

## 2023-04-25 RX ORDER — ACETAMINOPHEN 500 MG
650 TABLET ORAL EVERY 6 HOURS
Refills: 0 | Status: DISCONTINUED | OUTPATIENT
Start: 2023-04-25 | End: 2023-05-03

## 2023-04-25 RX ORDER — SODIUM CHLORIDE 9 MG/ML
1000 INJECTION INTRAMUSCULAR; INTRAVENOUS; SUBCUTANEOUS
Refills: 0 | Status: DISCONTINUED | OUTPATIENT
Start: 2023-04-25 | End: 2023-04-25

## 2023-04-25 RX ORDER — SODIUM CHLORIDE 9 MG/ML
2 INJECTION INTRAMUSCULAR; INTRAVENOUS; SUBCUTANEOUS EVERY 8 HOURS
Refills: 0 | Status: DISCONTINUED | OUTPATIENT
Start: 2023-04-25 | End: 2023-04-27

## 2023-04-25 RX ADMIN — AMLODIPINE BESYLATE 5 MILLIGRAM(S): 2.5 TABLET ORAL at 05:49

## 2023-04-25 RX ADMIN — SODIUM CHLORIDE 30 MILLILITER(S): 5 INJECTION, SOLUTION INTRAVENOUS at 07:32

## 2023-04-25 RX ADMIN — Medication 4 MILLILITER(S): at 05:26

## 2023-04-25 RX ADMIN — SODIUM CHLORIDE 30 MILLILITER(S): 5 INJECTION, SOLUTION INTRAVENOUS at 01:07

## 2023-04-25 RX ADMIN — CARBIDOPA AND LEVODOPA 1 TABLET(S): 25; 100 TABLET ORAL at 19:19

## 2023-04-25 RX ADMIN — CARBIDOPA AND LEVODOPA 1 TABLET(S): 25; 100 TABLET ORAL at 16:04

## 2023-04-25 RX ADMIN — Medication 3 MILLILITER(S): at 05:26

## 2023-04-25 RX ADMIN — ENTACAPONE 200 MILLIGRAM(S): 200 TABLET, FILM COATED ORAL at 16:04

## 2023-04-25 RX ADMIN — Medication 10 MILLIGRAM(S): at 20:07

## 2023-04-25 RX ADMIN — Medication 10 MILLIGRAM(S): at 04:11

## 2023-04-25 RX ADMIN — Medication 4 MILLIGRAM(S): at 05:49

## 2023-04-25 RX ADMIN — Medication 4 MILLIGRAM(S): at 13:14

## 2023-04-25 RX ADMIN — Medication 4 MILLILITER(S): at 22:05

## 2023-04-25 RX ADMIN — ENTACAPONE 200 MILLIGRAM(S): 200 TABLET, FILM COATED ORAL at 07:31

## 2023-04-25 RX ADMIN — Medication 3 MILLILITER(S): at 17:27

## 2023-04-25 RX ADMIN — PIPERACILLIN AND TAZOBACTAM 25 GRAM(S): 4; .5 INJECTION, POWDER, LYOPHILIZED, FOR SOLUTION INTRAVENOUS at 09:02

## 2023-04-25 RX ADMIN — SENNA PLUS 2 TABLET(S): 8.6 TABLET ORAL at 22:00

## 2023-04-25 RX ADMIN — SODIUM CHLORIDE 2 GRAM(S): 9 INJECTION INTRAMUSCULAR; INTRAVENOUS; SUBCUTANEOUS at 11:36

## 2023-04-25 RX ADMIN — Medication 3 MILLILITER(S): at 10:07

## 2023-04-25 RX ADMIN — CHLORHEXIDINE GLUCONATE 1 APPLICATION(S): 213 SOLUTION TOPICAL at 05:49

## 2023-04-25 RX ADMIN — SODIUM CHLORIDE 2 GRAM(S): 9 INJECTION INTRAMUSCULAR; INTRAVENOUS; SUBCUTANEOUS at 22:00

## 2023-04-25 RX ADMIN — Medication 1 DROP(S): at 09:03

## 2023-04-25 RX ADMIN — Medication 1 DROP(S): at 05:50

## 2023-04-25 RX ADMIN — Medication 1 DROP(S): at 13:14

## 2023-04-25 RX ADMIN — Medication 4 MILLILITER(S): at 10:07

## 2023-04-25 RX ADMIN — LEVETIRACETAM 600 MILLIGRAM(S): 250 TABLET, FILM COATED ORAL at 17:05

## 2023-04-25 RX ADMIN — CARBIDOPA AND LEVODOPA 1 TABLET(S): 25; 100 TABLET ORAL at 08:00

## 2023-04-25 RX ADMIN — ENTACAPONE 200 MILLIGRAM(S): 200 TABLET, FILM COATED ORAL at 11:00

## 2023-04-25 RX ADMIN — PIPERACILLIN AND TAZOBACTAM 25 GRAM(S): 4; .5 INJECTION, POWDER, LYOPHILIZED, FOR SOLUTION INTRAVENOUS at 16:36

## 2023-04-25 RX ADMIN — Medication 1 DROP(S): at 17:05

## 2023-04-25 RX ADMIN — SODIUM CHLORIDE 50 MILLILITER(S): 9 INJECTION INTRAMUSCULAR; INTRAVENOUS; SUBCUTANEOUS at 11:23

## 2023-04-25 RX ADMIN — PANTOPRAZOLE SODIUM 40 MILLIGRAM(S): 20 TABLET, DELAYED RELEASE ORAL at 11:00

## 2023-04-25 RX ADMIN — CARBIDOPA AND LEVODOPA 1 TABLET(S): 25; 100 TABLET ORAL at 11:00

## 2023-04-25 RX ADMIN — Medication 1 DROP(S): at 02:05

## 2023-04-25 RX ADMIN — LEVETIRACETAM 600 MILLIGRAM(S): 250 TABLET, FILM COATED ORAL at 05:49

## 2023-04-25 RX ADMIN — Medication 1 DROP(S): at 21:51

## 2023-04-25 RX ADMIN — ENOXAPARIN SODIUM 40 MILLIGRAM(S): 100 INJECTION SUBCUTANEOUS at 22:00

## 2023-04-25 RX ADMIN — PIPERACILLIN AND TAZOBACTAM 25 GRAM(S): 4; .5 INJECTION, POWDER, LYOPHILIZED, FOR SOLUTION INTRAVENOUS at 00:57

## 2023-04-25 RX ADMIN — Medication 3 MILLILITER(S): at 22:06

## 2023-04-25 RX ADMIN — Medication 4 MILLILITER(S): at 17:27

## 2023-04-25 NOTE — PROGRESS NOTE ADULT - SUBJECTIVE AND OBJECTIVE BOX
PM EVENTS: no acute overnight events as of documentation time of this note.    ROS: negative except as mentioned above.    T(C): 36.6 (04-25-23 @ 17:51), Max: 37.4 (04-25-23 @ 14:12)  HR: 81 (04-25-23 @ 20:00) (50 - 93)  BP: 165/113 (04-25-23 @ 20:00) (114/64 - 180/85)  RR: 18 (04-25-23 @ 20:00) (16 - 20)  SpO2: 98% (04-25-23 @ 20:00) (93% - 100%)        I&O's Summary    24 Apr 2023 07:01  -  25 Apr 2023 07:00  --------------------------------------------------------  IN: 2025 mL / OUT: 2197 mL / NET: -172 mL    25 Apr 2023 07:01  -  25 Apr 2023 20:07  --------------------------------------------------------  IN: 450 mL / OUT: 1812 mL / NET: -1362 mL        EXAM:     [place] Coma Scale:     General: normocephalic, atraumatic, laying in bed, in no distress  Neuro     MS: A/Ox3, cooperative, normal attention, no neglect, comprehension intact, speech with preserved fluency, naming, and repetition    CN: PERRL, VF FTC, EOMI and no ptosis bilaterally, sensation intact to crude touch V1-V3, face symmetric, hearing grossly intact    Mot: bulk normal, tone normal, power 5/5 in bilateral upper and lower proximal extremities    Sens: intact to crude touch in bilateral upper and lower extremities    Reflexes: deferred    Coord: no dysmetria or ataxia on finger to nose/heel to shin, respectively, no focal bradykinetic movements    Gait: deferred  Chest: nonlabored respirations, no adventitious lung sounds bilaterally, heart regular rate/rhythm, present S1/S2, no murmurs or rubs  Abdomen: nondistended, soft and nontender without peritoneal signs, normoactive bowel sounds  Extremities: no clubbing, well-perfused, no edema                              13.7   11.17 )-----------( 179      ( 25 Apr 2023 05:30 )             44.2     04-25    145  |  111<H>  |  25<H>  ----------------------------<  150<H>  4.2   |  24  |  1.08    Ca    8.4      25 Apr 2023 15:10  Phos  3.2     04-25  Mg     2.8     04-25    TPro  x   /  Alb  2.4<L>  /  TBili  x   /  DBili  x   /  AST  x   /  ALT  x   /  AlkPhos  x   04-24      MEDICATIONS  (STANDING):  acetylcysteine 20%  Inhalation 4 milliLiter(s) Inhalation every 6 hours  albuterol/ipratropium for Nebulization 3 milliLiter(s) Nebulizer every 6 hours  amLODIPine   Tablet 5 milliGRAM(s) Oral daily  artificial  tears Solution 1 Drop(s) Both EYES every 4 hours  carbidopa/levodopa  25/250 1 Tablet(s) Oral <User Schedule>  chlorhexidine 2% Cloths 1 Application(s) Topical daily  dexAMETHasone  Injectable   IV Push   dextrose 50% Injectable 25 Gram(s) IV Push once  enoxaparin Injectable 40 milliGRAM(s) SubCutaneous <User Schedule>  entacapone 200 milliGRAM(s) Oral <User Schedule>  insulin lispro (ADMELOG) corrective regimen sliding scale   SubCutaneous every 6 hours  levETIRAcetam  IVPB 1000 milliGRAM(s) IV Intermittent every 12 hours  pantoprazole  Injectable 40 milliGRAM(s) IV Push daily  piperacillin/tazobactam IVPB.. 3.375 Gram(s) IV Intermittent every 8 hours  senna 2 Tablet(s) Oral at bedtime  sodium chloride 2 Gram(s) Oral every 8 hours    MEDICATIONS  (PRN):  acetaminophen   Oral Liquid .. 650 milliGRAM(s) Oral every 6 hours PRN Temp greater or equal to 38C (100.4F), Mild Pain (1 - 3)  hydrALAZINE Injectable 10 milliGRAM(s) IV Push every 4 hours PRN SBP >160  ondansetron Injectable 4 milliGRAM(s) IV Push every 6 hours PRN Nausea and/or Vomiting      Please see the day's note documented by  *** for detailed ongoing assessment and plan.  Additions to plan are as follows:    - None.         PM EVENTS: no acute overnight events as of documentation time of this note.    ROS: negative except as mentioned above.    T(C): 36.6 (04-25-23 @ 17:51), Max: 37.4 (04-25-23 @ 14:12)  HR: 81 (04-25-23 @ 20:00) (50 - 93)  BP: 165/113 (04-25-23 @ 20:00) (114/64 - 180/85)  RR: 18 (04-25-23 @ 20:00) (16 - 20)  SpO2: 98% (04-25-23 @ 20:00) (93% - 100%)        I&O's Summary    24 Apr 2023 07:01  -  25 Apr 2023 07:00  --------------------------------------------------------  IN: 2025 mL / OUT: 2197 mL / NET: -172 mL    25 Apr 2023 07:01  -  25 Apr 2023 20:07  --------------------------------------------------------  IN: 450 mL / OUT: 1812 mL / NET: -1362 mL        EXAM:     [place] Coma Scale: 3/4/6 = 13    General: normocephalic, EVD, laying in bed supine, no distress  Neuro     MS: awake, alert and oriented x2, cooperative with exam, follows commands, no neglect    CN: PERRL, gaze is conjugate, face symmetric, hearing grossly intact    Mot: bulk normal, (+)bilateral rigid tone, power UPPER 5/5 proximally and distally, LOWER 3/3 proximally  Chest: lungs are CTA, heart regular rate/rhythm, present S1/S2, no murmurs or rubs  Abdomen: distended but soft and nontender without peritoneal signs  Extremities: no clubbing, well-perfused, no edema                            13.7   11.17 )-----------( 179      ( 25 Apr 2023 05:30 )             44.2     04-25    145  |  111<H>  |  25<H>  ----------------------------<  150<H>  4.2   |  24  |  1.08    Ca    8.4      25 Apr 2023 15:10  Phos  3.2     04-25  Mg     2.8     04-25    TPro  x   /  Alb  2.4<L>  /  TBili  x   /  DBili  x   /  AST  x   /  ALT  x   /  AlkPhos  x   04-24      MEDICATIONS  (STANDING):  acetylcysteine 20%  Inhalation 4 milliLiter(s) Inhalation every 6 hours  albuterol/ipratropium for Nebulization 3 milliLiter(s) Nebulizer every 6 hours  amLODIPine   Tablet 5 milliGRAM(s) Oral daily  artificial  tears Solution 1 Drop(s) Both EYES every 4 hours  carbidopa/levodopa  25/250 1 Tablet(s) Oral <User Schedule>  chlorhexidine 2% Cloths 1 Application(s) Topical daily  dexAMETHasone  Injectable   IV Push   dextrose 50% Injectable 25 Gram(s) IV Push once  enoxaparin Injectable 40 milliGRAM(s) SubCutaneous <User Schedule>  entacapone 200 milliGRAM(s) Oral <User Schedule>  insulin lispro (ADMELOG) corrective regimen sliding scale   SubCutaneous every 6 hours  levETIRAcetam  IVPB 1000 milliGRAM(s) IV Intermittent every 12 hours  pantoprazole  Injectable 40 milliGRAM(s) IV Push daily  piperacillin/tazobactam IVPB.. 3.375 Gram(s) IV Intermittent every 8 hours  senna 2 Tablet(s) Oral at bedtime  sodium chloride 2 Gram(s) Oral every 8 hours    MEDICATIONS  (PRN):  acetaminophen   Oral Liquid .. 650 milliGRAM(s) Oral every 6 hours PRN Temp greater or equal to 38C (100.4F), Mild Pain (1 - 3)  hydrALAZINE Injectable 10 milliGRAM(s) IV Push every 4 hours PRN SBP >160  ondansetron Injectable 4 milliGRAM(s) IV Push every 6 hours PRN Nausea and/or Vomiting      Please see the day's note documented by Dr. Grimaldo for detailed ongoing assessment and plan.     - Frequent neuro checks/vital signs, EVD/ICP/cerebral edema watch  - Tube feeds    Remains in NSICU given high risk of acute neurological decompensation

## 2023-04-25 NOTE — PROGRESS NOTE ADULT - SUBJECTIVE AND OBJECTIVE BOX
HPI:  Patient is a 68 year old male with PMH of seizures, HTN, and Parkinson's disease diagnosed in 2012 with multiple falls who initially presented to [facility] on 4/17 for elective left craniotomy for meningioma resection. He has also been wheelchair bound since about 2-3 years ago and has frequent falls related to his Parkinson's disease.  Underwent L crani for meningioma resection on 4/17. Hospital course was uncomplicated.   Discharged to [facility] on 4/21 in stable condition.     On arrival to rehab, pt c/o incisional headache with episodes of n/v during ambulance ride to rehab.  Pt given zofran and oxycodone, however, headache returned with persistent nausea and vomiting.   RRT called for worsened mental status. CT head obtained stat which showed new large left frontal hemorrhage with IVH, cerebral edema with midline shift and some effacement of L frontal horn.  Pt started on cardene, given Keppra and transferred to [facility]. Prior to transfer, mental status declined (GCS 13-> 9) with increased secretions, therefore attempted intubation; LMA airway placed.      ICH 3   NIHSS 23 (22 Apr 2023 06:28)    INTERVAL EVENTS:  Tolerating PO. EVD remains at 10cm H2O.    HOSPITAL COURSE:  4/22: readmitted with new large left frontal IPH with IVH, with edema and midline shift with large left frontal IPH. EVD placed, opened at 10. CTH post EVD complete. Faison sump placed for GI decompression. Started 3%@80 for na goal 145-150. KUB showing small loops of gas, will start TF tomorrow. TTE mild aortic sclerosis w/ no stenosis, mild AR, left pleural effusion EF 71%. LE dopplers negative.  4/23: POD 6. EVD @ 10. POCUS with RLL consolidation, trace B lines, L lung clear, collapsable IVC. 250cc albumin and 500cc NS bolus x2 in setting of decreasing UOP. Started on SQL 40 BID, weight based. Off propofol, on CPAP 8/5, plan to extubate. Removed matthew, f/u TOV. Removed a-line. Extubated to HFNC. Decreased 3% to 30. Passed TOV.   4/24: POD 7. Tolerating HFNC. Start TF today. Na 155, stopped 3%, fentanyl d/c'd, CT stable. Trickle feeds started. PT/OT recommending AR. C/o headache, given tramadol  with resolution. S&S recommend pureed diet with thin liquids. Weaning to 6L NC. Na 155-->144, gave 225cc 3% bolus, Na-->147.   4/25: POD 8. Cont 3% at 30. EVD remains at 10. Tolerating PO.    Vital Signs Last 24 Hrs  T(C): 37.2 (24 Apr 2023 21:44), Max: 37.8 (24 Apr 2023 17:18)  T(F): 98.9 (24 Apr 2023 21:44), Max: 100 (24 Apr 2023 17:18)  HR: 72 (25 Apr 2023 01:00) (45 - 93)  BP: 127/70 (25 Apr 2023 01:00) (121/64 - 164/70)  BP(mean): 94 (25 Apr 2023 01:00) (85 - 112)  RR: 16 (25 Apr 2023 01:00) (16 - 22)  SpO2: 100% (25 Apr 2023 01:00) (98% - 100%)    Parameters below as of 25 Apr 2023 01:00  Patient On (Oxygen Delivery Method): nasal cannula w/ humidification  O2 Flow (L/min): 6      I&O's Summary    23 Apr 2023 07:01  -  24 Apr 2023 07:00  --------------------------------------------------------  IN: 2413.7 mL / OUT: 2292 mL / NET: 121.7 mL    24 Apr 2023 07:01  -  25 Apr 2023 01:15  --------------------------------------------------------  IN: 1645 mL / OUT: 1734 mL / NET: -89 mL      PHYSICAL EXAM:  General: NAD   Cardiovascular: regular rhythm, slow rhythm  Respiratory: nonlabored breathing, normal chest rise   GI: abdomen soft, nontender, nondistended; +NGT  Neuro: OE, tracks, PERRL, EOMI, intention tremors, slowed speech, AOx2, follows commands, RUE 4+/5, LUE 5/5, RLE 4/5 prox, 4+/5 distally, LLE 4+/5 prox, 5/5 distally  Extremities: distal pulses 2+ x4  Incision/wound: L crani incision c/d/i; R EVD site c/d/i    LABS:                        11.1   10.72 )-----------( 126      ( 24 Apr 2023 02:35 )             34.9     04-24    147<H>  |  116<H>  |  30<H>  ----------------------------<  164<H>  4.3   |  25  |  1.15    Ca    8.8      24 Apr 2023 23:49  Phos  2.2     04-24  Mg     2.1     04-24    TPro  x   /  Alb  2.4<L>  /  TBili  x   /  DBili  x   /  AST  x   /  ALT  x   /  AlkPhos  x   04-24            CAPILLARY BLOOD GLUCOSE      POCT Blood Glucose.: 137 mg/dL (24 Apr 2023 23:41)  POCT Blood Glucose.: 111 mg/dL (24 Apr 2023 17:22)  POCT Blood Glucose.: 98 mg/dL (24 Apr 2023 11:54)  POCT Blood Glucose.: 99 mg/dL (24 Apr 2023 06:02)      Drug Levels: [] N/A    CSF Analysis: [] N/A      Allergies    No Known Allergies    Intolerances      MEDICATIONS:  Antibiotics:  piperacillin/tazobactam IVPB.. 3.375 Gram(s) IV Intermittent every 8 hours    Neuro:  acetaminophen   Oral Liquid .. 650 milliGRAM(s) Oral every 6 hours PRN  carbidopa/levodopa  25/250 1 Tablet(s) Oral <User Schedule>  entacapone 200 milliGRAM(s) Oral <User Schedule>  levETIRAcetam  IVPB 1000 milliGRAM(s) IV Intermittent every 12 hours  ondansetron Injectable 4 milliGRAM(s) IV Push every 6 hours PRN    Anticoagulation:  enoxaparin Injectable 40 milliGRAM(s) SubCutaneous <User Schedule>    OTHER:  acetylcysteine 20%  Inhalation 4 milliLiter(s) Inhalation every 6 hours  albuterol/ipratropium for Nebulization 3 milliLiter(s) Nebulizer every 6 hours  amLODIPine   Tablet 5 milliGRAM(s) Oral daily  artificial  tears Solution 1 Drop(s) Both EYES every 4 hours  chlorhexidine 2% Cloths 1 Application(s) Topical daily  dexAMETHasone  Injectable 4 milliGRAM(s) IV Push every 8 hours  dexAMETHasone  Injectable   IV Push   dextrose 50% Injectable 25 Gram(s) IV Push once  hydrALAZINE Injectable 10 milliGRAM(s) IV Push every 4 hours PRN  insulin lispro (ADMELOG) corrective regimen sliding scale   SubCutaneous every 6 hours  pantoprazole  Injectable 40 milliGRAM(s) IV Push daily  senna 2 Tablet(s) Oral at bedtime    IVF:  sodium chloride 3%. 500 milliLiter(s) IV Continuous <Continuous>    CULTURES:  Culture Results:   Normal Respiratory Neli present (04-22 @ 06:20)    RADIOLOGY & ADDITIONAL TESTS:      ASSESSMENT:  68M hx seizures, HTN, poorly controlled Parkinson's disease w/ prog inability to walk since Nov 2022. Found to have large L frontal dural based lesion c/f meningioma w/ associated mass effect and edema. S/p L crani for meningioma resection (4/17). Discharged to [facility] on 4/21. On arrival to rehab c/o headache with persistgent nausea/vomiting. CTH performed reveals new large left frontal IPH with IVH, with edema and midline shift. Intubated for airway protection. Readmitted to [facility] for further management, NIHSS 23, ICH 3.    NEURO:  - neuro/vitals q1hr  - EVD @10cm H2O, monitor ICP, outptu  - Keppra 1g BID (hx seizures)  - Decadron 4mg q6h taper 1 week to off   - continue home sinemet (25/250mg) 4 times/day, entacapone 200mg q8h  - hold home gabapentin 400mg 4 times/day   - repeat CTH performed on arrival, CTH post EVD stable 4/22  - repeat CTH 4/24: no significant interval change    CARDIOVASCULAR:  - -160  - amlodipine 5mg qd  - TTE 4/22 mild aortic sclerosis w/ no stenosis, mild AR, left pleural effusion EF 71%    PULMONARY:  - extubated 4/23; on NC  - nebs q6h, chest PT    RENAL:  - Na goal 145-150  - 3% @ 30   - voiding     GI:  - NGT in place, pureed diet with thin liquids  - bowel regimen, last BM 4/22   - protonix 40mg qd for GI ppx while on steroids    HEME:  - SCDs, SQL for DVT ppx   - LE dopplers 4/22 negative     ID:  - leukocytosis and RLL infiltrate  - Zosyn (4/22-4/27), f/u sputum culture     ENDO:  - ISS while on steroids  - A1c 5.8    DISPO:  - NSICU, full code, family updated  - PT/OT rec AR: patient can tolerate three hours PT/OT daily    Discussed with Dr. D'Amico and Dr. Belcher

## 2023-04-25 NOTE — PROGRESS NOTE ADULT - SUBJECTIVE AND OBJECTIVE BOX
==========================  ADULT NSICU PROGRESS NOTE  ==========================  HPI:  Patient is a 68 year old male with PMH of seizures, HTN, and Parkinson's disease diagnosed in 2012 with multiple falls who initially presented to [facility] on 4/17 for elective left craniotomy for meningioma resection. He has also been wheelchair bound since about 2-3 years ago and has frequent falls related to his Parkinson's disease.  Underwent L crani for meningioma resection on 4/17. Hospital course was uncomplicated.   Discharged to [facility] on 4/21 in stable condition.     On arrival to rehab, pt c/o incisional headache with episodes of n/v during ambulance ride to rehab.  Pt given zofran and oxycodone, however, headache returned with persistent nausea and vomiting.   RRT called for worsened mental status. CT head obtained stat which showed new large left frontal hemorrhage with IVH, cerebral edema with midline shift and some effacement of L frontal horn.  Pt started on cardene, given Keppra and transferred to [facility]. Prior to transfer, mental status declined (GCS 13-> 9) with increased secretions, therefore attempted intubation; LMA airway placed.      On admission, the patient was:  GCS:  Chavez-Hou:  modified Garcia:  ICH score: 3  NIHSS: 23      ICU Vital Signs Last 24 Hrs  T(C): 37.2 (25 Apr 2023 09:00), Max: 37.8 (24 Apr 2023 17:18)  T(F): 98.9 (25 Apr 2023 09:00), Max: 100 (24 Apr 2023 17:18)  HR: 76 (25 Apr 2023 11:00) (50 - 93)  BP: 138/67 (25 Apr 2023 11:00) (114/64 - 180/85)  BP(mean): 95 (25 Apr 2023 11:00) (84 - 122)  RR: 16 (25 Apr 2023 11:00) (16 - 20)  SpO2: 100% (25 Apr 2023 11:00) (98% - 100%)      04-24-23 @ 07:01  -  04-25-23 @ 07:00  --------------------------------------------------------  IN: 2025 mL / OUT: 2197 mL / NET: -172 mL    04-25-23 @ 07:01  -  04-25-23 @ 11:20  --------------------------------------------------------  IN: 220 mL / OUT: 833 mL / NET: -613 mL      EXAM:   General: Normocephalic, EVD, laying in bed supine, no distress  Neuro:    MS: Awake, alert, oriented x 4     CN: PERRL 3mm and reactive, gaze is conjugate, face symmetric, hearing grossly intact    Mot: Power 5/5 in all extremities  Chest: Lungs clear  Heart: Regular rate/rhythm, present S1/S2, no murmurs or rubs  Abdomen: Distended, soft and nontender without peritoneal signs  Extremities: No edema      MEDICATIONS:   acetaminophen   Oral Liquid .. 650 milliGRAM(s) Oral every 6 hours PRN  acetylcysteine 20%  Inhalation 4 milliLiter(s) Inhalation every 6 hours  albuterol/ipratropium for Nebulization 3 milliLiter(s) Nebulizer every 6 hours  amLODIPine   Tablet 5 milliGRAM(s) Oral daily  artificial  tears Solution 1 Drop(s) Both EYES every 4 hours  carbidopa/levodopa  25/250 1 Tablet(s) Oral <User Schedule>  chlorhexidine 2% Cloths 1 Application(s) Topical daily  dexAMETHasone  Injectable 4 milliGRAM(s) IV Push every 8 hours  dexAMETHasone  Injectable   IV Push   dextrose 50% Injectable 25 Gram(s) IV Push once  enoxaparin Injectable 40 milliGRAM(s) SubCutaneous <User Schedule>  entacapone 200 milliGRAM(s) Oral <User Schedule>  hydrALAZINE Injectable 10 milliGRAM(s) IV Push every 4 hours PRN  insulin lispro (ADMELOG) corrective regimen sliding scale   SubCutaneous every 6 hours  levETIRAcetam  IVPB 1000 milliGRAM(s) IV Intermittent every 12 hours  ondansetron Injectable 4 milliGRAM(s) IV Push every 6 hours PRN  pantoprazole  Injectable 40 milliGRAM(s) IV Push daily  piperacillin/tazobactam IVPB.. 3.375 Gram(s) IV Intermittent every 8 hours  senna 2 Tablet(s) Oral at bedtime  sodium chloride 0.9%. 1000 milliLiter(s) (50 mL/Hr) IV Continuous <Continuous>  sodium chloride 3%. 500 milliLiter(s) (30 mL/Hr) IV Continuous <Continuous>    LABS:                     13.7   11.17 )-----------( 179      ( 25 Apr 2023 05:30 )             44.2     04-25    146<H>  |  112<H>  |  27<H>  ----------------------------<  126<H>  4.3   |  24  |  1.19    Ca    8.8      25 Apr 2023 05:30  Phos  3.2     04-25  Mg     2.8     04-25    TPro  x   /  Alb  2.4<L>  /  TBili  x   /  DBili  x   /  AST  x   /  ALT  x   /  AlkPhos  x   04-24    LIVER FUNCTIONS - ( 24 Apr 2023 02:35 )  Alb: 2.4 g/dL / Pro: x     / ALK PHOS: x     / ALT: x     / AST: x     / GGT: x

## 2023-04-25 NOTE — PROGRESS NOTE ADULT - ASSESSMENT
ASSESSMENT AND PLAN  L. frontal ICH-3 with IVH, suspected venous bleed  History of elective L. crani for meningioma resection (4/17/23, Dr. D'Amico)  History of seizures, Parkinson's disease, multiple falls and wheelchair bound  Abdominal distension    NEURO:  Q1h neurochecks  Continue Keppra 1000mg bid (seizure history)  Decadron with taper per NSGY  R. frontal EVD at 68loQ8C (monitor ICPs/CPP/ color/output)  CT head repeat 4/25am  Continue anti-Parkinson's meds: Sinemet, entacapone  Activity: PT/OT as tolerated.     PULM: Pulm vasc congestion (noted on CXR 4/24)  Pulm toilet. HFNC weaned; currently on 6LNC  proBNP, POCUS  SpO2 goal > 92%  Chest PT    CARDIAC:   BP goal 100- 160  Continue Amlodipine  TTE: EF 71%. Mild AR    GI:  Diet: TFs. Pureed diet  Stress ulcer prophylaxis: PPI while on steroids  LBM: 4/25    /RENAL:  Sodium goal 140-145. Start salt tabs  Trend BMPs Q6  Is/Os  DC IVF re: pulm vasc congestion    HEME:  Maintain Hb > 7.0, PLT > 100,000  SCDs, SQL  AntiXa 4/26 2:00am    ID:  Piperacillin tazobactam for empiric aspiration PNA coverage  Follow up sputum culture  Monitor temp curve, WBC.    ENDO:  Goal euglycemia, ISS    MISC:    SOCIAL/FAMILY:  [] awaiting [x] updated son Kenneth at bedside [] family meeting    CODE STATUS:  [x] Full Code [] DNR [] DNI [] Palliative/Comfort Care    DISPOSITION:  [x] ICU [] Stroke Unit [] Floor [] EMU [] RCU [] PCU    Time spent: 60 critical care minutes

## 2023-04-26 LAB
ANION GAP SERPL CALC-SCNC: 7 MMOL/L — SIGNIFICANT CHANGE UP (ref 5–17)
BUN SERPL-MCNC: 23 MG/DL — SIGNIFICANT CHANGE UP (ref 7–23)
CALCIUM SERPL-MCNC: 8.8 MG/DL — SIGNIFICANT CHANGE UP (ref 8.4–10.5)
CHLORIDE SERPL-SCNC: 110 MMOL/L — HIGH (ref 96–108)
CO2 SERPL-SCNC: 25 MMOL/L — SIGNIFICANT CHANGE UP (ref 22–31)
CREAT SERPL-MCNC: 1.02 MG/DL — SIGNIFICANT CHANGE UP (ref 0.5–1.3)
EGFR: 79 ML/MIN/1.73M2 — SIGNIFICANT CHANGE UP
GLUCOSE BLDC GLUCOMTR-MCNC: 106 MG/DL — HIGH (ref 70–99)
GLUCOSE BLDC GLUCOMTR-MCNC: 137 MG/DL — HIGH (ref 70–99)
GLUCOSE BLDC GLUCOMTR-MCNC: 82 MG/DL — SIGNIFICANT CHANGE UP (ref 70–99)
GLUCOSE SERPL-MCNC: 124 MG/DL — HIGH (ref 70–99)
HCT VFR BLD CALC: 38.8 % — LOW (ref 39–50)
HGB BLD-MCNC: 12.6 G/DL — LOW (ref 13–17)
MAGNESIUM SERPL-MCNC: 2.4 MG/DL — SIGNIFICANT CHANGE UP (ref 1.6–2.6)
MCHC RBC-ENTMCNC: 31.1 PG — SIGNIFICANT CHANGE UP (ref 27–34)
MCHC RBC-ENTMCNC: 32.5 GM/DL — SIGNIFICANT CHANGE UP (ref 32–36)
MCV RBC AUTO: 95.8 FL — SIGNIFICANT CHANGE UP (ref 80–100)
NRBC # BLD: 0 /100 WBCS — SIGNIFICANT CHANGE UP (ref 0–0)
NT-PROBNP SERPL-SCNC: 1387 PG/ML — HIGH (ref 0–300)
PHOSPHATE SERPL-MCNC: 3.4 MG/DL — SIGNIFICANT CHANGE UP (ref 2.5–4.5)
PLATELET # BLD AUTO: 175 K/UL — SIGNIFICANT CHANGE UP (ref 150–400)
POTASSIUM SERPL-MCNC: 3.8 MMOL/L — SIGNIFICANT CHANGE UP (ref 3.5–5.3)
POTASSIUM SERPL-SCNC: 3.8 MMOL/L — SIGNIFICANT CHANGE UP (ref 3.5–5.3)
RBC # BLD: 4.05 M/UL — LOW (ref 4.2–5.8)
RBC # FLD: 14.9 % — HIGH (ref 10.3–14.5)
SODIUM SERPL-SCNC: 142 MMOL/L — SIGNIFICANT CHANGE UP (ref 135–145)
UFH PPP CHRO-ACNC: 0.27 IU/ML — LOW (ref 0.3–0.7)
WBC # BLD: 8.4 K/UL — SIGNIFICANT CHANGE UP (ref 3.8–10.5)
WBC # FLD AUTO: 8.4 K/UL — SIGNIFICANT CHANGE UP (ref 3.8–10.5)

## 2023-04-26 PROCEDURE — 76937 US GUIDE VASCULAR ACCESS: CPT | Mod: 26

## 2023-04-26 PROCEDURE — 36000 PLACE NEEDLE IN VEIN: CPT

## 2023-04-26 PROCEDURE — 99291 CRITICAL CARE FIRST HOUR: CPT

## 2023-04-26 PROCEDURE — 99292 CRITICAL CARE ADDL 30 MIN: CPT | Mod: 24

## 2023-04-26 PROCEDURE — 70450 CT HEAD/BRAIN W/O DYE: CPT | Mod: 26

## 2023-04-26 RX ORDER — POTASSIUM CHLORIDE 20 MEQ
20 PACKET (EA) ORAL ONCE
Refills: 0 | Status: COMPLETED | OUTPATIENT
Start: 2023-04-26 | End: 2023-04-26

## 2023-04-26 RX ORDER — IPRATROPIUM/ALBUTEROL SULFATE 18-103MCG
3 AEROSOL WITH ADAPTER (GRAM) INHALATION EVERY 6 HOURS
Refills: 0 | Status: DISCONTINUED | OUTPATIENT
Start: 2023-04-26 | End: 2023-05-03

## 2023-04-26 RX ORDER — PANTOPRAZOLE SODIUM 20 MG/1
40 TABLET, DELAYED RELEASE ORAL
Refills: 0 | Status: DISCONTINUED | OUTPATIENT
Start: 2023-04-27 | End: 2023-04-30

## 2023-04-26 RX ORDER — AMLODIPINE BESYLATE 2.5 MG/1
5 TABLET ORAL ONCE
Refills: 0 | Status: COMPLETED | OUTPATIENT
Start: 2023-04-26 | End: 2023-04-26

## 2023-04-26 RX ORDER — AMLODIPINE BESYLATE 2.5 MG/1
10 TABLET ORAL DAILY
Refills: 0 | Status: DISCONTINUED | OUTPATIENT
Start: 2023-04-27 | End: 2023-05-03

## 2023-04-26 RX ORDER — LEVETIRACETAM 250 MG/1
1000 TABLET, FILM COATED ORAL
Refills: 0 | Status: DISCONTINUED | OUTPATIENT
Start: 2023-04-26 | End: 2023-05-03

## 2023-04-26 RX ADMIN — PIPERACILLIN AND TAZOBACTAM 25 GRAM(S): 4; .5 INJECTION, POWDER, LYOPHILIZED, FOR SOLUTION INTRAVENOUS at 15:06

## 2023-04-26 RX ADMIN — Medication 1 DROP(S): at 14:05

## 2023-04-26 RX ADMIN — AMLODIPINE BESYLATE 5 MILLIGRAM(S): 2.5 TABLET ORAL at 05:50

## 2023-04-26 RX ADMIN — Medication 4 MILLILITER(S): at 22:00

## 2023-04-26 RX ADMIN — CARBIDOPA AND LEVODOPA 1 TABLET(S): 25; 100 TABLET ORAL at 19:53

## 2023-04-26 RX ADMIN — Medication 3 MILLILITER(S): at 05:50

## 2023-04-26 RX ADMIN — CARBIDOPA AND LEVODOPA 1 TABLET(S): 25; 100 TABLET ORAL at 08:00

## 2023-04-26 RX ADMIN — LEVETIRACETAM 1000 MILLIGRAM(S): 250 TABLET, FILM COATED ORAL at 17:08

## 2023-04-26 RX ADMIN — SODIUM CHLORIDE 2 GRAM(S): 9 INJECTION INTRAMUSCULAR; INTRAVENOUS; SUBCUTANEOUS at 14:05

## 2023-04-26 RX ADMIN — ENTACAPONE 200 MILLIGRAM(S): 200 TABLET, FILM COATED ORAL at 15:07

## 2023-04-26 RX ADMIN — PANTOPRAZOLE SODIUM 40 MILLIGRAM(S): 20 TABLET, DELAYED RELEASE ORAL at 11:30

## 2023-04-26 RX ADMIN — Medication 4 MILLILITER(S): at 16:12

## 2023-04-26 RX ADMIN — SODIUM CHLORIDE 2 GRAM(S): 9 INJECTION INTRAMUSCULAR; INTRAVENOUS; SUBCUTANEOUS at 22:00

## 2023-04-26 RX ADMIN — Medication 4 MILLIGRAM(S): at 05:50

## 2023-04-26 RX ADMIN — SODIUM CHLORIDE 2 GRAM(S): 9 INJECTION INTRAMUSCULAR; INTRAVENOUS; SUBCUTANEOUS at 05:50

## 2023-04-26 RX ADMIN — PIPERACILLIN AND TAZOBACTAM 25 GRAM(S): 4; .5 INJECTION, POWDER, LYOPHILIZED, FOR SOLUTION INTRAVENOUS at 00:00

## 2023-04-26 RX ADMIN — CARBIDOPA AND LEVODOPA 1 TABLET(S): 25; 100 TABLET ORAL at 11:30

## 2023-04-26 RX ADMIN — ENTACAPONE 200 MILLIGRAM(S): 200 TABLET, FILM COATED ORAL at 11:30

## 2023-04-26 RX ADMIN — PIPERACILLIN AND TAZOBACTAM 25 GRAM(S): 4; .5 INJECTION, POWDER, LYOPHILIZED, FOR SOLUTION INTRAVENOUS at 08:01

## 2023-04-26 RX ADMIN — ENTACAPONE 200 MILLIGRAM(S): 200 TABLET, FILM COATED ORAL at 08:00

## 2023-04-26 RX ADMIN — Medication 1 DROP(S): at 10:49

## 2023-04-26 RX ADMIN — LEVETIRACETAM 600 MILLIGRAM(S): 250 TABLET, FILM COATED ORAL at 05:50

## 2023-04-26 RX ADMIN — Medication 1 DROP(S): at 17:08

## 2023-04-26 RX ADMIN — Medication 4 MILLILITER(S): at 10:49

## 2023-04-26 RX ADMIN — Medication 1 DROP(S): at 22:00

## 2023-04-26 RX ADMIN — AMLODIPINE BESYLATE 5 MILLIGRAM(S): 2.5 TABLET ORAL at 12:06

## 2023-04-26 RX ADMIN — Medication 10 MILLIGRAM(S): at 06:24

## 2023-04-26 RX ADMIN — Medication 3 MILLILITER(S): at 10:49

## 2023-04-26 RX ADMIN — SENNA PLUS 2 TABLET(S): 8.6 TABLET ORAL at 22:00

## 2023-04-26 RX ADMIN — Medication 4 MILLIGRAM(S): at 17:08

## 2023-04-26 RX ADMIN — Medication 1 DROP(S): at 06:01

## 2023-04-26 RX ADMIN — Medication 1 DROP(S): at 02:06

## 2023-04-26 RX ADMIN — CHLORHEXIDINE GLUCONATE 1 APPLICATION(S): 213 SOLUTION TOPICAL at 05:58

## 2023-04-26 RX ADMIN — CARBIDOPA AND LEVODOPA 1 TABLET(S): 25; 100 TABLET ORAL at 15:07

## 2023-04-26 RX ADMIN — Medication 4 MILLILITER(S): at 05:51

## 2023-04-26 RX ADMIN — ENOXAPARIN SODIUM 40 MILLIGRAM(S): 100 INJECTION SUBCUTANEOUS at 22:00

## 2023-04-26 RX ADMIN — Medication 20 MILLIEQUIVALENT(S): at 06:59

## 2023-04-26 NOTE — PROGRESS NOTE ADULT - SUBJECTIVE AND OBJECTIVE BOX
==========================  ADULT NSICU PROGRESS NOTE  ==========================  HPI:  Patient is a 68 year old male with PMH of seizures, HTN, and Parkinson's disease diagnosed in 2012 with multiple falls who initially presented to [facility] on 4/17 for elective left craniotomy for meningioma resection. He has also been wheelchair bound since about 2-3 years ago and has frequent falls related to his Parkinson's disease.  Underwent L crani for meningioma resection on 4/17. Hospital course was uncomplicated.   Discharged to [facility] on 4/21 in stable condition.     On arrival to rehab, pt c/o incisional headache with episodes of n/v during ambulance ride to rehab.  Pt given zofran and oxycodone, however, headache returned with persistent nausea and vomiting.   RRT called for worsened mental status. CT head obtained stat which showed new large left frontal hemorrhage with IVH, cerebral edema with midline shift and some effacement of L frontal horn.  Pt started on cardene, given Keppra and transferred to [facility]. Prior to transfer, mental status declined (GCS 13-> 9) with increased secretions, therefore attempted intubation; LMA airway placed.      On admission, the patient was:  GCS:  Chavez-Hou:  modified Garcia:  ICH score: 3  NIHSS: 23      ICU Vital Signs Last 24 Hrs  T(C): 37.4 (26 Apr 2023 05:38), Max: 37.4 (25 Apr 2023 14:12)  T(F): 99.3 (26 Apr 2023 05:38), Max: 99.4 (25 Apr 2023 14:12)  HR: 73 (26 Apr 2023 08:00) (56 - 88)  BP: 137/72 (26 Apr 2023 08:00) (126/63 - 185/84)  BP(mean): 98 (26 Apr 2023 08:00) (89 - 132)  RR: 18 (26 Apr 2023 08:00) (16 - 20)  SpO2: 95% (26 Apr 2023 08:00) (93% - 100%)      04-25-23 @ 07:01  -  04-26-23 @ 07:00  --------------------------------------------------------  IN: 650 mL / OUT: 3410 mL / NET: -2760 mL      EXAM:   General: Normocephalic, EVD, laying in bed supine, no distress  Neuro:    MS: Awake, alert, oriented x 4     CN: PERRL 3mm and reactive, gaze is conjugate, face symmetric, hearing grossly intact    Mot: Power 5/5 in all extremities  Chest: Lungs clear  Heart: Regular rate/rhythm, present S1/S2, no murmurs or rubs  Abdomen: Distended, soft and nontender without peritoneal signs  Extremities: No edema      MEDICATIONS:  acetaminophen   Oral Liquid .. 650 milliGRAM(s) Oral every 6 hours PRN  acetylcysteine 20%  Inhalation 4 milliLiter(s) Inhalation every 6 hours  albuterol/ipratropium for Nebulization 3 milliLiter(s) Nebulizer every 6 hours  amLODIPine   Tablet 5 milliGRAM(s) Oral daily  artificial  tears Solution 1 Drop(s) Both EYES every 4 hours  carbidopa/levodopa  25/250 1 Tablet(s) Oral <User Schedule>  chlorhexidine 2% Cloths 1 Application(s) Topical daily  dexAMETHasone  Injectable   IV Push   dexAMETHasone  Injectable 4 milliGRAM(s) IV Push every 12 hours  dextrose 50% Injectable 25 Gram(s) IV Push once  enoxaparin Injectable 40 milliGRAM(s) SubCutaneous <User Schedule>  entacapone 200 milliGRAM(s) Oral <User Schedule>  hydrALAZINE Injectable 10 milliGRAM(s) IV Push every 4 hours PRN  insulin lispro (ADMELOG) corrective regimen sliding scale   SubCutaneous every 6 hours  levETIRAcetam  IVPB 1000 milliGRAM(s) IV Intermittent every 12 hours  ondansetron Injectable 4 milliGRAM(s) IV Push every 6 hours PRN  pantoprazole  Injectable 40 milliGRAM(s) IV Push daily  piperacillin/tazobactam IVPB.. 3.375 Gram(s) IV Intermittent every 8 hours  senna 2 Tablet(s) Oral at bedtime  sodium chloride 2 Gram(s) Oral every 8 hours    LABS:                       12.6   8.40  )-----------( 175      ( 26 Apr 2023 02:26 )             38.8     04-26    142  |  110<H>  |  23  ----------------------------<  124<H>  3.8   |  25  |  1.02    Ca    8.8      26 Apr 2023 02:26  Phos  3.4     04-26  Mg     2.4     04-26

## 2023-04-26 NOTE — CHART NOTE - NSCHARTNOTEFT_GEN_A_CORE
4/26: POD 9. EVD @ 10. Nebs made PRN, amlodipine increased to 10mg. Rec'd for soft and bite size diet, CTH today with no significant interval change. EVD clamp trial ICPS hovering around 18, will monitor clinically and open if neuro change or headache.

## 2023-04-26 NOTE — PROGRESS NOTE ADULT - ASSESSMENT
ASSESSMENT AND PLAN  L. frontal ICH-3 with IVH, suspected venous bleed  History of elective L. crani for meningioma resection (4/17/23, Dr. D'Amico)  History of seizures, Parkinson's disease, multiple falls and wheelchair bound  Abdominal distension    NEURO:  Q1h neurochecks  Continue Keppra 1000mg bid (seizure history)  Decadron with taper per NSGY  R. frontal EVD at [id number] (monitor ICPs/CPP/ color/output)  CT head repeat 4/25am  Continue anti-Parkinson's meds: Sinemet, entacapone  Activity: PT/OT as tolerated.     PULM: Pulm vasc congestion (noted on CXR 4/24)  Pulm toilet. HFNC weaned; currently on 2LNC  proBNP 1400->:  POCUS  SpO2 goal > 92%  Chest PT    CARDIAC:   BP goal 100- 160  Continue Amlodipine  TTE: EF 71%. Mild AR    GI:  Diet: TFs. Pureed diet  Stress ulcer prophylaxis: PPI while on steroids  LBM: 4/25    /RENAL:  Sodium goal 140-145. Continue salt tabs  Trend BMPs daily  Monitor Is/Os  Cautious IVF re: pulm vasc congestion. Currently IV locked    HEME:  Maintain Hb > 7.0, PLT > 100,000  SCDs, SQL  AntiXa 4/26 0.27.Continue daily dose.    ID:  Piperacillin tazobactam for empiric aspiration PNA coverage  Follow up sputum culture  Monitor temp curve, WBC.    ENDO:  Goal euglycemia, ISS    MISC:    SOCIAL/FAMILY:  [] awaiting [x] updated son Kenneth at bedside [] family meeting    CODE STATUS:  [x] Full Code [] DNR [] DNI [] Palliative/Comfort Care    DISPOSITION:  [x] ICU [] Stroke Unit [] Floor [] EMU [] RCU [] PCU    Time spent: 60 critical care minutes     ASSESSMENT AND PLAN  L. frontal ICH-3 with IVH, suspected venous bleed;BD 4  History of elective L. crani for meningioma resection (4/17/23, Dr. D'Amico)  History of seizures, Parkinson's disease, multiple falls and wheelchair bound  Abdominal distension    NEURO:  Q1h neurochecks  Continue Keppra 1000mg bid (seizure history)  Decadron with taper per NSGY  R. frontal EVD at 52xfK3M (monitor ICPs/CPP/ color/output)  CT head repeat 4/26am- stable  MRI   Continue anti-Parkinson's meds: Sinemet, entacapone  Activity: PT/OT as tolerated.     PULM: Pulm vasc congestion (noted on CXR 4/24)  Pulm toilet. HFNC weaned; currently on room air  proBNP 1400->  SpO2 goal > 92%  Chest PT    CARDIAC:   BP goal 100- 160  Continue Amlodipine (increase to 10mg daily)  TTE: EF 71%. Mild AR    GI:  Diet: Soft and bite sized diet  Stress ulcer prophylaxis: PPI while on steroids  LBM: 4/26    /RENAL:  Sodium goal 140-145. Continue salt tabs  Trend BMPs daily  Monitor Is/Os  Cautious IVF re: pulm vasc congestion. Currently IV locked    HEME:  Maintain Hb > 7.0, PLT > 100,000  SCDs, SQL  AntiXa 4/26 0.27.Continue daily dose.    ID:  Piperacillin tazobactam for empiric aspiration PNA coverage 4/27  Follow up sputum culture  Monitor temp curve, WBC.    ENDO:  Goal euglycemia  DC ISS    MISC:    SOCIAL/FAMILY:  [] awaiting [x] updated son Kenneth at bedside [] family meeting    CODE STATUS:  [x] Full Code [] DNR [] DNI [] Palliative/Comfort Care    DISPOSITION:  [x] ICU [] Stroke Unit [] Floor [] EMU [] RCU [] PCU    Time spent: 60 critical care minutes

## 2023-04-26 NOTE — PROGRESS NOTE ADULT - SUBJECTIVE AND OBJECTIVE BOX
SUBJECTIVE: Patient awake and arousable. Unable to assess ROS d/t mental status.     HOSPITAL COURSE:  4/17: POD 0 L crani for tumor resection.   4/18: POD1 L crani tumor resection. Norvasc increased to 5mg daily. Neuro exam stable. Neurology consulted, reocmmended maintaining current parkinson's medication regimen. Had asymptomatic episode of VTACH read on telemetry, hemodynamically stable, returned back to baseline, EKG obtained negative. Cardiology consulted, reported rhythm strip was sinus, likely artifact from tremors in arms, NTD. Pt c/o worsened difficulty breathing, satting well on RA. Given duoneb treatment and CXR completed.  Postop MRI completed in evening.   4/19: POD2, SIRIA overnight,  neuro stable, SLP state may benefit from outpt speech therapy will continue to follow  4/20: POD3, SIRIA overnight. per s/s: soft/bite size diet w/ thin liquids.  4/21: POD4, SIRIA overnight, discahrge Kiran [facility]uggan AR    DISCHARGED TO [facility] on 4/21:    4/22: readmitted with new large left frontal IPH with IVH, with edema and midline shift with large left frontal IPH. EVD placed, opened at 10. CTH post EVD complete. [place] sump placed for GI decompression. Started 3%@80 for na goal 145-150. KUB showing small loops of gas, will start TF tomorrow. TTE mild aortic sclerosis w/ no stenosis, mild AR, left pleural effusion EF 71%. LE dopplers negative.  4/23: POD 6. EVD @ 10. POCUS with RLL consolidation, trace B lines, L lung clear, collapsable IVC. 250cc albumin and 500cc NS bolus x2 in setting of decreasing UOP. Started on SQL 40 BID, weight based. Off propofol, on CPAP 8/5, plan to extubate. Removed matthew, f/u TOV. Removed a-line. Extubated to HFNC. Decreased 3% to 30. Passed TOV.   4/24: POD 7. Tolerating HFNC. Start TF today. Na 155, stopped 3%, fentanyl d/c'd, CT stable. Trickle feeds started. PT/OT recommending AR. C/o headache, given tramadol  with resolution. S&S recommend pureed diet with thin liquids. Weaning to 6L NC. Na 155-->144, gave 225cc 3% bolus, Na-->147.   4/25: POD 8. Cont 3% at 30. EVD remains at 10. Tolerating PO. Given hydralazine 10mg x 1 for SBP 180s. Bradycardic to 53 resolved on own. Dc'd 3%, started salt tabs 2q8. Sodium goal 140-145. Sputum culture from 4/22: normal respiratory blank. Repeat 145. F/u AM sodium. ProBNP 1492. Pt removed NGT. Tolerating pureed diet.   4/26: POD 9. EVD @ 10.     Vital Signs Last 24 Hrs  T(C): 37.4 (26 Apr 2023 00:14), Max: 37.4 (25 Apr 2023 14:12)  T(F): 99.4 (26 Apr 2023 00:14), Max: 99.4 (25 Apr 2023 14:12)  HR: 74 (26 Apr 2023 00:00) (50 - 90)  BP: 126/63 (26 Apr 2023 00:00) (114/64 - 180/85)  BP(mean): 89 (26 Apr 2023 00:00) (84 - 132)  RR: 18 (26 Apr 2023 00:00) (16 - 20)  SpO2: 96% (26 Apr 2023 00:00) (93% - 100%)    Parameters below as of 26 Apr 2023 00:00  Patient On (Oxygen Delivery Method): room air    I&O's Summary    24 Apr 2023 07:01  -  25 Apr 2023 07:00  --------------------------------------------------------  IN: 2025 mL / OUT: 2197 mL / NET: -172 mL    25 Apr 2023 07:01  -  26 Apr 2023 00:51  --------------------------------------------------------  IN: 500 mL / OUT: 2600 mL / NET: -2100 mL    PHYSICAL EXAM:  General: NAD   Cardiovascular: regular rhythm, slow rhythm  Respiratory: nonlabored breathing, normal chest rise   GI: abdomen soft, nontender, nondistended; +NGT  Neuro: OE, tracks, PERRL, EOMI, intention tremors, slowed speech, AOx2, follows commands, RUE 4+/5, LUE 5/5, RLE 4/5 prox, 4+/5 distally, LLE 4+/5 prox, 5/5 distally  Extremities: distal pulses 2+ x4  Incision/wound: L crani incision c/d/i; R EVD site c/d/i    LABS:                        13.7   11.17 )-----------( 179      ( 25 Apr 2023 05:30 )             44.2     04-25    145  |  111<H>  |  25<H>  ----------------------------<  150<H>  4.2   |  24  |  1.08    Ca    8.4      25 Apr 2023 15:10  Phos  3.2     04-25  Mg     2.8     04-25    TPro  x   /  Alb  2.4<L>  /  TBili  x   /  DBili  x   /  AST  x   /  ALT  x   /  AlkPhos  x   04-24    CAPILLARY BLOOD GLUCOSE    POCT Blood Glucose.: 116 mg/dL (25 Apr 2023 23:33)  POCT Blood Glucose.: 131 mg/dL (25 Apr 2023 15:56)  POCT Blood Glucose.: 134 mg/dL (25 Apr 2023 10:48)  POCT Blood Glucose.: 101 mg/dL (25 Apr 2023 05:20)    Drug Levels: [] N/A    CSF Analysis: [] N/A    Allergies    No Known Allergies    Intolerances      MEDICATIONS:  Antibiotics:  piperacillin/tazobactam IVPB.. 3.375 Gram(s) IV Intermittent every 8 hours    Neuro:  acetaminophen   Oral Liquid .. 650 milliGRAM(s) Oral every 6 hours PRN  carbidopa/levodopa  25/250 1 Tablet(s) Oral <User Schedule>  entacapone 200 milliGRAM(s) Oral <User Schedule>  levETIRAcetam  IVPB 1000 milliGRAM(s) IV Intermittent every 12 hours  ondansetron Injectable 4 milliGRAM(s) IV Push every 6 hours PRN    Anticoagulation:  enoxaparin Injectable 40 milliGRAM(s) SubCutaneous <User Schedule>    OTHER:  acetylcysteine 20%  Inhalation 4 milliLiter(s) Inhalation every 6 hours  albuterol/ipratropium for Nebulization 3 milliLiter(s) Nebulizer every 6 hours  amLODIPine   Tablet 5 milliGRAM(s) Oral daily  artificial  tears Solution 1 Drop(s) Both EYES every 4 hours  chlorhexidine 2% Cloths 1 Application(s) Topical daily  dexAMETHasone  Injectable 4 milliGRAM(s) IV Push every 12 hours  dexAMETHasone  Injectable   IV Push   dextrose 50% Injectable 25 Gram(s) IV Push once  hydrALAZINE Injectable 10 milliGRAM(s) IV Push every 4 hours PRN  insulin lispro (ADMELOG) corrective regimen sliding scale   SubCutaneous every 6 hours  pantoprazole  Injectable 40 milliGRAM(s) IV Push daily  senna 2 Tablet(s) Oral at bedtime    IVF:  sodium chloride 2 Gram(s) Oral every 8 hours    CULTURES:  Culture Results:   Normal Respiratory Blank present (04-22 @ 06:20)    RADIOLOGY & ADDITIONAL TESTS:      ASSESSMENT:  68M hx seizures, HTN, poorly controlled Parkinson's disease w/ prog inability to walk since Nov 2022. Found to have large L frontal dural based lesion c/f meningioma w/ associated mass effect and edema. S/p L crani for meningioma resection (4/17). Discharged to [facility] on 4/21. On arrival to rehab c/o headache with persistgent nausea/vomiting. CTH performed reveals new large left frontal IPH with IVH, with edema and midline shift. Intubated for airway protection. Readmitted to [facility] for further management, NIHSS 23, ICH 3.    NEURO:  - neuro/vitals q1hr  - EVD @10cm H2O, monitor ICP, output  - Keppra 1g BID (hx seizures)  - Decadron 4mg q6h taper 1 week to off   - Parkinsons: continue home sinemet (25/250mg) 4 times/day, entacapone 200mg q8h  - CTH 4/24: no significant interval change  - Hold home gabapentin 400mg 4 times/day     CARDIOVASCULAR:  - -160  - Amlodipine 5 mg qd  - TTE 4/22 mild aortic sclerosis w/ no stenosis, mild AR, left pleural effusion EF 71%    PULMONARY:  - nebs q6h, chest PT    RENAL:  - Na goal 140-145, salt tabs 2 q 6  - voiding     GI:  - Pureed diet with thin liquids  - Bowel regimen, last BM 4/25   - Protonix 40mg qd for GI ppx while on steroids    HEME:  - SCDs, SQL for DVT ppx   - LE dopplers 4/22 negative     ID:  - Zosyn (4/22-4/27) - emipiric PNA, RLL infiltrate     ENDO:  - ISS, A1c 5.8    DISPO:  - NSICU, full code, family updated  - PT/OT rec AR: patient can tolerate three hours PT/OT daily    Discussed with Dr. D'Amico and Dr. Belcher

## 2023-04-26 NOTE — PROGRESS NOTE ADULT - SUBJECTIVE AND OBJECTIVE BOX
PM EVENTS: no acute overnight events as of documentation time of this note.    ROS: negative except as mentioned above.    T(C): 36.6 (04-25-23 @ 17:51), Max: 37.4 (04-25-23 @ 14:12)  HR: 81 (04-25-23 @ 20:00) (50 - 93)  BP: 165/113 (04-25-23 @ 20:00) (114/64 - 180/85)  RR: 18 (04-25-23 @ 20:00) (16 - 20)  SpO2: 98% (04-25-23 @ 20:00) (93% - 100%)        I&O's Summary    24 Apr 2023 07:01  -  25 Apr 2023 07:00  --------------------------------------------------------  IN: 2025 mL / OUT: 2197 mL / NET: -172 mL    25 Apr 2023 07:01  -  25 Apr 2023 20:07  --------------------------------------------------------  IN: 450 mL / OUT: 1812 mL / NET: -1362 mL        EXAM:     [place] Coma Scale: 3/4/6 = 13    General: normocephalic, EVD, laying in bed supine, no distress  Neuro     MS: awake, alert and oriented x2, cooperative with exam, follows commands, no neglect    CN: PERRL, gaze is conjugate, face symmetric, hearing grossly intact    Mot: bulk normal, (+)bilateral rigid tone, power UPPER 5/5 proximally and distally, LOWER 3/3 proximally  Chest: lungs are CTA, heart regular rate/rhythm, present S1/S2, no murmurs or rubs  Abdomen: distended but soft and nontender without peritoneal signs  Extremities: no clubbing, well-perfused, no edema                            13.7   11.17 )-----------( 179      ( 25 Apr 2023 05:30 )             44.2     04-25    145  |  111<H>  |  25<H>  ----------------------------<  150<H>  4.2   |  24  |  1.08    Ca    8.4      25 Apr 2023 15:10  Phos  3.2     04-25  Mg     2.8     04-25    TPro  x   /  Alb  2.4<L>  /  TBili  x   /  DBili  x   /  AST  x   /  ALT  x   /  AlkPhos  x   04-24      MEDICATIONS  (STANDING):  acetylcysteine 20%  Inhalation 4 milliLiter(s) Inhalation every 6 hours  albuterol/ipratropium for Nebulization 3 milliLiter(s) Nebulizer every 6 hours  amLODIPine   Tablet 5 milliGRAM(s) Oral daily  artificial  tears Solution 1 Drop(s) Both EYES every 4 hours  carbidopa/levodopa  25/250 1 Tablet(s) Oral <User Schedule>  chlorhexidine 2% Cloths 1 Application(s) Topical daily  dexAMETHasone  Injectable   IV Push   dextrose 50% Injectable 25 Gram(s) IV Push once  enoxaparin Injectable 40 milliGRAM(s) SubCutaneous <User Schedule>  entacapone 200 milliGRAM(s) Oral <User Schedule>  insulin lispro (ADMELOG) corrective regimen sliding scale   SubCutaneous every 6 hours  levETIRAcetam  IVPB 1000 milliGRAM(s) IV Intermittent every 12 hours  pantoprazole  Injectable 40 milliGRAM(s) IV Push daily  piperacillin/tazobactam IVPB.. 3.375 Gram(s) IV Intermittent every 8 hours  senna 2 Tablet(s) Oral at bedtime  sodium chloride 2 Gram(s) Oral every 8 hours    MEDICATIONS  (PRN):  acetaminophen   Oral Liquid .. 650 milliGRAM(s) Oral every 6 hours PRN Temp greater or equal to 38C (100.4F), Mild Pain (1 - 3)  hydrALAZINE Injectable 10 milliGRAM(s) IV Push every 4 hours PRN SBP >160  ondansetron Injectable 4 milliGRAM(s) IV Push every 6 hours PRN Nausea and/or Vomiting      Please see the day's note documented by Dr. Grimaldo for detailed ongoing assessment and plan.     - Frequent neuro checks/vital signs, EVD/ICP/cerebral edema watch  - Tube feeds    Remains in NSICU given high risk of acute neurological decompensation     PM EVENTS: no acute overnight events as of documentation time of this note.    ROS: negative except as mentioned above.      ICU Vital Signs Last 24 Hrs  T(C): 37 (26 Apr 2023 21:49), Max: 37.7 (26 Apr 2023 17:32)  T(F): 98.6 (26 Apr 2023 21:49), Max: 99.8 (26 Apr 2023 17:32)  HR: 69 (27 Apr 2023 00:00) (56 - 91)  BP: 125/69 (27 Apr 2023 00:00) (125/69 - 185/84)  BP(mean): 91 (27 Apr 2023 00:00) (91 - 121)  ABP: --  ABP(mean): --  RR: 18 (27 Apr 2023 00:00) (16 - 20)  SpO2: 99% (27 Apr 2023 00:00) (95% - 100%)    O2 Parameters below as of 27 Apr 2023 00:00  Patient On (Oxygen Delivery Method): room air            EXAM:     [place] Coma Scale: 3/4/6 = 13    General: normocephalic, EVD clamped, laying in bed supine, no distress  Neuro     MS: awake, alert and oriented x2 (self/month), cooperative with exam, follows commands, no neglect    CN: PERRL, gaze is conjugate, face symmetric, hearing grossly intact    Mot: bulk normal, (+)bilateral rigid tone, power UPPER 5/5 proximally and distally, LOWER 3/3 proximally  Chest: lungs are CTA, heart regular rate/rhythm, present S1/S2, no murmurs or rubs  Abdomen: distended but soft and nontender without peritoneal signs  Extremities: no clubbing, well-perfused, no edema                              12.6   8.40  )-----------( 175      ( 26 Apr 2023 02:26 )             38.8     04-26    142  |  110<H>  |  23  ----------------------------<  124<H>  3.8   |  25  |  1.02    Ca    8.8      26 Apr 2023 02:26  Phos  3.4     04-26  Mg     2.4     04-26        MEDICATIONS  (STANDING):  acetylcysteine 20%  Inhalation 4 milliLiter(s) Inhalation every 6 hours  amLODIPine   Tablet 10 milliGRAM(s) Oral daily  artificial  tears Solution 1 Drop(s) Both EYES every 4 hours  carbidopa/levodopa  25/250 1 Tablet(s) Oral <User Schedule>  chlorhexidine 2% Cloths 1 Application(s) Topical daily  dexAMETHasone  Injectable 2 milliGRAM(s) IV Push every 8 hours  dexAMETHasone  Injectable   IV Push   enoxaparin Injectable 40 milliGRAM(s) SubCutaneous <User Schedule>  entacapone 200 milliGRAM(s) Oral <User Schedule>  levETIRAcetam 1000 milliGRAM(s) Oral two times a day  pantoprazole    Tablet 40 milliGRAM(s) Oral before breakfast  piperacillin/tazobactam IVPB.. 3.375 Gram(s) IV Intermittent every 8 hours  senna 2 Tablet(s) Oral at bedtime  sodium chloride 2 Gram(s) Oral every 8 hours    MEDICATIONS  (PRN):  acetaminophen   Oral Liquid .. 650 milliGRAM(s) Oral every 6 hours PRN Temp greater or equal to 38C (100.4F), Mild Pain (1 - 3)  albuterol/ipratropium for Nebulization 3 milliLiter(s) Nebulizer every 6 hours PRN Shortness of Breath and/or Wheezing  ondansetron Injectable 4 milliGRAM(s) IV Push every 6 hours PRN Nausea and/or Vomiting      Please see the day's note documented by Dr. Grimaldo for detailed ongoing assessment and plan.     - Frequent neuro checks/vital signs, EVD/ICP/cerebral edema watch  - Tube feeds    Remains in NSICU given high risk of acute neurological decompensation

## 2023-04-27 LAB
ANION GAP SERPL CALC-SCNC: 7 MMOL/L — SIGNIFICANT CHANGE UP (ref 5–17)
BUN SERPL-MCNC: 19 MG/DL — SIGNIFICANT CHANGE UP (ref 7–23)
CALCIUM SERPL-MCNC: 8.6 MG/DL — SIGNIFICANT CHANGE UP (ref 8.4–10.5)
CHLORIDE SERPL-SCNC: 105 MMOL/L — SIGNIFICANT CHANGE UP (ref 96–108)
CO2 SERPL-SCNC: 25 MMOL/L — SIGNIFICANT CHANGE UP (ref 22–31)
CREAT SERPL-MCNC: 1.16 MG/DL — SIGNIFICANT CHANGE UP (ref 0.5–1.3)
EGFR: 68 ML/MIN/1.73M2 — SIGNIFICANT CHANGE UP
GLUCOSE SERPL-MCNC: 118 MG/DL — HIGH (ref 70–99)
HCT VFR BLD CALC: 37.9 % — LOW (ref 39–50)
HGB BLD-MCNC: 12.6 G/DL — LOW (ref 13–17)
MAGNESIUM SERPL-MCNC: 2.2 MG/DL — SIGNIFICANT CHANGE UP (ref 1.6–2.6)
MCHC RBC-ENTMCNC: 31.3 PG — SIGNIFICANT CHANGE UP (ref 27–34)
MCHC RBC-ENTMCNC: 33.2 GM/DL — SIGNIFICANT CHANGE UP (ref 32–36)
MCV RBC AUTO: 94.3 FL — SIGNIFICANT CHANGE UP (ref 80–100)
NRBC # BLD: 0 /100 WBCS — SIGNIFICANT CHANGE UP (ref 0–0)
PHOSPHATE SERPL-MCNC: 3.5 MG/DL — SIGNIFICANT CHANGE UP (ref 2.5–4.5)
PLATELET # BLD AUTO: 173 K/UL — SIGNIFICANT CHANGE UP (ref 150–400)
POTASSIUM SERPL-MCNC: 4.1 MMOL/L — SIGNIFICANT CHANGE UP (ref 3.5–5.3)
POTASSIUM SERPL-SCNC: 4.1 MMOL/L — SIGNIFICANT CHANGE UP (ref 3.5–5.3)
RBC # BLD: 4.02 M/UL — LOW (ref 4.2–5.8)
RBC # FLD: 14.8 % — HIGH (ref 10.3–14.5)
SODIUM SERPL-SCNC: 137 MMOL/L — SIGNIFICANT CHANGE UP (ref 135–145)
WBC # BLD: 6.74 K/UL — SIGNIFICANT CHANGE UP (ref 3.8–10.5)
WBC # FLD AUTO: 6.74 K/UL — SIGNIFICANT CHANGE UP (ref 3.8–10.5)

## 2023-04-27 PROCEDURE — 99292 CRITICAL CARE ADDL 30 MIN: CPT | Mod: 24

## 2023-04-27 PROCEDURE — 99231 SBSQ HOSP IP/OBS SF/LOW 25: CPT

## 2023-04-27 PROCEDURE — 99291 CRITICAL CARE FIRST HOUR: CPT

## 2023-04-27 RX ORDER — SODIUM CHLORIDE 9 MG/ML
3 INJECTION INTRAMUSCULAR; INTRAVENOUS; SUBCUTANEOUS EVERY 6 HOURS
Refills: 0 | Status: DISCONTINUED | OUTPATIENT
Start: 2023-04-27 | End: 2023-05-01

## 2023-04-27 RX ADMIN — SODIUM CHLORIDE 3 GRAM(S): 9 INJECTION INTRAMUSCULAR; INTRAVENOUS; SUBCUTANEOUS at 11:37

## 2023-04-27 RX ADMIN — LEVETIRACETAM 1000 MILLIGRAM(S): 250 TABLET, FILM COATED ORAL at 06:00

## 2023-04-27 RX ADMIN — SODIUM CHLORIDE 2 GRAM(S): 9 INJECTION INTRAMUSCULAR; INTRAVENOUS; SUBCUTANEOUS at 06:00

## 2023-04-27 RX ADMIN — LEVETIRACETAM 1000 MILLIGRAM(S): 250 TABLET, FILM COATED ORAL at 17:45

## 2023-04-27 RX ADMIN — SENNA PLUS 2 TABLET(S): 8.6 TABLET ORAL at 21:38

## 2023-04-27 RX ADMIN — Medication 1 DROP(S): at 09:42

## 2023-04-27 RX ADMIN — CARBIDOPA AND LEVODOPA 1 TABLET(S): 25; 100 TABLET ORAL at 19:04

## 2023-04-27 RX ADMIN — Medication 2 MILLIGRAM(S): at 21:38

## 2023-04-27 RX ADMIN — Medication 1 DROP(S): at 17:45

## 2023-04-27 RX ADMIN — ENTACAPONE 200 MILLIGRAM(S): 200 TABLET, FILM COATED ORAL at 08:00

## 2023-04-27 RX ADMIN — PIPERACILLIN AND TAZOBACTAM 25 GRAM(S): 4; .5 INJECTION, POWDER, LYOPHILIZED, FOR SOLUTION INTRAVENOUS at 00:05

## 2023-04-27 RX ADMIN — Medication 1 DROP(S): at 21:36

## 2023-04-27 RX ADMIN — AMLODIPINE BESYLATE 10 MILLIGRAM(S): 2.5 TABLET ORAL at 06:00

## 2023-04-27 RX ADMIN — ENTACAPONE 200 MILLIGRAM(S): 200 TABLET, FILM COATED ORAL at 11:37

## 2023-04-27 RX ADMIN — Medication 2 MILLIGRAM(S): at 14:37

## 2023-04-27 RX ADMIN — Medication 2 MILLIGRAM(S): at 05:58

## 2023-04-27 RX ADMIN — SODIUM CHLORIDE 3 GRAM(S): 9 INJECTION INTRAMUSCULAR; INTRAVENOUS; SUBCUTANEOUS at 23:13

## 2023-04-27 RX ADMIN — PIPERACILLIN AND TAZOBACTAM 25 GRAM(S): 4; .5 INJECTION, POWDER, LYOPHILIZED, FOR SOLUTION INTRAVENOUS at 08:02

## 2023-04-27 RX ADMIN — ENOXAPARIN SODIUM 40 MILLIGRAM(S): 100 INJECTION SUBCUTANEOUS at 21:38

## 2023-04-27 RX ADMIN — SODIUM CHLORIDE 3 GRAM(S): 9 INJECTION INTRAMUSCULAR; INTRAVENOUS; SUBCUTANEOUS at 17:45

## 2023-04-27 RX ADMIN — PANTOPRAZOLE SODIUM 40 MILLIGRAM(S): 20 TABLET, DELAYED RELEASE ORAL at 07:00

## 2023-04-27 RX ADMIN — Medication 1 DROP(S): at 06:01

## 2023-04-27 RX ADMIN — Medication 4 MILLILITER(S): at 22:26

## 2023-04-27 RX ADMIN — Medication 4 MILLILITER(S): at 09:42

## 2023-04-27 RX ADMIN — Medication 1 DROP(S): at 14:37

## 2023-04-27 RX ADMIN — Medication 1 DROP(S): at 02:15

## 2023-04-27 RX ADMIN — CARBIDOPA AND LEVODOPA 1 TABLET(S): 25; 100 TABLET ORAL at 11:37

## 2023-04-27 RX ADMIN — Medication 4 MILLILITER(S): at 05:53

## 2023-04-27 RX ADMIN — CARBIDOPA AND LEVODOPA 1 TABLET(S): 25; 100 TABLET ORAL at 08:00

## 2023-04-27 RX ADMIN — CARBIDOPA AND LEVODOPA 1 TABLET(S): 25; 100 TABLET ORAL at 15:18

## 2023-04-27 RX ADMIN — CHLORHEXIDINE GLUCONATE 1 APPLICATION(S): 213 SOLUTION TOPICAL at 06:01

## 2023-04-27 RX ADMIN — Medication 4 MILLILITER(S): at 15:18

## 2023-04-27 RX ADMIN — ENTACAPONE 200 MILLIGRAM(S): 200 TABLET, FILM COATED ORAL at 15:18

## 2023-04-27 NOTE — PROGRESS NOTE ADULT - SUBJECTIVE AND OBJECTIVE BOX
==========================  ADULT NSICU PROGRESS NOTE  ==========================  HPI:  Patient is a 68 year old male with PMH of seizures, HTN, and Parkinson's disease diagnosed in 2012 with multiple falls who initially presented to [facility] on 4/17 for elective left craniotomy for meningioma resection. He has also been wheelchair bound since about 2-3 years ago and has frequent falls related to his Parkinson's disease.  Underwent L crani for meningioma resection on 4/17. Hospital course was uncomplicated.   Discharged to [facility] on 4/21 in stable condition.     On arrival to rehab, pt c/o incisional headache with episodes of n/v during ambulance ride to rehab.  Pt given zofran and oxycodone, however, headache returned with persistent nausea and vomiting.   RRT called for worsened mental status. CT head obtained stat which showed new large left frontal hemorrhage with IVH, cerebral edema with midline shift and some effacement of L frontal horn.  Pt started on cardene, given Keppra and transferred to [facility]. Prior to transfer, mental status declined (GCS 13-> 9) with increased secretions, therefore attempted intubation; LMA airway placed.      On admission, the patient was:  GCS:  Chavez-Hou:  modified Garcia:  ICH score: 3  NIHSS: 23      ICU Vital Signs Last 24 Hrs  T(C): 37.2 (27 Apr 2023 05:29), Max: 37.7 (26 Apr 2023 17:32)  T(F): 98.9 (27 Apr 2023 05:29), Max: 99.8 (26 Apr 2023 17:32)  HR: 60 (27 Apr 2023 07:00) (56 - 91)  BP: 150/81 (27 Apr 2023 07:00) (125/69 - 161/91)  BP(mean): 109 (27 Apr 2023 07:00) (91 - 120)  RR: 18 (27 Apr 2023 07:00) (16 - 18)  SpO2: 99% (27 Apr 2023 07:00) (96% - 100%)      04-26-23 @ 07:01  -  04-27-23 @ 07:00  --------------------------------------------------------  IN: 1435 mL / OUT: 1926 mL / NET: -491 mL        EXAM:   General: Normocephalic, EVD, laying in bed supine, no distress  Neuro:    MS: Awake, alert, oriented x 3    CN: PERRL 3mm and reactive, gaze is conjugate, face symmetric    Mot: Power 5/5 in all extremities  Chest: Lungs clear  Heart: Regular rate/rhythm, S1/S2  Abdomen: Soft and nontender. Non distended  Extremities: No edema      MEDICATIONS:   acetaminophen   Oral Liquid .. 650 milliGRAM(s) Oral every 6 hours PRN  acetylcysteine 20%  Inhalation 4 milliLiter(s) Inhalation every 6 hours  albuterol/ipratropium for Nebulization 3 milliLiter(s) Nebulizer every 6 hours PRN  amLODIPine   Tablet 10 milliGRAM(s) Oral daily  artificial  tears Solution 1 Drop(s) Both EYES every 4 hours  carbidopa/levodopa  25/250 1 Tablet(s) Oral <User Schedule>  chlorhexidine 2% Cloths 1 Application(s) Topical daily  dexAMETHasone  Injectable   IV Push   dexAMETHasone  Injectable 2 milliGRAM(s) IV Push every 8 hours  enoxaparin Injectable 40 milliGRAM(s) SubCutaneous <User Schedule>  entacapone 200 milliGRAM(s) Oral <User Schedule>  levETIRAcetam 1000 milliGRAM(s) Oral two times a day  ondansetron Injectable 4 milliGRAM(s) IV Push every 6 hours PRN  pantoprazole    Tablet 40 milliGRAM(s) Oral before breakfast  piperacillin/tazobactam IVPB.. 3.375 Gram(s) IV Intermittent every 8 hours  senna 2 Tablet(s) Oral at bedtime  sodium chloride 3 Gram(s) Oral every 6 hours    LABS:                      12.6   6.74  )-----------( 173      ( 27 Apr 2023 05:28 )             37.9     04-27    137  |  105  |  19  ----------------------------<  118<H>  4.1   |  25  |  1.16    Ca    8.6      27 Apr 2023 05:28  Phos  3.5     04-27  Mg     2.2     04-27                           ==========================  ADULT NSICU PROGRESS NOTE  ==========================  HPI:  Patient is a 68 year old male with PMH of seizures, HTN, and Parkinson's disease diagnosed in 2012 with multiple falls who initially presented to [facility] on 4/17 for elective left craniotomy for meningioma resection. He has also been wheelchair bound since about 2-3 years ago and has frequent falls related to his Parkinson's disease.  Underwent L crani for meningioma resection on 4/17. Hospital course was uncomplicated.   Discharged to [facility] on 4/21 in stable condition.     On arrival to rehab, pt c/o incisional headache with episodes of n/v during ambulance ride to rehab.  Pt given zofran and oxycodone, however, headache returned with persistent nausea and vomiting.   RRT called for worsened mental status. CT head obtained stat which showed new large left frontal hemorrhage with IVH, cerebral edema with midline shift and some effacement of L frontal horn.  Pt started on cardene, given Keppra and transferred to [facility]. Prior to transfer, mental status declined (GCS 13-> 9) with increased secretions, therefore attempted intubation; LMA airway placed.      On admission, the patient was:  GCS:  Chavez-Hou:  modified Garcia:  ICH score: 3  NIHSS: 23      ICU Vital Signs Last 24 Hrs  T(C): 37.2 (27 Apr 2023 05:29), Max: 37.7 (26 Apr 2023 17:32)  T(F): 98.9 (27 Apr 2023 05:29), Max: 99.8 (26 Apr 2023 17:32)  HR: 60 (27 Apr 2023 07:00) (56 - 91)  BP: 150/81 (27 Apr 2023 07:00) (125/69 - 161/91)  BP(mean): 109 (27 Apr 2023 07:00) (91 - 120)  RR: 18 (27 Apr 2023 07:00) (16 - 18)  SpO2: 99% (27 Apr 2023 07:00) (96% - 100%)      04-26-23 @ 07:01  -  04-27-23 @ 07:00  --------------------------------------------------------  IN: 1435 mL / OUT: 1926 mL / NET: -491 mL      EXAM:   General: Normocephalic, EVD, laying in bed supine, no distress  Neuro:    MS: Awake, alert, oriented x 3    CN: PERRL 3mm and reactive, gaze is conjugate, face symmetric    Mot: Power 5/5 in all extremities  Chest: Lungs clear  Heart: Regular rate/rhythm, S1/S2  Abdomen: Soft and nontender. Non distended  Extremities: No edema      MEDICATIONS:   acetaminophen   Oral Liquid .. 650 milliGRAM(s) Oral every 6 hours PRN  acetylcysteine 20%  Inhalation 4 milliLiter(s) Inhalation every 6 hours  albuterol/ipratropium for Nebulization 3 milliLiter(s) Nebulizer every 6 hours PRN  amLODIPine   Tablet 10 milliGRAM(s) Oral daily  artificial  tears Solution 1 Drop(s) Both EYES every 4 hours  carbidopa/levodopa  25/250 1 Tablet(s) Oral <User Schedule>  chlorhexidine 2% Cloths 1 Application(s) Topical daily  dexAMETHasone  Injectable   IV Push   dexAMETHasone  Injectable 2 milliGRAM(s) IV Push every 8 hours  enoxaparin Injectable 40 milliGRAM(s) SubCutaneous <User Schedule>  entacapone 200 milliGRAM(s) Oral <User Schedule>  levETIRAcetam 1000 milliGRAM(s) Oral two times a day  ondansetron Injectable 4 milliGRAM(s) IV Push every 6 hours PRN  pantoprazole    Tablet 40 milliGRAM(s) Oral before breakfast  piperacillin/tazobactam IVPB.. 3.375 Gram(s) IV Intermittent every 8 hours  senna 2 Tablet(s) Oral at bedtime  sodium chloride 3 Gram(s) Oral every 6 hours    LABS:                      12.6   6.74  )-----------( 173      ( 27 Apr 2023 05:28 )             37.9     04-27    137  |  105  |  19  ----------------------------<  118<H>  4.1   |  25  |  1.16    Ca    8.6      27 Apr 2023 05:28  Phos  3.5     04-27  Mg     2.2     04-27

## 2023-04-27 NOTE — PROGRESS NOTE ADULT - ASSESSMENT
ASSESSMENT AND PLAN  L. frontal ICH-3 with IVH, suspected venous bleed;BD 5  History of elective L. crani for meningioma resection (4/17/23, Dr. D'Amico)  History of seizures, Parkinson's disease, multiple falls and wheelchair bound  Abdominal distension    NEURO:  Q1h neurochecks  Continue Keppra 1000mg bid (seizure history)  Decadron with taper per NSGY  R. frontal EVD at [id number] (monitor ICPs/CPP/ color/output)  S/P clamp trial 4/26*  CT head repeat 4/26am- stable  MRI at some point  Continue anti-Parkinson's meds: Sinemet, entacapone  Activity: PT/OT as tolerated.     PULM: Pulm vasc congestion (noted on CXR 4/24)  Pulm toilet. HFNC weaned; currently on room air  proBNP 1400->  SpO2 goal > 92%  Chest PT    CARDIAC:   BP goal 100- 160  Continue Amlodipine (increase to 10mg daily)  TTE: EF 71%. Mild AR    GI:  Diet: Soft and bite sized diet  Stress ulcer prophylaxis: PPI while on steroids  LBM: 4/26    /RENAL:  Sodium goal 140-145. Continue salt tabs (increase to 3 Q6)  Trend BMPs daily  Monitor Is/Os  Cautious IVF re: pulm vasc congestion. Currently IV locked    HEME:  Maintain Hb > 7.0, PLT > 100,000  SCDs, SQL  AntiXa 4/26 0.27. Continue daily dose.    ID:  Piperacillin tazobactam for empiric aspiration PNA coverage 4/27  Follow up sputum culture  Monitor temp curve, WBC.    ENDO:  Goal euglycemia  DC ISS    MISC:    SOCIAL/FAMILY:  [] awaiting [x] updated son Kenneth at bedside [] family meeting    CODE STATUS:  [x] Full Code [] DNR [] DNI [] Palliative/Comfort Care    DISPOSITION:  [x] ICU [] Stroke Unit [] Floor [] EMU [] RCU [] PCU    Time spent: 60 critical care minutes     ASSESSMENT AND PLAN  L. frontal ICH-3 with IVH, suspected venous bleed; BD 5  History of elective L. crani for meningioma resection (4/17/23, Dr. D'Amico)  History of seizures, Parkinson's disease, multiple falls and wheelchair bound  Abdominal distension    NEURO:  Q1h neurochecks  Continue Keppra 1000mg bid (seizure history)  Decadron with taper per NSGY  R. frontal EVD at [id number] (monitor ICPs/CPP/ color/output)  S/P clamp trial 4/26* Retrial 4/28  CT head repeat 4/26am- stable  MRI at some point  Continue anti-Parkinson's meds: Sinemet, entacapone  Activity: PT/OT as tolerated.     PULM:   Currently on room air  proBNP 1400->1300  SpO2 goal > 92%  Chest PT as tolerated    CARDIAC:   BP goal 100- 160  Continue Amlodipine (increase to 10mg daily)  TTE: EF 71%. Mild AR    GI:  Diet: Soft and bite sized diet  Stress ulcer prophylaxis: PPI while on steroids  LBM: 4/27    /RENAL:  Sodium goal 140-145. Continue salt tabs (increase to 3 Q6)  Trend BMPs daily  Monitor Is/Os    HEME:  Maintain Hb > 7.0, PLT > 100,000  SCDs, SQL  AntiXa 4/26 0.27. Continue daily dose.    ID:  Piperacillin tazobactam for empiric aspiration PNA coverage 4/27  Follow up sputum culture- normal blank  Monitor temp curve, WBC    ENDO:  Goal euglycemia  DC ISS    MISC:    SOCIAL/FAMILY:  [] awaiting [x] updated son Kenneth at bedside [] family meeting    CODE STATUS:  [x] Full Code [] DNR [] DNI [] Palliative/Comfort Care    DISPOSITION:  [x] ICU [] Stroke Unit [] Floor [] EMU [] RCU [] PCU    Time spent: 60 critical care minutes

## 2023-04-27 NOTE — PROGRESS NOTE ADULT - SUBJECTIVE AND OBJECTIVE BOX
PM EVENTS: no acute overnight events as of documentation time of this note.    ROS: negative except as mentioned above.    ICU Vital Signs Last 24 Hrs  T(C): 37.4 (27 Apr 2023 17:30), Max: 37.4 (27 Apr 2023 17:30)  T(F): 99.3 (27 Apr 2023 17:30), Max: 99.3 (27 Apr 2023 17:30)  HR: 62 (27 Apr 2023 19:00) (56 - 89)  BP: 128/79 (27 Apr 2023 19:00) (123/78 - 154/77)  BP(mean): 98 (27 Apr 2023 19:00) (91 - 112)  ABP: --  ABP(mean): --  RR: 16 (27 Apr 2023 19:00) (16 - 18)  SpO2: 97% (27 Apr 2023 19:00) (91% - 100%)    O2 Parameters below as of 27 Apr 2023 19:00  Patient On (Oxygen Delivery Method): room air            EXAM:     [place] Coma Scale: 3/4/6 = 13    General: normocephalic, EVD clamped, laying in bed supine, no distress  Neuro     MS: awake, alert and oriented x2 (self/month), cooperative with exam, follows commands, no neglect    CN: PERRL, gaze is conjugate, face symmetric, hearing grossly intact    Mot: bulk normal, (+)bilateral rigid tone, power UPPER 5/5 proximally and distally, LOWER 3/3 proximally  Chest: lungs are CTA, heart regular rate/rhythm, present S1/S2, no murmurs or rubs  Abdomen: distended but soft and nontender without peritoneal signs  Extremities: no clubbing, well-perfused, no edema                              12.6   6.74  )-----------( 173      ( 27 Apr 2023 05:28 )             37.9     04-27    137  |  105  |  19  ----------------------------<  118<H>  4.1   |  25  |  1.16    Ca    8.6      27 Apr 2023 05:28  Phos  3.5     04-27  Mg     2.2     04-27        MEDICATIONS  (STANDING):  acetylcysteine 20%  Inhalation 4 milliLiter(s) Inhalation every 6 hours  amLODIPine   Tablet 10 milliGRAM(s) Oral daily  artificial  tears Solution 1 Drop(s) Both EYES every 4 hours  carbidopa/levodopa  25/250 1 Tablet(s) Oral <User Schedule>  chlorhexidine 2% Cloths 1 Application(s) Topical daily  dexAMETHasone  Injectable 2 milliGRAM(s) IV Push every 8 hours  dexAMETHasone  Injectable   IV Push   enoxaparin Injectable 40 milliGRAM(s) SubCutaneous <User Schedule>  entacapone 200 milliGRAM(s) Oral <User Schedule>  levETIRAcetam 1000 milliGRAM(s) Oral two times a day  pantoprazole    Tablet 40 milliGRAM(s) Oral before breakfast  senna 2 Tablet(s) Oral at bedtime  sodium chloride 3 Gram(s) Oral every 6 hours    MEDICATIONS  (PRN):  acetaminophen   Oral Liquid .. 650 milliGRAM(s) Oral every 6 hours PRN Temp greater or equal to 38C (100.4F), Mild Pain (1 - 3)  albuterol/ipratropium for Nebulization 3 milliLiter(s) Nebulizer every 6 hours PRN Shortness of Breath and/or Wheezing  ondansetron Injectable 4 milliGRAM(s) IV Push every 6 hours PRN Nausea and/or Vomiting      Please see the day's note documented by Dr. Grimaldo for detailed ongoing assessment and plan.     - Frequent neuro checks/vital signs, EVD/ICP/cerebral edema watch  - Tube feeds    Remains in NSICU given high risk of acute neurological decompensation     PM EVENTS: no acute overnight events as of documentation time of this note.    ROS: negative except as mentioned above.    ICU Vital Signs Last 24 Hrs  T(C): 37.4 (27 Apr 2023 17:30), Max: 37.4 (27 Apr 2023 17:30)  T(F): 99.3 (27 Apr 2023 17:30), Max: 99.3 (27 Apr 2023 17:30)  HR: 62 (27 Apr 2023 19:00) (56 - 89)  BP: 128/79 (27 Apr 2023 19:00) (123/78 - 154/77)  BP(mean): 98 (27 Apr 2023 19:00) (91 - 112)  ABP: --  ABP(mean): --  RR: 16 (27 Apr 2023 19:00) (16 - 18)  SpO2: 97% (27 Apr 2023 19:00) (91% - 100%)    O2 Parameters below as of 27 Apr 2023 19:00  Patient On (Oxygen Delivery Method): room air            EXAM:     [place] Coma Scale: 3/4/6 = 13    General: normocephalic, EVD clamped, laying in bed supine, no distress  Neuro     MS: awake, alert and oriented x2 (self/month/hospital, not year), cooperative with exam, follows commands, no neglect    CN: PERRL, gaze is conjugate, face symmetric, hearing grossly intact    Mot: bulk normal, (+)bilateral rigid tone, power UPPER 5/5 proximally and distally, LOWER 3/3 proximally  Chest: lungs are CTA, heart regular rate/rhythm  Abdomen: distended but soft and nontender without peritoneal signs  Extremities: no clubbing, well-perfused, no edema                              12.6   6.74  )-----------( 173      ( 27 Apr 2023 05:28 )             37.9     04-27    137  |  105  |  19  ----------------------------<  118<H>  4.1   |  25  |  1.16    Ca    8.6      27 Apr 2023 05:28  Phos  3.5     04-27  Mg     2.2     04-27        MEDICATIONS  (STANDING):  acetylcysteine 20%  Inhalation 4 milliLiter(s) Inhalation every 6 hours  amLODIPine   Tablet 10 milliGRAM(s) Oral daily  artificial  tears Solution 1 Drop(s) Both EYES every 4 hours  carbidopa/levodopa  25/250 1 Tablet(s) Oral <User Schedule>  chlorhexidine 2% Cloths 1 Application(s) Topical daily  dexAMETHasone  Injectable 2 milliGRAM(s) IV Push every 8 hours  dexAMETHasone  Injectable   IV Push   enoxaparin Injectable 40 milliGRAM(s) SubCutaneous <User Schedule>  entacapone 200 milliGRAM(s) Oral <User Schedule>  levETIRAcetam 1000 milliGRAM(s) Oral two times a day  pantoprazole    Tablet 40 milliGRAM(s) Oral before breakfast  senna 2 Tablet(s) Oral at bedtime  sodium chloride 3 Gram(s) Oral every 6 hours    MEDICATIONS  (PRN):  acetaminophen   Oral Liquid .. 650 milliGRAM(s) Oral every 6 hours PRN Temp greater or equal to 38C (100.4F), Mild Pain (1 - 3)  albuterol/ipratropium for Nebulization 3 milliLiter(s) Nebulizer every 6 hours PRN Shortness of Breath and/or Wheezing  ondansetron Injectable 4 milliGRAM(s) IV Push every 6 hours PRN Nausea and/or Vomiting      Please see the day's note documented by Dr. Grimaldo for detailed ongoing assessment and plan.     - Frequent neuro checks/vital signs, EVD/ICP/cerebral edema watch  - Tube feeds    Remains in NSICU given high risk of acute neurological decompensation

## 2023-04-27 NOTE — PROGRESS NOTE ADULT - SUBJECTIVE AND OBJECTIVE BOX
S/Overnight events: Overnight ICPs sustained 22-26 x 15 minutes, EVD unclamped.       Hospital Course:   4/22: readmitted with new large left frontal IPH with IVH, with edema and midline shift with large left frontal IPH. EVD placed, opened at 10. CTH post EVD complete. [place] sump placed for GI decompression. Started 3%@80 for na goal 145-150. KUB showing small loops of gas, will start TF tomorrow. TTE mild aortic sclerosis w/ no stenosis, mild AR, left pleural effusion EF 71%. LE dopplers negative.  4/23: POD 6. EVD @ 10. POCUS with RLL consolidation, trace B lines, L lung clear, collapsable IVC. 250cc albumin and 500cc NS bolus x2 in setting of decreasing UOP. Started on SQL 40 BID, weight based. Off propofol, on CPAP 8/5, plan to extubate. Removed matthew, f/u TOV. Removed a-line. Extubated to HFNC. Decreased 3% to 30. Passed TOV.   4/24: POD 7. Tolerating HFNC. Start TF today. Na 155, stopped 3%, fentanyl d/c'd, CT stable. Trickle feeds started. PT/OT recommending AR. C/o headache, given tramadol  with resolution. S&S recommend pureed diet with thin liquids. Weaning to 6L NC. Na 155-->144, gave 225cc 3% bolus, Na-->147.   4/25: POD 8. Cont 3% at 30. EVD remains at 10. Tolerating PO. Given hydralazine 10mg x 1 for SBP 180s. Bradycardic to 53 resolved on own. Dc'd 3%, started salt tabs 2q8. Sodium goal 140-145. Sputum culture from 4/22: normal respiratory blank. Repeat 145. F/u AM sodium. ProBNP 1492. Pt removed NGT. Tolerating pureed diet.   4/26: POD 9. EVD @ 10. Nebs made PRN, amlodipine increased to 10mg. Rec'd for soft and bite size diet, CTH today with no significant interval change. EVD clamp trial ICPS hovering around 18, will monitor clinically and open if neuro change or headache, pro-bnp 1387 downtrending.   4/27: POD10. ICPs sustained 22-26 x 15 min, opened EVD.       Vital Signs Last 24 Hrs  T(C): 37.3 (27 Apr 2023 01:16), Max: 37.7 (26 Apr 2023 17:32)  T(F): 99.2 (27 Apr 2023 01:16), Max: 99.8 (26 Apr 2023 17:32)  HR: 60 (27 Apr 2023 02:00) (56 - 91)  BP: 126/68 (27 Apr 2023 02:00) (125/69 - 185/84)  BP(mean): 91 (27 Apr 2023 02:00) (91 - 121)  RR: 18 (27 Apr 2023 02:00) (16 - 20)  SpO2: 98% (27 Apr 2023 02:00) (95% - 100%)    Parameters below as of 27 Apr 2023 02:00  Patient On (Oxygen Delivery Method): room air        I&O's Detail    25 Apr 2023 07:01  -  26 Apr 2023 07:00  --------------------------------------------------------  IN:    Enteral Tube Flush: 30 mL    IV PiggyBack: 500 mL    sodium chloride 3%: 120 mL  Total IN: 650 mL    OUT:    External Ventricular Device (mL): 210 mL    Incontinent per Condom Catheter (mL): 3200 mL  Total OUT: 3410 mL    Total NET: -2760 mL      26 Apr 2023 07:01  -  27 Apr 2023 02:28  --------------------------------------------------------  IN:    IV PiggyBack: 325 mL    Oral Fluid: 1110 mL  Total IN: 1435 mL    OUT:    External Ventricular Device (mL): 78 mL    Incontinent per Condom Catheter (mL): 1200 mL  Total OUT: 1278 mL    Total NET: 157 mL        I&O's Summary    25 Apr 2023 07:01  -  26 Apr 2023 07:00  --------------------------------------------------------  IN: 650 mL / OUT: 3410 mL / NET: -2760 mL    26 Apr 2023 07:01  -  27 Apr 2023 02:28  --------------------------------------------------------  IN: 1435 mL / OUT: 1278 mL / NET: 157 mL        PHYSICAL EXAM:  Constitutional: awake, sitting up in bed. NAD.   Respiratory: non-labored breathing. Normal chest rise.   Cardiovascular: Regular rate and rhythm  Gastrointestinal:  Soft, nontender, nondistended.  .  Vascular: Extremities warm, no ulcers, no discoloration of skin.   Neurological: Gen: AA&O x 3 to choices, slow speech, follows commands      CN II-XII grossly intact.    Motor: RAMIREZ x 4, RUE/LUE 5/5, RLE/LLE 3/5    Sens: Sensation intact to light touch throughout.    Extremities: warm and well perfused.     No pronator drift, no dysmetria.  Wound/incision: +EVD. L crani incision c/d/i.     TUBES/LINES:  [] CVC  [x] A-line  [] Lumbar Drain  [x] Ventriculostomy  [] Other    DIET:  [] NPO  [x] Mechanical - soft and bite sized  [] Tube feeds    LABS:                        12.6   8.40  )-----------( 175      ( 26 Apr 2023 02:26 )             38.8     04-26    142  |  110<H>  |  23  ----------------------------<  124<H>  3.8   |  25  |  1.02    Ca    8.8      26 Apr 2023 02:26  Phos  3.4     04-26  Mg     2.4     04-26              CAPILLARY BLOOD GLUCOSE      POCT Blood Glucose.: 106 mg/dL (26 Apr 2023 16:44)  POCT Blood Glucose.: 137 mg/dL (26 Apr 2023 11:41)  POCT Blood Glucose.: 82 mg/dL (26 Apr 2023 05:41)      Drug Levels: [] N/A    CSF Analysis: [] N/A      Allergies    No Known Allergies    Intolerances      MEDICATIONS:  Antibiotics:  piperacillin/tazobactam IVPB.. 3.375 Gram(s) IV Intermittent every 8 hours    Neuro:  acetaminophen   Oral Liquid .. 650 milliGRAM(s) Oral every 6 hours PRN  carbidopa/levodopa  25/250 1 Tablet(s) Oral <User Schedule>  entacapone 200 milliGRAM(s) Oral <User Schedule>  levETIRAcetam 1000 milliGRAM(s) Oral two times a day  ondansetron Injectable 4 milliGRAM(s) IV Push every 6 hours PRN    Anticoagulation:  enoxaparin Injectable 40 milliGRAM(s) SubCutaneous <User Schedule>    OTHER:  acetylcysteine 20%  Inhalation 4 milliLiter(s) Inhalation every 6 hours  albuterol/ipratropium for Nebulization 3 milliLiter(s) Nebulizer every 6 hours PRN  amLODIPine   Tablet 10 milliGRAM(s) Oral daily  artificial  tears Solution 1 Drop(s) Both EYES every 4 hours  chlorhexidine 2% Cloths 1 Application(s) Topical daily  dexAMETHasone  Injectable 2 milliGRAM(s) IV Push every 8 hours  dexAMETHasone  Injectable   IV Push   pantoprazole    Tablet 40 milliGRAM(s) Oral before breakfast  senna 2 Tablet(s) Oral at bedtime    IVF:  sodium chloride 2 Gram(s) Oral every 8 hours    CULTURES:  Culture Results:   Normal Respiratory Blank present (04-22 @ 06:20)    RADIOLOGY & ADDITIONAL TESTS:      ASSESSMENT:  68M hx seizures, HTN, poorly controlled Parkinson's disease w/ prog inability to walk since Nov 2022. Found to have large L frontal dural based lesion c/f meningioma w/ associated mass effect and edema. S/p L crani for meningioma resection (4/17). Discharged to [facility] on 4/21. On arrival to rehab c/o headache with persistgent nausea/vomiting. CTH performed reveals new large left frontal IPH with IVH, with edema and midline shift. Intubated for airway protection. Readmitted to [facility] for further management, NIHSS 23, ICH 3.    INTRACRANIAL HEMORRHAGE    D32.9    Handoff    Parkinsons    HTN (hypertension)    History of seizures    Brain mass    Impaired gait    Meningioma    History of insomnia    Intracerebral hemorrhage    Intracerebral hemorrhage    No significant past surgical history    Room Service Assist    [id number]        PLAN:    NEURO:  - neuro/vitals q1hr  - EVD @10cm H2O, monitor ICP, output, failed clamped trial 4/27  - Keppra 1g BID (hx seizures)  - Decadron 4mg q6h taper 1 week to off   - Parkinsons: continue home sinemet (25/250mg) 4 times/day, entacapone 200mg q8h  - CTH 4/24: no significant interval change, CTH 4/26: no significant interval change  - Hold home gabapentin 400mg 4 times/day     CARDIOVASCULAR:  - -160  - Amlodipine 10 mg qd  - TTE 4/22 mild aortic sclerosis w/ no stenosis, mild AR, left pleural effusion EF 71%    PULMONARY:  - nebs q6h prn, chest PT  - RA, NC PRN     RENAL:  - Na goal 140-145, salt tabs 2 q 8  - voiding     GI:  - Soft and bite sized diet   - Bowel regimen, last BM 4/25   - Protonix 40mg qd for GI ppx while on steroids    HEME:  - SCDs, SQL for DVT ppx   - LE dopplers 4/22 negative   - Anti xa 0.27 4/26    ID:  - Zosyn (4/22-4/27) - emipiric PNA, RLL infiltrate     ENDO:  - ISS, A1c 5.8    DISPO:  - NSICU, full code, family updated  - PT/OT rec AR: patient can tolerate three hours PT/OT daily    Discussed with Dr. D'Amico and Dr. Belcher     Assessment:  Present when checked    []  GCS  E   V  M     Heart Failure: []Acute, [] acute on chronic , []chronic  Heart Failure:  [] Diastolic (HFpEF), [] Systolic (HFrEF), []Combined (HFpEF and HFrEF), [] RHF, [] Pulm HTN, [] Other    [] NINFA, [] ATN, [] AIN, [] other  [] CKD1, [] CKD2, [] CKD 3, [] CKD 4, [] CKD 5, []ESRD    Encephalopathy: [] Metabolic, [] Hepatic, [] toxic, [] Neurological, [] Other    Abnormal Nurtitional Status: [] malnurtition (see nutrition note), [ ]underweight: BMI < 19, [] morbid obesity: BMI >40, [] Cachexia    [] Sepsis  [] hypovolemic shock,[] cardiogenic shock, [] hemorrhagic shock, [] neuogenic shock  [] Acute Respiratory Failure  []Cerebral edema, [] Brain compression/ herniation,   [] Functional quadriplegia  [] Acute blood loss anemia

## 2023-04-28 LAB
ANION GAP SERPL CALC-SCNC: 8 MMOL/L — SIGNIFICANT CHANGE UP (ref 5–17)
BUN SERPL-MCNC: 24 MG/DL — HIGH (ref 7–23)
CALCIUM SERPL-MCNC: 8.9 MG/DL — SIGNIFICANT CHANGE UP (ref 8.4–10.5)
CHLORIDE SERPL-SCNC: 105 MMOL/L — SIGNIFICANT CHANGE UP (ref 96–108)
CO2 SERPL-SCNC: 26 MMOL/L — SIGNIFICANT CHANGE UP (ref 22–31)
CREAT SERPL-MCNC: 1.29 MG/DL — SIGNIFICANT CHANGE UP (ref 0.5–1.3)
EGFR: 60 ML/MIN/1.73M2 — SIGNIFICANT CHANGE UP
GLUCOSE SERPL-MCNC: 114 MG/DL — HIGH (ref 70–99)
HCT VFR BLD CALC: 43.4 % — SIGNIFICANT CHANGE UP (ref 39–50)
HGB BLD-MCNC: 14.2 G/DL — SIGNIFICANT CHANGE UP (ref 13–17)
MAGNESIUM SERPL-MCNC: 2.3 MG/DL — SIGNIFICANT CHANGE UP (ref 1.6–2.6)
MCHC RBC-ENTMCNC: 31.1 PG — SIGNIFICANT CHANGE UP (ref 27–34)
MCHC RBC-ENTMCNC: 32.7 GM/DL — SIGNIFICANT CHANGE UP (ref 32–36)
MCV RBC AUTO: 95 FL — SIGNIFICANT CHANGE UP (ref 80–100)
NRBC # BLD: 0 /100 WBCS — SIGNIFICANT CHANGE UP (ref 0–0)
PHOSPHATE SERPL-MCNC: 3.6 MG/DL — SIGNIFICANT CHANGE UP (ref 2.5–4.5)
PLATELET # BLD AUTO: 185 K/UL — SIGNIFICANT CHANGE UP (ref 150–400)
POTASSIUM SERPL-MCNC: 4.2 MMOL/L — SIGNIFICANT CHANGE UP (ref 3.5–5.3)
POTASSIUM SERPL-SCNC: 4.2 MMOL/L — SIGNIFICANT CHANGE UP (ref 3.5–5.3)
RBC # BLD: 4.57 M/UL — SIGNIFICANT CHANGE UP (ref 4.2–5.8)
RBC # FLD: 14.6 % — HIGH (ref 10.3–14.5)
SODIUM SERPL-SCNC: 139 MMOL/L — SIGNIFICANT CHANGE UP (ref 135–145)
WBC # BLD: 9.65 K/UL — SIGNIFICANT CHANGE UP (ref 3.8–10.5)
WBC # FLD AUTO: 9.65 K/UL — SIGNIFICANT CHANGE UP (ref 3.8–10.5)

## 2023-04-28 PROCEDURE — 99291 CRITICAL CARE FIRST HOUR: CPT

## 2023-04-28 PROCEDURE — 99292 CRITICAL CARE ADDL 30 MIN: CPT | Mod: 24

## 2023-04-28 RX ORDER — VALACYCLOVIR 500 MG/1
1000 TABLET, FILM COATED ORAL EVERY 12 HOURS
Refills: 0 | Status: DISCONTINUED | OUTPATIENT
Start: 2023-04-28 | End: 2023-05-03

## 2023-04-28 RX ORDER — ACETYLCYSTEINE 200 MG/ML
4 VIAL (ML) MISCELLANEOUS EVERY 6 HOURS
Refills: 0 | Status: DISCONTINUED | OUTPATIENT
Start: 2023-04-28 | End: 2023-05-03

## 2023-04-28 RX ADMIN — CARBIDOPA AND LEVODOPA 1 TABLET(S): 25; 100 TABLET ORAL at 11:11

## 2023-04-28 RX ADMIN — ENOXAPARIN SODIUM 40 MILLIGRAM(S): 100 INJECTION SUBCUTANEOUS at 21:44

## 2023-04-28 RX ADMIN — SODIUM CHLORIDE 3 GRAM(S): 9 INJECTION INTRAMUSCULAR; INTRAVENOUS; SUBCUTANEOUS at 17:03

## 2023-04-28 RX ADMIN — Medication 1 DROP(S): at 07:02

## 2023-04-28 RX ADMIN — SENNA PLUS 2 TABLET(S): 8.6 TABLET ORAL at 21:44

## 2023-04-28 RX ADMIN — Medication 4 MILLILITER(S): at 10:09

## 2023-04-28 RX ADMIN — VALACYCLOVIR 1000 MILLIGRAM(S): 500 TABLET, FILM COATED ORAL at 17:04

## 2023-04-28 RX ADMIN — PANTOPRAZOLE SODIUM 40 MILLIGRAM(S): 20 TABLET, DELAYED RELEASE ORAL at 07:03

## 2023-04-28 RX ADMIN — ENTACAPONE 200 MILLIGRAM(S): 200 TABLET, FILM COATED ORAL at 07:03

## 2023-04-28 RX ADMIN — Medication 4 MILLILITER(S): at 05:40

## 2023-04-28 RX ADMIN — CARBIDOPA AND LEVODOPA 1 TABLET(S): 25; 100 TABLET ORAL at 19:22

## 2023-04-28 RX ADMIN — CHLORHEXIDINE GLUCONATE 1 APPLICATION(S): 213 SOLUTION TOPICAL at 07:02

## 2023-04-28 RX ADMIN — AMLODIPINE BESYLATE 10 MILLIGRAM(S): 2.5 TABLET ORAL at 07:03

## 2023-04-28 RX ADMIN — CARBIDOPA AND LEVODOPA 1 TABLET(S): 25; 100 TABLET ORAL at 07:03

## 2023-04-28 RX ADMIN — Medication 2 MILLIGRAM(S): at 17:04

## 2023-04-28 RX ADMIN — ENTACAPONE 200 MILLIGRAM(S): 200 TABLET, FILM COATED ORAL at 15:58

## 2023-04-28 RX ADMIN — CARBIDOPA AND LEVODOPA 1 TABLET(S): 25; 100 TABLET ORAL at 15:58

## 2023-04-28 RX ADMIN — Medication 1 DROP(S): at 21:46

## 2023-04-28 RX ADMIN — Medication 1 DROP(S): at 19:23

## 2023-04-28 RX ADMIN — VALACYCLOVIR 1000 MILLIGRAM(S): 500 TABLET, FILM COATED ORAL at 11:18

## 2023-04-28 RX ADMIN — LEVETIRACETAM 1000 MILLIGRAM(S): 250 TABLET, FILM COATED ORAL at 07:03

## 2023-04-28 RX ADMIN — ENTACAPONE 200 MILLIGRAM(S): 200 TABLET, FILM COATED ORAL at 11:12

## 2023-04-28 RX ADMIN — Medication 1 DROP(S): at 14:13

## 2023-04-28 RX ADMIN — LEVETIRACETAM 1000 MILLIGRAM(S): 250 TABLET, FILM COATED ORAL at 17:03

## 2023-04-28 RX ADMIN — Medication 1 DROP(S): at 02:04

## 2023-04-28 RX ADMIN — SODIUM CHLORIDE 3 GRAM(S): 9 INJECTION INTRAMUSCULAR; INTRAVENOUS; SUBCUTANEOUS at 11:11

## 2023-04-28 RX ADMIN — Medication 1 DROP(S): at 10:06

## 2023-04-28 RX ADMIN — SODIUM CHLORIDE 3 GRAM(S): 9 INJECTION INTRAMUSCULAR; INTRAVENOUS; SUBCUTANEOUS at 23:51

## 2023-04-28 RX ADMIN — SODIUM CHLORIDE 3 GRAM(S): 9 INJECTION INTRAMUSCULAR; INTRAVENOUS; SUBCUTANEOUS at 07:03

## 2023-04-28 NOTE — PROGRESS NOTE ADULT - ASSESSMENT
Neurology    68 y o M hx seizures, HTN, poorly controlled Parkinson's disease w/ prog inability to walk since Nov 2022. Found to have large L frontal dural based lesion c/f meningioma w/ associated mass effect and edema. S/p L crani for meningioma resection (4/17). Discharged to [facility] on 4/21. On arrival to rehab c/o headache with persistent nausea/vomiting. CTH performed reveals new large left frontal IPH with IVH, with edema and midline shift. Intubated for airway protection. Readmitted to [facility] for further management, NIHSS 23, ICH 3.    PLAN:  NEURO:  - neuro/vitals q1hr  - EVD @10cm H2O, monitor ICP, output, failed clamped trial 4/27  - Keppra 1g BID (hx seizures)  - Decadron 4mg q6h taper 1 week to off   - Parkinsons: continue home sinemet (25/250mg) 4 times/day, entacapone 200mg q8h  - CTH 4/24: no significant interval change, CTH 4/26: no significant interval change  - Hold home gabapentin 400mg 4 times/day     CARDIOVASCULAR:  - -160  - Amlodipine 10 mg qd  - TTE 4/22 mild aortic sclerosis w/ no stenosis, mild AR, left pleural effusion EF 71%    PULMONARY:  - nebs q6h prn, chest PT  - RA, NC PRN   - secretions improving    RENAL:  - Na goal 140-145, salt tabs 3 q 6  - voiding   - electrolyte repletion PRN     GI:  - Soft and bite sized diet   - Bowel regimen, last BM 4/27   - Protonix 40mg qd for GI ppx while on steroids    HEME:  - SCDs, SQL for DVT ppx   - LE dopplers 4/22 negative   - Anti xa 0.27 4/26    ID:  - Zosyn (4/22-4/27) - emipiric PNA, RLL infiltrate     ENDO:  -Afebrile, resolved leukocytosis   - ISS, A1c 5.8    DISPO:  - NSICU, full code, family updated  - PT/OT rec AR: patient can tolerate three hours PT/OT daily    Discussed with Dr. D'Amico and Dr. Belcher

## 2023-04-28 NOTE — PROGRESS NOTE ADULT - ASSESSMENT
ASSESSMENT AND PLAN  L. frontal ICH-3 with IVH, suspected venous bleed; BD 6  History of elective L. crani for meningioma resection (4/17/23, Dr. D'Amico)  History of seizures, Parkinson's disease, multiple falls and wheelchair bound  Abdominal distension    NEURO:  Q1h neurochecks  Continue Keppra 1000mg bid (seizure history)  Decadron with taper per NSGY  R. frontal EVD at 61cqE0A (monitor ICPs/CPP/ color/output)  S/P clamp trial 4/26* Retrial 4/28  CT head repeat 4/26am- stable  MRI at some point  Continue anti-Parkinson's meds: Sinemet, entacapone  Activity: PT/OT as tolerated.     PULM:   Currently on room air  proBNP 1400->1300  SpO2 goal > 92%  Chest PT as tolerated    CARDIAC:   BP goal 100- 160  Continue Amlodipine (increase to 10mg daily)  TTE: EF 71%. Mild AR    GI:  Diet: Soft and bite sized diet  Stress ulcer prophylaxis: PPI while on steroids  LBM: 4/27    /RENAL:  Sodium goal 140-145. Continue salt tabs   Trend BMPs daily  Monitor Is/Os    HEME:  Maintain Hb > 7.0, PLT > 100,000  SCDs, SQL  AntiXa 4/26 0.27. Continue daily dose.  LE dopplers 4/22 negative for DVT    ID:  Piperacillin tazobactam for empiric aspiration PNA coverage 4/27  Follow up sputum culture- normal blank  Monitor temp curve, WBC    ENDO:  Goal euglycemia  DC ISS    MISC:    SOCIAL/FAMILY:  [] awaiting [x] updated son Kenneth at bedside [] family meeting    CODE STATUS:  [x] Full Code [] DNR [] DNI [] Palliative/Comfort Care    DISPOSITION:  [x] ICU [] Stroke Unit [] Floor [] EMU [] RCU [] PCU    Time spent: 60 critical care minutes     ASSESSMENT AND PLAN  L. frontal ICH-3 with IVH, suspected venous bleed; BD 6  History of elective L. crani for meningioma resection (4/17/23, Dr. D'Amico)  History of seizures, Parkinson's disease, multiple falls and wheelchair bound  Abdominal distension    NEURO:  Q1h neurochecks  Continue Keppra 1000mg bid (seizure history)  Decadron with taper per NSGY  R. frontal EVD at 29xsU8D (monitor ICPs/CPP/ color/output)  S/P clamp trial 4/26* Retrial 4/28  CT head repeat 4/26am- stable  MRI at some point  Continue anti-Parkinson's meds: Sinemet, entacapone (will need to increase entacapone to Q6)  Activity: PT/OT as tolerated.     PULM:   Currently on room air  proBNP 1400->1300  SpO2 goal > 92%  Chest PT as tolerated, nebs prn    CARDIAC:   BP goal 100- 160  Continue Amlodipine (increase to 10mg daily)  TTE: EF 71%. Mild AR    GI:  Diet: Soft and bite sized diet  Stress ulcer prophylaxis: PPI while on steroids  LBM: 4/28    /RENAL:  Sodium goal 140-145. Continue salt tabs   Trend BMPs daily  Monitor Is/Os    HEME:  Maintain Hb > 7.0, PLT > 100,000  SCDs, SQL  AntiXa 4/26 0.27. Continue daily dose.  LE dopplers 4/22 negative for DVT    ID:  S/P: Piperacillin tazobactam for empiric aspiration PNA coverage 4/27  Follow up sputum culture- normal blank  Monitor temp curve, WBC  Valtrex PO bid x 7 days    ENDO:  Goal euglycemia  DC ISS    MISC:    SOCIAL/FAMILY:  [] awaiting [x] updated son Kenneth at bedside [] family meeting    CODE STATUS:  [x] Full Code [] DNR [] DNI [] Palliative/Comfort Care    DISPOSITION:  [x] ICU [] Stroke Unit [] Floor [] EMU [] RCU [] PCU    Time spent: 60 critical care minutes

## 2023-04-28 NOTE — PROGRESS NOTE ADULT - SUBJECTIVE AND OBJECTIVE BOX
==========================  ADULT NSICU PROGRESS NOTE  ==========================  HPI:  Patient is a 68 year old male with PMH of seizures, HTN, and Parkinson's disease diagnosed in 2012 with multiple falls who initially presented to [facility] on 4/17 for elective left craniotomy for meningioma resection. He has also been wheelchair bound since about 2-3 years ago and has frequent falls related to his Parkinson's disease.  Underwent L crani for meningioma resection on 4/17. Hospital course was uncomplicated.   Discharged to [facility] on 4/21 in stable condition.     On arrival to rehab, pt c/o incisional headache with episodes of n/v during ambulance ride to rehab.  Pt given zofran and oxycodone, however, headache returned with persistent nausea and vomiting.   RRT called for worsened mental status. CT head obtained stat which showed new large left frontal hemorrhage with IVH, cerebral edema with midline shift and some effacement of L frontal horn.  Pt started on cardene, given Keppra and transferred to [facility]. Prior to transfer, mental status declined (GCS 13-> 9) with increased secretions, therefore attempted intubation; LMA airway placed.      On admission, the patient was:  GCS:  Chavez-Hou:  modified Garcia:  ICH score: 3  NIHSS: 23          ICU Vital Signs Last 24 Hrs  T(C): 37.1 (28 Apr 2023 06:18), Max: 37.4 (27 Apr 2023 17:30)  T(F): 98.8 (28 Apr 2023 06:18), Max: 99.3 (27 Apr 2023 17:30)  HR: 82 (28 Apr 2023 07:00) (51 - 83)  BP: 171/104 (28 Apr 2023 07:00) (123/78 - 171/104)  BP(mean): 134 (28 Apr 2023 07:00) (94 - 134)  RR: 18 (28 Apr 2023 07:00) (14 - 18)  SpO2: 97% (28 Apr 2023 07:00) (91% - 100%)      04-27-23 @ 07:01  -  04-28-23 @ 07:00  --------------------------------------------------------  IN: 1145 mL / OUT: 2354 mL / NET: -1209 mL      EXAM:   General: Normocephalic, EVD, laying in bed supine, no distress  Neuro:    MS: Awake, alert, oriented x 2- 3    CN: PERRL 3mm and reactive, gaze is conjugate, face symmetric    Mot: Power 5/5 in all extremities  Chest: Lungs clear  Heart: Regular rate/rhythm, S1/S2  Abdomen: Soft and nontender. Non distended  Extremities: No edema      MEDICATIONS:   acetaminophen   Oral Liquid .. 650 milliGRAM(s) Oral every 6 hours PRN  acetylcysteine 20%  Inhalation 4 milliLiter(s) Inhalation every 6 hours  albuterol/ipratropium for Nebulization 3 milliLiter(s) Nebulizer every 6 hours PRN  amLODIPine   Tablet 10 milliGRAM(s) Oral daily  artificial  tears Solution 1 Drop(s) Both EYES every 4 hours  carbidopa/levodopa  25/250 1 Tablet(s) Oral <User Schedule>  chlorhexidine 2% Cloths 1 Application(s) Topical daily  dexAMETHasone  Injectable   IV Push   dexAMETHasone  Injectable 2 milliGRAM(s) IV Push every 12 hours  enoxaparin Injectable 40 milliGRAM(s) SubCutaneous <User Schedule>  entacapone 200 milliGRAM(s) Oral <User Schedule>  levETIRAcetam 1000 milliGRAM(s) Oral two times a day  ondansetron Injectable 4 milliGRAM(s) IV Push every 6 hours PRN  pantoprazole    Tablet 40 milliGRAM(s) Oral before breakfast  senna 2 Tablet(s) Oral at bedtime  sodium chloride 3 Gram(s) Oral every 6 hours    LABS:                      14.2   9.65  )-----------( 185      ( 28 Apr 2023 05:43 )             43.4     04-28    139  |  105  |  24<H>  ----------------------------<  114<H>  4.2   |  26  |  1.29    Ca    8.9      28 Apr 2023 05:43  Phos  3.6     04-28  Mg     2.3     04-28                     ==========================  ADULT NSICU PROGRESS NOTE  ==========================  HPI:  Patient is a 68 year old male with PMH of seizures, HTN, and Parkinson's disease diagnosed in 2012 with multiple falls who initially presented to [facility] on 4/17 for elective left craniotomy for meningioma resection. He has also been wheelchair bound since about 2-3 years ago and has frequent falls related to his Parkinson's disease.  Underwent L crani for meningioma resection on 4/17. Hospital course was uncomplicated.   Discharged to [facility] on 4/21 in stable condition.     On arrival to rehab, pt c/o incisional headache with episodes of n/v during ambulance ride to rehab.  Pt given zofran and oxycodone, however, headache returned with persistent nausea and vomiting.   RRT called for worsened mental status. CT head obtained stat which showed new large left frontal hemorrhage with IVH, cerebral edema with midline shift and some effacement of L frontal horn.  Pt started on cardene, given Keppra and transferred to [facility]. Prior to transfer, mental status declined (GCS 13-> 9) with increased secretions, therefore attempted intubation; LMA airway placed.      On admission, the patient was:  GCS:  Chavez-Hou:  modified Garcia:  ICH score: 3  NIHSS: 23      ICU Vital Signs Last 24 Hrs  T(C): 37.1 (28 Apr 2023 06:18), Max: 37.4 (27 Apr 2023 17:30)  T(F): 98.8 (28 Apr 2023 06:18), Max: 99.3 (27 Apr 2023 17:30)  HR: 82 (28 Apr 2023 07:00) (51 - 83)  BP: 171/104 (28 Apr 2023 07:00) (123/78 - 171/104)  BP(mean): 134 (28 Apr 2023 07:00) (94 - 134)  RR: 18 (28 Apr 2023 07:00) (14 - 18)  SpO2: 97% (28 Apr 2023 07:00) (91% - 100%)      04-27-23 @ 07:01  -  04-28-23 @ 07:00  --------------------------------------------------------  IN: 1145 mL / OUT: 2354 mL / NET: -1209 mL      EXAM:   General: Normocephalic, EVD, laying in bed supine, no distress  Neuro:    MS: Awake, alert, oriented x 2- 3    CN: PERRL 3mm and reactive, gaze is conjugate, face symmetric    Mot: Power 5/5 in all extremities  Chest: Lungs clear  Heart: Regular rate/rhythm, S1/S2  Abdomen: Soft and nontender. Non distended  Skin: Vesicular appearing lesions on L face  Extremities: No edema      MEDICATIONS:   acetaminophen   Oral Liquid .. 650 milliGRAM(s) Oral every 6 hours PRN  acetylcysteine 20%  Inhalation 4 milliLiter(s) Inhalation every 6 hours  albuterol/ipratropium for Nebulization 3 milliLiter(s) Nebulizer every 6 hours PRN  amLODIPine   Tablet 10 milliGRAM(s) Oral daily  artificial  tears Solution 1 Drop(s) Both EYES every 4 hours  carbidopa/levodopa  25/250 1 Tablet(s) Oral <User Schedule>  chlorhexidine 2% Cloths 1 Application(s) Topical daily  dexAMETHasone  Injectable   IV Push   dexAMETHasone  Injectable 2 milliGRAM(s) IV Push every 12 hours  enoxaparin Injectable 40 milliGRAM(s) SubCutaneous <User Schedule>  entacapone 200 milliGRAM(s) Oral <User Schedule>  levETIRAcetam 1000 milliGRAM(s) Oral two times a day  ondansetron Injectable 4 milliGRAM(s) IV Push every 6 hours PRN  pantoprazole    Tablet 40 milliGRAM(s) Oral before breakfast  senna 2 Tablet(s) Oral at bedtime  sodium chloride 3 Gram(s) Oral every 6 hours      LABS:                      14.2   9.65  )-----------( 185      ( 28 Apr 2023 05:43 )             43.4     04-28    139  |  105  |  24<H>  ----------------------------<  114<H>  4.2   |  26  |  1.29    Ca    8.9      28 Apr 2023 05:43  Phos  3.6     04-28  Mg     2.3     04-28

## 2023-04-28 NOTE — PROGRESS NOTE ADULT - SUBJECTIVE AND OBJECTIVE BOX
HPI:  Patient is a 68 year old male with PMH of seizures, HTN, and Parkinson's disease diagnosed in 2012 with multiple falls who initially presented to [facility] on 4/17 for elective left craniotomy for meningioma resection. He has also been wheelchair bound since about 2-3 years ago and has frequent falls related to his Parkinson's disease.  Underwent L crani for meningioma resection on 4/17. Hospital course was uncomplicated.   Discharged to [facility] on 4/21 in stable condition.     On arrival to rehab, pt c/o incisional headache with episodes of n/v during ambulance ride to rehab.  Pt given zofran and oxycodone, however, headache returned with persistent nausea and vomiting.   RRT called for worsened mental status. CT head obtained stat which showed new large left frontal hemorrhage with IVH, cerebral edema with midline shift and some effacement of L frontal horn.  Pt started on cardene, given Keppra and transferred to [facility]. Prior to transfer, mental status declined (GCS 13-> 9) with increased secretions, therefore attempted intubation; LMA airway placed.      ICH 3   NIHSS 23 (22 Apr 2023 06:28)    OVERNIGHT EVENTS: SIRIA overnight, neuro stable, denies headaches or discomfort. EVD remains open    HOSPITAL COURSE  4/17: POD 0 L crani for tumor resection.   4/18: POD1 L crani tumor resection. Norvasc increased to 5mg daily. Neuro exam stable. Neurology consulted, reocmmended maintaining current parkinson's medication regimen. Had asymptomatic episode of VTACH read on telemetry, hemodynamically stable, returned back to baseline, EKG obtained negative. Cardiology consulted, reported rhythm strip was sinus, likely artifact from tremors in arms, NTD. Pt c/o worsened difficulty breathing, satting well on RA. Given duoneb treatment and CXR completed.  Postop MRI completed in evening.   4/19: POD2, SIRIA overnight,  neuro stable, SLP state may benefit from outpt speech therapy will continue to follow  4/20: POD3, SIRIA overnight. per s/s: soft/bite size diet w/ thin liquids.  4/21: POD4, SIRIA overnight, discahrge Kiran Evans AR    DISCHARGED TO [facility] on 4/21:    4/22: readmitted with new large left frontal IPH with IVH, with edema and midline shift with large left frontal IPH. EVD placed, opened at 10. CTH post EVD complete. [place] sump placed for GI decompression. Started 3%@80 for na goal 145-150. KUB showing small loops of gas, will start TF tomorrow. TTE mild aortic sclerosis w/ no stenosis, mild AR, left pleural effusion EF 71%. LE dopplers negative.  4/23: POD 6. EVD @ 10. POCUS with RLL consolidation, trace B lines, L lung clear, collapsable IVC. 250cc albumin and 500cc NS bolus x2 in setting of decreasing UOP. Started on SQL 40 BID, weight based. Off propofol, on CPAP 8/5, plan to extubate. Removed matthew, f/u TOV. Removed a-line. Extubated to HFNC. Decreased 3% to 30. Passed TOV.   4/24: POD 7. Tolerating HFNC. Start TF today. Na 155, stopped 3%, fentanyl d/c'd, CT stable. Trickle feeds started. PT/OT recommending AR. C/o headache, given tramadol  with resolution. S&S recommend pureed diet with thin liquids. Weaning to 6L NC. Na 155-->144, gave 225cc 3% bolus, Na-->147.   4/25: POD 8. Cont 3% at 30. EVD remains at 10. Tolerating PO. Given hydralazine 10mg x 1 for SBP 180s. Bradycardic to 53 resolved on own. Dc'd 3%, started salt tabs 2q8. Sodium goal 140-145. Sputum culture from 4/22: normal respiratory blank. Repeat 145. F/u AM sodium. ProBNP 1492. Pt removed NGT. Tolerating pureed diet.   4/26: POD 9. EVD @ 10. Nebs made PRN, amlodipine increased to 10mg. Rec'd for soft and bite size diet, CTH today with no significant interval change. EVD clamp trial ICPS hovering around 18, will monitor clinically and open if neuro change or headache, pro-bnp 1387 downtrending.   4/27: POD10. ICPs sustained 22-26 x 15 min, opened EVD. Finished course of zosyn for empiric pneumonia treatment.   4/28: POD11, Neuro stable, EVD remains open, ICPs stable.   Vital Signs Last 24 Hrs  T(C): 36.8 (28 Apr 2023 00:22), Max: 37.4 (27 Apr 2023 17:30)  T(F): 98.2 (28 Apr 2023 00:22), Max: 99.3 (27 Apr 2023 17:30)  HR: 59 (28 Apr 2023 00:00) (56 - 89)  BP: 142/75 (28 Apr 2023 00:00) (123/78 - 154/77)  BP(mean): 102 (28 Apr 2023 00:00) (91 - 111)  RR: 17 (28 Apr 2023 00:00) (16 - 18)  SpO2: 96% (28 Apr 2023 00:00) (91% - 100%)    Parameters below as of 28 Apr 2023 00:00  Patient On (Oxygen Delivery Method): room air        I&O's Summary    26 Apr 2023 07:01  -  27 Apr 2023 07:00  --------------------------------------------------------  IN: 1435 mL / OUT: 2376 mL / NET: -941 mL    27 Apr 2023 07:01  -  28 Apr 2023 01:16  --------------------------------------------------------  IN: 1145 mL / OUT: 1829 mL / NET: -684 mL        PHYSICAL EXAM:  General: NAD, pt is comfortably sitting up in bed, on room air  HEENT: CN II-XII grossly intact, PERRL 3mm briskly reactive, EOMI b/l, face symmetric, tongue midline, neck FROM  Cardiovascular: RRR, normal S1 and S2   Respiratory: lungs CTAB, no wheezing, rhonchi, or crackles   GI: normoactive BS to auscultation, abd soft, NTND   Neuro: A&Ox2-3 (with choices), +word finding difficulty/perseveration, no dysmetria, no pronator drift. Follows commands.  RAMIREZ x4 spontaneously, Bilateral UE 5/5, bilateral LE 4+/5. SILT throughout. +rigidity and mild intention tremors in upper extremities.    Extremities: warm and well perfused   Wound/incision: +leftcraniotomy incision with sutures in place, C/D/I  Drain: +EVD open at [id number].      TUBES/LINES:  [] Matthew  [] Lumbar Drain  [] Wound Drains  [x] Others - EVD       DIET:  [] NPO  [x] Mechanical  [] Tube feeds    LABS:                        12.6   6.74  )-----------( 173      ( 27 Apr 2023 05:28 )             37.9     04-27    137  |  105  |  19  ----------------------------<  118<H>  4.1   |  25  |  1.16    Ca    8.6      27 Apr 2023 05:28  Phos  3.5     04-27  Mg     2.2     04-27              CAPILLARY BLOOD GLUCOSE          Drug Levels: [] N/A    CSF Analysis: [] N/A      Allergies    No Known Allergies    Intolerances      MEDICATIONS:  Antibiotics:    Neuro:  acetaminophen   Oral Liquid .. 650 milliGRAM(s) Oral every 6 hours PRN  carbidopa/levodopa  25/250 1 Tablet(s) Oral <User Schedule>  entacapone 200 milliGRAM(s) Oral <User Schedule>  levETIRAcetam 1000 milliGRAM(s) Oral two times a day  ondansetron Injectable 4 milliGRAM(s) IV Push every 6 hours PRN    Anticoagulation:  enoxaparin Injectable 40 milliGRAM(s) SubCutaneous <User Schedule>    OTHER:  acetylcysteine 20%  Inhalation 4 milliLiter(s) Inhalation every 6 hours  albuterol/ipratropium for Nebulization 3 milliLiter(s) Nebulizer every 6 hours PRN  amLODIPine   Tablet 10 milliGRAM(s) Oral daily  artificial  tears Solution 1 Drop(s) Both EYES every 4 hours  chlorhexidine 2% Cloths 1 Application(s) Topical daily  dexAMETHasone  Injectable 2 milliGRAM(s) IV Push every 12 hours  dexAMETHasone  Injectable   IV Push   pantoprazole    Tablet 40 milliGRAM(s) Oral before breakfast  senna 2 Tablet(s) Oral at bedtime    IVF:  sodium chloride 3 Gram(s) Oral every 6 hours    CULTURES:  Culture Results:   Normal Respiratory Blank present (04-22 @ 06:20)    RADIOLOGY & ADDITIONAL TESTS:      ASSESSMENT:  68M hx seizures, HTN, poorly controlled Parkinson's disease w/ prog inability to walk since Nov 2022. Found to have large L frontal dural based lesion c/f meningioma w/ associated mass effect and edema. S/p L crani for meningioma resection (4/17). Discharged to [facility] on 4/21. On arrival to rehab c/o headache with persistent nausea/vomiting. CTH performed reveals new large left frontal IPH with IVH, with edema and midline shift. Intubated for airway protection. Readmitted to [facility] for further management, NIHSS 23, ICH 3.    INTRACRANIAL HEMORRHAGE    D32.9    Handoff    Parkinsons    HTN (hypertension)    History of seizures    Brain mass    Impaired gait    Meningioma    History of insomnia    Intracerebral hemorrhage    Intracerebral hemorrhage    No significant past surgical history    Room Service Assist    [email or web address]        PLAN:  NEURO:  - neuro/vitals q1hr  - EVD @10cm H2O, monitor ICP, output, failed clamped trial 4/27  - Keppra 1g BID (hx seizures)  - Decadron 4mg q6h taper 1 week to off   - Parkinsons: continue home sinemet (25/250mg) 4 times/day, entacapone 200mg q8h  - CTH 4/24: no significant interval change, CTH 4/26: no significant interval change  - Hold home gabapentin 400mg 4 times/day     CARDIOVASCULAR:  - -160  - Amlodipine 10 mg qd  - TTE 4/22 mild aortic sclerosis w/ no stenosis, mild AR, left pleural effusion EF 71%    PULMONARY:  - nebs q6h prn, chest PT  - RA, NC PRN   - secretions improving    RENAL:  - Na goal 140-145, salt tabs 3 q 6  - voiding   - electrolyte repletion PRN     GI:  - Soft and bite sized diet   - Bowel regimen, last BM 4/27   - Protonix 40mg qd for GI ppx while on steroids    HEME:  - SCDs, SQL for DVT ppx   - LE dopplers 4/22 negative   - Anti xa 0.27 4/26    ID:  - Zosyn (4/22-4/27) - emipiric PNA, RLL infiltrate     ENDO:  -Afebrile, resolved leukocytosis   - ISS, A1c 5.8    DISPO:  - NSICU, full code, family updated  - PT/OT rec AR: patient can tolerate three hours PT/OT daily    Discussed with Dr. D'Amico and Dr. Belcher     Assessment:  Present when checked    []  GCS  E   V  M     Heart Failure: []Acute, [] acute on chronic , []chronic  Heart Failure:  [] Diastolic (HFpEF), [] Systolic (HFrEF), []Combined (HFpEF and HFrEF), [] RHF, [] Pulm HTN, [] Other    [] NINFA, [] ATN, [] AIN, [] other  [] CKD1, [] CKD2, [] CKD 3, [] CKD 4, [] CKD 5, []ESRD    Encephalopathy: [] Metabolic, [] Hepatic, [] toxic, [] Neurological, [] Other    Abnormal Nurtitional Status: [] malnurtition (see nutrition note), [ ]underweight: BMI < 19, [] morbid obesity: BMI >40, [] Cachexia    [] Sepsis  [] hypovolemic shock,[] cardiogenic shock, [] hemorrhagic shock, [] neuogenic shock  [] Acute Respiratory Failure  []Cerebral edema, [] Brain compression/ herniation,   [] Functional quadriplegia  [] Acute blood loss anemia

## 2023-04-28 NOTE — CHART NOTE - NSCHARTNOTEFT_GEN_A_CORE
Patient remains clinically stable. EVD clamped with ICPs into the 20s but without neurological symptoms. Facial lesions, swabbed for viral PCR to r/o herpes zoster/simplex. Obtained PMR consult to work towards acute rehab at [facility] when medically ready.

## 2023-04-28 NOTE — PROGRESS NOTE ADULT - SUBJECTIVE AND OBJECTIVE BOX
Physical Medicine and Rehabilitation Progress Note :       Patient is a 68y old  Male who presents with a chief complaint of ICH (28 Apr 2023 07:47)      HPI:  Patient is a 68 year old male with PMH of seizures, HTN, and Parkinson's disease diagnosed in 2012 with multiple falls who initially presented to [facility] on 4/17 for elective left craniotomy for meningioma resection. He has also been wheelchair bound since about 2-3 years ago and has frequent falls related to his Parkinson's disease.  Underwent L crani for meningioma resection on 4/17. Hospital course was uncomplicated.   Discharged to [facility] on 4/21 in stable condition.     On arrival to rehab, pt c/o incisional headache with episodes of n/v during ambulance ride to rehab.  Pt given zofran and oxycodone, however, headache returned with persistent nausea and vomiting.   RRT called for worsened mental status. CT head obtained stat which showed new large left frontal hemorrhage with IVH, cerebral edema with midline shift and some effacement of L frontal horn.  Pt started on cardene, given Keppra and transferred to [facility]. Prior to transfer, mental status declined (GCS 13-> 9) with increased secretions, therefore attempted intubation; LMA airway placed.      ICH 3   NIHSS 23 (22 Apr 2023 06:28)                            14.2   9.65  )-----------( 185      ( 28 Apr 2023 05:43 )             43.4       04-28    139  |  105  |  24<H>  ----------------------------<  114<H>  4.2   |  26  |  1.29    Ca    8.9      28 Apr 2023 05:43  Phos  3.6     04-28  Mg     2.3     04-28      Vital Signs Last 24 Hrs  T(C): 37.1 (28 Apr 2023 06:18), Max: 37.4 (27 Apr 2023 17:30)  T(F): 98.8 (28 Apr 2023 06:18), Max: 99.3 (27 Apr 2023 17:30)  HR: 77 (28 Apr 2023 11:00) (51 - 86)  BP: 143/73 (28 Apr 2023 11:00) (121/62 - 171/104)  BP(mean): 102 (28 Apr 2023 11:00) (84 - 134)  RR: 19 (28 Apr 2023 11:00) (14 - 20)  SpO2: 100% (28 Apr 2023 11:00) (90% - 100%)    Parameters below as of 28 Apr 2023 11:00  Patient On (Oxygen Delivery Method): room air        MEDICATIONS  (STANDING):  amLODIPine   Tablet 10 milliGRAM(s) Oral daily  artificial  tears Solution 1 Drop(s) Both EYES every 4 hours  carbidopa/levodopa  25/250 1 Tablet(s) Oral <User Schedule>  chlorhexidine 2% Cloths 1 Application(s) Topical daily  dexAMETHasone  Injectable   IV Push   dexAMETHasone  Injectable 2 milliGRAM(s) IV Push every 12 hours  enoxaparin Injectable 40 milliGRAM(s) SubCutaneous <User Schedule>  entacapone 200 milliGRAM(s) Oral <User Schedule>  levETIRAcetam 1000 milliGRAM(s) Oral two times a day  pantoprazole    Tablet 40 milliGRAM(s) Oral before breakfast  senna 2 Tablet(s) Oral at bedtime  sodium chloride 3 Gram(s) Oral every 6 hours  valACYclovir 1000 milliGRAM(s) Oral every 12 hours    MEDICATIONS  (PRN):  acetaminophen   Oral Liquid .. 650 milliGRAM(s) Oral every 6 hours PRN Temp greater or equal to 38C (100.4F), Mild Pain (1 - 3)  acetylcysteine 20%  Inhalation 4 milliLiter(s) Inhalation every 6 hours PRN congestion  albuterol/ipratropium for Nebulization 3 milliLiter(s) Nebulizer every 6 hours PRN Shortness of Breath and/or Wheezing  ondansetron Injectable 4 milliGRAM(s) IV Push every 6 hours PRN Nausea and/or Vomiting       4/27/2023 Functional Status Assessment :         Pain Assessment/Number Scale (0-10) Adult  Presence of Pain: complains of pain/discomfort  Body Location: R knee  Radiation To Radiation To: unrated     Safety      AM-PAC Functional Assessment: Basic Mobility  Turning from your back to your side while in a flat bed without using bedrails?: 2 = A lot of assistance  Moving from lying on your back to sitting on the flat side of a flat bed without using bedrails?: 2 = A lot of assistance  Moving to and from a bed to a chair (including a wheelchair)?: 2 = A lot of assistance  Standing up from a chair using your arms (e.g. wheelchair or bedside chair)?: 2 = A lot of assistance  Walking in hospital room?: 2 = A lot of assistance  Climbing 3-5 steps with a railing?: 2 = A lot of assistance  Score: 12   Row Comment: Ask the patient "How much help from another person do you currently need? (If the patient hasn't done an activity recently, how much help from another person do you think he/she needs if he/she tried?)    Cognitive/Neuro      Cognitive/Neuro/Behavioral  Level of Consciousness: alert  Arousal Level: arouses to voice  Orientation: disoriented to;  time  Speech: clear  Mood/Behavior: calm    Language Assistance  Preferred Language to Address Healthcare Preferred Language to Address Healthcare: English    Therapeutic Interventions      Bed Mobility  Bed Mobility Training Symptoms Noted During/After Treatment: none  Bed Mobility Training Rolling/Turning: maximum assist (25% patient effort);  2 person assist;  verbal cues  Bed Mobility Training Scooting: verbal cues;  moderate assist (50% patient effort);  1 person assist  Bed Mobility Training Sit-to-Supine: maximum assist (25% patient effort);  2 person assist;  verbal cues  Bed Mobility Training Supine-to-Sit: maximum assist (25% patient effort);  2 person assist;  verbal cues  Bed Mobility Training Limitations: decreased ability to use legs for bridging/pushing;  impaired ability to control trunk for mobility;  impaired balance;  decreased strength;  impaired postural control    Sit-Stand Transfer Training  Sit-to-Stand Transfer Training Symptoms Noted During/After Treatment: none  Transfer Training Sit-to-Stand Transfer: min A x 2 on first trial, mod A x 2 on second trial;  B Handheld Assist; trunk assist  Transfer Training Stand-to-Sit Transfer: moderate assist (50% patient effort);  2 person assist;  verbal cues  Sit-to-Stand Transfer Training Transfer Safety Analysis: decreased balance;  decreased step length;  decreased weight-shifting ability;  pt returning to sitting unprompted, c/o R knee pain;  impaired balance;  decreased strength;  impaired postural control    Gait Training  Gait Training Charges: Pt unable to unweight either LE to advance fo ambulation     Neuromuscular Re-education  Neuromuscular Re-education Detail: (L) hand  5/5, (R) hand  4+/5. CN Testing: smile symmetrical; tongue protrusion at midline; B/L eyes open/close intact; Vision H-Test: bilateral tracking and smooth pursuit intact; Convergence/Divergence: intact // Finger to nose: intact bilaterally // MMT performed: (L)UE and (L)LE 5/5 for all major pivots; (R)shoulder flexion N/T, (R)elbow flexion 5/5, (R)elbow extension 4+/5, (R)wrist extension N/T, (R)wrist flexion N/T; (R)hip flexion 4/5, (R)knee extension 4+/5, (R)knee flexion 4/5, (R)ankle DF 5/5, (R)imtiaz PF 5/5           PM&R Impression : as above    Current Disposition Plan Recommendations :   acute rehab placement

## 2023-04-28 NOTE — PROGRESS NOTE ADULT - SUBJECTIVE AND OBJECTIVE BOX
PM EVENTS: no acute overnight events as of documentation time of this note.    ROS: negative except as mentioned above.    ICU Vital Signs Last 24 Hrs  T(C): 37.5 (28 Apr 2023 17:31), Max: 37.5 (28 Apr 2023 17:31)  T(F): 99.5 (28 Apr 2023 17:31), Max: 99.5 (28 Apr 2023 17:31)  HR: 84 (28 Apr 2023 19:00) (51 - 86)  BP: 131/70 (28 Apr 2023 19:00) (120/70 - 171/104)  BP(mean): 95 (28 Apr 2023 19:00) (84 - 134)  ABP: --  ABP(mean): --  RR: 17 (28 Apr 2023 19:00) (14 - 22)  SpO2: 98% (28 Apr 2023 19:00) (90% - 100%)    O2 Parameters below as of 28 Apr 2023 19:00  Patient On (Oxygen Delivery Method): room air              EXAM:     Joe Coma Scale: 3/4/6 = 13    General: normocephalic, EVD clamped, laying in bed supine, no distress  Neuro     MS: awake, alert and oriented x2 (self/month/hospital, not year), cooperative with exam, follows commands, no neglect    CN: PERRL, gaze is conjugate, face symmetric, hearing grossly intact    Mot: bulk normal, (+)bilateral rigid tone, power UPPER 5/5 proximally and distally, LOWER 3/3 proximally  Chest: lungs are CTA, heart regular rate/rhythm  Abdomen: distended but soft and nontender without peritoneal signs  Extremities: no clubbing, well-perfused, no edema                            14.2   9.65  )-----------( 185      ( 28 Apr 2023 05:43 )             43.4     04-28    139  |  105  |  24<H>  ----------------------------<  114<H>  4.2   |  26  |  1.29    Ca    8.9      28 Apr 2023 05:43  Phos  3.6     04-28  Mg     2.3     04-28        MEDICATIONS  (STANDING):  amLODIPine   Tablet 10 milliGRAM(s) Oral daily  artificial  tears Solution 1 Drop(s) Both EYES every 4 hours  carbidopa/levodopa  25/250 1 Tablet(s) Oral <User Schedule>  chlorhexidine 2% Cloths 1 Application(s) Topical daily  dexAMETHasone  Injectable   IV Push   dexAMETHasone  Injectable 2 milliGRAM(s) IV Push every 12 hours  enoxaparin Injectable 40 milliGRAM(s) SubCutaneous <User Schedule>  entacapone 200 milliGRAM(s) Oral <User Schedule>  levETIRAcetam 1000 milliGRAM(s) Oral two times a day  pantoprazole    Tablet 40 milliGRAM(s) Oral before breakfast  senna 2 Tablet(s) Oral at bedtime  sodium chloride 3 Gram(s) Oral every 6 hours  valACYclovir 1000 milliGRAM(s) Oral every 12 hours    MEDICATIONS  (PRN):  acetaminophen   Oral Liquid .. 650 milliGRAM(s) Oral every 6 hours PRN Temp greater or equal to 38C (100.4F), Mild Pain (1 - 3)  acetylcysteine 20%  Inhalation 4 milliLiter(s) Inhalation every 6 hours PRN congestion  albuterol/ipratropium for Nebulization 3 milliLiter(s) Nebulizer every 6 hours PRN Shortness of Breath and/or Wheezing  ondansetron Injectable 4 milliGRAM(s) IV Push every 6 hours PRN Nausea and/or Vomiting      Please see the day's note documented by Dr. Grimaldo for detailed ongoing assessment and plan.     - Frequent neuro checks/vital signs, EVD/ICP/cerebral edema watch  - Tube feeds    Remains in NSICU given high risk of acute neurological decompensation     PM EVENTS: no acute overnight events as of documentation time of this note.    ROS: negative except as mentioned above.    ICU Vital Signs Last 24 Hrs  T(C): 37.5 (28 Apr 2023 17:31), Max: 37.5 (28 Apr 2023 17:31)  T(F): 99.5 (28 Apr 2023 17:31), Max: 99.5 (28 Apr 2023 17:31)  HR: 84 (28 Apr 2023 19:00) (51 - 86)  BP: 131/70 (28 Apr 2023 19:00) (120/70 - 171/104)  BP(mean): 95 (28 Apr 2023 19:00) (84 - 134)  ABP: --  ABP(mean): --  RR: 17 (28 Apr 2023 19:00) (14 - 22)  SpO2: 98% (28 Apr 2023 19:00) (90% - 100%)    O2 Parameters below as of 28 Apr 2023 19:00  Patient On (Oxygen Delivery Method): room air              EXAM:     Joe Coma Scale: 3/4/6 = 13    General: normocephalic, EVD clamped, laying in bed supine, no distress  Neuro     MS: awake, alert and oriented x2 (self/month, not year), cooperative with exam, follows commands, no neglect    CN: PERRL, gaze is conjugate, face symmetric, hearing grossly intact    Mot: bulk normal, (+)bilateral rigid tone, power UPPER 4/4 proximally, 5/5 distally LOWER 3/3 proximally  Chest: lungs are CTA, heart regular rate/rhythm  Abdomen: distended but soft and nontender without peritoneal signs  Extremities: no clubbing, well-perfused, no edema                            14.2   9.65  )-----------( 185      ( 28 Apr 2023 05:43 )             43.4     04-28    139  |  105  |  24<H>  ----------------------------<  114<H>  4.2   |  26  |  1.29    Ca    8.9      28 Apr 2023 05:43  Phos  3.6     04-28  Mg     2.3     04-28        MEDICATIONS  (STANDING):  amLODIPine   Tablet 10 milliGRAM(s) Oral daily  artificial  tears Solution 1 Drop(s) Both EYES every 4 hours  carbidopa/levodopa  25/250 1 Tablet(s) Oral <User Schedule>  chlorhexidine 2% Cloths 1 Application(s) Topical daily  dexAMETHasone  Injectable   IV Push   dexAMETHasone  Injectable 2 milliGRAM(s) IV Push every 12 hours  enoxaparin Injectable 40 milliGRAM(s) SubCutaneous <User Schedule>  entacapone 200 milliGRAM(s) Oral <User Schedule>  levETIRAcetam 1000 milliGRAM(s) Oral two times a day  pantoprazole    Tablet 40 milliGRAM(s) Oral before breakfast  senna 2 Tablet(s) Oral at bedtime  sodium chloride 3 Gram(s) Oral every 6 hours  valACYclovir 1000 milliGRAM(s) Oral every 12 hours    MEDICATIONS  (PRN):  acetaminophen   Oral Liquid .. 650 milliGRAM(s) Oral every 6 hours PRN Temp greater or equal to 38C (100.4F), Mild Pain (1 - 3)  acetylcysteine 20%  Inhalation 4 milliLiter(s) Inhalation every 6 hours PRN congestion  albuterol/ipratropium for Nebulization 3 milliLiter(s) Nebulizer every 6 hours PRN Shortness of Breath and/or Wheezing  ondansetron Injectable 4 milliGRAM(s) IV Push every 6 hours PRN Nausea and/or Vomiting      Please see the day's note documented by Dr. Grimaldo for detailed ongoing assessment and plan.     - Frequent neuro checks/vital signs, EVD/ICP/cerebral edema watch  - Tube feeds    Remains in NSICU given high risk of acute neurological decompensation

## 2023-04-28 NOTE — CHART NOTE - NSCHARTNOTEFT_GEN_A_CORE
Admitting Diagnosis:   Patient is a 68y old  Male who presents with a chief complaint of ICH (28 Apr 2023 07:47)    PAST MEDICAL & SURGICAL HISTORY:  Parkinsons  HTN (hypertension)  History of seizures  Brain mass  Impaired gait  Meningioma  History of insomnia  Intracerebral hemorrhage  No significant past surgical history    Current Nutrition Order:  Soft and bite sized diet     PO Intake: Good (%) [   ]  Fair (50-75%) [   ] Poor (<25%) [   ]- please see subjective. Appetite varies with team vs family.     GI Issues:   WDL, last BM 4/28  No n/v/d/c noted  No abd distention noted    Pain:  No pain noted at this time    Skin Integrity:  Surgical incision, becky score 17  EVD in place  Edema: +1 pitting edema BL legs  No pressure ulcers noted    Labs:   04-28    139  |  105  |  24<H>  ----------------------------<  114<H>  4.2   |  26  |  1.29    Ca    8.9      28 Apr 2023 05:43  Phos  3.6     04-28  Mg     2.3     04-28    Medications:  MEDICATIONS  (STANDING):  amLODIPine   Tablet 10 milliGRAM(s) Oral daily  artificial  tears Solution 1 Drop(s) Both EYES every 4 hours  carbidopa/levodopa  25/250 1 Tablet(s) Oral <User Schedule>  chlorhexidine 2% Cloths 1 Application(s) Topical daily  dexAMETHasone  Injectable   IV Push   dexAMETHasone  Injectable 2 milliGRAM(s) IV Push every 12 hours  enoxaparin Injectable 40 milliGRAM(s) SubCutaneous <User Schedule>  entacapone 200 milliGRAM(s) Oral <User Schedule>  levETIRAcetam 1000 milliGRAM(s) Oral two times a day  pantoprazole    Tablet 40 milliGRAM(s) Oral before breakfast  senna 2 Tablet(s) Oral at bedtime  sodium chloride 3 Gram(s) Oral every 6 hours  valACYclovir 1000 milliGRAM(s) Oral every 12 hours    MEDICATIONS  (PRN):  acetaminophen   Oral Liquid .. 650 milliGRAM(s) Oral every 6 hours PRN Temp greater or equal to 38C (100.4F), Mild Pain (1 - 3)  acetylcysteine 20%  Inhalation 4 milliLiter(s) Inhalation every 6 hours PRN congestion  albuterol/ipratropium for Nebulization 3 milliLiter(s) Nebulizer every 6 hours PRN Shortness of Breath and/or Wheezing  ondansetron Injectable 4 milliGRAM(s) IV Push every 6 hours PRN Nausea and/or Vomiting    Admitting Anthropometrics:   Height for BMI (FEET)	5 Feet  Height for BMI (INCHES)	4 Inch(s)  Height for BMI (CENTIMETERS)	162.56 Centimeter(s)  Weight for BMI (lbs)	213.8 lb  Weight for BMI (kg)	97 kg  Body Mass Index	36.7    Weight Change:   No new weights obtained during this admission. Please cont to trend biweekly    Nutrition Focused Physical Exam: Completed [   ]  Not Pertinent [ x  ]    Estimated energy needs:  IBW used for calculations as pt >120% of IBW (157%). Needs adjusted for advanced age, obesity, and wound healing recent post-op  Kcal (25-30 kcal/kg): 7963-3961 kcal  Protein (1.2-1.4 g/kg): 74-86g pro  Fluids (30-35 ml/kg):  2835-3102 ml    Subjective:   68M hx seizures, HTN, poorly controlled Parkinson's disease w/ prog inability to walk since Nov 2022. Found to have large L frontal dural based lesion c/f meningioma w/ associated mass effect and edema. S/p L crani for meningioma resection (4/17). Discharged to [facility] on 4/21. On arrival to rehab c/o headache with persistgent nausea/vomiting. CTH performed reveals new large left frontal IPH with IVH, with edema and midline shift. Intubated for airway protection. Readmitted to [facility] for further management, NIHSS 23, ICH 3. On assessment this am, pt was resting in bed with breakfast tray sitting at bedside. Currently on soft and bite sized diet- per RN pt was refused to eat, but immediately started to eat when family encouraged PO intake. Consuming >75% of meals when family it around. No n/v/d/c. No abd distention or discomfort noted. Last BM 4/28. No pain noted at this time. Education deferred as pt remains lethargic/ disoriented. RD to follow.     Previous Nutrition Diagnosis:  Increased Nutrient Needs RT wound healing for recent post-op AEB s/p L crani for meningioma resection (4/17).    Active [x   ]  Resolved [   ]    If resolved, new PES:     Goal:  Consistently meet >75% est needs    Recommendations:  1. Cont with soft and bite sized diet  >> Cont to adjust diet consistency per SLPs recommendation  2. Pain and bowel regimen per team   3. Cont to monitor lytes and replete prn   4. RD diet edu prn    Education:   Deferred at this time    Risk Level: High [ x  ] Moderate [   ] Low [   ]

## 2023-04-29 LAB
ANION GAP SERPL CALC-SCNC: 7 MMOL/L — SIGNIFICANT CHANGE UP (ref 5–17)
BUN SERPL-MCNC: 28 MG/DL — HIGH (ref 7–23)
CALCIUM SERPL-MCNC: 8.2 MG/DL — LOW (ref 8.4–10.5)
CHLORIDE SERPL-SCNC: 108 MMOL/L — SIGNIFICANT CHANGE UP (ref 96–108)
CO2 SERPL-SCNC: 25 MMOL/L — SIGNIFICANT CHANGE UP (ref 22–31)
CREAT SERPL-MCNC: 1.34 MG/DL — HIGH (ref 0.5–1.3)
EGFR: 57 ML/MIN/1.73M2 — LOW
GLUCOSE SERPL-MCNC: 117 MG/DL — HIGH (ref 70–99)
HCT VFR BLD CALC: 39.4 % — SIGNIFICANT CHANGE UP (ref 39–50)
HGB BLD-MCNC: 13 G/DL — SIGNIFICANT CHANGE UP (ref 13–17)
HSV+VZV DNA SPEC QL NAA+PROBE: ABNORMAL
MAGNESIUM SERPL-MCNC: 2.3 MG/DL — SIGNIFICANT CHANGE UP (ref 1.6–2.6)
MCHC RBC-ENTMCNC: 31.1 PG — SIGNIFICANT CHANGE UP (ref 27–34)
MCHC RBC-ENTMCNC: 33 GM/DL — SIGNIFICANT CHANGE UP (ref 32–36)
MCV RBC AUTO: 94.3 FL — SIGNIFICANT CHANGE UP (ref 80–100)
NRBC # BLD: 0 /100 WBCS — SIGNIFICANT CHANGE UP (ref 0–0)
PHOSPHATE SERPL-MCNC: 3.5 MG/DL — SIGNIFICANT CHANGE UP (ref 2.5–4.5)
PLATELET # BLD AUTO: 193 K/UL — SIGNIFICANT CHANGE UP (ref 150–400)
POTASSIUM SERPL-MCNC: 4.1 MMOL/L — SIGNIFICANT CHANGE UP (ref 3.5–5.3)
POTASSIUM SERPL-SCNC: 4.1 MMOL/L — SIGNIFICANT CHANGE UP (ref 3.5–5.3)
RBC # BLD: 4.18 M/UL — LOW (ref 4.2–5.8)
RBC # FLD: 14.7 % — HIGH (ref 10.3–14.5)
SODIUM SERPL-SCNC: 140 MMOL/L — SIGNIFICANT CHANGE UP (ref 135–145)
SPECIMEN SOURCE: SIGNIFICANT CHANGE UP
WBC # BLD: 12.23 K/UL — HIGH (ref 3.8–10.5)
WBC # FLD AUTO: 12.23 K/UL — HIGH (ref 3.8–10.5)

## 2023-04-29 PROCEDURE — 70450 CT HEAD/BRAIN W/O DYE: CPT | Mod: 26

## 2023-04-29 PROCEDURE — 71045 X-RAY EXAM CHEST 1 VIEW: CPT | Mod: 26

## 2023-04-29 PROCEDURE — 99291 CRITICAL CARE FIRST HOUR: CPT

## 2023-04-29 RX ORDER — SODIUM CHLORIDE 9 MG/ML
1000 INJECTION INTRAMUSCULAR; INTRAVENOUS; SUBCUTANEOUS
Refills: 0 | Status: DISCONTINUED | OUTPATIENT
Start: 2023-04-29 | End: 2023-04-30

## 2023-04-29 RX ORDER — SODIUM CHLORIDE 9 MG/ML
1000 INJECTION INTRAMUSCULAR; INTRAVENOUS; SUBCUTANEOUS
Refills: 0 | Status: DISCONTINUED | OUTPATIENT
Start: 2023-04-29 | End: 2023-04-29

## 2023-04-29 RX ORDER — SODIUM CHLORIDE 5 G/100ML
250 INJECTION, SOLUTION INTRAVENOUS ONCE
Refills: 0 | Status: DISCONTINUED | OUTPATIENT
Start: 2023-04-29 | End: 2023-04-29

## 2023-04-29 RX ADMIN — VALACYCLOVIR 1000 MILLIGRAM(S): 500 TABLET, FILM COATED ORAL at 06:12

## 2023-04-29 RX ADMIN — ENOXAPARIN SODIUM 40 MILLIGRAM(S): 100 INJECTION SUBCUTANEOUS at 21:04

## 2023-04-29 RX ADMIN — Medication 1 DROP(S): at 21:05

## 2023-04-29 RX ADMIN — LEVETIRACETAM 1000 MILLIGRAM(S): 250 TABLET, FILM COATED ORAL at 06:11

## 2023-04-29 RX ADMIN — ENTACAPONE 200 MILLIGRAM(S): 200 TABLET, FILM COATED ORAL at 15:02

## 2023-04-29 RX ADMIN — Medication 1 DROP(S): at 14:34

## 2023-04-29 RX ADMIN — CARBIDOPA AND LEVODOPA 1 TABLET(S): 25; 100 TABLET ORAL at 19:15

## 2023-04-29 RX ADMIN — Medication 2 MILLIGRAM(S): at 06:11

## 2023-04-29 RX ADMIN — LEVETIRACETAM 1000 MILLIGRAM(S): 250 TABLET, FILM COATED ORAL at 17:04

## 2023-04-29 RX ADMIN — ENTACAPONE 200 MILLIGRAM(S): 200 TABLET, FILM COATED ORAL at 07:03

## 2023-04-29 RX ADMIN — CARBIDOPA AND LEVODOPA 1 TABLET(S): 25; 100 TABLET ORAL at 15:03

## 2023-04-29 RX ADMIN — SODIUM CHLORIDE 3 GRAM(S): 9 INJECTION INTRAMUSCULAR; INTRAVENOUS; SUBCUTANEOUS at 17:04

## 2023-04-29 RX ADMIN — Medication 1 DROP(S): at 01:01

## 2023-04-29 RX ADMIN — CHLORHEXIDINE GLUCONATE 1 APPLICATION(S): 213 SOLUTION TOPICAL at 06:11

## 2023-04-29 RX ADMIN — VALACYCLOVIR 1000 MILLIGRAM(S): 500 TABLET, FILM COATED ORAL at 17:04

## 2023-04-29 RX ADMIN — Medication 1 DROP(S): at 17:46

## 2023-04-29 RX ADMIN — CARBIDOPA AND LEVODOPA 1 TABLET(S): 25; 100 TABLET ORAL at 11:20

## 2023-04-29 RX ADMIN — PANTOPRAZOLE SODIUM 40 MILLIGRAM(S): 20 TABLET, DELAYED RELEASE ORAL at 06:11

## 2023-04-29 RX ADMIN — SODIUM CHLORIDE 3 GRAM(S): 9 INJECTION INTRAMUSCULAR; INTRAVENOUS; SUBCUTANEOUS at 23:32

## 2023-04-29 RX ADMIN — Medication 1 DROP(S): at 06:22

## 2023-04-29 RX ADMIN — SODIUM CHLORIDE 3 GRAM(S): 9 INJECTION INTRAMUSCULAR; INTRAVENOUS; SUBCUTANEOUS at 11:21

## 2023-04-29 RX ADMIN — AMLODIPINE BESYLATE 10 MILLIGRAM(S): 2.5 TABLET ORAL at 06:11

## 2023-04-29 RX ADMIN — SODIUM CHLORIDE 3 GRAM(S): 9 INJECTION INTRAMUSCULAR; INTRAVENOUS; SUBCUTANEOUS at 06:11

## 2023-04-29 RX ADMIN — Medication 1 DROP(S): at 09:13

## 2023-04-29 RX ADMIN — CARBIDOPA AND LEVODOPA 1 TABLET(S): 25; 100 TABLET ORAL at 07:03

## 2023-04-29 RX ADMIN — ENTACAPONE 200 MILLIGRAM(S): 200 TABLET, FILM COATED ORAL at 11:20

## 2023-04-29 NOTE — CHART NOTE - NSCHARTNOTEFT_GEN_A_CORE
EVD remains clamped, ICPs intermittently elevated but patient remains neuro stable and without headaches. NS at 100 started for rising Cr level. repeat CTH when clamped x 24 hrs in AM with stable MLS and mass effect. NS lowered to 75 for positive fluid status. 3 BMs today, bowel regimen dc'd.

## 2023-04-29 NOTE — PROGRESS NOTE ADULT - SUBJECTIVE AND OBJECTIVE BOX
HPI:  Patient is a 68 year old male with PMH of seizures, HTN, and Parkinson's disease diagnosed in 2012 with multiple falls who initially presented to [facility] on 4/17 for elective left craniotomy for meningioma resection. He has also been wheelchair bound since about 2-3 years ago and has frequent falls related to his Parkinson's disease.  Underwent L crani for meningioma resection on 4/17. Hospital course was uncomplicated.   Discharged to [facility] on 4/21 in stable condition.     On arrival to rehab, pt c/o incisional headache with episodes of n/v during ambulance ride to rehab.  Pt given zofran and oxycodone, however, headache returned with persistent nausea and vomiting.   RRT called for worsened mental status. CT head obtained stat which showed new large left frontal hemorrhage with IVH, cerebral edema with midline shift and some effacement of L frontal horn.  Pt started on cardene, given Keppra and transferred to [facility]. Prior to transfer, mental status declined (GCS 13-> 9) with increased secretions, therefore attempted intubation; LMA airway placed.      ICH 3   NIHSS 23 (22 Apr 2023 06:28)    OVERNIGHT EVENTS: SIRIA overnight, neuro stable, EVD remains clamped, ICPs mostly in teens throughout the night.     HOSPITAL COURSE:  4/17: POD 0 L crani for tumor resection.   4/18: POD1 L crani tumor resection. Norvasc increased to 5mg daily. Neuro exam stable. Neurology consulted, reocmmended maintaining current parkinson's medication regimen. Had asymptomatic episode of VTACH read on telemetry, hemodynamically stable, returned back to baseline, EKG obtained negative. Cardiology consulted, reported rhythm strip was sinus, likely artifact from tremors in arms, NTD. Pt c/o worsened difficulty breathing, satting well on RA. Given duoneb treatment and CXR completed.  Postop MRI completed in evening.   4/19: POD2, SIRIA overnight,  neuro stable, SLP state may benefit from outpt speech therapy will continue to follow  4/20: POD3, SIRIA overnight. per s/s: soft/bite size diet w/ thin liquids.  4/21: POD4, SIRIA overnight, chris [place] [facility]    DISCHARGED TO [facility] on 4/21:    4/22: readmitted with new large left frontal IPH with IVH, with edema and midline shift with large left frontal IPH. EVD placed, opened at 10. CTH post EVD complete. [place] sump placed for GI decompression. Started 3%@80 for na goal 145-150. KUB showing small loops of gas, will start TF tomorrow. TTE mild aortic sclerosis w/ no stenosis, mild AR, left pleural effusion EF 71%. LE dopplers negative.  4/23: POD 6. EVD @ 10. POCUS with RLL consolidation, trace B lines, L lung clear, collapsable IVC. 250cc albumin and 500cc NS bolus x2 in setting of decreasing UOP. Started on SQL 40 BID, weight based. Off propofol, on CPAP 8/5, plan to extubate. Removed matthew, f/u TOV. Removed a-line. Extubated to HFNC. Decreased 3% to 30. Passed TOV.   4/24: POD 7. Tolerating HFNC. Start TF today. Na 155, stopped 3%, fentanyl d/c'd, CT stable. Trickle feeds started. PT/OT recommending AR. C/o headache, given tramadol  with resolution. S&S recommend pureed diet with thin liquids. Weaning to 6L NC. Na 155-->144, gave 225cc 3% bolus, Na-->147.   4/25: POD 8. Cont 3% at 30. EVD remains at 10. Tolerating PO. Given hydralazine 10mg x 1 for SBP 180s. Bradycardic to 53 resolved on own. Dc'd 3%, started salt tabs 2q8. Sodium goal 140-145. Sputum culture from 4/22: normal respiratory blank. Repeat 145. F/u AM sodium. ProBNP 1492. Pt removed NGT. Tolerating pureed diet.   4/26: POD 9. EVD @ 10. Nebs made PRN, amlodipine increased to 10mg. Rec'd for soft and bite size diet, CTH today with no significant interval change. EVD clamp trial ICPS hovering around 18, will monitor clinically and open if neuro change or headache, pro-bnp 1387 downtrending.   4/27: POD10. ICPs sustained 22-26 x 15 min, opened EVD. Finished course of zosyn for empiric pneumonia treatment.   4/28: POD11, Neuro stable, EVD clamped this morning, ICPs hovering higher than 20, neuro exam remains stable, denies HA, will monitor based on clinical/neuro status during clamp trial. Plan for CTH Sunday if tolerating clamp trial. F/u neurology regarding entacapone dosing. Viral PCR swab sent of herpetic lesions L cheek, f/u results.   4/29: POD12, SIRIA overnight EVD remains clamped, ICPs intermittently elevated but patient remains neuro stable and without headaches. Pending repeat CTH sunday.     Vital Signs Last 24 Hrs  T(C): 37.9 (28 Apr 2023 21:01), Max: 37.9 (28 Apr 2023 21:01)  T(F): 100.2 (28 Apr 2023 21:01), Max: 100.2 (28 Apr 2023 21:01)  HR: 71 (29 Apr 2023 00:00) (51 - 86)  BP: 112/73 (29 Apr 2023 00:00) (109/64 - 171/104)  BP(mean): 86 (29 Apr 2023 00:00) (80 - 134)  RR: 20 (29 Apr 2023 00:00) (12 - 22)  SpO2: 98% (29 Apr 2023 00:00) (90% - 100%)    Parameters below as of 29 Apr 2023 00:00  Patient On (Oxygen Delivery Method): room air        I&O's Summary    27 Apr 2023 07:01  -  28 Apr 2023 07:00  --------------------------------------------------------  IN: 1145 mL / OUT: 2354 mL / NET: -1209 mL    28 Apr 2023 07:01  -  29 Apr 2023 00:53  --------------------------------------------------------  IN: 950 mL / OUT: 2211 mL / NET: -1261 mL        PHYSICAL EXAM:  General: NAD, pt is comfortably sitting up in bed, on room air  HEENT: CN II-XII grossly intact, PERRL 3mm briskly reactive, EOMI b/l, face symmetric, tongue midline, neck FROM  Cardiovascular: RRR, normal S1 and S2   Respiratory: lungs CTAB, no wheezing, rhonchi, or crackles   GI: normoactive BS to auscultation, abd soft, NTND   Neuro: A&Ox2-3 (with choices), +word finding difficulty/perseveration, no dysmetria, no pronator drift. Follows commands.  RAMIREZ x4 spontaneously, RUE 4+/5, LUE 5/5, RLE 4+/5. LLE 5/5. SILT throughout. +rigidity and mild intention tremors in upper extremities.    Extremities: warm and well perfused   Wound/incision: +left craniotomy incision with sutures in place, C/D/I  Drain: +EVD open at [id number].      TUBES/LINES:  [] Matthew  [] Lumbar Drain  [] Wound Drains  [x] Others - EVD       DIET:  [] NPO  [x] Mechanical  [] Tube feedsLABS:                        14.2   9.65  )-----------( 185      ( 28 Apr 2023 05:43 )             43.4     04-28    139  |  105  |  24<H>  ----------------------------<  114<H>  4.2   |  26  |  1.29    Ca    8.9      28 Apr 2023 05:43  Phos  3.6     04-28  Mg     2.3     04-28              CAPILLARY BLOOD GLUCOSE          Drug Levels: [] N/A    CSF Analysis: [] N/A      Allergies    No Known Allergies    Intolerances      MEDICATIONS:  Antibiotics:  valACYclovir 1000 milliGRAM(s) Oral every 12 hours    Neuro:  acetaminophen   Oral Liquid .. 650 milliGRAM(s) Oral every 6 hours PRN  carbidopa/levodopa  25/250 1 Tablet(s) Oral <User Schedule>  entacapone 200 milliGRAM(s) Oral <User Schedule>  levETIRAcetam 1000 milliGRAM(s) Oral two times a day  ondansetron Injectable 4 milliGRAM(s) IV Push every 6 hours PRN    Anticoagulation:  enoxaparin Injectable 40 milliGRAM(s) SubCutaneous <User Schedule>    OTHER:  acetylcysteine 20%  Inhalation 4 milliLiter(s) Inhalation every 6 hours PRN  albuterol/ipratropium for Nebulization 3 milliLiter(s) Nebulizer every 6 hours PRN  amLODIPine   Tablet 10 milliGRAM(s) Oral daily  artificial  tears Solution 1 Drop(s) Both EYES every 4 hours  chlorhexidine 2% Cloths 1 Application(s) Topical daily  dexAMETHasone  Injectable   IV Push   dexAMETHasone  Injectable 2 milliGRAM(s) IV Push every 12 hours  pantoprazole    Tablet 40 milliGRAM(s) Oral before breakfast  senna 2 Tablet(s) Oral at bedtime    IVF:  sodium chloride 3 Gram(s) Oral every 6 hours    CULTURES:  Culture Results:   Normal Respiratory Blank present (04-22 @ 06:20)    RADIOLOGY & ADDITIONAL TESTS:      ASSESSMENT:  68M hx seizures, HTN, poorly controlled Parkinson's disease w/ prog inability to walk since Nov 2022. Found to have large L frontal dural based lesion c/f meningioma w/ associated mass effect and edema. S/p L crani for meningioma resection (4/17). Discharged to [facility] on 4/21. On arrival to rehab c/o headache with persistent nausea/vomiting. CTH performed reveals new large left frontal IPH with IVH, with edema and midline shift. Intubated for airway protection. Readmitted to [facility] for further management, NIHSS 23, ICH 3.    INTRACRANIAL HEMORRHAGE    D32.9    Handoff    Parkinsons    HTN (hypertension)    History of seizures    Brain mass    Impaired gait    Meningioma    History of insomnia    Intracerebral hemorrhage    Intracerebral hemorrhage    No significant past surgical history    Room Service Assist    [email or web address]        PLAN:  NEURO:  - neuro/vitals q1hr  - EVD @10cm H2O, monitor ICP, output, failed clamped trial 4/27, reclamped 4/28  - Keppra 1g BID (hx seizures)  - Decadron 4mg q6h taper 1 week to off   - Parkinsons: continue home sinemet (25/250mg) 4 times/day, entacapone 200mg q8h  - CTH 4/24: no significant interval change, CTH 4/26: no significant interval change  - Hold home gabapentin 400mg 4 times/day     CARDIOVASCULAR:  - -160  - Amlodipine 10 mg qd  - TTE 4/22 mild aortic sclerosis w/ no stenosis, mild AR, left pleural effusion EF 71%    PULMONARY:  - nebs q6h prn, chest PT  - RA  - secretions improving    RENAL:  - Na goal 135-145, salt tabs 3 q 6  - voiding   - electrolyte repletion PRN     GI:  - Soft and bite sized diet   - Bowel regimen, last BM 4/28   - Protonix 40mg qd for GI ppx while on steroids    HEME:  - SCDs, SQL for DVT ppx   - LE dopplers 4/22 negative   - Anti xa 0.27 4/26    ID:  - Zosyn (4/22-4/27) - emipiric PNA, RLL infiltrate     ENDO:  - Afebrile, resolved leukocytosis   - ISS, A1c 5.8    DISPO:  - NSICU, full code, family updated  - PT/OT rec AR: patient can tolerate three hours PT/OT daily    Discussed with Dr. D'Amico and Dr. Belcher     Assessment:  Present when checked    []  GCS  E   V  M     Heart Failure: []Acute, [] acute on chronic , []chronic  Heart Failure:  [] Diastolic (HFpEF), [] Systolic (HFrEF), []Combined (HFpEF and HFrEF), [] RHF, [] Pulm HTN, [] Other    [] NINFA, [] ATN, [] AIN, [] other  [] CKD1, [] CKD2, [] CKD 3, [] CKD 4, [] CKD 5, []ESRD    Encephalopathy: [] Metabolic, [] Hepatic, [] toxic, [] Neurological, [] Other    Abnormal Nurtitional Status: [] malnurtition (see nutrition note), [ ]underweight: BMI < 19, [] morbid obesity: BMI >40, [] Cachexia    [] Sepsis  [] hypovolemic shock,[] cardiogenic shock, [] hemorrhagic shock, [] neuogenic shock  [] Acute Respiratory Failure  []Cerebral edema, [] Brain compression/ herniation,   [] Functional quadriplegia  [] Acute blood loss anemia

## 2023-04-29 NOTE — PROGRESS NOTE ADULT - ASSESSMENT
ASSESSMENT AND PLAN  L. frontal ICH-3 with IVH, suspected venous bleed; BD 7  History of elective L. crani for meningioma resection (4/17/23, Dr. D'Amico)  History of seizures, Parkinson's disease, multiple falls and wheelchair bound  Abdominal distension    NEURO:  Q1h neurochecks  Continue Keppra 1000mg bid (seizure history)  Decadron with taper per NSGY  R. frontal EVD clamped 4/28; ICPs ~20s however patient neurologically at baseline and asymptomatic. CT head *  MRI at some point  Continue anti-Parkinson's meds: Sinemet, entacapone (may need to increase entacapone to Q6-D/W neurology)  Activity: PT/OT as tolerated.     PULM:   Currently on room air  proBNP 1400->1300  SpO2 goal > 92%  Chest PT as tolerated, nebs prn    CARDIAC:   BP goal 100- 160  Continue Amlodipine (increase to 10mg daily)  TTE: EF 71%. Mild AR    GI:  Diet: Soft and bite sized diet  Stress ulcer prophylaxis: PPI while on steroids  LBM: 4/28    /RENAL:  Sodium goal 140-145. Continue salt tabs   Trend BMPs daily  Monitor Is/Os    HEME:  Maintain Hb > 7.0, PLT > 100,000  SCDs, SQL  AntiXa 4/26 0.27. Continue daily dose.  LE dopplers 4/22 negative for DVT    ID:  S/P: Piperacillin tazobactam for empiric aspiration PNA coverage 4/27  Follow up sputum culture- normal blank  Monitor temp curve, WBC  Valtrex PO bid x 7 days- VZV/HSV swab    ENDO:  Goal euglycemia    MISC:    SOCIAL/FAMILY:  [] awaiting [x] updated son Kenneth at bedside [] family meeting    CODE STATUS:  [x] Full Code [] DNR [] DNI [] Palliative/Comfort Care    DISPOSITION:  [x] ICU [] Stroke Unit [] Floor [] EMU [] RCU [] PCU    Time spent: 60 critical care minutes     ASSESSMENT AND PLAN  L. frontal ICH-3 with IVH, suspected venous bleed; BD 8  History of elective L. crani for meningioma resection (4/17/23, Dr. D'Amico)  History of seizures, Parkinson's disease, multiple falls and wheelchair bound  Abdominal distension    NEURO:  Q1h neurochecks  Continue Keppra 1000mg bid (seizure history)  Decadron with taper   R. frontal EVD clamped 4/28; ICPs ~20s however patient neurologically at baseline and asymptomatic. CT head 4/29 stable edema with MLS.  MRI at some point  Continue anti-Parkinson's meds: Sinemet, entacapone (may need to increase entacapone to Q6-D/W neurology)  Activity: PT/OT as tolerated.     PULM:   Currently on room air  SpO2 goal > 92%  Chest PT as tolerated, nebs prn    CARDIAC:   BP goal 100- 160  Continue Amlodipine (increase to 10mg daily)  TTE: EF 71%. Mild AR    GI:  Diet: Soft and bite sized diet  Stress ulcer prophylaxis: PPI while on steroids  LBM: 4/29    /RENAL: Mildly prerenal  Sodium goal 140-145. Continue salt tabs   NS at 100cc/hr. Trend BMPs daily  Monitor Is/Os    HEME:  Maintain Hb > 7.0, PLT > 100,000  SCDs, SQL  AntiXa 4/26 0.27. Continue daily dose.  LE dopplers 4/22 negative for DVT    ID:  S/P: Piperacillin tazobactam for empiric aspiration PNA coverage 4/27  Follow up sputum culture- normal blank  Monitor temp curve, WBC  Valtrex PO bid x 7 days- VZV/HSV swab results pending    ENDO:  Goal euglycemia    MISC:    SOCIAL/FAMILY:  [] awaiting [x] updated son Kenneth at bedside [] family meeting    CODE STATUS:  [x] Full Code [] DNR [] DNI [] Palliative/Comfort Care    DISPOSITION:  [x] ICU [] Stroke Unit [] Floor [] EMU [] RCU [] PCU    Time spent: 60 critical care minutes

## 2023-04-29 NOTE — PROGRESS NOTE ADULT - SUBJECTIVE AND OBJECTIVE BOX
==========================  ADULT NSICU PROGRESS NOTE  ==========================  HPI:  Patient is a 68 year old male with PMH of seizures, HTN, and Parkinson's disease diagnosed in 2012 with multiple falls who initially presented to [facility] on 4/17 for elective left craniotomy for meningioma resection. He has also been wheelchair bound since about 2-3 years ago and has frequent falls related to his Parkinson's disease.  Underwent L crani for meningioma resection on 4/17. Hospital course was uncomplicated.   Discharged to [facility] on 4/21 in stable condition.     On arrival to rehab, pt c/o incisional headache with episodes of n/v during ambulance ride to rehab.  Pt given zofran and oxycodone, however, headache returned with persistent nausea and vomiting.   RRT called for worsened mental status. CT head obtained stat which showed new large left frontal hemorrhage with IVH, cerebral edema with midline shift and some effacement of L frontal horn.  Pt started on cardene, given Keppra and transferred to [facility]. Prior to transfer, mental status declined (GCS 13-> 9) with increased secretions, therefore attempted intubation; LMA airway placed.      On admission, the patient was:  GCS:  Chavez-Hou:  modified Garcia:  ICH score: 3  NIHSS: 23        ICU Vital Signs Last 24 Hrs  T(C): 37.6 (29 Apr 2023 05:47), Max: 37.9 (28 Apr 2023 21:01)  T(F): 99.6 (29 Apr 2023 05:47), Max: 100.2 (28 Apr 2023 21:01)  HR: 67 (29 Apr 2023 07:00) (52 - 86)  BP: 122/65 (29 Apr 2023 07:00) (109/64 - 143/73)  BP(mean): 87 (29 Apr 2023 07:00) (80 - 104)  RR: 16 (29 Apr 2023 07:00) (12 - 22)  SpO2: 100% (29 Apr 2023 07:00) (90% - 100%)      04-28-23 @ 07:01  -  04-29-23 @ 07:00  --------------------------------------------------------  IN: 1050 mL / OUT: 2861 mL / NET: -1811 mL          EXAM:   General: Normocephalic, EVD, laying in bed supine, no distress  Neuro:    MS: Awake, alert, oriented x 2- 3    CN: PERRL 3mm and reactive, gaze is conjugate, face symmetric    Mot: Power 5/5 in all extremities  Chest: Lungs clear  Heart: Regular rate/rhythm, S1/S2  Abdomen: Soft and nontender. Non distended  Skin: Vesicular appearing lesions on L face  Extremities: No edema      MEDICATIONS:   acetaminophen   Oral Liquid .. 650 milliGRAM(s) Oral every 6 hours PRN  acetylcysteine 20%  Inhalation 4 milliLiter(s) Inhalation every 6 hours PRN  albuterol/ipratropium for Nebulization 3 milliLiter(s) Nebulizer every 6 hours PRN  amLODIPine   Tablet 10 milliGRAM(s) Oral daily  artificial  tears Solution 1 Drop(s) Both EYES every 4 hours  carbidopa/levodopa  25/250 1 Tablet(s) Oral <User Schedule>  chlorhexidine 2% Cloths 1 Application(s) Topical daily  dexAMETHasone  Injectable   IV Push   enoxaparin Injectable 40 milliGRAM(s) SubCutaneous <User Schedule>  entacapone 200 milliGRAM(s) Oral <User Schedule>  levETIRAcetam 1000 milliGRAM(s) Oral two times a day  ondansetron Injectable 4 milliGRAM(s) IV Push every 6 hours PRN  pantoprazole    Tablet 40 milliGRAM(s) Oral before breakfast  senna 2 Tablet(s) Oral at bedtime  sodium chloride 3 Gram(s) Oral every 6 hours  valACYclovir 1000 milliGRAM(s) Oral every 12 hours    LABS:                      13.0   12.23 )-----------( 193      ( 29 Apr 2023 05:30 )             39.4     04-29    140  |  108  |  28<H>  ----------------------------<  117<H>  4.1   |  25  |  1.34<H>    Ca    8.2<L>      29 Apr 2023 05:30  Phos  3.5     04-29  Mg     2.3     04-29                           ==========================  ADULT NSICU PROGRESS NOTE  ==========================  HPI:  Patient is a 68 year old male with PMH of seizures, HTN, and Parkinson's disease diagnosed in 2012 with multiple falls who initially presented to [facility] on 4/17 for elective left craniotomy for meningioma resection. He has also been wheelchair bound since about 2-3 years ago and has frequent falls related to his Parkinson's disease.  Underwent L crani for meningioma resection on 4/17. Hospital course was uncomplicated.   Discharged to [facility] on 4/21 in stable condition.     On arrival to rehab, pt c/o incisional headache with episodes of n/v during ambulance ride to rehab.  Pt given zofran and oxycodone, however, headache returned with persistent nausea and vomiting.   RRT called for worsened mental status. CT head obtained stat which showed new large left frontal hemorrhage with IVH, cerebral edema with midline shift and some effacement of L frontal horn.  Pt started on cardene, given Keppra and transferred to [facility]. Prior to transfer, mental status declined (GCS 13-> 9) with increased secretions, therefore attempted intubation; LMA airway placed.      On admission, the patient was:  GCS:  Chavez-Hou:  modified Garcia:  ICH score: 3  NIHSS: 23      ICU Vital Signs Last 24 Hrs  T(C): 37.6 (29 Apr 2023 05:47), Max: 37.9 (28 Apr 2023 21:01)  T(F): 99.6 (29 Apr 2023 05:47), Max: 100.2 (28 Apr 2023 21:01)  HR: 67 (29 Apr 2023 07:00) (52 - 86)  BP: 122/65 (29 Apr 2023 07:00) (109/64 - 143/73)  BP(mean): 87 (29 Apr 2023 07:00) (80 - 104)  RR: 16 (29 Apr 2023 07:00) (12 - 22)  SpO2: 100% (29 Apr 2023 07:00) (90% - 100%)      04-28-23 @ 07:01  -  04-29-23 @ 07:00  --------------------------------------------------------  IN: 1050 mL / OUT: 2861 mL / NET: -1811 mL      EXAM:   General: Normocephalic, EVD, laying in bed supine, no distress  Neuro:    MS: Awake, alert, oriented x 2- 3    CN: PERRL 3mm and reactive, gaze is conjugate, face symmetric    Mot: Power 5/5 in all extremities  Chest: Lungs clear  Heart: Regular rate/rhythm, S1/S2  Abdomen: Soft and nontender. Non distended  Skin: Vesicular appearing lesions on L face  Extremities: No edema      MEDICATIONS:   acetaminophen   Oral Liquid .. 650 milliGRAM(s) Oral every 6 hours PRN  acetylcysteine 20%  Inhalation 4 milliLiter(s) Inhalation every 6 hours PRN  albuterol/ipratropium for Nebulization 3 milliLiter(s) Nebulizer every 6 hours PRN  amLODIPine   Tablet 10 milliGRAM(s) Oral daily  artificial  tears Solution 1 Drop(s) Both EYES every 4 hours  carbidopa/levodopa  25/250 1 Tablet(s) Oral <User Schedule>  chlorhexidine 2% Cloths 1 Application(s) Topical daily  dexAMETHasone  Injectable   IV Push   enoxaparin Injectable 40 milliGRAM(s) SubCutaneous <User Schedule>  entacapone 200 milliGRAM(s) Oral <User Schedule>  levETIRAcetam 1000 milliGRAM(s) Oral two times a day  ondansetron Injectable 4 milliGRAM(s) IV Push every 6 hours PRN  pantoprazole    Tablet 40 milliGRAM(s) Oral before breakfast  senna 2 Tablet(s) Oral at bedtime  sodium chloride 3 Gram(s) Oral every 6 hours  valACYclovir 1000 milliGRAM(s) Oral every 12 hours    LABS:                      13.0   12.23 )-----------( 193      ( 29 Apr 2023 05:30 )             39.4     04-29    140  |  108  |  28<H>  ----------------------------<  117<H>  4.1   |  25  |  1.34<H>    Ca    8.2<L>      29 Apr 2023 05:30  Phos  3.5     04-29  Mg     2.3     04-29

## 2023-04-30 LAB
ANION GAP SERPL CALC-SCNC: 7 MMOL/L — SIGNIFICANT CHANGE UP (ref 5–17)
ANION GAP SERPL CALC-SCNC: 9 MMOL/L — SIGNIFICANT CHANGE UP (ref 5–17)
APPEARANCE CSF: SIGNIFICANT CHANGE UP
APPEARANCE SPUN FLD: SIGNIFICANT CHANGE UP
BUN SERPL-MCNC: 17 MG/DL — SIGNIFICANT CHANGE UP (ref 7–23)
BUN SERPL-MCNC: 19 MG/DL — SIGNIFICANT CHANGE UP (ref 7–23)
CALCIUM SERPL-MCNC: 8.1 MG/DL — LOW (ref 8.4–10.5)
CALCIUM SERPL-MCNC: 8.8 MG/DL — SIGNIFICANT CHANGE UP (ref 8.4–10.5)
CHLORIDE SERPL-SCNC: 108 MMOL/L — SIGNIFICANT CHANGE UP (ref 96–108)
CHLORIDE SERPL-SCNC: 109 MMOL/L — HIGH (ref 96–108)
CO2 SERPL-SCNC: 24 MMOL/L — SIGNIFICANT CHANGE UP (ref 22–31)
CO2 SERPL-SCNC: 26 MMOL/L — SIGNIFICANT CHANGE UP (ref 22–31)
COLOR CSF: ABNORMAL
CREAT SERPL-MCNC: 1.11 MG/DL — SIGNIFICANT CHANGE UP (ref 0.5–1.3)
CREAT SERPL-MCNC: 1.19 MG/DL — SIGNIFICANT CHANGE UP (ref 0.5–1.3)
CSF COMMENTS: SIGNIFICANT CHANGE UP
CSF PCR RESULT: SIGNIFICANT CHANGE UP
EGFR: 66 ML/MIN/1.73M2 — SIGNIFICANT CHANGE UP
EGFR: 71 ML/MIN/1.73M2 — SIGNIFICANT CHANGE UP
GLUCOSE CSF-MCNC: 43 MG/DL — SIGNIFICANT CHANGE UP (ref 40–70)
GLUCOSE SERPL-MCNC: 110 MG/DL — HIGH (ref 70–99)
GLUCOSE SERPL-MCNC: 144 MG/DL — HIGH (ref 70–99)
GRAM STN FLD: SIGNIFICANT CHANGE UP
HCT VFR BLD CALC: 40.2 % — SIGNIFICANT CHANGE UP (ref 39–50)
HGB BLD-MCNC: 13.3 G/DL — SIGNIFICANT CHANGE UP (ref 13–17)
MAGNESIUM SERPL-MCNC: 2.2 MG/DL — SIGNIFICANT CHANGE UP (ref 1.6–2.6)
MCHC RBC-ENTMCNC: 31.5 PG — SIGNIFICANT CHANGE UP (ref 27–34)
MCHC RBC-ENTMCNC: 33.1 GM/DL — SIGNIFICANT CHANGE UP (ref 32–36)
MCV RBC AUTO: 95.3 FL — SIGNIFICANT CHANGE UP (ref 80–100)
MONOS+MACROS NFR CSF: 5 % — LOW (ref 15–45)
NEUTROPHILS # CSF: 6 % — SIGNIFICANT CHANGE UP (ref 0–6)
NRBC # BLD: 0 /100 WBCS — SIGNIFICANT CHANGE UP (ref 0–0)
NRBC NFR CSF: 11 /UL — HIGH (ref 0–5)
PHOSPHATE SERPL-MCNC: 3 MG/DL — SIGNIFICANT CHANGE UP (ref 2.5–4.5)
PLATELET # BLD AUTO: 184 K/UL — SIGNIFICANT CHANGE UP (ref 150–400)
POTASSIUM SERPL-MCNC: 3.8 MMOL/L — SIGNIFICANT CHANGE UP (ref 3.5–5.3)
POTASSIUM SERPL-MCNC: 4 MMOL/L — SIGNIFICANT CHANGE UP (ref 3.5–5.3)
POTASSIUM SERPL-SCNC: 3.8 MMOL/L — SIGNIFICANT CHANGE UP (ref 3.5–5.3)
POTASSIUM SERPL-SCNC: 4 MMOL/L — SIGNIFICANT CHANGE UP (ref 3.5–5.3)
PROT CSF-MCNC: 53 MG/DL — HIGH (ref 15–45)
RBC # BLD: 4.22 M/UL — SIGNIFICANT CHANGE UP (ref 4.2–5.8)
RBC # CSF: HIGH /UL (ref 0–0)
RBC # FLD: 15.1 % — HIGH (ref 10.3–14.5)
SODIUM SERPL-SCNC: 141 MMOL/L — SIGNIFICANT CHANGE UP (ref 135–145)
SODIUM SERPL-SCNC: 142 MMOL/L — SIGNIFICANT CHANGE UP (ref 135–145)
SPECIMEN SOURCE: SIGNIFICANT CHANGE UP
TUBE TYPE: SIGNIFICANT CHANGE UP
WBC # BLD: 8.99 K/UL — SIGNIFICANT CHANGE UP (ref 3.8–10.5)
WBC # FLD AUTO: 8.99 K/UL — SIGNIFICANT CHANGE UP (ref 3.8–10.5)

## 2023-04-30 PROCEDURE — 99024 POSTOP FOLLOW-UP VISIT: CPT

## 2023-04-30 PROCEDURE — 36000 PLACE NEEDLE IN VEIN: CPT

## 2023-04-30 PROCEDURE — 76937 US GUIDE VASCULAR ACCESS: CPT | Mod: 26

## 2023-04-30 PROCEDURE — 99291 CRITICAL CARE FIRST HOUR: CPT

## 2023-04-30 RX ORDER — ENOXAPARIN SODIUM 100 MG/ML
40 INJECTION SUBCUTANEOUS ONCE
Refills: 0 | Status: COMPLETED | OUTPATIENT
Start: 2023-04-30 | End: 2023-04-30

## 2023-04-30 RX ORDER — POTASSIUM CHLORIDE 20 MEQ
20 PACKET (EA) ORAL ONCE
Refills: 0 | Status: COMPLETED | OUTPATIENT
Start: 2023-04-30 | End: 2023-04-30

## 2023-04-30 RX ORDER — SODIUM CHLORIDE 5 G/100ML
250 INJECTION, SOLUTION INTRAVENOUS ONCE
Refills: 0 | Status: COMPLETED | OUTPATIENT
Start: 2023-04-30 | End: 2023-04-30

## 2023-04-30 RX ADMIN — CARBIDOPA AND LEVODOPA 1 TABLET(S): 25; 100 TABLET ORAL at 15:06

## 2023-04-30 RX ADMIN — Medication 1 DROP(S): at 10:02

## 2023-04-30 RX ADMIN — ENTACAPONE 200 MILLIGRAM(S): 200 TABLET, FILM COATED ORAL at 15:06

## 2023-04-30 RX ADMIN — Medication 1 DROP(S): at 18:29

## 2023-04-30 RX ADMIN — LEVETIRACETAM 1000 MILLIGRAM(S): 250 TABLET, FILM COATED ORAL at 17:06

## 2023-04-30 RX ADMIN — Medication 2 MILLIGRAM(S): at 07:01

## 2023-04-30 RX ADMIN — VALACYCLOVIR 1000 MILLIGRAM(S): 500 TABLET, FILM COATED ORAL at 07:00

## 2023-04-30 RX ADMIN — CHLORHEXIDINE GLUCONATE 1 APPLICATION(S): 213 SOLUTION TOPICAL at 07:01

## 2023-04-30 RX ADMIN — SODIUM CHLORIDE 3 GRAM(S): 9 INJECTION INTRAMUSCULAR; INTRAVENOUS; SUBCUTANEOUS at 11:29

## 2023-04-30 RX ADMIN — Medication 20 MILLIEQUIVALENT(S): at 07:49

## 2023-04-30 RX ADMIN — SODIUM CHLORIDE 3 GRAM(S): 9 INJECTION INTRAMUSCULAR; INTRAVENOUS; SUBCUTANEOUS at 07:00

## 2023-04-30 RX ADMIN — Medication 1 DROP(S): at 14:04

## 2023-04-30 RX ADMIN — Medication 1 DROP(S): at 21:56

## 2023-04-30 RX ADMIN — SODIUM CHLORIDE 3 GRAM(S): 9 INJECTION INTRAMUSCULAR; INTRAVENOUS; SUBCUTANEOUS at 23:00

## 2023-04-30 RX ADMIN — ENOXAPARIN SODIUM 40 MILLIGRAM(S): 100 INJECTION SUBCUTANEOUS at 17:05

## 2023-04-30 RX ADMIN — SODIUM CHLORIDE 3 GRAM(S): 9 INJECTION INTRAMUSCULAR; INTRAVENOUS; SUBCUTANEOUS at 17:06

## 2023-04-30 RX ADMIN — Medication 1 DROP(S): at 02:06

## 2023-04-30 RX ADMIN — PANTOPRAZOLE SODIUM 40 MILLIGRAM(S): 20 TABLET, DELAYED RELEASE ORAL at 07:00

## 2023-04-30 RX ADMIN — CARBIDOPA AND LEVODOPA 1 TABLET(S): 25; 100 TABLET ORAL at 07:01

## 2023-04-30 RX ADMIN — LEVETIRACETAM 1000 MILLIGRAM(S): 250 TABLET, FILM COATED ORAL at 07:00

## 2023-04-30 RX ADMIN — SODIUM CHLORIDE 750 MILLILITER(S): 5 INJECTION, SOLUTION INTRAVENOUS at 14:25

## 2023-04-30 RX ADMIN — Medication 1 DROP(S): at 07:01

## 2023-04-30 RX ADMIN — ENTACAPONE 200 MILLIGRAM(S): 200 TABLET, FILM COATED ORAL at 07:00

## 2023-04-30 RX ADMIN — ENTACAPONE 200 MILLIGRAM(S): 200 TABLET, FILM COATED ORAL at 11:29

## 2023-04-30 RX ADMIN — AMLODIPINE BESYLATE 10 MILLIGRAM(S): 2.5 TABLET ORAL at 07:00

## 2023-04-30 RX ADMIN — VALACYCLOVIR 1000 MILLIGRAM(S): 500 TABLET, FILM COATED ORAL at 17:07

## 2023-04-30 RX ADMIN — CARBIDOPA AND LEVODOPA 1 TABLET(S): 25; 100 TABLET ORAL at 19:05

## 2023-04-30 RX ADMIN — CARBIDOPA AND LEVODOPA 1 TABLET(S): 25; 100 TABLET ORAL at 11:29

## 2023-04-30 NOTE — PROVIDER CONTACT NOTE (OTHER) - REASON
ICP > 20
Pt UO 30 mL for the hour & pupils were unequal per pupilometer
SBP > 160
ICP > 20
Neuro status fluctuating

## 2023-04-30 NOTE — PROCEDURE NOTE - NSPROCDETAILS_GEN_ALL_CORE
patient pre-oxygenated, tube inserted, placement confirmed
location identified, draped/prepped, sterile technique used, needle inserted/introduced/positive blood return obtained via catheter/connected to a pressurized flush line/sutured in place/Seldinger technique
US guidance/location identified, draped/prepped, sterile technique used/blood seen on insertion/dressing applied/flushes easily/secured in place/sterile technique, catheter placed

## 2023-04-30 NOTE — PROVIDER CONTACT NOTE (OTHER) - ASSESSMENT
9 am neuro assessment: no drifts on all four extremities, pupils equal and reactive, a/o x3 disoriented to time. But then at 11 patient answers neither orientation question correctly, has some effort against gravity on lowers, and no drifts on uppers. Hourly exam fluctuates
Patient AOx2, with no drifts on room air. Upright in bed. Patient denies pain at this time. Patient denies Nausea. Noted tremors secondary to Parkinson diagnosis. Vitals WDL. Urine Output WDL.
VSS within parameters.
Pt neuro status WDL, no complaints of pain or SOB.
Pt neuro status remains at baseline. A&Ox2, following commands, no drifts. Pupils 3 & brisk.

## 2023-04-30 NOTE — PROGRESS NOTE ADULT - SUBJECTIVE AND OBJECTIVE BOX
HPI:  Patient is a 68 year old male with PMH of seizures, HTN, and Parkinson's disease diagnosed in 2012 with multiple falls who initially presented to [facility] on 4/17 for elective left craniotomy for meningioma resection. He has also been wheelchair bound since about 2-3 years ago and has frequent falls related to his Parkinson's disease.  Underwent L crani for meningioma resection on 4/17. Hospital course was uncomplicated.   Discharged to [facility] on 4/21 in stable condition.     On arrival to rehab, pt c/o incisional headache with episodes of n/v during ambulance ride to rehab.  Pt given zofran and oxycodone, however, headache returned with persistent nausea and vomiting.   RRT called for worsened mental status. CT head obtained stat which showed new large left frontal hemorrhage with IVH, cerebral edema with midline shift and some effacement of L frontal horn.  Pt started on cardene, given Keppra and transferred to [facility]. Prior to transfer, mental status declined (GCS 13-> 9) with increased secretions, therefore attempted intubation; LMA airway placed.        Hospital Course:   4/17: POD 0 L crani for tumor resection.   4/18: POD1 L crani tumor resection. Norvasc increased to 5mg daily. Neuro exam stable. Neurology consulted, reocmmended maintaining current parkinson's medication regimen. Had asymptomatic episode of VTACH read on telemetry, hemodynamically stable, returned back to baseline, EKG obtained negative. Cardiology consulted, reported rhythm strip was sinus, likely artifact from tremors in arms, NTD. Pt c/o worsened difficulty breathing, satting well on RA. Given duoneb treatment and CXR completed.  Postop MRI completed in evening.   4/19: POD2, SIRIA overnight,  neuro stable, SLP state may benefit from outpt speech therapy will continue to follow  4/20: POD3, SIRIA overnight. per s/s: soft/bite size diet w/ thin liquids.  4/21: POD4, SIRIA overnight, discahrge Kiran Evans AR    DISCHARGED TO [place] on 4/21:    4/22: readmitted with new large left frontal IPH with IVH, with edema and midline shift with large left frontal IPH. EVD placed, opened at 10. CTH post EVD complete. Towns sump placed for GI decompression. Started 3%@80 for na goal 145-150. KUB showing small loops of gas, will start TF tomorrow. TTE mild aortic sclerosis w/ no stenosis, mild AR, left pleural effusion EF 71%. LE dopplers negative.  4/23: POD 6. EVD @ 10. POCUS with RLL consolidation, trace B lines, L lung clear, collapsable IVC. 250cc albumin and 500cc NS bolus x2 in setting of decreasing UOP. Started on SQL 40 BID, weight based. Off propofol, on CPAP 8/5, plan to extubate. Removed matthew, f/u TOV. Removed a-line. Extubated to HFNC. Decreased 3% to 30. Passed TOV.   4/24: POD 7. Tolerating HFNC. Start TF today. Na 155, stopped 3%, fentanyl d/c'd, CT stable. Trickle feeds started. PT/OT recommending AR. C/o headache, given tramadol  with resolution. S&S recommend pureed diet with thin liquids. Weaning to 6L NC. Na 155-->144, gave 225cc 3% bolus, Na-->147.   4/25: POD 8. Cont 3% at 30. EVD remains at 10. Tolerating PO. Given hydralazine 10mg x 1 for SBP 180s. Bradycardic to 53 resolved on own. Dc'd 3%, started salt tabs 2q8. Sodium goal 140-145. Sputum culture from 4/22: normal respiratory blank. Repeat 145. F/u AM sodium. ProBNP 1492. Pt removed NGT. Tolerating pureed diet.   4/26: POD 9. EVD @ 10. Nebs made PRN, amlodipine increased to 10mg. Rec'd for soft and bite size diet, CTH today with no significant interval change. EVD clamp trial ICPS hovering around 18, will monitor clinically and open if neuro change or headache, pro-bnp 1387 downtrending.   4/27: POD10. ICPs sustained 22-26 x 15 min, opened EVD. Finished course of zosyn for empiric pneumonia treatment.   4/28: POD11, Neuro stable, EVD clamped this morning, ICPs hovering higher than 20, neuro exam remains stable, denies HA, will monitor based on clinical/neuro status during clamp trial. Plan for CTH Sunday if tolerating clamp trial. F/u neurology regarding entacapone dosing. Viral PCR swab sent of herpetic lesions L cheek, f/u results.   4/29: POD12, SIRIA overnight EVD remains clamped, ICPs intermittently elevated but patient remains neuro stable and without headaches. NS at 100 started for rising Cr level. repeat CTH when clamped x 24 hrs in AM with stable MLS and mass effect. NS lowered to 75 for positive fluid status. 3 BMs today, bowel regimen dc'd.   4/30: POD13. SIRIA overnight, EVD remains clamped, ICPs <20, neuro stable, no headaches. Cont valtrex x7d. F/u viral lesion swab results.       Vital Signs Last 24 Hrs  T(C): 37.2 (30 Apr 2023 01:15), Max: 37.7 (29 Apr 2023 09:30)  T(F): 98.9 (30 Apr 2023 01:15), Max: 99.8 (29 Apr 2023 09:30)  HR: 66 (30 Apr 2023 01:00) (58 - 76)  BP: 162/82 (30 Apr 2023 01:00) (106/55 - 162/82)  BP(mean): 114 (30 Apr 2023 01:00) (68 - 114)  RR: 18 (30 Apr 2023 01:00) (14 - 21)  SpO2: 100% (30 Apr 2023 01:00) (95% - 100%)    Parameters below as of 30 Apr 2023 01:00  Patient On (Oxygen Delivery Method): room air        I&O's Detail    28 Apr 2023 07:01  -  29 Apr 2023 07:00  --------------------------------------------------------  IN:    Oral Fluid: 1050 mL  Total IN: 1050 mL    OUT:    External Ventricular Device (mL): 11 mL    Incontinent per Condom Catheter (mL): 2850 mL  Total OUT: 2861 mL    Total NET: -1811 mL      29 Apr 2023 07:01  -  30 Apr 2023 02:19  --------------------------------------------------------  IN:    Oral Fluid: 1200 mL    sodium chloride 0.9%: 225 mL    sodium chloride 0.9%: 700 mL    sodium chloride 0.9%: 600 mL  Total IN: 2725 mL    OUT:    External Ventricular Device (mL): 0 mL    Voided (mL): 2600 mL  Total OUT: 2600 mL    Total NET: 125 mL        I&O's Summary    28 Apr 2023 07:01  -  29 Apr 2023 07:00  --------------------------------------------------------  IN: 1050 mL / OUT: 2861 mL / NET: -1811 mL    29 Apr 2023 07:01  -  30 Apr 2023 02:19  --------------------------------------------------------  IN: 2725 mL / OUT: 2600 mL / NET: 125 mL        PHYSICAL EXAM:  General: NAD, pt is comfortably sitting up in bed, A&O x3 (with choices), on RA  HEENT: PERRL 3mm, EOMI b/l, face symmetric, tongue midline, neck FROM, + L facial twitching (h/o parkinsons), L cheek vesicular lesions/rash   Cardiovascular: RRR, normal S1 and S2   Respiratory: lungs CTAB, no wheezing, rhonchi, or crackles   GI: normoactive BS to auscultation, abd soft, NTND   Neuro: +b/l LUE > RUE tremors, +word finding difficulty/perseveration, no dysmetria, no pronator drift  RUE 4+/5, LUE 5/5, RLE 4+/5. LLE 5/5  sensation intact to light touch throughout   Extremities: distal pulses 2+ x4   Wound/incision: +left craniotomy incision with sutures in place, C/D/I  Drain: EVD @[id number], clamped     TUBES/LINES:  [] CVC  [] A-line  [] Lumbar Drain  [X] Ventriculostomy  [] Other    DIET:  [] NPO  [X] Mechanical  [] Tube feeds    LABS:          CAPILLARY BLOOD GLUCOSE          Drug Levels: [] N/A    CSF Analysis: [] N/A      Allergies    No Known Allergies    Intolerances      MEDICATIONS:  Antibiotics:  valACYclovir 1000 milliGRAM(s) Oral every 12 hours    Neuro:  acetaminophen   Oral Liquid .. 650 milliGRAM(s) Oral every 6 hours PRN  carbidopa/levodopa  25/250 1 Tablet(s) Oral <User Schedule>  entacapone 200 milliGRAM(s) Oral <User Schedule>  levETIRAcetam 1000 milliGRAM(s) Oral two times a day  ondansetron Injectable 4 milliGRAM(s) IV Push every 6 hours PRN    Anticoagulation:  enoxaparin Injectable 40 milliGRAM(s) SubCutaneous <User Schedule>    OTHER:  acetylcysteine 20%  Inhalation 4 milliLiter(s) Inhalation every 6 hours PRN  albuterol/ipratropium for Nebulization 3 milliLiter(s) Nebulizer every 6 hours PRN  amLODIPine   Tablet 10 milliGRAM(s) Oral daily  artificial  tears Solution 1 Drop(s) Both EYES every 4 hours  chlorhexidine 2% Cloths 1 Application(s) Topical daily  dexAMETHasone  Injectable 2 milliGRAM(s) IV Push every 24 hours  dexAMETHasone  Injectable   IV Push   pantoprazole    Tablet 40 milliGRAM(s) Oral before breakfast    IVF:  sodium chloride 3 Gram(s) Oral every 6 hours  sodium chloride 0.9%. 1000 milliLiter(s) IV Continuous <Continuous>    CULTURES:  Culture Results:   Normal Respiratory Blank present (04-22 @ 06:20)    RADIOLOGY & ADDITIONAL TESTS:      ASSESSMENT:  68M hx seizures, HTN, poorly controlled Parkinson's disease w/ prog inability to walk since Nov 2022. Found to have large L frontal dural based lesion c/f meningioma w/ associated mass effect and edema. S/p L crani for meningioma resection (4/17). Discharged to [facility] on 4/21. On arrival to rehab c/o headache with persistent nausea/vomiting. CTH performed reveals new large left frontal IPH with IVH, with edema and midline shift. Intubated for airway protection. Readmitted to [facility] for further management, NIHSS 23, ICH 3. s/p bedside right frontal EVD 4/22.      INTRACRANIAL HEMORRHAGE    D32.9    Handoff    Parkinsons    HTN (hypertension)    History of seizures    Brain mass    Impaired gait    Meningioma    History of insomnia    Intracerebral hemorrhage    Intracerebral hemorrhage    No significant past surgical history    Room Service Assist    [email or web address]        PLAN:  NEURO:  - neuro/vitals q1hr  - EVD clamped, monitor ICP, output, failed clamped trial 4/27, reclamped 4/28  - Keppra 1g BID (hx seizures)  - Decadron taper 1 week to off   - Parkinsons: continue home sinemet (25/250mg) 4 times/day, entacapone 200mg q8h  - CTH 4/24: no significant interval change, CTH 4/26: stable , 4/29 stable  - Hold home gabapentin 400mg 4 times/day     CARDIOVASCULAR:  - -160  - Amlodipine 10 mg qd  - TTE 4/22 mild aortic sclerosis w/ no stenosis, mild AR, left pleural effusion EF 71%    PULMONARY:  - nebs q6h prn, chest PT  - RA  - secretions improving    RENAL:  - Na goal 135-145, salt tabs 3 q 6  - NS at 100 for uptrending Cr  - voiding   - electrolyte repletion PRN     GI:  - Soft and bite sized diet   - Bowel regimen dc'd, last BM 4/29  - Protonix 40mg qd for GI ppx while on steroids    HEME:  - SCDs, SQL for DVT ppx   - LE dopplers 4/22 negative   - Anti xa 0.27 4/26    ID:  - Zosyn (4/22-4/27) - emipiric PNA, RLL infiltrate   - Valtrex 4/28-5/1 for herpetic lesions L cheek; cx +HSV-1     ENDO:  - Afebrile, resolved leukocytosis   - ISS, A1c 5.8    DISPO:  - NSICU, full code, family updated  - PT/OT rec AR: patient can tolerate three hours PT/OT daily    Discussed with Dr. D'Amico and Dr. Grimaldo

## 2023-04-30 NOTE — PROGRESS NOTE ADULT - SUBJECTIVE AND OBJECTIVE BOX
==========================  ADULT NSICU PROGRESS NOTE  ==========================  HPI:  Patient is a 68 year old male with PMH of seizures, HTN, and Parkinson's disease diagnosed in 2012 with multiple falls who initially presented to [facility] on 4/17 for elective left craniotomy for meningioma resection. He has also been wheelchair bound since about 2-3 years ago and has frequent falls related to his Parkinson's disease.  Underwent L crani for meningioma resection on 4/17. Hospital course was uncomplicated.   Discharged to [facility] on 4/21 in stable condition.     On arrival to rehab, pt c/o incisional headache with episodes of n/v during ambulance ride to rehab.  Pt given zofran and oxycodone, however, headache returned with persistent nausea and vomiting.   RRT called for worsened mental status. CT head obtained stat which showed new large left frontal hemorrhage with IVH, cerebral edema with midline shift and some effacement of L frontal horn.  Pt started on cardene, given Keppra and transferred to [facility]. Prior to transfer, mental status declined (GCS 13-> 9) with increased secretions, therefore attempted intubation; LMA airway placed.      On admission, the patient was:  GCS:  Chavez-Hou:  modified Garcia:  ICH score: 3  NIHSS: 23      ICU Vital Signs Last 24 Hrs  T(C): 37.3 (30 Apr 2023 05:53), Max: 37.7 (29 Apr 2023 09:30)  T(F): 99.1 (30 Apr 2023 05:53), Max: 99.8 (29 Apr 2023 09:30)  HR: 53 (30 Apr 2023 07:00) (52 - 76)  BP: 161/81 (30 Apr 2023 07:00) (106/55 - 162/82)  BP(mean): 114 (30 Apr 2023 07:00) (68 - 114)  RR: 17 (30 Apr 2023 07:00) (14 - 21)  SpO2: 100% (30 Apr 2023 07:00) (95% - 100%)      04-29-23 @ 07:01  -  04-30-23 @ 07:00  --------------------------------------------------------  IN: 3275 mL / OUT: 4525 mL / NET: -1250 mL    04-30-23 @ 07:01  -  04-30-23 @ 07:46  --------------------------------------------------------  IN: 75 mL / OUT: 0 mL / NET: 75 mL        EXAM:   General: Normocephalic, atraumatic. Masked facies  HEENT: EVD site C/D/I  Neuro:    MS: Awake, alert, oriented x 3    CN: PERRL 3mm and reactive, gaze is conjugate, face symmetric    Mot: Power 5/5 in uppers, 4/5 lowers. Tremulous secondary to Parkinson's disease  Chest: Lungs clear  Heart: Regular rate/rhythm, S1/S2  Abdomen: Soft and nontender. Non distended  Skin: Vesicular appearing lesions on L face  Extremities: No edema      MEDICATIONS:   acetaminophen   Oral Liquid .. 650 milliGRAM(s) Oral every 6 hours PRN  acetylcysteine 20%  Inhalation 4 milliLiter(s) Inhalation every 6 hours PRN  albuterol/ipratropium for Nebulization 3 milliLiter(s) Nebulizer every 6 hours PRN  amLODIPine   Tablet 10 milliGRAM(s) Oral daily  artificial  tears Solution 1 Drop(s) Both EYES every 4 hours  carbidopa/levodopa  25/250 1 Tablet(s) Oral <User Schedule>  chlorhexidine 2% Cloths 1 Application(s) Topical daily  enoxaparin Injectable 40 milliGRAM(s) SubCutaneous <User Schedule>  entacapone 200 milliGRAM(s) Oral <User Schedule>  levETIRAcetam 1000 milliGRAM(s) Oral two times a day  ondansetron Injectable 4 milliGRAM(s) IV Push every 6 hours PRN  pantoprazole    Tablet 40 milliGRAM(s) Oral before breakfast  potassium chloride   Powder 20 milliEquivalent(s) Oral once  sodium chloride 3 Gram(s) Oral every 6 hours  sodium chloride 0.9%. 1000 milliLiter(s) (75 mL/Hr) IV Continuous <Continuous>  valACYclovir 1000 milliGRAM(s) Oral every 12 hours      LABS:                      13.3   8.99  )-----------( 184      ( 30 Apr 2023 04:55 )             40.2     04-30    141  |  108  |  19  ----------------------------<  110<H>  3.8   |  26  |  1.11    Ca    8.1<L>      30 Apr 2023 04:55  Phos  3.0     04-30  Mg     2.2     04-30                             ==========================  ADULT NSICU PROGRESS NOTE  ==========================  HPI:  Patient is a 68 year old male with PMH of seizures, HTN, and Parkinson's disease diagnosed in 2012 with multiple falls who initially presented to [facility] on 4/17 for elective left craniotomy for meningioma resection. He has also been wheelchair bound since about 2-3 years ago and has frequent falls related to his Parkinson's disease.  Underwent L crani for meningioma resection on 4/17. Hospital course was uncomplicated.   Discharged to [facility] on 4/21 in stable condition.     On arrival to rehab, pt c/o incisional headache with episodes of n/v during ambulance ride to rehab.  Pt given Zofran and oxycodone, however, headache returned with persistent nausea and vomiting.   RRT called for worsened mental status. CT head obtained stat which showed new large left frontal hemorrhage with IVH, cerebral edema with midline shift and some effacement of L frontal horn.  Pt started on cardene, given Keppra and transferred to [facility]. Prior to transfer, mental status declined (GCS 13-> 9) with increased secretions, therefore attempted intubation; LMA airway placed.      On admission, the patient was:  GCS:  Chavez-Hou:  modified Garcia:  ICH score: 3  NIHSS: 23      ICU Vital Signs Last 24 Hrs  T(C): 37.3 (30 Apr 2023 05:53), Max: 37.7 (29 Apr 2023 09:30)  T(F): 99.1 (30 Apr 2023 05:53), Max: 99.8 (29 Apr 2023 09:30)  HR: 53 (30 Apr 2023 07:00) (52 - 76)  BP: 161/81 (30 Apr 2023 07:00) (106/55 - 162/82)  BP(mean): 114 (30 Apr 2023 07:00) (68 - 114)  RR: 17 (30 Apr 2023 07:00) (14 - 21)  SpO2: 100% (30 Apr 2023 07:00) (95% - 100%)      04-29-23 @ 07:01  -  04-30-23 @ 07:00  --------------------------------------------------------  IN: 3275 mL / OUT: 4525 mL / NET: -1250 mL    04-30-23 @ 07:01  -  04-30-23 @ 07:46  --------------------------------------------------------  IN: 75 mL / OUT: 0 mL / NET: 75 mL      EXAM:   General: Normocephalic, atraumatic. Masked facies  HEENT: EVD site C/D/I  Neuro:    MS: Awake, alert, oriented x 3    CN: PERRL 3mm and reactive, gaze is conjugate, face symmetric    Mot: Power 5/5 in uppers, 4/5 lowers. Tremulous secondary to Parkinson's disease  Chest: Lungs clear  Heart: Regular rate/rhythm, S1/S2  Abdomen: Soft and nontender. Non distended  Skin: Vesicular appearing lesions on L face  Extremities: No edema      MEDICATIONS:   acetaminophen   Oral Liquid .. 650 milliGRAM(s) Oral every 6 hours PRN  acetylcysteine 20%  Inhalation 4 milliLiter(s) Inhalation every 6 hours PRN  albuterol/ipratropium for Nebulization 3 milliLiter(s) Nebulizer every 6 hours PRN  amLODIPine   Tablet 10 milliGRAM(s) Oral daily  artificial  tears Solution 1 Drop(s) Both EYES every 4 hours  carbidopa/levodopa  25/250 1 Tablet(s) Oral <User Schedule>  chlorhexidine 2% Cloths 1 Application(s) Topical daily  enoxaparin Injectable 40 milliGRAM(s) SubCutaneous <User Schedule>  entacapone 200 milliGRAM(s) Oral <User Schedule>  levETIRAcetam 1000 milliGRAM(s) Oral two times a day  ondansetron Injectable 4 milliGRAM(s) IV Push every 6 hours PRN  pantoprazole    Tablet 40 milliGRAM(s) Oral before breakfast  potassium chloride   Powder 20 milliEquivalent(s) Oral once  sodium chloride 3 Gram(s) Oral every 6 hours  sodium chloride 0.9%. 1000 milliLiter(s) (75 mL/Hr) IV Continuous <Continuous>  valACYclovir 1000 milliGRAM(s) Oral every 12 hours      LABS:                      13.3   8.99  )-----------( 184      ( 30 Apr 2023 04:55 )             40.2     04-30    141  |  108  |  19  ----------------------------<  110<H>  3.8   |  26  |  1.11    Ca    8.1<L>      30 Apr 2023 04:55  Phos  3.0     04-30  Mg     2.2     04-30

## 2023-04-30 NOTE — PROVIDER CONTACT NOTE (OTHER) - SITUATION
ICP maintained above 20 for 15 minutes
Pt UO 30 mL for the hour & pupils were unequal per pupilometer
Patient Clamped at 0830. ICP >20 noted.
neuro assessments fluctuating from one hour to the next
Pt /82

## 2023-04-30 NOTE — CHART NOTE - NSCHARTNOTEFT_GEN_A_CORE
EVD remains clamped, ICPs <20, neuro stable, no headaches. Cont valtrex x7d. . IVF dc'd. CSF PCR panel and culture sent. EEG ordered. 250cc 3% bolus given. Protonix dc'd.

## 2023-04-30 NOTE — PROGRESS NOTE ADULT - ASSESSMENT
ASSESSMENT AND PLAN  L. frontal ICH-3 with IVH, suspected venous bleed; BD 9  History of elective L. crani for meningioma resection (4/17/23, Dr. D'Amico)  History of seizures, Parkinson's disease, multiple falls and wheelchair bound  Abdominal distension  HSV skin lesion    NEURO:  Q1h neurochecks  Continue Keppra 1000mg bid (seizure history)  Decadron with taper   R. frontal EVD clamped 4/28; ICPs ~20s however patient neurologically at baseline and asymptomatic. CT head 4/29 stable edema with MLS.  MRI at some point  Continue anti-Parkinson's meds: Sinemet, entacapone (may need to increase entacapone to Q6-D/W neurology)  Activity: PT/OT as tolerated.     PULM:   Currently on room air  SpO2 goal > 92%  Chest PT as tolerated, nebs prn    CARDIAC:   BP goal 100- 160  Continue Amlodipine   TTE: EF 71%. Mild AR    GI:  Diet: Soft and bite sized diet  Stress ulcer prophylaxis: PPI while on steroids  LBM: 4/29    /RENAL: Resolution of mild NINFA  Sodium goal 140-145. Continue salt tabs   NS at 75cc/hr. Trend BMPs daily*  Monitor Is/Os    HEME:  Maintain Hb > 7.0, PLT > 100,000  SCDs, SQL. AntiXa 0.27.  LE dopplers 4/22 negative for DVT    ID:  S/P: Piperacillin tazobactam for empiric aspiration PNA coverage 4/27  Sputum culture- normal blank  Monitor temp curve, WBC  Valtrex PO bid x 7 days- VZV/HSV swab confirmatory for HSV    ENDO:  Goal euglycemia    MISC:    SOCIAL/FAMILY:  [] awaiting [x] updated son Kenneth at bedside [] family meeting    CODE STATUS:  [x] Full Code [] DNR [] DNI [] Palliative/Comfort Care    DISPOSITION:  [x] ICU [] Stroke Unit [] Floor [] EMU [] RCU [] PCU    Time spent: 60 critical care minutes     ASSESSMENT AND PLAN  L. frontal ICH-3 with IVH, suspected venous bleed; BD 9  History of elective L. crani for meningioma resection (4/17/23, Dr. D'Amico)  History of seizures, Parkinson's disease, multiple falls and wheelchair bound  HSV skin lesion    NEURO:  Q1h neurochecks  Continue Keppra 1000mg bid (seizure history)  Decadron with taper   R. frontal EVD clamped 4/28; ICPs ~20s however patient neurologically at baseline and asymptomatic. CT head 4/29 stable edema with MLS.  CT and possible EVD removal 5/1  MRI at some point  Continue anti-Parkinson's meds: Sinemet, entacapone (may need to increase entacapone to Q6-D/W neurology)  Activity: PT/OT as tolerated.     PULM:   Currently on room air  SpO2 goal > 92%  Chest PT as tolerated, nebs prn    CARDIAC:   BP goal 100- 160  Continue Amlodipine   TTE: EF 71%. Mild AR    GI:  Diet: Soft and bite sized diet  Stress ulcer prophylaxis: PPI while on steroids  LBM: 4/29    /RENAL: Resolution of mild NINFA  Sodium goal 140-145. Continue salt tabs   IVL. Trend BMPs daily  Monitor Is/Os    HEME:  Maintain Hb > 7.0, PLT > 100,000  SCDs, SQL. AntiXa 0.27.  LE dopplers 4/22 negative for DVT    ID: HSV +ve  S/P: Piperacillin tazobactam for empiric aspiration PNA coverage 4/27  Sputum culture- normal blank  Valtrex PO bid x 7 days- HSV swab confirmatory for HSV    ENDO:  Goal euglycemia    MISC:    SOCIAL/FAMILY:  [] awaiting [x] updated son Kenneth at bedside [] family meeting    CODE STATUS:  [x] Full Code [] DNR [] DNI [] Palliative/Comfort Care    DISPOSITION:  [x] ICU [] Stroke Unit [] Floor [] EMU [] RCU [] PCU    Time spent: 60 critical care minutes

## 2023-04-30 NOTE — PROVIDER CONTACT NOTE (OTHER) - BACKGROUND
Pt had R EVD placement 4/22, admitted w/ L frontal hemorrhage w/ IVH & midline shift
EVD clamped at 0830.
new large left frontal hemorrhage with IVH, cerebral edema with midline shift and some effacement of L frontal horn. EVD placed on admission 4/22
s/p R EVD placement on 4/22 for new L frontal hemorrhage

## 2023-05-01 PROBLEM — I10 ESSENTIAL (PRIMARY) HYPERTENSION: Chronic | Status: ACTIVE | Noted: 2023-04-14

## 2023-05-01 LAB
ANION GAP SERPL CALC-SCNC: 7 MMOL/L — SIGNIFICANT CHANGE UP (ref 5–17)
BUN SERPL-MCNC: 17 MG/DL — SIGNIFICANT CHANGE UP (ref 7–23)
CALCIUM SERPL-MCNC: 8.8 MG/DL — SIGNIFICANT CHANGE UP (ref 8.4–10.5)
CHLORIDE SERPL-SCNC: 109 MMOL/L — HIGH (ref 96–108)
CO2 SERPL-SCNC: 25 MMOL/L — SIGNIFICANT CHANGE UP (ref 22–31)
CREAT SERPL-MCNC: 1.12 MG/DL — SIGNIFICANT CHANGE UP (ref 0.5–1.3)
EGFR: 71 ML/MIN/1.73M2 — SIGNIFICANT CHANGE UP
GLUCOSE SERPL-MCNC: 114 MG/DL — HIGH (ref 70–99)
HCT VFR BLD CALC: 39.6 % — SIGNIFICANT CHANGE UP (ref 39–50)
HGB BLD-MCNC: 13 G/DL — SIGNIFICANT CHANGE UP (ref 13–17)
MAGNESIUM SERPL-MCNC: 2.1 MG/DL — SIGNIFICANT CHANGE UP (ref 1.6–2.6)
MCHC RBC-ENTMCNC: 31.3 PG — SIGNIFICANT CHANGE UP (ref 27–34)
MCHC RBC-ENTMCNC: 32.8 GM/DL — SIGNIFICANT CHANGE UP (ref 32–36)
MCV RBC AUTO: 95.4 FL — SIGNIFICANT CHANGE UP (ref 80–100)
NRBC # BLD: 0 /100 WBCS — SIGNIFICANT CHANGE UP (ref 0–0)
PHOSPHATE SERPL-MCNC: 3.1 MG/DL — SIGNIFICANT CHANGE UP (ref 2.5–4.5)
PLATELET # BLD AUTO: 188 K/UL — SIGNIFICANT CHANGE UP (ref 150–400)
POTASSIUM SERPL-MCNC: 3.8 MMOL/L — SIGNIFICANT CHANGE UP (ref 3.5–5.3)
POTASSIUM SERPL-SCNC: 3.8 MMOL/L — SIGNIFICANT CHANGE UP (ref 3.5–5.3)
RBC # BLD: 4.15 M/UL — LOW (ref 4.2–5.8)
RBC # FLD: 15 % — HIGH (ref 10.3–14.5)
SODIUM SERPL-SCNC: 141 MMOL/L — SIGNIFICANT CHANGE UP (ref 135–145)
WBC # BLD: 8.44 K/UL — SIGNIFICANT CHANGE UP (ref 3.8–10.5)
WBC # FLD AUTO: 8.44 K/UL — SIGNIFICANT CHANGE UP (ref 3.8–10.5)

## 2023-05-01 PROCEDURE — 99292 CRITICAL CARE ADDL 30 MIN: CPT

## 2023-05-01 PROCEDURE — 70450 CT HEAD/BRAIN W/O DYE: CPT | Mod: 26

## 2023-05-01 PROCEDURE — 99291 CRITICAL CARE FIRST HOUR: CPT | Mod: 24

## 2023-05-01 RX ORDER — SODIUM CHLORIDE 9 MG/ML
1000 INJECTION INTRAMUSCULAR; INTRAVENOUS; SUBCUTANEOUS
Refills: 0 | Status: DISCONTINUED | OUTPATIENT
Start: 2023-05-01 | End: 2023-05-03

## 2023-05-01 RX ORDER — SODIUM CHLORIDE 9 MG/ML
1 INJECTION INTRAMUSCULAR; INTRAVENOUS; SUBCUTANEOUS EVERY 6 HOURS
Refills: 0 | Status: DISCONTINUED | OUTPATIENT
Start: 2023-05-01 | End: 2023-05-03

## 2023-05-01 RX ORDER — LIDOCAINE 4 G/100G
1 CREAM TOPICAL EVERY 24 HOURS
Refills: 0 | Status: DISCONTINUED | OUTPATIENT
Start: 2023-05-01 | End: 2023-05-03

## 2023-05-01 RX ORDER — POTASSIUM CHLORIDE 20 MEQ
20 PACKET (EA) ORAL ONCE
Refills: 0 | Status: COMPLETED | OUTPATIENT
Start: 2023-05-01 | End: 2023-05-01

## 2023-05-01 RX ADMIN — CARBIDOPA AND LEVODOPA 1 TABLET(S): 25; 100 TABLET ORAL at 11:02

## 2023-05-01 RX ADMIN — ENTACAPONE 200 MILLIGRAM(S): 200 TABLET, FILM COATED ORAL at 07:01

## 2023-05-01 RX ADMIN — Medication 1 DROP(S): at 02:18

## 2023-05-01 RX ADMIN — CARBIDOPA AND LEVODOPA 1 TABLET(S): 25; 100 TABLET ORAL at 16:19

## 2023-05-01 RX ADMIN — LIDOCAINE 1 PATCH: 4 CREAM TOPICAL at 19:00

## 2023-05-01 RX ADMIN — SODIUM CHLORIDE 1 GRAM(S): 9 INJECTION INTRAMUSCULAR; INTRAVENOUS; SUBCUTANEOUS at 18:02

## 2023-05-01 RX ADMIN — CHLORHEXIDINE GLUCONATE 1 APPLICATION(S): 213 SOLUTION TOPICAL at 07:00

## 2023-05-01 RX ADMIN — Medication 1 DROP(S): at 09:31

## 2023-05-01 RX ADMIN — SODIUM CHLORIDE 1 GRAM(S): 9 INJECTION INTRAMUSCULAR; INTRAVENOUS; SUBCUTANEOUS at 14:03

## 2023-05-01 RX ADMIN — Medication 20 MILLIEQUIVALENT(S): at 07:37

## 2023-05-01 RX ADMIN — LEVETIRACETAM 1000 MILLIGRAM(S): 250 TABLET, FILM COATED ORAL at 18:01

## 2023-05-01 RX ADMIN — Medication 1 DROP(S): at 18:01

## 2023-05-01 RX ADMIN — CARBIDOPA AND LEVODOPA 1 TABLET(S): 25; 100 TABLET ORAL at 19:49

## 2023-05-01 RX ADMIN — ENTACAPONE 200 MILLIGRAM(S): 200 TABLET, FILM COATED ORAL at 11:02

## 2023-05-01 RX ADMIN — LIDOCAINE 1 PATCH: 4 CREAM TOPICAL at 21:00

## 2023-05-01 RX ADMIN — Medication 1 DROP(S): at 07:00

## 2023-05-01 RX ADMIN — VALACYCLOVIR 1000 MILLIGRAM(S): 500 TABLET, FILM COATED ORAL at 07:01

## 2023-05-01 RX ADMIN — AMLODIPINE BESYLATE 10 MILLIGRAM(S): 2.5 TABLET ORAL at 07:01

## 2023-05-01 RX ADMIN — VALACYCLOVIR 1000 MILLIGRAM(S): 500 TABLET, FILM COATED ORAL at 18:01

## 2023-05-01 RX ADMIN — SODIUM CHLORIDE 60 MILLILITER(S): 9 INJECTION INTRAMUSCULAR; INTRAVENOUS; SUBCUTANEOUS at 18:04

## 2023-05-01 RX ADMIN — ENTACAPONE 200 MILLIGRAM(S): 200 TABLET, FILM COATED ORAL at 16:19

## 2023-05-01 RX ADMIN — CARBIDOPA AND LEVODOPA 1 TABLET(S): 25; 100 TABLET ORAL at 07:01

## 2023-05-01 RX ADMIN — LEVETIRACETAM 1000 MILLIGRAM(S): 250 TABLET, FILM COATED ORAL at 07:01

## 2023-05-01 RX ADMIN — Medication 1 DROP(S): at 22:39

## 2023-05-01 RX ADMIN — LIDOCAINE 1 PATCH: 4 CREAM TOPICAL at 09:29

## 2023-05-01 RX ADMIN — Medication 1 DROP(S): at 14:03

## 2023-05-01 RX ADMIN — SODIUM CHLORIDE 3 GRAM(S): 9 INJECTION INTRAMUSCULAR; INTRAVENOUS; SUBCUTANEOUS at 07:01

## 2023-05-01 NOTE — PROGRESS NOTE ADULT - SUBJECTIVE AND OBJECTIVE BOX
INTERVAL HISTORY: HPI:  Patient is a 68 year old male with PMH of seizures, HTN, and Parkinson's disease diagnosed in 2012 with multiple falls who initially presented to [facility] on 4/17 for elective left craniotomy for meningioma resection. He has also been wheelchair bound since about 2-3 years ago and has frequent falls related to his Parkinson's disease.  Underwent L crani for meningioma resection on 4/17. Hospital course was uncomplicated.   Discharged to [facility] on 4/21 in stable condition.     On arrival to rehab, pt c/o incisional headache with episodes of n/v during ambulance ride to rehab.  Pt given zofran and oxycodone, however, headache returned with persistent nausea and vomiting.   RRT called for worsened mental status. CT head obtained stat which showed new large left frontal hemorrhage with IVH, cerebral edema with midline shift and some effacement of L frontal horn.  Pt started on cardene, given Keppra and transferred to [facility]. Prior to transfer, mental status declined (GCS 13-> 9) with increased secretions, therefore attempted intubation; LMA airway placed.      ICH 3   NIHSS 23 (22 Apr 2023 06:28)      MEDICATIONS  (STANDING):  amLODIPine   Tablet 10 milliGRAM(s) Oral daily  artificial  tears Solution 1 Drop(s) Both EYES every 4 hours  carbidopa/levodopa  25/250 1 Tablet(s) Oral <User Schedule>  chlorhexidine 2% Cloths 1 Application(s) Topical daily  entacapone 200 milliGRAM(s) Oral <User Schedule>  levETIRAcetam 1000 milliGRAM(s) Oral two times a day  lidocaine   4% Patch 1 Patch Transdermal every 24 hours  sodium chloride 1 Gram(s) Oral every 6 hours  valACYclovir 1000 milliGRAM(s) Oral every 12 hours    MEDICATIONS  (PRN):  acetaminophen   Oral Liquid .. 650 milliGRAM(s) Oral every 6 hours PRN Temp greater or equal to 38C (100.4F), Mild Pain (1 - 3)  acetylcysteine 20%  Inhalation 4 milliLiter(s) Inhalation every 6 hours PRN congestion  albuterol/ipratropium for Nebulization 3 milliLiter(s) Nebulizer every 6 hours PRN Shortness of Breath and/or Wheezing  ondansetron Injectable 4 milliGRAM(s) IV Push every 6 hours PRN Nausea and/or Vomiting      Drug Dosing Weight  Height (cm): 162.6 (23 Apr 2023 10:00)  Weight (kg): 97 (22 Apr 2023 23:24)  BMI (kg/m2): 36.7 (23 Apr 2023 10:00)  BSA (m2): 2.01 (23 Apr 2023 10:00)    PAST MEDICAL & SURGICAL HISTORY:  Parkinsons      HTN (hypertension)      History of seizures      Brain mass      Impaired gait      Meningioma      History of insomnia      Intracerebral hemorrhage      No significant past surgical history          REVIEW OF SYSTEMS: [ ] Unable to Assess due to neurologic exam   [ ] All ROS addressed below are non-contributory, except:  Neuro: [ ] Headache [ ] Back pain [ ] Numbness [ ] Weakness [ ] Ataxia [ ] Dizziness [ ] Aphasia [ ] Dysarthria [ ] Visual disturbance  Resp: [ ] Shortness of breath/dyspnea, [ ] Orthopnea [ ] Cough  CV: [ ] Chest pain [ ] Palpitation [ ] Lightheadedness [ ] Syncope  Renal: [ ] Thirst [ ] Edema  GI: [ ] Nausea [ ] Emesis [ ] Abdominal pain [ ] Constipation [ ] Diarrhea  Hem: [ ] Hematemesis [ ] bright red blood per rectum  ID: [ ] Fever [ ] Chills [ ] Dysuria  ENT: [ ] Rhinorrhea    PHYSICAL EXAM:    General: No Acute Distress, intention tremor b/l UE & face (when attempts to speak)    Neurological: AOx3, slow speech, waxing and waning exam timing better s/p parkinson medications; eyes open to voice, EOMI, b/l UE 5/5 w/ tremors, b/l LE 5/5.     Pulmonary: Clear to Auscultation, No Rales, No Rhonchi, No Wheezes     Cardiovascular: S1, S2, Regular Rate and Rhythm     Gastrointestinal: Soft, Nontender, Nondistended     Extremities: No calf tenderness     Incision: CDI, EVD in place & clamped    ICU Vital Signs Last 24 Hrs  T(C): 37.2 (01 May 2023 17:25), Max: 37.6 (30 Apr 2023 21:52)  T(F): 99 (01 May 2023 17:25), Max: 99.7 (30 Apr 2023 21:52)  HR: 79 (01 May 2023 17:16) (50 - 87)  BP: 140/70 (01 May 2023 17:16) (103/80 - 165/71)  BP(mean): 99 (01 May 2023 17:16) (77 - 114)  RR: 20 (01 May 2023 17:16) (12 - 26)  SpO2: 98% (01 May 2023 17:16) (91% - 100%)    O2 Parameters below as of 01 May 2023 17:16  Patient On (Oxygen Delivery Method): room air        I&O's Detail    30 Apr 2023 07:01  -  01 May 2023 07:00  --------------------------------------------------------  IN:    IV PiggyBack: 250 mL    Oral Fluid: 800 mL    sodium chloride 0.9%: 225 mL  Total IN: 1275 mL    OUT:    External Ventricular Device (mL): 0 mL    Voided (mL): 4550 mL  Total OUT: 4550 mL    Total NET: -3275 mL      01 May 2023 07:01  -  01 May 2023 17:41  --------------------------------------------------------  IN:    Oral Fluid: 720 mL  Total IN: 720 mL    OUT:    External Ventricular Device (mL): 0 mL    Voided (mL): 1875 mL  Total OUT: 1875 mL    Total NET: -1155 mL              LABS:  CBC Full  -  ( 01 May 2023 05:09 )  WBC Count : 8.44 K/uL  RBC Count : 4.15 M/uL  Hemoglobin : 13.0 g/dL  Hematocrit : 39.6 %  Platelet Count - Automated : 188 K/uL  Mean Cell Volume : 95.4 fl  Mean Cell Hemoglobin : 31.3 pg  Mean Cell Hemoglobin Concentration : 32.8 gm/dL  Auto Neutrophil # : x  Auto Lymphocyte # : x  Auto Monocyte # : x  Auto Eosinophil # : x  Auto Basophil # : x  Auto Neutrophil % : x  Auto Lymphocyte % : x  Auto Monocyte % : x  Auto Eosinophil % : x  Auto Basophil % : x    05-01    141  |  109<H>  |  17  ----------------------------<  114<H>  3.8   |  25  |  1.12    Ca    8.8      01 May 2023 05:09  Phos  3.1     05-01  Mg     2.1     05-01            RADIOLOGY & ADDITIONAL STUDIES:

## 2023-05-01 NOTE — PROGRESS NOTE ADULT - ASSESSMENT
ASSESSMENT:  68M hx seizures, HTN, poorly controlled Parkinson's disease w/ prog inability to walk since Nov 2022. Found to have large L frontal dural based lesion c/f meningioma w/ associated mass effect and edema. S/p L crani for meningioma resection (4/17). Discharged to [facility] on 4/21. On arrival to rehab c/o headache with persistent nausea/vomiting. CTH performed reveals new large left frontal IPH with IVH, with edema and midline shift. ICH 3. s/p bedside right frontal EVD 4/22. EVD clamped.     NEURO:  - neuro/vitals q1hr  - EVD clamped, monitor ICP, failed clamped trial 4/27, reclamped 4/28; plan to remove today; pending CT head   - Keppra 1g BID (hx seizures)  - s/p Decadron taper x 1 week  - Parkinsons: continue home sinemet (25/250mg) 4 times/day, entacapone 200mg q8h  - CTH 4/24: no significant interval change, CTH 4/26: stable , 4/29 stable, pending CTH 5/1  - Hold home gabapentin 400mg 4 times/day   - d/c vEEG; movement likely tremors 2/2 parkinson's dz not seizure    CARDIOVASCULAR:  - -160  - Amlodipine 10 mg qd  - TTE 4/22 mild aortic sclerosis w/ no stenosis, mild AR, left pleural effusion EF 71%    PULMONARY:  - nebs q6h prn, chest PT  - RA  - secretions improving    RENAL:  - Na goal 140-145, salt tabs decrease to 1 q 6, s/p 250 cc 3% bolus 4/30  - IVF 60cc/hr NS while on valacyclovir, voiding     GI:  - Soft and bite sized diet   - Bowel regimen dc'd for loose stool, last BM 4/30; continue to monitor     HEME:  - SCDs, SQL (6pm tonight) for DVT ppx   - LE dopplers 4/22 negative     ID:  - Valtrex 4/28-5/5 for herpetic lesions L cheek; cx +HSV-1; no signs of CNS involvement   - Zosyn (4/22-4/27) - emipiric PNA, RLL infiltrate   - f/u HIV testing  - CSF PCR (negative) panel     ENDO:  - ISS, A1c 5.8    DISPO:  - NSICU, full code, family updated  - PT/OT rec AR: patient can tolerate three hours PT/OT daily

## 2023-05-01 NOTE — PROGRESS NOTE ADULT - SUBJECTIVE AND OBJECTIVE BOX
SUBJECTIVE: Unable to perform further ROS d/t mental status.     HOSPITAL COURSE:   4/17: POD 0 L crani for tumor resection.   4/18: POD1 L crani tumor resection. Norvasc increased to 5mg daily. Neuro exam stable. Neurology consulted, reocmmended maintaining current parkinson's medication regimen. Had asymptomatic episode of VTACH read on telemetry, hemodynamically stable, returned back to baseline, EKG obtained negative. Cardiology consulted, reported rhythm strip was sinus, likely artifact from tremors in arms, NTD. Pt c/o worsened difficulty breathing, satting well on RA. Given duoneb treatment and CXR completed.  Postop MRI completed in evening.   4/19: POD2, SIRIA overnight,  neuro stable, SLP state may benefit from outpt speech therapy will continue to follow  4/20: POD3, SIRIA overnight. per s/s: soft/bite size diet w/ thin liquids.  4/21: POD4, SIRIA overnight, discahrge Kiran Evans AR    DISCHARGED TO [facility] on 4/21:    4/22: readmitted with new large left frontal IPH with IVH, with edema and midline shift with large left frontal IPH. EVD placed, opened at 10. CTH post EVD complete. Starr sump placed for GI decompression. Started 3%@80 for na goal 145-150. KUB showing small loops of gas, will start TF tomorrow. TTE mild aortic sclerosis w/ no stenosis, mild AR, left pleural effusion EF 71%. LE dopplers negative.  4/23: POD 6. EVD @ 10. POCUS with RLL consolidation, trace B lines, L lung clear, collapsable IVC. 250cc albumin and 500cc NS bolus x2 in setting of decreasing UOP. Started on SQL 40 BID, weight based. Off propofol, on CPAP 8/5, plan to extubate. Removed matthew, f/u TOV. Removed a-line. Extubated to HFNC. Decreased 3% to 30. Passed TOV.   4/24: POD 7. Tolerating HFNC. Start TF today. Na 155, stopped 3%, fentanyl d/c'd, CT stable. Trickle feeds started. PT/OT recommending AR. C/o headache, given tramadol  with resolution. S&S recommend pureed diet with thin liquids. Weaning to 6L NC. Na 155-->144, gave 225cc 3% bolus, Na-->147.   4/25: POD 8. Cont 3% at 30. EVD remains at 10. Tolerating PO. Given hydralazine 10mg x 1 for SBP 180s. Bradycardic to 53 resolved on own. Dc'd 3%, started salt tabs 2q8. Sodium goal 140-145. Sputum culture from 4/22: normal respiratory blank. Repeat 145. F/u AM sodium. ProBNP 1492. Pt removed NGT. Tolerating pureed diet.   4/26: POD 9. EVD @ 10. Nebs made PRN, amlodipine increased to 10mg. Rec'd for soft and bite size diet, CTH today with no significant interval change. EVD clamp trial ICPS hovering around 18, will monitor clinically and open if neuro change or headache, pro-bnp 1387 downtrending.   4/27: POD10. ICPs sustained 22-26 x 15 min, opened EVD. Finished course of zosyn for empiric pneumonia treatment.   4/28: POD11, Neuro stable, EVD clamped this morning, ICPs hovering higher than 20, neuro exam remains stable, denies HA, will monitor based on clinical/neuro status during clamp trial. Plan for CTH Sunday if tolerating clamp trial. F/u neurology regarding entacapone dosing. Viral PCR swab sent of herpetic lesions L cheek, f/u results.   4/29: POD12, SIRIA overnight EVD remains clamped, ICPs intermittently elevated but patient remains neuro stable and without headaches. NS at 100 started for rising Cr level. repeat CTH when clamped x 24 hrs in AM with stable MLS and mass effect. NS lowered to 75 for positive fluid status. 3 BMs today, bowel regimen dc'd.   4/30: POD13. SIRIA overnight, EVD remains clamped, ICPs <20, neuro stable, no headaches. Cont valtrex x7d. . IVF dc'd. CSF PCR panel and culture sent. EEG ordered. 250cc 3% bolus given. Protonix dc'd.  5/1: POD 14. SIRIA o/n.     Vital Signs Last 24 Hrs  T(C): 37.6 (30 Apr 2023 21:52), Max: 37.6 (30 Apr 2023 21:52)  T(F): 99.7 (30 Apr 2023 21:52), Max: 99.7 (30 Apr 2023 21:52)  HR: 54 (01 May 2023 00:00) (52 - 78)  BP: 145/72 (01 May 2023 00:00) (107/59 - 162/82)  BP(mean): 101 (01 May 2023 00:00) (78 - 114)  RR: 16 (01 May 2023 00:00) (14 - 21)  SpO2: 100% (01 May 2023 00:00) (97% - 100%)    Parameters below as of 30 Apr 2023 22:00  Patient On (Oxygen Delivery Method): room air    I&O's Summary    29 Apr 2023 07:01  -  30 Apr 2023 07:00  --------------------------------------------------------  IN: 3275 mL / OUT: 4525 mL / NET: -1250 mL    30 Apr 2023 07:01  -  01 May 2023 00:41  --------------------------------------------------------  IN: 1275 mL / OUT: 2450 mL / NET: -1175 mL    PHYSICAL EXAM:  General: NAD, pt is comfortably sitting up in bed, A&O x3 (with choices), on RA  HEENT: PERRL 3mm, EOMI b/l, face symmetric, tongue midline, neck FROM, + L facial twitching (h/o parkinsons), L cheek vesicular lesions/rash   Cardiovascular: RRR, normal S1 and S2   Respiratory: lungs CTAB, no wheezing, rhonchi, or crackles   GI: normoactive BS to auscultation, abd soft, NTND   Neuro: +b/l LUE > RUE tremors, +word finding difficulty/perseveration, no dysmetria, no pronator drift  RUE 4+/5, LUE 5/5, RLE 4+/5. LLE 5/5  sensation intact to light touch throughout   Extremities: distal pulses 2+ x4   Wound/incision: +left craniotomy incision with sutures in place, C/D/I  Drain: EVD @25azT9U, clamped     LABS:                        13.3   8.99  )-----------( 184      ( 30 Apr 2023 04:55 )             40.2     04-30    142  |  109<H>  |  17  ----------------------------<  144<H>  4.0   |  24  |  1.19    Ca    8.8      30 Apr 2023 19:12  Phos  3.0     04-30  Mg     2.2     04-30              CAPILLARY BLOOD GLUCOSE          Drug Levels: [] N/A    CSF Analysis: [] N/A  RBC Count - Spinal Fluid: 965541 /uL (04-30 @ 13:22)  Glucose, CSF: 43 mg/dL (04-30 @ 13:22)  Protein, CSF: 53 mg/dL (04-30 @ 13:22)      Allergies    No Known Allergies    Intolerances      MEDICATIONS:  Antibiotics:  valACYclovir 1000 milliGRAM(s) Oral every 12 hours    Neuro:  acetaminophen   Oral Liquid .. 650 milliGRAM(s) Oral every 6 hours PRN  carbidopa/levodopa  25/250 1 Tablet(s) Oral <User Schedule>  entacapone 200 milliGRAM(s) Oral <User Schedule>  levETIRAcetam 1000 milliGRAM(s) Oral two times a day  ondansetron Injectable 4 milliGRAM(s) IV Push every 6 hours PRN    Anticoagulation:    OTHER:  acetylcysteine 20%  Inhalation 4 milliLiter(s) Inhalation every 6 hours PRN  albuterol/ipratropium for Nebulization 3 milliLiter(s) Nebulizer every 6 hours PRN  amLODIPine   Tablet 10 milliGRAM(s) Oral daily  artificial  tears Solution 1 Drop(s) Both EYES every 4 hours  chlorhexidine 2% Cloths 1 Application(s) Topical daily    IVF:  sodium chloride 3 Gram(s) Oral every 6 hours    CULTURES:  Culture Results:   Normal Respiratory Blank present (04-22 @ 06:20)    RADIOLOGY & ADDITIONAL TESTS:      ASSESSMENT:  68M hx seizures, HTN, poorly controlled Parkinson's disease w/ prog inability to walk since Nov 2022. Found to have large L frontal dural based lesion c/f meningioma w/ associated mass effect and edema. S/p L crani for meningioma resection (4/17). Discharged to [place] rehab on 4/21. On arrival to rehab c/o headache with persistent nausea/vomiting. CTH performed reveals new large left frontal IPH with IVH, with edema and midline shift. ICH 3. s/p bedside right frontal EVD 4/22. EVD clamped.     NEURO:  - neuro/vitals q1hr  - EVD clamped, monitor ICP, failed clamped trial 4/27, reclamped 4/28  - Keppra 1g BID (hx seizures)  - s/p Decadron taper x 1 week  - Parkinsons: continue home sinemet (25/250mg) 4 times/day, entacapone 200mg q8h  - CTH 4/24: no significant interval change, CTH 4/26: stable , 4/29 stable, pending CTH 5/1  - Hold home gabapentin 400mg 4 times/day   - pending EEG    CARDIOVASCULAR:  - -160  - Amlodipine 10 mg qd  - TTE 4/22 mild aortic sclerosis w/ no stenosis, mild AR, left pleural effusion EF 71%    PULMONARY:  - nebs q6h prn, chest PT  - RA  - secretions improving    RENAL:  - Na goal 140-145, salt tabs 3 q 6, s/p 250 cc 3% bolus 4/30  - IVL, voiding     GI:  - Soft and bite sized diet   - Bowel regimen dc'd, last BM 4/30    HEME:  - SCDs, SQL (6pm tonight) for DVT ppx   - LE dopplers 4/22 negative     ID:  - Valtrex 4/28-5/5 for herpetic lesions L cheek; cx +HSV-1   - Zosyn (4/22-4/27) - emipiric PNA, RLL infiltrate   - f/u HIV testing  - f/u CSF PCR panel and cultures 4/30    ENDO:  - ISS, A1c 5.8    DISPO:  - NSICU, full code, family updated  - PT/OT rec AR: patient can tolerate three hours PT/OT daily    Discussed with Dr. D'Amico and Dr. Grimaldo

## 2023-05-01 NOTE — EEG REPORT - NS EEG TEXT BOX
[facility] Department of Neurology  Inpatient Continuous video-Electroencephalogram    Patient Name:	MAHDI KELY    :	1955  MRN:	[id number]    Study Start Date/Time:  2023, 4:55:05 PM  Study End Date/Time:    Referred by: Randy D'Amico, MD    Brief Clinical History:  MAHDI EDGE is a 68 year old Male Lt crani tumor resection; study performed to investigate for seizures or markers of epilepsy.  Technologist notes:-    Diagnosis Code:   R56.9 convulsions/seizure  The live video was: unmonitored.    Pertinent Medications:  n/a    Acquisition Details:  Electroencephalography was acquired using a minimum of 21 channels on an [employer] Neu[employer] system v 9.3.1 with electrode placement according to the standard International 10-20 system following ACNS (American Clinical Neurophysiology Society) guidelines for Long-Term Video EEG monitoring.  Anterior temporal T1 and T2 electrodes were utilized whenever possible.   The [employer] automated spike & seizure detections were all reviewed in detail, in addition to extensive portions of raw EEG.    There were right frontal electrodes missing – F4 and C4.      Day 1: 2023 @ 4:55:05 PM to next morning @ 07:00 am  Background:  continuous, with predominantly theta/delta frequencies.  Symmetry:  No persistent asymmetries of voltage or frequency.  Posterior Dominant Rhythm:  7 Hz rudimentary but with improvement over the course of the recording reaching 8-9 Hz and well-regulated.  Organization: Rudimentary initially but by the end of the study anterior-posterior gradient was  Voltage:  Normal (20+ uV)  Variability: Yes. 		Reactivity: Yes.  N2 sleep: Rudimentary but likely N2 transients present.  Spontaneous Activity:  No epileptiform discharges.  Periodic/rhythmic activity:  None  Events:  No electrographic seizures or significant clinical events.  Provocations:  Hyperventilation and Photic stimulation: was not performed.    Daily Summary:    Continuous Mild generalized   slowing and though improvement was seen there was still excess delta-theta, suggestive of a Mild degree of diffuse or multifocal  dysfunction.  No epileptiform activity and no significant clinical events occurred.      Salazar Ruiz MD  Attending Neurologist, [facility]ilepsy Program

## 2023-05-01 NOTE — PROCEDURE NOTE - ADDITIONAL PROCEDURE DETAILS
[facility] [place]
Right frontal EVD was removed under sterile conditions. One staple was put in place at EVD exit site. No leaking was visualized. Patient tolerated procedure well.
Patient tolerated procedure well. Pending CXR to confirm placement.
All necessary equipment and materials for sterile procedure were gathered. Procedure, pt name and  were all confirmed. EVD was clamped and CSF port was cleaned thoroughly with chlorhexidine prep.  7cc of CSF were drained and placed into three collection cups in sterile fashion. Pt tolerated procedure well and there were no complications.
R EVD, 2 passes, moderate pressure
ICU cteam continued to care for pt at bedside

## 2023-05-02 ENCOUNTER — TRANSCRIPTION ENCOUNTER (OUTPATIENT)
Age: 68
End: 2023-05-02

## 2023-05-02 LAB
ANION GAP SERPL CALC-SCNC: 7 MMOL/L — SIGNIFICANT CHANGE UP (ref 5–17)
BUN SERPL-MCNC: 16 MG/DL — SIGNIFICANT CHANGE UP (ref 7–23)
CALCIUM SERPL-MCNC: 9.3 MG/DL — SIGNIFICANT CHANGE UP (ref 8.4–10.5)
CHLORIDE SERPL-SCNC: 107 MMOL/L — SIGNIFICANT CHANGE UP (ref 96–108)
CO2 SERPL-SCNC: 25 MMOL/L — SIGNIFICANT CHANGE UP (ref 22–31)
CREAT SERPL-MCNC: 1.16 MG/DL — SIGNIFICANT CHANGE UP (ref 0.5–1.3)
EGFR: 68 ML/MIN/1.73M2 — SIGNIFICANT CHANGE UP
GLUCOSE BLDC GLUCOMTR-MCNC: 126 MG/DL — HIGH (ref 70–99)
GLUCOSE SERPL-MCNC: 118 MG/DL — HIGH (ref 70–99)
HCT VFR BLD CALC: 39.2 % — SIGNIFICANT CHANGE UP (ref 39–50)
HGB BLD-MCNC: 13 G/DL — SIGNIFICANT CHANGE UP (ref 13–17)
MAGNESIUM SERPL-MCNC: 2.2 MG/DL — SIGNIFICANT CHANGE UP (ref 1.6–2.6)
MCHC RBC-ENTMCNC: 31.6 PG — SIGNIFICANT CHANGE UP (ref 27–34)
MCHC RBC-ENTMCNC: 33.2 GM/DL — SIGNIFICANT CHANGE UP (ref 32–36)
MCV RBC AUTO: 95.4 FL — SIGNIFICANT CHANGE UP (ref 80–100)
NRBC # BLD: 0 /100 WBCS — SIGNIFICANT CHANGE UP (ref 0–0)
PHOSPHATE SERPL-MCNC: 3.6 MG/DL — SIGNIFICANT CHANGE UP (ref 2.5–4.5)
PLATELET # BLD AUTO: 186 K/UL — SIGNIFICANT CHANGE UP (ref 150–400)
POTASSIUM SERPL-MCNC: 4.1 MMOL/L — SIGNIFICANT CHANGE UP (ref 3.5–5.3)
POTASSIUM SERPL-SCNC: 4.1 MMOL/L — SIGNIFICANT CHANGE UP (ref 3.5–5.3)
RBC # BLD: 4.11 M/UL — LOW (ref 4.2–5.8)
RBC # FLD: 15.2 % — HIGH (ref 10.3–14.5)
SODIUM SERPL-SCNC: 139 MMOL/L — SIGNIFICANT CHANGE UP (ref 135–145)
WBC # BLD: 6.31 K/UL — SIGNIFICANT CHANGE UP (ref 3.8–10.5)
WBC # FLD AUTO: 6.31 K/UL — SIGNIFICANT CHANGE UP (ref 3.8–10.5)

## 2023-05-02 PROCEDURE — 70450 CT HEAD/BRAIN W/O DYE: CPT | Mod: 26

## 2023-05-02 PROCEDURE — 99232 SBSQ HOSP IP/OBS MODERATE 35: CPT | Mod: 24

## 2023-05-02 RX ORDER — SENNA PLUS 8.6 MG/1
2 TABLET ORAL AT BEDTIME
Refills: 0 | Status: DISCONTINUED | OUTPATIENT
Start: 2023-05-02 | End: 2023-05-03

## 2023-05-02 RX ORDER — ENOXAPARIN SODIUM 100 MG/ML
40 INJECTION SUBCUTANEOUS EVERY 24 HOURS
Refills: 0 | Status: DISCONTINUED | OUTPATIENT
Start: 2023-05-02 | End: 2023-05-03

## 2023-05-02 RX ORDER — AMANTADINE HCL 100 MG
100 CAPSULE ORAL
Refills: 0 | Status: DISCONTINUED | OUTPATIENT
Start: 2023-05-02 | End: 2023-05-02

## 2023-05-02 RX ORDER — AMANTADINE HCL 100 MG
100 CAPSULE ORAL ONCE
Refills: 0 | Status: DISCONTINUED | OUTPATIENT
Start: 2023-05-02 | End: 2023-05-02

## 2023-05-02 RX ORDER — POLYETHYLENE GLYCOL 3350 17 G/17G
17 POWDER, FOR SOLUTION ORAL DAILY
Refills: 0 | Status: DISCONTINUED | OUTPATIENT
Start: 2023-05-02 | End: 2023-05-03

## 2023-05-02 RX ORDER — AMANTADINE HCL 100 MG
100 CAPSULE ORAL EVERY 24 HOURS
Refills: 0 | Status: DISCONTINUED | OUTPATIENT
Start: 2023-05-03 | End: 2023-05-03

## 2023-05-02 RX ORDER — AMANTADINE HCL 100 MG
100 CAPSULE ORAL ONCE
Refills: 0 | Status: COMPLETED | OUTPATIENT
Start: 2023-05-02 | End: 2023-05-02

## 2023-05-02 RX ADMIN — Medication 1 DROP(S): at 02:45

## 2023-05-02 RX ADMIN — SODIUM CHLORIDE 1 GRAM(S): 9 INJECTION INTRAMUSCULAR; INTRAVENOUS; SUBCUTANEOUS at 19:44

## 2023-05-02 RX ADMIN — SODIUM CHLORIDE 1 GRAM(S): 9 INJECTION INTRAMUSCULAR; INTRAVENOUS; SUBCUTANEOUS at 00:09

## 2023-05-02 RX ADMIN — LIDOCAINE 1 PATCH: 4 CREAM TOPICAL at 19:02

## 2023-05-02 RX ADMIN — LIDOCAINE 1 PATCH: 4 CREAM TOPICAL at 21:00

## 2023-05-02 RX ADMIN — Medication 1 DROP(S): at 13:59

## 2023-05-02 RX ADMIN — CARBIDOPA AND LEVODOPA 1 TABLET(S): 25; 100 TABLET ORAL at 15:06

## 2023-05-02 RX ADMIN — LEVETIRACETAM 1000 MILLIGRAM(S): 250 TABLET, FILM COATED ORAL at 05:52

## 2023-05-02 RX ADMIN — CARBIDOPA AND LEVODOPA 1 TABLET(S): 25; 100 TABLET ORAL at 19:44

## 2023-05-02 RX ADMIN — CHLORHEXIDINE GLUCONATE 1 APPLICATION(S): 213 SOLUTION TOPICAL at 05:51

## 2023-05-02 RX ADMIN — ENOXAPARIN SODIUM 40 MILLIGRAM(S): 100 INJECTION SUBCUTANEOUS at 21:32

## 2023-05-02 RX ADMIN — Medication 1 DROP(S): at 17:04

## 2023-05-02 RX ADMIN — CARBIDOPA AND LEVODOPA 1 TABLET(S): 25; 100 TABLET ORAL at 11:20

## 2023-05-02 RX ADMIN — ENTACAPONE 200 MILLIGRAM(S): 200 TABLET, FILM COATED ORAL at 15:06

## 2023-05-02 RX ADMIN — ENTACAPONE 200 MILLIGRAM(S): 200 TABLET, FILM COATED ORAL at 07:15

## 2023-05-02 RX ADMIN — Medication 100 MILLIGRAM(S): at 11:19

## 2023-05-02 RX ADMIN — LIDOCAINE 1 PATCH: 4 CREAM TOPICAL at 09:05

## 2023-05-02 RX ADMIN — SENNA PLUS 2 TABLET(S): 8.6 TABLET ORAL at 21:31

## 2023-05-02 RX ADMIN — Medication 1 DROP(S): at 05:53

## 2023-05-02 RX ADMIN — VALACYCLOVIR 1000 MILLIGRAM(S): 500 TABLET, FILM COATED ORAL at 05:52

## 2023-05-02 RX ADMIN — AMLODIPINE BESYLATE 10 MILLIGRAM(S): 2.5 TABLET ORAL at 05:52

## 2023-05-02 RX ADMIN — SODIUM CHLORIDE 1 GRAM(S): 9 INJECTION INTRAMUSCULAR; INTRAVENOUS; SUBCUTANEOUS at 12:10

## 2023-05-02 RX ADMIN — SODIUM CHLORIDE 60 MILLILITER(S): 9 INJECTION INTRAMUSCULAR; INTRAVENOUS; SUBCUTANEOUS at 07:15

## 2023-05-02 RX ADMIN — POLYETHYLENE GLYCOL 3350 17 GRAM(S): 17 POWDER, FOR SOLUTION ORAL at 10:43

## 2023-05-02 RX ADMIN — SODIUM CHLORIDE 1 GRAM(S): 9 INJECTION INTRAMUSCULAR; INTRAVENOUS; SUBCUTANEOUS at 07:15

## 2023-05-02 RX ADMIN — Medication 1 DROP(S): at 21:31

## 2023-05-02 RX ADMIN — VALACYCLOVIR 1000 MILLIGRAM(S): 500 TABLET, FILM COATED ORAL at 17:04

## 2023-05-02 RX ADMIN — ENTACAPONE 200 MILLIGRAM(S): 200 TABLET, FILM COATED ORAL at 11:20

## 2023-05-02 RX ADMIN — LEVETIRACETAM 1000 MILLIGRAM(S): 250 TABLET, FILM COATED ORAL at 17:04

## 2023-05-02 RX ADMIN — CARBIDOPA AND LEVODOPA 1 TABLET(S): 25; 100 TABLET ORAL at 07:16

## 2023-05-02 RX ADMIN — Medication 1 DROP(S): at 09:06

## 2023-05-02 NOTE — DISCHARGE NOTE PROVIDER - NSDCQMANTICOAGCONTRA_GEN_A_CORE
Please fax the reclast order that is in epic to infusion center at [facility] or to the pharmacy there. Patient does not have atrial fibrillation/flutter

## 2023-05-02 NOTE — DISCHARGE NOTE PROVIDER - NSDCFUADDAPPT_GEN_ALL_CORE_FT
Please schedule a follow up appointment with Dr. D'Amico [phone]    Please schedule a follow up appointment with Dr. Nguyen (neurology) [phone] Please follow up with Dr. D'Amico, call the office to confirm appointment at [phone]    Please follow up with Dr. Nguyen from Neurology, call the office to schedule an appointment at [phone]    Please follow up with your primary care doctor outpatient  Please follow up with Dr. D'Amico on 5/17/23 at 9AM, call the office to confirm appointment at [phone]    Please follow up with Dr. Nguyen from Neurology, call the office to schedule an appointment at [phone]    Please follow up with your primary care doctor outpatient

## 2023-05-02 NOTE — PROGRESS NOTE ADULT - ASSESSMENT
ASSESSMENT:  68M hx seizures, HTN, poorly controlled Parkinson's disease w/ prog inability to walk since Nov 2022. Found to have large L frontal dural based lesion c/f meningioma w/ associated mass effect and edema. S/p L crani for meningioma resection (4/17). Discharged to [place] re[facility] on 4/21. On arrival to rehab c/o headache with persistent nausea/vomiting. CTH performed reveals new large left frontal IPH with IVH, with edema and midline shift. ICH 3. s/p bedside right frontal EVD 4/22. EVD clamped.     NEURO:  - neuro/vitals q4hr  - EVD clamped, monitor ICP, failed clamped trial 4/27, reclamped 4/28; plan to remove today; pending CT head   - Keppra 1g BID (hx seizures)  - s/p Decadron taper x 1 week  - Parkinsons: continue home sinemet (25/250mg) 4 times/day, entacapone 200mg q8h; start amantadine 100mg qd  - CTH 4/24: no significant interval change, CTH 4/26: stable , 4/29 stable, pending CTH 5/1  - Hold home gabapentin 400mg 4 times/day   - d/c vEEG; movement likely tremors 2/2 parkinson's dz not seizure    CARDIOVASCULAR:  - -160  - Amlodipine 10 mg qd  - TTE 4/22 mild aortic sclerosis w/ no stenosis, mild AR, left pleural effusion EF 71%    PULMONARY:  - nebs q6h prn, chest PT  - RA  - secretions improving    RENAL:  - Na goal 140-145, salt tabs decrease to 1 q 6, s/p 250 cc 3% bolus 4/30  - IVF 60cc/hr NS while on valacyclovir, voiding     GI:  - Soft and bite sized diet   - Bowel regimen dc'd for loose stool, last BM 4/30; continue to monitor     HEME:  - SCDs, SQL (6pm tonight) for DVT ppx   - LE dopplers 4/22 negative     ID:  - Valtrex 4/28-5/5 for herpetic lesions L cheek; cx +HSV-1; no signs of CNS involvement   - Zosyn (4/22-4/27) - emipiric PNA, RLL infiltrate   - f/u HIV testing  - CSF PCR (negative) panel     ENDO:  - ISS, A1c 5.8    DISPO:  - NSICU, full code, family updated at bedside  - PT/OT rec AR: patient can tolerate three hours PT/OT daily

## 2023-05-02 NOTE — DISCHARGE NOTE PROVIDER - HOSPITAL COURSE
HPI:  Patient is a 68 year old male with PMH of seizures, HTN, and Parkinson's disease diagnosed in 2012 with multiple falls who initially presented to [facility] on 4/17 for elective left craniotomy for meningioma resection. He has also been wheelchair bound since about 2-3 years ago and has frequent falls related to his Parkinson's disease.  Underwent L crani for meningioma resection on 4/17. Hospital course was uncomplicated.   Discharged to [facility] on 4/21 in stable condition.     On arrival to rehab, pt c/o incisional headache with episodes of n/v during ambulance ride to rehab.  Pt given zofran and oxycodone, however, headache returned with persistent nausea and vomiting.   RRT called for worsened mental status. CT head obtained stat which showed new large left frontal hemorrhage with IVH, cerebral edema with midline shift and some effacement of L frontal horn.  Pt started on cardene, given Keppra and transferred to [facility]. Prior to transfer, mental status declined (GCS 13-> 9) with increased secretions, therefore attempted intubation; LMA airway placed.        Hospital Course:  4/17: POD 0 L crani for tumor resection.   4/18: POD1 L crani tumor resection. Norvasc increased to 5mg daily. Neuro exam stable. Neurology consulted, reocmmended maintaining current parkinson's medication regimen. Had asymptomatic episode of VTACH read on telemetry, hemodynamically stable, returned back to baseline, EKG obtained negative. Cardiology consulted, reported rhythm strip was sinus, likely artifact from tremors in arms, NTD. Pt c/o worsened difficulty breathing, satting well on RA. Given duoneb treatment and CXR completed.  Postop MRI completed in evening.   4/19: POD2, SIRIA overnight,  neuro stable, SLP state may benefit from outpt speech therapy will continue to follow  4/20: POD3, SIRIA overnight. per s/s: soft/bite size diet w/ thin liquids.  4/21: POD4, SIRIA overnight, discahrge Kiran Evans AR    DISCHARGED TO [place] on 4/21:    4/22: readmitted with new large left frontal IPH with IVH, with edema and midline shift with large left frontal IPH. EVD placed, opened at 10. CTH post EVD complete. [place] sump placed for GI decompression. Started 3%@80 for na goal 145-150. KUB showing small loops of gas, will start TF tomorrow. TTE mild aortic sclerosis w/ no stenosis, mild AR, left pleural effusion EF 71%. LE dopplers negative.  4/23: POD 6. EVD @ 10. POCUS with RLL consolidation, trace B lines, L lung clear, collapsable IVC. 250cc albumin and 500cc NS bolus x2 in setting of decreasing UOP. Started on SQL 40 BID, weight based. Off propofol, on CPAP 8/5, plan to extubate. Removed matthew, f/u TOV. Removed a-line. Extubated to HFNC. Decreased 3% to 30. Passed TOV.   4/24: POD 7. Tolerating HFNC. Start TF today. Na 155, stopped 3%, fentanyl d/c'd, CT stable. Trickle feeds started. PT/OT recommending AR. C/o headache, given tramadol  with resolution. S&S recommend pureed diet with thin liquids. Weaning to 6L NC. Na 155-->144, gave 225cc 3% bolus, Na-->147.   4/25: POD 8. Cont 3% at 30. EVD remains at 10. Tolerating PO. Given hydralazine 10mg x 1 for SBP 180s. Bradycardic to 53 resolved on own. Dc'd 3%, started salt tabs 2q8. Sodium goal 140-145. Sputum culture from 4/22: normal respiratory blank. Repeat 145. F/u AM sodium. ProBNP 1492. Pt removed NGT. Tolerating pureed diet.   4/26: POD 9. EVD @ 10. Nebs made PRN, amlodipine increased to 10mg. Rec'd for soft and bite size diet, CTH today with no significant interval change. EVD clamp trial ICPS hovering around 18, will monitor clinically and open if neuro change or headache, pro-bnp 1387 downtrending.   4/27: POD10. ICPs sustained 22-26 x 15 min, opened EVD. Finished course of zosyn for empiric pneumonia treatment.   4/28: POD11, Neuro stable, EVD clamped this morning, ICPs hovering higher than 20, neuro exam remains stable, denies HA, will monitor based on clinical/neuro status during clamp trial. Plan for CTH Sunday if tolerating clamp trial. F/u neurology regarding entacapone dosing. Viral PCR swab sent of herpetic lesions L cheek, f/u results.   4/29: POD12, SIRIA overnight EVD remains clamped, ICPs intermittently elevated but patient remains neuro stable and without headaches. NS at 100 started for rising Cr level. repeat CTH when clamped x 24 hrs in AM with stable MLS and mass effect. NS lowered to 75 for positive fluid status. 3 BMs today, bowel regimen dc'd.   4/30: POD13. SIRIA overnight, EVD remains clamped, ICPs <20, neuro stable, no headaches. Cont valtrex x7d. . IVF dc'd. CSF PCR panel and culture sent. EEG ordered. 250cc 3% bolus given. Protonix dc'd.  5/1: POD 14. SIRIA o/n. Salt tabs made 1 q 6. CTH showing stable vent size and improved IVH. EVD removed. EEG dc'd. Started on NS @60 while on valcyclovir  5/2: POD15. SIRIA o/n neuro stable. CTH stable, restarting SQL. started on amantadine 100 qd.   5/3: POD 16. tx to SDU.     Patient evaluated by PT/OT who recommended: Acute Rehab  Patient is going to [facility] course c/b: hydrocephalus (s/p EVD placement and removal)     Exam on day of discharge:    Checklist:   - Obtained follow up appointment from NP  - Reviewed final recommendations of inpatient consults  - review discharge planning on provider handoff  - post op imaging completed  - Neurologically stable for discharge  - Vitals stable for discharge   - Afebrile for discharge  - WBC is stable  - Sodium level is normal  - Pain is adequately controlled  - Pt has PICC/walker/brace/collar   - LACE score (10 or > needs PCP apt)   - stroke patient? Discharge NIHSS score   HPI:  Patient is a 68 year old male with PMH of seizures, HTN, and Parkinson's disease diagnosed in 2012 with multiple falls who initially presented to [facility] on 4/17 for elective left craniotomy for meningioma resection. He has also been wheelchair bound since about 2-3 years ago and has frequent falls related to his Parkinson's disease.  Underwent L crani for meningioma resection on 4/17. Hospital course was uncomplicated.   Discharged to [facility] on 4/21 in stable condition.     On arrival to rehab, pt c/o incisional headache with episodes of n/v during ambulance ride to rehab.  Pt given zofran and oxycodone, however, headache returned with persistent nausea and vomiting.   RRT called for worsened mental status. CT head obtained stat which showed new large left frontal hemorrhage with IVH, cerebral edema with midline shift and some effacement of L frontal horn.  Pt started on cardene, given Keppra and transferred to [facility]. Prior to transfer, mental status declined (GCS 13-> 9) with increased secretions, therefore attempted intubation; LMA airway placed.        Hospital Course:  4/17: POD 0 L crani for tumor resection.   4/18: POD1 L crani tumor resection. Norvasc increased to 5mg daily. Neuro exam stable. Neurology consulted, reocmmended maintaining current parkinson's medication regimen. Had asymptomatic episode of VTACH read on telemetry, hemodynamically stable, returned back to baseline, EKG obtained negative. Cardiology consulted, reported rhythm strip was sinus, likely artifact from tremors in arms, NTD. Pt c/o worsened difficulty breathing, satting well on RA. Given duoneb treatment and CXR completed.  Postop MRI completed in evening.   4/19: POD2, SIRIA overnight,  neuro stable, SLP state may benefit from outpt speech therapy will continue to follow  4/20: POD3, SIRIA overnight. per s/s: soft/bite size diet w/ thin liquids.  4/21: POD4, SIRIA overnight, discahrge Kiran Evans AR    DISCHARGED TO [place] on 4/21:    4/22: readmitted with new large left frontal IPH with IVH, with edema and midline shift with large left frontal IPH. EVD placed, opened at 10. CTH post EVD complete. [place] sump placed for GI decompression. Started 3%@80 for na goal 145-150. KUB showing small loops of gas, will start TF tomorrow. TTE mild aortic sclerosis w/ no stenosis, mild AR, left pleural effusion EF 71%. LE dopplers negative.  4/23: POD 6. EVD @ 10. POCUS with RLL consolidation, trace B lines, L lung clear, collapsable IVC. 250cc albumin and 500cc NS bolus x2 in setting of decreasing UOP. Started on SQL 40 BID, weight based. Off propofol, on CPAP 8/5, plan to extubate. Removed matthew, f/u TOV. Removed a-line. Extubated to HFNC. Decreased 3% to 30. Passed TOV.   4/24: POD 7. Tolerating HFNC. Start TF today. Na 155, stopped 3%, fentanyl d/c'd, CT stable. Trickle feeds started. PT/OT recommending AR. C/o headache, given tramadol  with resolution. S&S recommend pureed diet with thin liquids. Weaning to 6L NC. Na 155-->144, gave 225cc 3% bolus, Na-->147.   4/25: POD 8. Cont 3% at 30. EVD remains at 10. Tolerating PO. Given hydralazine 10mg x 1 for SBP 180s. Bradycardic to 53 resolved on own. Dc'd 3%, started salt tabs 2q8. Sodium goal 140-145. Sputum culture from 4/22: normal respiratory blank. Repeat 145. F/u AM sodium. ProBNP 1492. Pt removed NGT. Tolerating pureed diet.   4/26: POD 9. EVD @ 10. Nebs made PRN, amlodipine increased to 10mg. Rec'd for soft and bite size diet, CTH today with no significant interval change. EVD clamp trial ICPS hovering around 18, will monitor clinically and open if neuro change or headache, pro-bnp 1387 downtrending.   4/27: POD10. ICPs sustained 22-26 x 15 min, opened EVD. Finished course of zosyn for empiric pneumonia treatment.   4/28: POD11, Neuro stable, EVD clamped this morning, ICPs hovering higher than 20, neuro exam remains stable, denies HA, will monitor based on clinical/neuro status during clamp trial. Plan for CTH Sunday if tolerating clamp trial. F/u neurology regarding entacapone dosing. Viral PCR swab sent of herpetic lesions L cheek, f/u results.   4/29: POD12, SIRIA overnight EVD remains clamped, ICPs intermittently elevated but patient remains neuro stable and without headaches. NS at 100 started for rising Cr level. repeat CTH when clamped x 24 hrs in AM with stable MLS and mass effect. NS lowered to 75 for positive fluid status. 3 BMs today, bowel regimen dc'd.   4/30: POD13. SIRIA overnight, EVD remains clamped, ICPs <20, neuro stable, no headaches. Cont valtrex x7d. . IVF dc'd. CSF PCR panel and culture sent. EEG ordered. 250cc 3% bolus given. Protonix dc'd.  5/1: POD 14. SIRIA o/n. Salt tabs made 1 q 6. CTH showing stable vent size and improved IVH. EVD removed. EEG dc'd. Started on NS @60 while on valcyclovir  5/2: POD15. SIRIA o/n neuro stable. CTH stable, restarting SQL. started on amantadine 100 qd.   5/3: POD 16. tx to SDU.     Patient evaluated by PT/OT who recommended: Acute Rehab  Patient is going to [facility] course c/b: hydrocephalus (s/p EVD placement and removal)     Exam on day of discharge:    Patient is neuro stable, vitals stable, afebrile, medically ready for discharge         *Discharge NIHSS score:    HPI:  Patient is a 68 year old male with PMH of seizures, HTN, and Parkinson's disease diagnosed in 2012 with multiple falls who initially presented to [facility] on 4/17 for elective left craniotomy for meningioma resection. He has also been wheelchair bound since about 2-3 years ago and has frequent falls related to his Parkinson's disease.  Underwent L crani for meningioma resection on 4/17. Hospital course was uncomplicated.   Discharged to [facility] on 4/21 in stable condition.     On arrival to rehab, pt c/o incisional headache with episodes of n/v during ambulance ride to rehab.  Pt given zofran and oxycodone, however, headache returned with persistent nausea and vomiting.   RRT called for worsened mental status. CT head obtained stat which showed new large left frontal hemorrhage with IVH, cerebral edema with midline shift and some effacement of L frontal horn.  Pt started on cardene, given Keppra and transferred to [facility]. Prior to transfer, mental status declined (GCS 13-> 9) with increased secretions, therefore attempted intubation; LMA airway placed.        Hospital Course:  4/17: POD 0 L crani for tumor resection.   4/18: POD1 L crani tumor resection. Norvasc increased to 5mg daily. Neuro exam stable. Neurology consulted, reocmmended maintaining current parkinson's medication regimen. Had asymptomatic episode of VTACH read on telemetry, hemodynamically stable, returned back to baseline, EKG obtained negative. Cardiology consulted, reported rhythm strip was sinus, likely artifact from tremors in arms, NTD. Pt c/o worsened difficulty breathing, satting well on RA. Given duoneb treatment and CXR completed.  Postop MRI completed in evening.   4/19: POD2, SIRIA overnight,  neuro stable, SLP state may benefit from outpt speech therapy will continue to follow  4/20: POD3, SIRIA overnight. per s/s: soft/bite size diet w/ thin liquids.  4/21: POD4, SIRIA overnight, discahrge Kiran Evans AR    DISCHARGED TO [place] on 4/21:    4/22: readmitted with new large left frontal IPH with IVH, with edema and midline shift with large left frontal IPH. EVD placed, opened at 10. CTH post EVD complete. [place] sump placed for GI decompression. Started 3%@80 for na goal 145-150. KUB showing small loops of gas, will start TF tomorrow. TTE mild aortic sclerosis w/ no stenosis, mild AR, left pleural effusion EF 71%. LE dopplers negative.  4/23: POD 6. EVD @ 10. POCUS with RLL consolidation, trace B lines, L lung clear, collapsable IVC. 250cc albumin and 500cc NS bolus x2 in setting of decreasing UOP. Started on SQL 40 BID, weight based. Off propofol, on CPAP 8/5, plan to extubate. Removed matthew, f/u TOV. Removed a-line. Extubated to HFNC. Decreased 3% to 30. Passed TOV.   4/24: POD 7. Tolerating HFNC. Start TF today. Na 155, stopped 3%, fentanyl d/c'd, CT stable. Trickle feeds started. PT/OT recommending AR. C/o headache, given tramadol  with resolution. S&S recommend pureed diet with thin liquids. Weaning to 6L NC. Na 155-->144, gave 225cc 3% bolus, Na-->147.   4/25: POD 8. Cont 3% at 30. EVD remains at 10. Tolerating PO. Given hydralazine 10mg x 1 for SBP 180s. Bradycardic to 53 resolved on own. Dc'd 3%, started salt tabs 2q8. Sodium goal 140-145. Sputum culture from 4/22: normal respiratory blank. Repeat 145. F/u AM sodium. ProBNP 1492. Pt removed NGT. Tolerating pureed diet.   4/26: POD 9. EVD @ 10. Nebs made PRN, amlodipine increased to 10mg. Rec'd for soft and bite size diet, CTH today with no significant interval change. EVD clamp trial ICPS hovering around 18, will monitor clinically and open if neuro change or headache, pro-bnp 1387 downtrending.   4/27: POD10. ICPs sustained 22-26 x 15 min, opened EVD. Finished course of zosyn for empiric pneumonia treatment.   4/28: POD11, Neuro stable, EVD clamped this morning, ICPs hovering higher than 20, neuro exam remains stable, denies HA, will monitor based on clinical/neuro status during clamp trial. Plan for CTH Sunday if tolerating clamp trial. F/u neurology regarding entacapone dosing. Viral PCR swab sent of herpetic lesions L cheek, f/u results.   4/29: POD12, SIRIA overnight EVD remains clamped, ICPs intermittently elevated but patient remains neuro stable and without headaches. NS at 100 started for rising Cr level. repeat CTH when clamped x 24 hrs in AM with stable MLS and mass effect. NS lowered to 75 for positive fluid status. 3 BMs today, bowel regimen dc'd.   4/30: POD13. SIRIA overnight, EVD remains clamped, ICPs <20, neuro stable, no headaches. Cont valtrex x7d. . IVF dc'd. CSF PCR panel and culture sent. EEG ordered. 250cc 3% bolus given. Protonix dc'd.  5/1: POD 14. SIRIA o/n. Salt tabs made 1 q 6. CTH showing stable vent size and improved IVH. EVD removed. EEG dc'd. Started on NS @60 while on valcyclovir  5/2: POD15. SIRIA o/n neuro stable. CTH stable, restarting SQL. started on amantadine 100 qd.   5/3: POD 16. tx to SDU.     Patient evaluated by PT/OT who recommended: Acute Rehab  [facility]/b: hydrocephalus (s/p EVD placement and removal)     Exam on day of discharge:  General: NAD. Sitting comfortably in bed eating breakfast with help from son.   HENT: PERRL, 3mm b/l. EOM grossly in tact. Visual acuity not assessed. 1 cm, erythematous, non-purulent herpetic lesion on L cheek. No facial droop.   Cardiac: Regular rate and rhythm. S1, S2 appreciated. No murmurs, gallops, rubs.   Pulm: Breathing comfortably on RA. CTAB. No rhonchi, wheezes, rales.   Abd: Soft, non-tender, non-distended. +BS x 4 quadrants.   Neuro: A&O x2 with prompts. Stuttering but speech coherent. Affect appropriate.  See HENT. CN II-XII grossly intact.   b/l UE 4/5. Bilateral tremors, left worse than right.   Bilateral hip flex/extend 3/5  Bilateral knee flex/extend, dorsi/plantarflexion 4/5    Sensation intact to light touch throughout   Extremities: Skin is warm and dry. 1+ pedal edema. DP 2+ bilaterally.     Patient is neuro stable, vitals stable, afebrile, medically ready for discharge     Discharge NIHSS score: 6

## 2023-05-02 NOTE — DISCHARGE NOTE PROVIDER - CARE PROVIDER_API CALL
Dianelys Nguyen  [address] [place] [address] 5  [facility] [place] [address]  Phone: [phone]  Fax: (   )    -  Follow Up Time:    Dianelys Nguyen  [address] [address]  Phone: [phone]  Fax: (   )    -  Follow Up Time:     DAmico, Randy S (MD)  Neurosurgery  [address] [place] [address]  Phone: [phone]  Fax: [phone]  Follow Up Time:

## 2023-05-02 NOTE — DISCHARGE NOTE PROVIDER - NSDCFUSCHEDAPPT_GEN_ALL_CORE_FT
D'Amico, Randy  [facility] Physician [facility]  NEUROSURG [address] [facility] [address]  Scheduled Appointment: 05/10/2023     D'Amico, Randy  [facility] Physician [facility]  NEUROSURG [address] [facility] [address]  Scheduled Appointment: 05/17/2023

## 2023-05-02 NOTE — DISCHARGE NOTE PROVIDER - NSDCCPTREATMENT_GEN_ALL_CORE_FT
PRINCIPAL PROCEDURE  Procedure: Insertion of external ventricular drain  Findings and Treatment: s/p removal 5/1

## 2023-05-02 NOTE — PROGRESS NOTE ADULT - SUBJECTIVE AND OBJECTIVE BOX
SUBJECTIVE:     HOSPITAL COURSE:  4/17: POD 0 L crani for tumor resection.   4/18: POD1 L crani tumor resection. Norvasc increased to 5mg daily. Neuro exam stable. Neurology consulted, reocmmended maintaining current parkinson's medication regimen. Had asymptomatic episode of VTACH read on telemetry, hemodynamically stable, returned back to baseline, EKG obtained negative. Cardiology consulted, reported rhythm strip was sinus, likely artifact from tremors in arms, NTD. Pt c/o worsened difficulty breathing, satting well on RA. Given duoneb treatment and CXR completed.  Postop MRI completed in evening.   4/19: POD2, SIRIA overnight,  neuro stable, SLP state may benefit from outpt speech therapy will continue to follow  4/20: POD3, SIRIA overnight. per s/s: soft/bite size diet w/ thin liquids.  4/21: POD4, SIRIA overnight, discahrge [facility] AR    DISCHARGED TO [facility] on 4/21:    4/22: readmitted with new large left frontal IPH with IVH, with edema and midline shift with large left frontal IPH. EVD placed, opened at 10. CTH post EVD complete. Parish sump placed for GI decompression. Started 3%@80 for na goal 145-150. KUB showing small loops of gas, will start TF tomorrow. TTE mild aortic sclerosis w/ no stenosis, mild AR, left pleural effusion EF 71%. LE dopplers negative.  4/23: POD 6. EVD @ 10. POCUS with RLL consolidation, trace B lines, L lung clear, collapsable IVC. 250cc albumin and 500cc NS bolus x2 in setting of decreasing UOP. Started on SQL 40 BID, weight based. Off propofol, on CPAP 8/5, plan to extubate. Removed matthew, f/u TOV. Removed a-line. Extubated to HFNC. Decreased 3% to 30. Passed TOV.   4/24: POD 7. Tolerating HFNC. Start TF today. Na 155, stopped 3%, fentanyl d/c'd, CT stable. Trickle feeds started. PT/OT recommending AR. C/o headache, given tramadol  with resolution. S&S recommend pureed diet with thin liquids. Weaning to 6L NC. Na 155-->144, gave 225cc 3% bolus, Na-->147.   4/25: POD 8. Cont 3% at 30. EVD remains at 10. Tolerating PO. Given hydralazine 10mg x 1 for SBP 180s. Bradycardic to 53 resolved on own. Dc'd 3%, started salt tabs 2q8. Sodium goal 140-145. Sputum culture from 4/22: normal respiratory blank. Repeat 145. F/u AM sodium. ProBNP 1492. Pt removed NGT. Tolerating pureed diet.   4/26: POD 9. EVD @ 10. Nebs made PRN, amlodipine increased to 10mg. Rec'd for soft and bite size diet, CTH today with no significant interval change. EVD clamp trial ICPS hovering around 18, will monitor clinically and open if neuro change or headache, pro-bnp 1387 downtrending.   4/27: POD10. ICPs sustained 22-26 x 15 min, opened EVD. Finished course of zosyn for empiric pneumonia treatment.   4/28: POD11, Neuro stable, EVD clamped this morning, ICPs hovering higher than 20, neuro exam remains stable, denies HA, will monitor based on clinical/neuro status during clamp trial. Plan for CTH Sunday if tolerating clamp trial. F/u neurology regarding entacapone dosing. Viral PCR swab sent of herpetic lesions L cheek, f/u results.   4/29: POD12, SIRIA overnight EVD remains clamped, ICPs intermittently elevated but patient remains neuro stable and without headaches. NS at 100 started for rising Cr level. repeat CTH when clamped x 24 hrs in AM with stable MLS and mass effect. NS lowered to 75 for positive fluid status. 3 BMs today, bowel regimen dc'd.   4/30: POD13. SIRIA overnight, EVD remains clamped, ICPs <20, neuro stable, no headaches. Cont valtrex x7d. . IVF dc'd. CSF PCR panel and culture sent. EEG ordered. 250cc 3% bolus given. Protonix dc'd.  5/1: POD 14. SIRIA o/n. Salt tabs made 1 q 6. CTH showing stable vent size and improved IVH. EVD removed. EEG dc'd. Started on NS @60 while on valcyclovir.   5/2: POD 15. BD 10 ICH. SIRIA o/n.     Vital Signs Last 24 Hrs  T(C): 37.6 (01 May 2023 21:28), Max: 37.6 (01 May 2023 21:28)  T(F): 99.7 (01 May 2023 21:28), Max: 99.7 (01 May 2023 21:28)  HR: 65 (02 May 2023 00:00) (50 - 87)  BP: 151/83 (02 May 2023 00:00) (103/80 - 165/71)  BP(mean): 111 (02 May 2023 00:00) (77 - 111)  RR: 14 (02 May 2023 00:00) (12 - 26)  SpO2: 97% (02 May 2023 00:00) (91% - 100%)    Parameters below as of 02 May 2023 00:00  Patient On (Oxygen Delivery Method): room air    I&O's Summary    30 Apr 2023 07:01  -  01 May 2023 07:00  --------------------------------------------------------  IN: 1275 mL / OUT: 4550 mL / NET: -3275 mL    01 May 2023 07:01  -  02 May 2023 00:43  --------------------------------------------------------  IN: 1200 mL / OUT: 3000 mL / NET: -1800 mL    PHYSICAL EXAM:  General: NAD, pt is comfortably sitting up in bed, A&O x3 (with choices), on RA  HEENT: PERRL 3mm, EOMI b/l, face symmetric, tongue midline, neck FROM, + L facial twitching (h/o parkinsons), L cheek vesicular lesions/rash   Cardiovascular: RRR, normal S1 and S2   Respiratory: lungs CTAB, no wheezing, rhonchi, or crackles   GI: normoactive BS to auscultation, abd soft, NTND   Neuro: +b/l LUE > RUE tremors, +word finding difficulty/perseveration, no dysmetria, no pronator drift  RUE 4+/5, LUE 5/5, RLE 4+/5. LLE 5/5  sensation intact to light touch throughout   Extremities: distal pulses 2+ x4   Wound/incision: +left craniotomy incision with sutures in place, C/D/I    LABS:                        13.0   8.44  )-----------( 188      ( 01 May 2023 05:09 )             39.6     05-01    141  |  109<H>  |  17  ----------------------------<  114<H>  3.8   |  25  |  1.12    Ca    8.8      01 May 2023 05:09  Phos  3.1     05-01  Mg     2.1     05-01    CAPILLARY BLOOD GLUCOSE    Drug Levels: [] N/A    CSF Analysis: [] N/A    Allergies    No Known Allergies    Intolerances    MEDICATIONS:  Antibiotics:  valACYclovir 1000 milliGRAM(s) Oral every 12 hours    Neuro:  acetaminophen   Oral Liquid .. 650 milliGRAM(s) Oral every 6 hours PRN  carbidopa/levodopa  25/250 1 Tablet(s) Oral <User Schedule>  entacapone 200 milliGRAM(s) Oral <User Schedule>  levETIRAcetam 1000 milliGRAM(s) Oral two times a day  ondansetron Injectable 4 milliGRAM(s) IV Push every 6 hours PRN    Anticoagulation:    OTHER:  acetylcysteine 20%  Inhalation 4 milliLiter(s) Inhalation every 6 hours PRN  albuterol/ipratropium for Nebulization 3 milliLiter(s) Nebulizer every 6 hours PRN  amLODIPine   Tablet 10 milliGRAM(s) Oral daily  artificial  tears Solution 1 Drop(s) Both EYES every 4 hours  chlorhexidine 2% Cloths 1 Application(s) Topical daily  lidocaine   4% Patch 1 Patch Transdermal every 24 hours    IVF:  sodium chloride 1 Gram(s) Oral every 6 hours  sodium chloride 0.9%. 1000 milliLiter(s) IV Continuous <Continuous>    CULTURES:  Culture Results:   No growth to date. (04-30 @ 13:22)  Culture Results:   Normal Respiratory Blank present (04-22 @ 06:20)    RADIOLOGY & ADDITIONAL TESTS:  < from: CT Head No Cont (05.01.23 @ 12:19) >  Findings:    Right frontal EVD in stable position. There is mild improvement in   intraventricular hemorrhage. There are stable ventricular caliber.    No significant change in left frontal intraparenchymal hematoma extending   to the left frontal horn. There is surrounding edema with mass effect on   the left frontal horn and dilatation of the right lateral ventricle.   There is stable 1.2 cm left-to-right midline shift. There is a small   extra-axial fluid collection deep to the left frontal craniotomy site   which is stable from prior exam..    There is no new intracranial hemorrhage or extra-axial fluid collection.   No large interval demarcated territorial infarction..    Left frontal craniotomy is noted. No acute calvarial fracture.    The paranasal sinuses and bilateral mastoid complexes are well aerated.    Impression:    Since prior CT head 4/29/2023, stable ventricular caliber. Mild decrease   size of intraventricular hemorrhage. Otherwise no significant change..    < end of copied text >    ASSESSMENT:  68M hx seizures, HTN, poorly controlled Parkinson's disease w/ prog inability to walk since Nov 2022. Found to have large L frontal dural based lesion c/f meningioma w/ associated mass effect and edema. S/p L crani for meningioma resection (4/17). Discharged to [place] [facility] on 4/21. On arrival to rehab c/o headache with persistent nausea/vomiting. CTH performed reveals new large left frontal IPH with IVH, with edema and midline shift. ICH 3. s/p bedside right frontal EVD 4/22, removed 5/1/23.     NEURO:  - neuro q2hr /vitals q1hr  - s/p EVD removal 5/1  - Keppra 1g BID (hx seizures)  - s/p Decadron taper x 1 week  - Parkinsons: continue home sinemet (25/250mg) 4 times/day, entacapone 200mg q8h  - CTH 4/24: no significant interval change, CTH 4/26: stable , 4/29 stable, CTH 5/1 stable vents and improved IVH   - Hold home gabapentin 400mg 4 times/day   - EEG negative dc'd     CARDIOVASCULAR:  - -160  - Amlodipine 10 mg qd  - TTE 4/22 mild aortic sclerosis w/ no stenosis, mild AR, left pleural effusion EF 71%    PULMONARY:  - nebs q6h prn, chest PT  - RA  - secretions improving    RENAL:  - Normonatremia goal, salt tabs 1 q 6  - IVL, voiding     GI:  - Soft and bite sized diet   - Bowel regimen dc'd, last BM 4/30    HEME:  - SCDs, SQL held for EVD pull   - LE dopplers 4/22 negative     ID:  - Valtrex 4/28-5/5 for herpetic lesions L cheek; cx +HSV-1   - Zosyn (4/22-4/27) - emipiric PNA, RLL infiltrate   - f/u HIV testing  - f/u CSF PCR panel and cultures 4/30    ENDO:  - ISS, A1c 5.8    DISPO:  - NSICU, full code, family updated  - PT/OT rec AR: patient can tolerate three hours PT/OT daily    Discussed with Dr. D'Amico and Dr. Grimaldo

## 2023-05-02 NOTE — DISCHARGE NOTE PROVIDER - NSDCCPCAREPLAN_GEN_ALL_CORE_FT
PRINCIPAL DISCHARGE DIAGNOSIS  Diagnosis: Intracerebral hemorrhage  Assessment and Plan of Treatment: CT head showing left frontal lobe hemorrhage with intraventricular hemorehage, increased size of right lateral ventricle. s/p EVD placement and then removal on 5/1. Serial stable CT head.      SECONDARY DISCHARGE DIAGNOSES  Diagnosis: Parkinson's disease  Assessment and Plan of Treatment: continue home Suzi Hawkins    Diagnosis: Hypertension  Assessment and Plan of Treatment: continue home Amlodipine    Diagnosis: Seizures  Assessment and Plan of Treatment: continue Keppra 1000mg twice daily    Diagnosis: HSV-1 (herpes simplex virus 1) infection  Assessment and Plan of Treatment: Valtrex for left cheek lesions x7 days     PRINCIPAL DISCHARGE DIAGNOSIS  Diagnosis: Intracerebral hemorrhage  Assessment and Plan of Treatment: CT head showing left frontal lobe hemorrhage with intraventricular hemorrhage, increased size of right lateral ventricle. s/p EVD placement and then removal on 5/1. Serial stable CT head.      SECONDARY DISCHARGE DIAGNOSES  Diagnosis: Parkinson's disease  Assessment and Plan of Treatment: continue home Sinement, Entacapone    Diagnosis: Hypertension  Assessment and Plan of Treatment: continue home Amlodipine    Diagnosis: Seizures  Assessment and Plan of Treatment: continue Keppra 1000mg twice daily    Diagnosis: HSV-1 (herpes simplex virus 1) infection  Assessment and Plan of Treatment: Valtrex for left cheek lesions x7 days (last day = 5/5/23)    Diagnosis: Left thigh pain  Assessment and Plan of Treatment: continue to hold home Gabapentin, Lidocaine patch to left thigh     PRINCIPAL DISCHARGE DIAGNOSIS  Diagnosis: Intracerebral hemorrhage  Assessment and Plan of Treatment: CT head showing left frontal lobe hemorrhage with intraventricular hemorrhage, increased size of right lateral ventricle. s/p EVD placement and then removal on 5/1. Serial stable CT head.      SECONDARY DISCHARGE DIAGNOSES  Diagnosis: Parkinson's disease  Assessment and Plan of Treatment: continue home Sinement, Entacapone    Diagnosis: Hypertension  Assessment and Plan of Treatment: home Amlodipine increased to 10mg daily    Diagnosis: Seizures  Assessment and Plan of Treatment: continue Keppra 1000mg twice daily    Diagnosis: HSV-1 (herpes simplex virus 1) infection  Assessment and Plan of Treatment: Valtrex for left cheek lesions x7 days (last day = 5/5/23)    Diagnosis: Left thigh pain  Assessment and Plan of Treatment: continue to hold home Gabapentin, Lidocaine patch to left thigh

## 2023-05-02 NOTE — DISCHARGE NOTE PROVIDER - NSDCFUADDINST_GEN_ALL_CORE_FT
Neurosurgery follow up appointment date/time:  - follow up in the office for a wound check   - please call the office to confirm appointment: [phone]     Wound Care:  - shower/have your hair and body washed daily   - pat dry incision after showering   - leave incision uncovered, open to air  - to picking at incision    Devices:  - Rolling walker for ambulation with assistance     Activity:  - fatigue is common after surgery, rest if you feel tired   - no bending, lifting, twisting or heavy lifting   - walking is recommended, ambulate as tolerated  - you may shower/have your hair and body washed at rehab, pat dry incision after hair wash  - no soaking in a tub/pool/hot tub   - no driving within 24 hours of anesthesia or while taking prescription pain medications   - keep hydrated, drink plenty of water     Inpatient consults:  - Cardiology  - Neurology    Please also follow up with your primary care doctor.     Pain Expectations:  - pain after surgery is expected  - please take pain meds as prescribed     Medications:  - Hold home Gabapentin?   - changes to home meds (ex. AED's): Keppra   - new meds: Lovenox while at rehab for DVT prevention, Valtrex for left cheek herpes lesion, Amantadine 100mg daily for neurostimulation, sodium chloride?   - pain meds: Tylenol as needed   - pain medications can cause constipation, you should eat a high fiber diet and may take a stool softener while on pain meds   - Avoid taking Advil (ibuprofen), Motrin (naproxen), or Aspirin for pain as they can cause bleeding     Call the office or come to ED if:  - wound has drainage or bleeding, increased redness or pain at incision site, neurological change, fever (>101), chills, night sweats, syncope, nausea/vomiting, chest pain, shortness of breath      Playback:  - are discharge instruction on playback?  - is a picture of the incision on playback?     WITHIN 24 HOURS OF DISCHARGE, PLEASE CONTACT NEURO PA  WITH ANY QUESTIONS OR CONCERNS: [phone]   OTHERWISE, PLEASE CALL THE OFFICE WITH ANY QUESTIONS OR CONCERNS: [phone] Neurosurgery follow up appointment date/time:  - follow up in the office for a wound check and staple removal   - please call the office to confirm appointment: [phone]     Wound Care:  - shower/have your hair and body washed daily   - pat dry incision after showering   - leave incision uncovered, open to air  - no picking at incision    Devices:  - Rolling walker for ambulation with assistance     Activity:  - fatigue is common after surgery, rest if you feel tired   - no bending, lifting, twisting or heavy lifting   - walking is recommended, ambulate as tolerated  - you may shower/have your hair and body washed at rehab, pat dry incision after hair wash  - no soaking in a tub/pool/hot tub   - no driving within 24 hours of anesthesia or while taking prescription pain medications   - keep hydrated, drink plenty of water     Inpatient consults:  - Neurology: Please follow up with Dr. Nguyen outpatient     Please also follow up with your primary care doctor.     Pain Expectations:  - pain after surgery is expected  - please take pain meds as prescribed     Medications:  - Hold home Gabapentin and Trazodone until follow up with Dr. D'Amico   - Home Amlodipine increased to 10mg daily   - Otherwise continue home medications as prescribed: Sinemet, Entacapone, Keppra 1000mg twice daily   - new meds: Lovenox while at rehab for DVT prevention (can cause skin irritation, bruising/bleeding), Valtrex for left cheek herpes lesion, Amantadine 100mg daily for neurostimulation (can cause nausea, dizziness), Duonebs and Mucomyst as needed   - pain meds: Tylenol as needed, Lidocaine patch as needed    - pain medications can cause constipation, you should eat a high fiber diet and may take a stool softener while on pain meds   - Avoid taking Advil (ibuprofen), Motrin (naproxen), or Aspirin for pain as they can cause bleeding     Call the office or come to ED if:  - wound has drainage or bleeding, increased redness or pain at incision site, neurological change, fever (>101), chills, night sweats, syncope, nausea/vomiting, chest pain, shortness of breath      Playback:  - See [facility] for a copy of your discharge paperwork     WITHIN 24 HOURS OF DISCHARGE, PLEASE CONTACT NEURO PA  WITH ANY QUESTIONS OR CONCERNS: [phone]   OTHERWISE, PLEASE CALL THE OFFICE WITH ANY QUESTIONS OR CONCERNS: [phone] Neurosurgery follow up appointment date/time:  - follow up in the office for a wound check  - please call the office to confirm appointment: [phone]     Wound Care:  - shower/have your hair and body washed daily   - pat dry incision after showering   - leave incision uncovered, open to air  - no picking at incision    Devices:  - Rolling walker for ambulation with assistance     Activity:  - fatigue is common after surgery, rest if you feel tired   - no bending, lifting, twisting or heavy lifting   - walking is recommended, ambulate as tolerated  - you may shower/have your hair and body washed at rehab, pat dry incision after hair wash  - no soaking in a tub/pool/hot tub   - no driving within 24 hours of anesthesia or while taking prescription pain medications   - keep hydrated, drink plenty of water     Inpatient consults:  - Neurology: Please follow up with Dr. Nguyen outpatient     Please also follow up with your primary care doctor.     Pain Expectations:  - pain after surgery is expected  - please take pain meds as prescribed     Medications:  - Hold home Gabapentin and Trazodone until follow up with Dr. D'Amico   - Home Amlodipine increased to 10mg daily   - Otherwise continue home medications as prescribed: Sinemet, Entacapone, Keppra 1000mg twice daily   - new meds: Lovenox while at rehab for DVT prevention (can cause skin irritation, bruising/bleeding), Valtrex for left cheek herpes lesion, Amantadine 100mg daily for neurostimulation (can cause nausea, dizziness), Duonebs and Mucomyst as needed   - pain meds: Tylenol as needed, Lidocaine patch as needed    - pain medications can cause constipation, you should eat a high fiber diet and may take a stool softener while on pain meds   - Avoid taking Advil (ibuprofen), Motrin (naproxen), or Aspirin for pain as they can cause bleeding     Call the office or come to ED if:  - wound has drainage or bleeding, increased redness or pain at incision site, neurological change, fever (>101), chills, night sweats, syncope, nausea/vomiting, chest pain, shortness of breath      Playback:  - See [facility] for a copy of your discharge paperwork     WITHIN 24 HOURS OF DISCHARGE, PLEASE CONTACT NEURO PA  WITH ANY QUESTIONS OR CONCERNS: [phone]   OTHERWISE, PLEASE CALL THE OFFICE WITH ANY QUESTIONS OR CONCERNS: [phone] Neurosurgery follow up appointment date/time: 5/17/23 at 9AM  - follow up in the office for a wound check  - please call the office to confirm appointment: [phone]     Wound Care:  - shower/have your hair and body washed daily   - pat dry incision after showering   - leave incision uncovered, open to air  - no picking at incision    Devices:  - Rolling walker for ambulation with assistance     Activity:  - fatigue is common after surgery, rest if you feel tired   - no bending, lifting, twisting or heavy lifting   - walking is recommended, ambulate as tolerated  - you may shower/have your hair and body washed at rehab, pat dry incision after hair wash  - no soaking in a tub/pool/hot tub   - no driving within 24 hours of anesthesia or while taking prescription pain medications   - keep hydrated, drink plenty of water     Inpatient consults:  - Neurology: Please follow up with Dr. Nguyen outpatient     Please also follow up with your primary care doctor.     Pain Expectations:  - pain after surgery is expected  - please take pain meds as prescribed     Medications:  - Hold home Gabapentin and Trazodone until follow up with Dr. D'Amico   - Home Amlodipine increased to 10mg daily   - Otherwise continue home medications as prescribed: Sinemet, Entacapone, Keppra 1000mg twice daily   - new meds: Lovenox while at rehab for DVT prevention (can cause skin irritation, bruising/bleeding), Valtrex for left cheek herpes lesion, Amantadine 100mg daily for neurostimulation (can cause nausea, dizziness), Duonebs and Mucomyst as needed   - pain meds: Tylenol as needed, Lidocaine patch as needed    - pain medications can cause constipation, you should eat a high fiber diet and may take a stool softener while on pain meds   - Avoid taking Advil (ibuprofen), Motrin (naproxen), or Aspirin for pain as they can cause bleeding     Call the office or come to ED if:  - wound has drainage or bleeding, increased redness or pain at incision site, neurological change, fever (>101), chills, night sweats, syncope, nausea/vomiting, chest pain, shortness of breath      Playback:  - See [facility] for a copy of your discharge paperwork     WITHIN 24 HOURS OF DISCHARGE, PLEASE CONTACT NEURO PA  WITH ANY QUESTIONS OR CONCERNS: [phone]   OTHERWISE, PLEASE CALL THE OFFICE WITH ANY QUESTIONS OR CONCERNS: [phone]

## 2023-05-02 NOTE — PROGRESS NOTE ADULT - SUBJECTIVE AND OBJECTIVE BOX
INTERVAL HISTORY: HPI:  Patient is a 68 year old male with PMH of seizures, HTN, and Parkinson's disease diagnosed in 2012 with multiple falls who initially presented to [facility] on 4/17 for elective left craniotomy for meningioma resection. He has also been wheelchair bound since about 2-3 years ago and has frequent falls related to his Parkinson's disease.  Underwent L crani for meningioma resection on 4/17. Hospital course was uncomplicated.   Discharged to [facility] on 4/21 in stable condition.     On arrival to rehab, pt c/o incisional headache with episodes of n/v during ambulance ride to rehab.  Pt given zofran and oxycodone, however, headache returned with persistent nausea and vomiting.   RRT called for worsened mental status. CT head obtained stat which showed new large left frontal hemorrhage with IVH, cerebral edema with midline shift and some effacement of L frontal horn.  Pt started on cardene, given Keppra and transferred to [facility]. Prior to transfer, mental status declined (GCS 13-> 9) with increased secretions, therefore attempted intubation; LMA airway placed.      ICH 3   NIHSS 23 (22 Apr 2023 06:28)    PAST MEDICAL & SURGICAL HISTORY:  Parkinsons  HTN (hypertension)  History of seizures  Brain mass  Impaired gait  Meningioma  History of insomnia  Intracerebral hemorrhage  No significant past surgical history    REVIEW OF SYSTEMS: [ ] Unable to Assess due to neurologic exam   [x ] All ROS addressed below are non-contributory, except: tremors  Neuro: [ ] Headache [ ] Back pain [ ] Numbness [ ] Weakness [ ] Ataxia [ ] Dizziness [ ] Aphasia [ ] Dysarthria [ ] Visual disturbance  Resp: [ ] Shortness of breath/dyspnea, [ ] Orthopnea [ ] Cough  CV: [ ] Chest pain [ ] Palpitation [ ] Lightheadedness [ ] Syncope  Renal: [ ] Thirst [ ] Edema  GI: [ ] Nausea [ ] Emesis [ ] Abdominal pain [ ] Constipation [ ] Diarrhea  Hem: [ ] Hematemesis [ ] bright red blood per rectum  ID: [ ] Fever [ ] Chills [ ] Dysuria  ENT: [ ] Rhinorrhea    PHYSICAL EXAM:  General: No Acute Distress, intention tremor b/l UE & face (when attempts to speak)  Neurological: AOx3, slow speech, waxing and waning exam timing better s/p parkinson medications; eyes open to voice, EOMI, b/l UE 5/5 w/ tremors, b/l LE 5/5.   Pulmonary: Clear to Auscultation, No Rales, No Rhonchi, No Wheezes   Cardiovascular: S1, S2, Regular Rate and Rhythm   Gastrointestinal: Soft, Nontender, Nondistended   Extremities: No calf tenderness   Incision: CDI,   Skin: vesicular lesions left face      ICU Vital Signs Last 24 Hrs  T(C): 37 (02 May 2023 09:02), Max: 37.7 (02 May 2023 05:50)  T(F): 98.6 (02 May 2023 09:02), Max: 99.9 (02 May 2023 05:50)  HR: 77 (02 May 2023 09:00) (56 - 87)  BP: 144/84 (02 May 2023 09:00) (109/53 - 156/78)  BP(mean): 108 (02 May 2023 09:00) (76 - 111)  RR: 16 (02 May 2023 09:00) (12 - 26)  SpO2: 97% (02 May 2023 09:00) (91% - 100%)      05-01-23 @ 07:01  -  05-02-23 @ 07:00  --------------------------------------------------------  IN: 1560 mL / OUT: 4300 mL / NET: -2740 mL    05-02-23 @ 07:01  -  05-02-23 @ 11:47  --------------------------------------------------------  IN: 300 mL / OUT: 975 mL / NET: -675 mL    acetaminophen   Oral Liquid .. 650 milliGRAM(s) Oral every 6 hours PRN  acetylcysteine 20%  Inhalation 4 milliLiter(s) Inhalation every 6 hours PRN  albuterol/ipratropium for Nebulization 3 milliLiter(s) Nebulizer every 6 hours PRN  amLODIPine   Tablet 10 milliGRAM(s) Oral daily  artificial  tears Solution 1 Drop(s) Both EYES every 4 hours  carbidopa/levodopa  25/250 1 Tablet(s) Oral <User Schedule>  chlorhexidine 2% Cloths 1 Application(s) Topical daily  enoxaparin Injectable 40 milliGRAM(s) SubCutaneous every 24 hours  entacapone 200 milliGRAM(s) Oral <User Schedule>  levETIRAcetam 1000 milliGRAM(s) Oral two times a day  lidocaine   4% Patch 1 Patch Transdermal every 24 hours  ondansetron Injectable 4 milliGRAM(s) IV Push every 6 hours PRN  polyethylene glycol 3350 17 Gram(s) Oral daily  senna 2 Tablet(s) Oral at bedtime  sodium chloride 1 Gram(s) Oral every 6 hours  sodium chloride 0.9%. 1000 milliLiter(s) (60 mL/Hr) IV Continuous <Continuous>  valACYclovir 1000 milliGRAM(s) Oral every 12 hours      LABS:  Na: 139 (05-02 @ 04:44), 141 (05-01 @ 05:09), 142 (04-30 @ 19:12), 141 (04-30 @ 04:55)  K: 4.1 (05-02 @ 04:44), 3.8 (05-01 @ 05:09), 4.0 (04-30 @ 19:12), 3.8 (04-30 @ 04:55)  Cl: 107 (05-02 @ 04:44), 109 (05-01 @ 05:09), 109 (04-30 @ 19:12), 108 (04-30 @ 04:55)  CO2: 25 (05-02 @ 04:44), 25 (05-01 @ 05:09), 24 (04-30 @ 19:12), 26 (04-30 @ 04:55)  BUN: 16 (05-02 @ 04:44), 17 (05-01 @ 05:09), 17 (04-30 @ 19:12), 19 (04-30 @ 04:55)  Cr: 1.16 (05-02 @ 04:44), 1.12 (05-01 @ 05:09), 1.19 (04-30 @ 19:12), 1.11 (04-30 @ 04:55)  Glu: 118(05-02 @ 04:44), 114(05-01 @ 05:09), 144(04-30 @ 19:12), 110(04-30 @ 04:55)    Hgb: 13.0 (05-02 @ 04:44), 13.0 (05-01 @ 05:09), 13.3 (04-30 @ 04:55)  Hct: 39.2 (05-02 @ 04:44), 39.6 (05-01 @ 05:09), 40.2 (04-30 @ 04:55)  WBC: 6.31 (05-02 @ 04:44), 8.44 (05-01 @ 05:09), 8.99 (04-30 @ 04:55)  Plt: 186 (05-02 @ 04:44), 188 (05-01 @ 05:09), 184 (04-30 @ 04:55)

## 2023-05-02 NOTE — DISCHARGE NOTE PROVIDER - NSDCMRMEDTOKEN_GEN_ALL_CORE_FT
acetaminophen 325 mg oral tablet: 2 tab(s) orally every 6 hours As needed Mild Pain (1 - 3)  amLODIPine 2.5 mg oral tablet: 1 orally once a day  carbidopa-levodopa 25 mg-250 mg oral tablet: 1 orally 4 times a day  dexamethasone: 2 milligram(s) orally every 6 hours TAPER:  2mg every 6 hours ()  2mg every 8 hours ()  2mg every 12 hours ()  2mg daily ()  entacapone 200 mg oral tablet: 1 tab(s) orally every 8 hours  gabapentin 400 mg oral capsule: 1 cap(s) orally 4 times a day  levETIRAcetam 100 mg/mL intravenous solution: 10 milliliter(s) intravenous every 12 hours  ondansetron 4 mg oral tablet, disintegratin tab(s) orally 3 times a day As needed Nausea and/or Vomiting  oxyCODONE 5 mg oral tablet: 1 tab(s) orally every 6 hours As needed Severe Pain (7 - 10)  pantoprazole 40 mg oral delayed release tablet: 1 tab(s) orally once a day (before a meal) while on steroids  polyethylene glycol 3350 oral powder for reconstitution: 17 gram(s) orally once a day  senna leaf extract oral tablet: 2 tab(s) orally once a day (at bedtime)   acetaminophen 325 mg oral tablet: 2 tab(s) orally every 6 hours As needed Mild Pain (1 - 3)  acetylcysteine 20% inhalation solution: 4 milliliter(s) inhaled every 6 hours As needed congestion  amantadine 50 mg/5 mL oral syrup: 10 milliliter(s) orally every 24 hours  amLODIPine 10 mg oral tablet: 1 tab(s) orally once a day  carbidopa-levodopa 25 mg-250 mg oral tablet: 1 orally 4 times a day  enoxaparin: 40 milligram(s) subcutaneous once a day for DVT prevention while at rehab  entacapone 200 mg oral tablet: 1 tab(s) orally every 8 hours  ipratropium-albuterol 0.5 mg-2.5 mg/3 mL inhalation solution: 3 milliliter(s) inhaled every 6 hours As needed Shortness of Breath and/or Wheezing  levETIRAcetam 1000 mg oral tablet: 1 tab(s) orally 2 times a day  lidocaine 4% topical film: Apply topically to affected area once a day as needed for L thigh pain  ocular lubricant ophthalmic solution: 1 drop(s) to each affected eye every 4 hours  polyethylene glycol 3350 oral powder for reconstitution: 17 gram(s) orally once a day  senna leaf extract oral tablet: 2 tab(s) orally once a day (at bedtime)  valACYclovir 1 g oral tablet: 1 tab(s) orally every 12 hours for L cheek herpes lesion, last day = 5/5/23

## 2023-05-02 NOTE — DISCHARGE NOTE PROVIDER - PROVIDER TOKENS
FREE:[LAST:[Patrick],FIRST:[Dianelys],PHONE:[phone]],FAX:[(   )    -],ADDRESS:[[address] [place] [address]]] FREE:[LAST:[Patrick],FIRST:[Dianelys],PHONE:[phone]],FAX:[(   )    -],ADDRESS:[[address] [place] [address]]],PROVIDER:[TOKEN:[[id number]]]

## 2023-05-02 NOTE — PROGRESS NOTE ADULT - SUBJECTIVE AND OBJECTIVE BOX
=================================  NEUROCRITICAL CARE ATTENDING NOTE  =================================    MAHDI KELY   MRN-[id number]  Summary:  68y/M with PMH of seizures, HTN, and Parkinson's disease diagnosed in  with multiple falls who initially presented to [facility] on  for elective left craniotomy for meningioma resection. He has also been wheelchair bound since about 2-3 years ago and has frequent falls related to his Parkinson's disease.  Underwent L crani for meningioma resection on . Hospital course was uncomplicated.   Discharged to [facility] on  in stable condition.     On arrival to rehab, pt c/o incisional headache with episodes of n/v during ambulance ride to rehab.  Pt given zofran and oxycodone, however, headache returned with persistent nausea and vomiting.   RRT called for worsened mental status. CT head obtained stat which showed new large left frontal hemorrhage with IVH, cerebral edema with midline shift and some effacement of L frontal horn.  Pt started on cardene, given Keppra and transferred to [facility]. Prior to transfer, mental status declined (GCS 13-> 9) with increased secretions, therefore attempted intubation; LMA airway placed.   ICH 3 NIHSS 23 (2023 06:28)    COURSE IN THE HOSPITAL:  : readmitted with new large left frontal IPH with IVH, with edema and midline shift with large left frontal IPH. EVD placed, opened at 10. CTH post EVD complete. [place] sump placed for GI decompression. Started 3%@80 for na goal 145-150. KUB showing small loops of gas, will start TF tomorrow. TTE mild aortic sclerosis w/ no stenosis, mild AR, left pleural effusion EF 71%. LE dopplers negative.  : POD 6. EVD @ 10. POCUS with RLL consolidation, trace B lines, L lung clear, collapsable IVC. 250cc albumin and 500cc NS bolus x2 in setting of decreasing UOP. Started on SQL 40 BID, weight based. Off propofol, on CPAP , plan to extubate. Removed matthew, f/u TOV. Removed a-line. Extubated to HFNC. Decreased 3% to 30. Passed TOV.   : POD 7. Tolerating HFNC. Start TF today. Na 155, stopped 3%, fentanyl d/c'd, CT stable. Trickle feeds started. PT/OT recommending AR. C/o headache, given tramadol  with resolution. S&S recommend pureed diet with thin liquids. Weaning to 6L NC. Na 155-->144, gave 225cc 3% bolus, Na-->147.   : POD 8. Cont 3% at 30. EVD remains at 10. Tolerating PO. Given hydralazine 10mg x 1 for SBP 180s. Bradycardic to 53 resolved on own. Dc'd 3%, started salt tabs 2q8. Sodium goal 140-145. Sputum culture from : normal respiratory blank. Repeat 145. F/u AM sodium. ProBNP 1492. Pt removed NGT. Tolerating pureed diet.   : POD 9. EVD @ 10. Nebs made PRN, amlodipine increased to 10mg. Rec'd for soft and bite size diet, CTH today with no significant interval change. EVD clamp trial ICPS hovering around 18, will monitor clinically and open if neuro change or headache, pro-bnp 1387 downtrending.   : POD10. ICPs sustained 22-26 x 15 min, opened EVD. Finished course of zosyn for empiric pneumonia treatment.   : POD11, Neuro stable, EVD clamped this morning, ICPs hovering higher than 20, neuro exam remains stable, denies HA, will monitor based on clinical/neuro status during clamp trial. Plan for CTH  if tolerating clamp trial. F/u neurology regarding entacapone dosing. Viral PCR swab sent of herpetic lesions L cheek, f/u results.   : POD12, SIRIA overnight EVD remains clamped, ICPs intermittently elevated but patient remains neuro stable and without headaches. NS at 100 started for rising Cr level. repeat CTH when clamped x 24 hrs in AM with stable MLS and mass effect. NS lowered to 75 for positive fluid status. 3 BMs today, bowel regimen dc'd.   : POD13. SIRIA overnight, EVD remains clamped, ICPs <20, neuro stable, no headaches. Cont valtrex x7d. . IVF dc'd. CSF PCR panel and culture sent. EEG ordered. 250cc 3% bolus given. Protonix dc'd.  : POD 14. SIRIA o/n. Salt tabs made 1 q 6. CTH showing stable vent size and improved IVH. EVD removed. EEG dc'd. Started on NS @60 while on valcyclovir.       Past Medical History: Parkinsons  HTN (hypertension)  History of seizures  Brain mass  Impaired gait  Meningioma  History of insomnia  Intracerebral hemorrhage    Allergies:  No Known Allergies  Home meds:   ·	acetaminophen 325 mg oral tablet: 2 tab(s) orally every 6 hours As needed Mild Pain (1 - 3)  ·	amLODIPine 2.5 mg oral tablet: 1 orally once a day  ·	carbidopa-levodopa 25 mg-250 mg oral tablet: 1 orally 4 times a day  ·	dexamethasone: 2 milligram(s) orally every 6 hours TAPER:  ·	2mg every 6 hours ()  ·	2mg every 8 hours ()  ·	2mg every 12 hours ()  ·	2mg daily ()  ·	entacapone 200 mg oral tablet: 1 tab(s) orally every 8 hours  ·	gabapentin 400 mg oral capsule: 1 cap(s) orally 4 times a day  ·	levETIRAcetam 100 mg/mL intravenous solution: 10 milliliter(s) intravenous every 12 hours  ·	ondansetron 4 mg oral tablet, disintegratin tab(s) orally 3 times a day As needed Nausea and/or Vomiting  ·	oxyCODONE 5 mg oral tablet: 1 tab(s) orally every 6 hours As needed Severe Pain (7 - 10)  ·	pantoprazole 40 mg oral delayed release tablet: 1 tab(s) orally once a day (before a meal) while on steroids  ·	polyethylene glycol 3350 oral powder for reconstitution: 17 gram(s) orally once a day  ·	senna leaf extract oral tablet: 2 tab(s) orally once a day (at bedtime)    PHYSICAL EXAMINATION  T(C): 36.4 ( @ 00:43), Max: 37.6 ( @ 21:28) HR: 60 ( @ 01:00) (50 - 87) BP: 151/83 ( @ 00:00) (103/80 - 165/71) RR: 15 ( @ 01:00) (12 - 26) SpO2: 97% ( @ 01:00) (91% - 100%)    NEUROLOGIC EXAMINATION:  Patient opens eyes, tracks, oriented x2 with choices, slow speech, FC, R UE 4+/5 R LE 3/5 L UE 5/5 L LE 4- to 5 /5.   GENERAL: not intubated, not in cardiorespiratory distress  EENT:  anicteric  CARDIOVASCULAR: (+) S1 S2, normal rate and regular rhythm  PULMONARY: clear to auscultation bilaterally  ABDOMEN: soft, nontender with normoactive bowel sounds  EXTREMITIES: no edema  SKIN: no rash    LABS:                        13.0   8.44  )-----------( 188      ( 01 May 2023 05:09 )             39.6     141  |  109<H>  |  17  ----------------------------<  114<H>  3.8   |  25  |  1.12    Ca    8.8      01 May 2023 05:09  Phos  3.1     -  Mg     2.1      @ 07:01  -   @ 07:00  IN: 1275 mL / OUT: 4550 mL / NET: -3275 mL    Bacteriology:  CSF studies:  EEG:  Neuroimaging:  Other imaging:    MEDICATIONS:     ·	valACYclovir 1000 Oral every 12 hours  ·	carbidopa/levodopa  25/250 1 Oral <User Schedule>  ·	entacapone 200 Oral <User Schedule>  ·	levETIRAcetam 1000 Oral two times a day  ·	amLODIPine   Tablet 10 milliGRAM(s) Oral daily  ·	artificial  tears Solution 1 Both EYES every 4 hours  ·	lidocaine   4% Patch 1 Transdermal every 24 hours  ·	sodium chloride 1 Oral every 6 hours  ·	acetaminophen   Oral Liquid .. 650 Oral every 6 hours PRN  ·	acetylcysteine 20%  Inhalation 4 Inhalation every 6 hours PRN  ·	albuterol/ipratropium for Nebulization 3 Nebulizer every 6 hours PRN  ·	ondansetron Injectable 4 IV Push every 6 hours PRN    IV FLUIDS:  DRIPS:  DIET:  Lines:  Drains:    23 @ 07:01  -  23 @ 07:00  --------------------------------------------------------  OUT:    External Ventricular Device (mL): 0 mL    Voided (mL): 4550 mL  Total OUT: 4550 mL        Wounds:    CODE STATUS:  Full Code                       GOALS OF CARE:  aggressive                      DISPOSITION:  ICU

## 2023-05-02 NOTE — PROGRESS NOTE ADULT - ASSESSMENT
68y/M with  intracranial hemorrhage, brain compression, cerebral edema  Parkinson's, h/o seizures, brain mass, impaired gait  meningioma  Hypertension   h/o insomnia    PLAN:   NEURO: neurochecks q2h, PRN pain meds with acetaminophen  seizure prophylaxis:  levetiracetam 500mg POBID, as per neurosurgery   steroid taper as per neurosurgery  Parkinson's: continue sinemet. emtaca[pme  REHAB:  physical therapy evaluation and management    EARLY MOB:      PULM:  Room air, incentive spirometry, PRN nebs  CARDIO:  SBP goal 100-16m Hg; amlodipne daily  ENDO:  Blood sugar goals 140-180 mg/dL, continue insulin sliding scale  GI:  PPI for GI prophylaxis  DIET:  RENAL:    HEM/ONC: Na 135-145, salt to all  Prophylaxis: SCDs, suspect  ID: afebrile, no leukocytosis, continue Abass, Mahdi,   f/u HIV test, CSF PCR panels  Social: will update family    Active issues:  What's keeping patient in the ICU? nothing  What is this patient's dispo plan? stepdown if stable    ATTENDING ATTESTATION:  I was physically present for the key portions of the evaluation and management (E/M) service provided.  I agree with the above history, physical and plan, which I have reviewed and edited where appropriate.    Patient at high risk for neurological deterioration or death due to:  ICU delirium, aspiration PNA, DVT / PE.  Critical care time:  I have personally provided 45 minutes of critical care time, excluding time spent on separate procedures.      Plan discussed with RN, house staff. 68y/M with  intracranial hemorrhage, brain compression, cerebral edema  Parkinson's, h/o seizures, brain mass, impaired gait  meningioma  Hypertension   h/o insomnia    PLAN:   NEURO: neurochecks q2h, PRN pain meds with acetaminophen  seizure prophylaxis:  levetiracetam 500mg POBID, as per neurosurgery   steroid taper as per neurosurgery  Parkinson's: continue sinemet. entacapone  REHAB:  physical therapy evaluation and management    EARLY MOB:      PULM:  Room air, incentive spirometry, PRN nebs  CARDIO:  SBP goal 100-16m Hg; amlodipne daily  ENDO:  Blood sugar goals 140-180 mg/dL, continue insulin sliding scale  GI:  PPI for GI prophylaxis  DIET:   RENAL:    HEM/ONC: Na 135-145, salt tabs  Prophylaxis: SCDs, suspect  ID: afebrile, no leukocytosis, continue Abass, Mahdi,   f/u HIV test, CSF PCR panels  Social: will update family    Active issues:  What's keeping patient in the ICU? nothing  What is this patient's dispo plan? stepdown if stable    ATTENDING ATTESTATION:  I was physically present for the key portions of the evaluation and management (E/M) service provided.  I agree with the above history, physical and plan, which I have reviewed and edited where appropriate.    Patient at high risk for neurological deterioration or death due to:  ICU delirium, aspiration PNA, DVT / PE.  Critical care time:  I have personally provided 45 minutes of critical care time, excluding time spent on separate procedures.      Plan discussed with RN, house staff.

## 2023-05-03 ENCOUNTER — APPOINTMENT (OUTPATIENT)
Dept: NEUROSURGERY | Facility: CLINIC | Age: 68
End: 2023-05-03
Payer: MEDICARE

## 2023-05-03 ENCOUNTER — INPATIENT (INPATIENT)
Facility: HOSPITAL | Age: 68
LOS: 21 days | Discharge: HOME HEALTH | DRG: 949 | End: 2023-05-25
Attending: INTERNAL MEDICINE | Admitting: INTERNAL MEDICINE
Payer: MEDICARE

## 2023-05-03 ENCOUNTER — TRANSCRIPTION ENCOUNTER (OUTPATIENT)
Age: 68
End: 2023-05-03

## 2023-05-03 VITALS
RESPIRATION RATE: 16 BRPM | HEART RATE: 72 BPM | HEIGHT: 66 IN | DIASTOLIC BLOOD PRESSURE: 85 MMHG | WEIGHT: 176.59 LBS | OXYGEN SATURATION: 96 % | TEMPERATURE: 99 F | SYSTOLIC BLOOD PRESSURE: 132 MMHG

## 2023-05-03 VITALS — TEMPERATURE: 99 F

## 2023-05-03 DIAGNOSIS — I69.154 HEMIPLEGIA AND HEMIPARESIS FOLLOWING NONTRAUMATIC INTRACEREBRAL HEMORRHAGE AFFECTING LEFT NON-DOMINANT SIDE: ICD-10-CM

## 2023-05-03 DIAGNOSIS — R26.9 UNSPECIFIED ABNORMALITIES OF GAIT AND MOBILITY: ICD-10-CM

## 2023-05-03 DIAGNOSIS — Z91.81 HISTORY OF FALLING: ICD-10-CM

## 2023-05-03 DIAGNOSIS — B00.9 HERPESVIRAL INFECTION, UNSPECIFIED: ICD-10-CM
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
## 2023-05-03 DIAGNOSIS — I69.191 DYSPHAGIA FOLLOWING NONTRAUMATIC INTRACEREBRAL HEMORRHAGE: ICD-10-CM

## 2023-05-03 DIAGNOSIS — Z48.812 ENCOUNTER FOR SURGICAL AFTERCARE FOLLOWING SURGERY ON THE CIRCULATORY SYSTEM: ICD-10-CM

## 2023-05-03 DIAGNOSIS — Z51.89 ENCOUNTER FOR OTHER SPECIFIED AFTERCARE: ICD-10-CM

## 2023-05-03 DIAGNOSIS — I10 ESSENTIAL (PRIMARY) HYPERTENSION: ICD-10-CM

## 2023-05-03 DIAGNOSIS — R41.82 ALTERED MENTAL STATUS, UNSPECIFIED: ICD-10-CM

## 2023-05-03 DIAGNOSIS — R56.9 UNSPECIFIED CONVULSIONS: ICD-10-CM

## 2023-05-03 DIAGNOSIS — R49.1 APHONIA: ICD-10-CM

## 2023-05-03 DIAGNOSIS — I61.9 NONTRAUMATIC INTRACEREBRAL HEMORRHAGE, UNSPECIFIED: ICD-10-CM

## 2023-05-03 DIAGNOSIS — Z99.3 DEPENDENCE ON WHEELCHAIR: ICD-10-CM

## 2023-05-03 DIAGNOSIS — R21 RASH AND OTHER NONSPECIFIC SKIN ERUPTION: ICD-10-CM
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
## 2023-05-03 DIAGNOSIS — R25.1 TREMOR, UNSPECIFIED: ICD-10-CM

## 2023-05-03 DIAGNOSIS — G20 PARKINSON'S DISEASE: ICD-10-CM

## 2023-05-03 DIAGNOSIS — I69.119 UNSPECIFIED SYMPTOMS AND SIGNS INVOLVING COGNITIVE FUNCTIONS FOLLOWING NONTRAUMATIC INTRACEREBRAL HEMORRHAGE: ICD-10-CM

## 2023-05-03 DIAGNOSIS — I61.1 NONTRAUMATIC INTRACEREBRAL HEMORRHAGE IN HEMISPHERE, CORTICAL: ICD-10-CM

## 2023-05-03 LAB
GLUCOSE BLDC GLUCOMTR-MCNC: 128 MG/DL — HIGH (ref 70–99)
GLUCOSE BLDC GLUCOMTR-MCNC: 94 MG/DL — SIGNIFICANT CHANGE UP (ref 70–99)

## 2023-05-03 PROCEDURE — 86901 BLOOD TYPING SEROLOGIC RH(D): CPT

## 2023-05-03 PROCEDURE — 97530 THERAPEUTIC ACTIVITIES: CPT

## 2023-05-03 PROCEDURE — 74018 RADEX ABDOMEN 1 VIEW: CPT

## 2023-05-03 PROCEDURE — 92610 EVALUATE SWALLOWING FUNCTION: CPT

## 2023-05-03 PROCEDURE — 84100 ASSAY OF PHOSPHORUS: CPT

## 2023-05-03 PROCEDURE — 83735 ASSAY OF MAGNESIUM: CPT

## 2023-05-03 PROCEDURE — 80048 BASIC METABOLIC PNL TOTAL CA: CPT

## 2023-05-03 PROCEDURE — P9045: CPT

## 2023-05-03 PROCEDURE — 71045 X-RAY EXAM CHEST 1 VIEW: CPT

## 2023-05-03 PROCEDURE — 87205 SMEAR GRAM STAIN: CPT

## 2023-05-03 PROCEDURE — 86900 BLOOD TYPING SEROLOGIC ABO: CPT

## 2023-05-03 PROCEDURE — 97161 PT EVAL LOW COMPLEX 20 MIN: CPT

## 2023-05-03 PROCEDURE — 82040 ASSAY OF SERUM ALBUMIN: CPT

## 2023-05-03 PROCEDURE — 95708 EEG WO VID EA 12-26HR UNMNTR: CPT

## 2023-05-03 PROCEDURE — 89051 BODY FLUID CELL COUNT: CPT

## 2023-05-03 PROCEDURE — 94640 AIRWAY INHALATION TREATMENT: CPT

## 2023-05-03 PROCEDURE — 85730 THROMBOPLASTIN TIME PARTIAL: CPT

## 2023-05-03 PROCEDURE — 87483 CNS DNA AMP PROBE TYPE 12-25: CPT

## 2023-05-03 PROCEDURE — 84157 ASSAY OF PROTEIN OTHER: CPT

## 2023-05-03 PROCEDURE — 80053 COMPREHEN METABOLIC PANEL: CPT

## 2023-05-03 PROCEDURE — 87070 CULTURE OTHR SPECIMN AEROBIC: CPT

## 2023-05-03 PROCEDURE — 82803 BLOOD GASES ANY COMBINATION: CPT

## 2023-05-03 PROCEDURE — 83880 ASSAY OF NATRIURETIC PEPTIDE: CPT

## 2023-05-03 PROCEDURE — 97112 NEUROMUSCULAR REEDUCATION: CPT

## 2023-05-03 PROCEDURE — 82945 GLUCOSE OTHER FLUID: CPT

## 2023-05-03 PROCEDURE — 99232 SBSQ HOSP IP/OBS MODERATE 35: CPT

## 2023-05-03 PROCEDURE — 85520 HEPARIN ASSAY: CPT

## 2023-05-03 PROCEDURE — 36415 COLL VENOUS BLD VENIPUNCTURE: CPT

## 2023-05-03 PROCEDURE — 87798 DETECT AGENT NOS DNA AMP: CPT

## 2023-05-03 PROCEDURE — 84443 ASSAY THYROID STIM HORMONE: CPT

## 2023-05-03 PROCEDURE — 92526 ORAL FUNCTION THERAPY: CPT

## 2023-05-03 PROCEDURE — 97165 OT EVAL LOW COMPLEX 30 MIN: CPT

## 2023-05-03 PROCEDURE — 70496 CT ANGIOGRAPHY HEAD: CPT

## 2023-05-03 PROCEDURE — 94002 VENT MGMT INPAT INIT DAY: CPT

## 2023-05-03 PROCEDURE — 85610 PROTHROMBIN TIME: CPT

## 2023-05-03 PROCEDURE — 85027 COMPLETE CBC AUTOMATED: CPT

## 2023-05-03 PROCEDURE — 93306 TTE W/DOPPLER COMPLETE: CPT

## 2023-05-03 PROCEDURE — 87529 HSV DNA AMP PROBE: CPT

## 2023-05-03 PROCEDURE — 83036 HEMOGLOBIN GLYCOSYLATED A1C: CPT

## 2023-05-03 PROCEDURE — 93970 EXTREMITY STUDY: CPT

## 2023-05-03 PROCEDURE — 86850 RBC ANTIBODY SCREEN: CPT

## 2023-05-03 PROCEDURE — 82962 GLUCOSE BLOOD TEST: CPT

## 2023-05-03 PROCEDURE — 80061 LIPID PANEL: CPT

## 2023-05-03 PROCEDURE — 70450 CT HEAD/BRAIN W/O DYE: CPT

## 2023-05-03 RX ORDER — ENOXAPARIN SODIUM 100 MG/ML
40 INJECTION SUBCUTANEOUS EVERY 24 HOURS
Refills: 0 | Status: DISCONTINUED | OUTPATIENT
Start: 2023-05-03 | End: 2023-05-18

## 2023-05-03 RX ORDER — VALACYCLOVIR 500 MG/1
1 TABLET, FILM COATED ORAL
Qty: 0 | Refills: 0 | DISCHARGE
Start: 2023-05-03

## 2023-05-03 RX ORDER — AMANTADINE HCL 100 MG
10 CAPSULE ORAL
Qty: 0 | Refills: 0 | DISCHARGE
Start: 2023-05-03

## 2023-05-03 RX ORDER — LIDOCAINE 4 G/100G
1 CREAM TOPICAL
Qty: 0 | Refills: 0 | DISCHARGE
Start: 2023-05-03

## 2023-05-03 RX ORDER — ALBUTEROL 90 UG/1
2 AEROSOL, METERED ORAL EVERY 6 HOURS
Refills: 0 | Status: DISCONTINUED | OUTPATIENT
Start: 2023-05-03 | End: 2023-05-25

## 2023-05-03 RX ORDER — VALACYCLOVIR 500 MG/1
1000 TABLET, FILM COATED ORAL EVERY 12 HOURS
Refills: 0 | Status: COMPLETED | OUTPATIENT
Start: 2023-05-03 | End: 2023-05-10

## 2023-05-03 RX ORDER — LIDOCAINE 4 G/100G
1 CREAM TOPICAL EVERY 24 HOURS
Refills: 0 | Status: DISCONTINUED | OUTPATIENT
Start: 2023-05-04 | End: 2023-05-25

## 2023-05-03 RX ORDER — POLYETHYLENE GLYCOL 3350 17 G/17G
17 POWDER, FOR SOLUTION ORAL DAILY
Refills: 0 | Status: DISCONTINUED | OUTPATIENT
Start: 2023-05-03 | End: 2023-05-16

## 2023-05-03 RX ORDER — AMANTADINE HCL 100 MG
100 CAPSULE ORAL DAILY
Refills: 0 | Status: DISCONTINUED | OUTPATIENT
Start: 2023-05-04 | End: 2023-05-11

## 2023-05-03 RX ORDER — AMLODIPINE BESYLATE 2.5 MG/1
1 TABLET ORAL
Qty: 0 | Refills: 0 | DISCHARGE
Start: 2023-05-03

## 2023-05-03 RX ORDER — SENNA PLUS 8.6 MG/1
2 TABLET ORAL AT BEDTIME
Refills: 0 | Status: DISCONTINUED | OUTPATIENT
Start: 2023-05-03 | End: 2023-05-25

## 2023-05-03 RX ORDER — CARBIDOPA AND LEVODOPA 25; 100 MG/1; MG/1
1 TABLET ORAL
Refills: 0 | Status: DISCONTINUED | OUTPATIENT
Start: 2023-05-03 | End: 2023-05-25

## 2023-05-03 RX ORDER — ENTACAPONE 200 MG/1
200 TABLET, FILM COATED ORAL
Refills: 0 | Status: DISCONTINUED | OUTPATIENT
Start: 2023-05-03 | End: 2023-05-25

## 2023-05-03 RX ORDER — IPRATROPIUM/ALBUTEROL SULFATE 18-103MCG
3 AEROSOL WITH ADAPTER (GRAM) INHALATION EVERY 6 HOURS
Refills: 0 | Status: DISCONTINUED | OUTPATIENT
Start: 2023-05-03 | End: 2023-05-03

## 2023-05-03 RX ORDER — AMLODIPINE BESYLATE 2.5 MG/1
1 TABLET ORAL
Refills: 0 | DISCHARGE

## 2023-05-03 RX ORDER — IPRATROPIUM/ALBUTEROL SULFATE 18-103MCG
3 AEROSOL WITH ADAPTER (GRAM) INHALATION
Qty: 0 | Refills: 0 | DISCHARGE
Start: 2023-05-03

## 2023-05-03 RX ORDER — ACETYLCYSTEINE 200 MG/ML
4 VIAL (ML) MISCELLANEOUS EVERY 6 HOURS
Refills: 0 | Status: DISCONTINUED | OUTPATIENT
Start: 2023-05-03 | End: 2023-05-25

## 2023-05-03 RX ORDER — LANOLIN ALCOHOL/MO/W.PET/CERES
6 CREAM (GRAM) TOPICAL AT BEDTIME
Refills: 0 | Status: DISCONTINUED | OUTPATIENT
Start: 2023-05-03 | End: 2023-05-04

## 2023-05-03 RX ORDER — ACETYLCYSTEINE 200 MG/ML
4 VIAL (ML) MISCELLANEOUS
Qty: 0 | Refills: 0 | DISCHARGE
Start: 2023-05-03

## 2023-05-03 RX ORDER — LEVETIRACETAM 250 MG/1
1000 TABLET, FILM COATED ORAL
Refills: 0 | Status: DISCONTINUED | OUTPATIENT
Start: 2023-05-03 | End: 2023-05-25

## 2023-05-03 RX ORDER — ENOXAPARIN SODIUM 100 MG/ML
40 INJECTION SUBCUTANEOUS
Qty: 0 | Refills: 0 | DISCHARGE
Start: 2023-05-03

## 2023-05-03 RX ORDER — AMLODIPINE BESYLATE 2.5 MG/1
10 TABLET ORAL DAILY
Refills: 0 | Status: DISCONTINUED | OUTPATIENT
Start: 2023-05-04 | End: 2023-05-25

## 2023-05-03 RX ORDER — SODIUM CHLORIDE 9 MG/ML
1 INJECTION INTRAMUSCULAR; INTRAVENOUS; SUBCUTANEOUS EVERY 8 HOURS
Refills: 0 | Status: DISCONTINUED | OUTPATIENT
Start: 2023-05-03 | End: 2023-05-16

## 2023-05-03 RX ORDER — TIOTROPIUM BROMIDE 18 UG/1
2 CAPSULE ORAL; RESPIRATORY (INHALATION) DAILY
Refills: 0 | Status: DISCONTINUED | OUTPATIENT
Start: 2023-05-03 | End: 2023-05-25

## 2023-05-03 RX ORDER — ACETAMINOPHEN 500 MG
650 TABLET ORAL EVERY 6 HOURS
Refills: 0 | Status: DISCONTINUED | OUTPATIENT
Start: 2023-05-03 | End: 2023-05-25

## 2023-05-03 RX ORDER — LEVETIRACETAM 250 MG/1
1 TABLET, FILM COATED ORAL
Qty: 0 | Refills: 0 | DISCHARGE
Start: 2023-05-03

## 2023-05-03 RX ADMIN — POLYETHYLENE GLYCOL 3350 17 GRAM(S): 17 POWDER, FOR SOLUTION ORAL at 18:09

## 2023-05-03 RX ADMIN — Medication 1 DROP(S): at 18:09

## 2023-05-03 RX ADMIN — POLYETHYLENE GLYCOL 3350 17 GRAM(S): 17 POWDER, FOR SOLUTION ORAL at 12:33

## 2023-05-03 RX ADMIN — LIDOCAINE 1 PATCH: 4 CREAM TOPICAL at 10:30

## 2023-05-03 RX ADMIN — CARBIDOPA AND LEVODOPA 1 TABLET(S): 25; 100 TABLET ORAL at 07:12

## 2023-05-03 RX ADMIN — ENTACAPONE 200 MILLIGRAM(S): 200 TABLET, FILM COATED ORAL at 18:09

## 2023-05-03 RX ADMIN — CARBIDOPA AND LEVODOPA 1 TABLET(S): 25; 100 TABLET ORAL at 12:33

## 2023-05-03 RX ADMIN — ENTACAPONE 200 MILLIGRAM(S): 200 TABLET, FILM COATED ORAL at 07:12

## 2023-05-03 RX ADMIN — SODIUM CHLORIDE 1 GRAM(S): 9 INJECTION INTRAMUSCULAR; INTRAVENOUS; SUBCUTANEOUS at 20:53

## 2023-05-03 RX ADMIN — SENNA PLUS 2 TABLET(S): 8.6 TABLET ORAL at 20:53

## 2023-05-03 RX ADMIN — LEVETIRACETAM 1000 MILLIGRAM(S): 250 TABLET, FILM COATED ORAL at 18:09

## 2023-05-03 RX ADMIN — ENTACAPONE 200 MILLIGRAM(S): 200 TABLET, FILM COATED ORAL at 12:34

## 2023-05-03 RX ADMIN — SODIUM CHLORIDE 1 GRAM(S): 9 INJECTION INTRAMUSCULAR; INTRAVENOUS; SUBCUTANEOUS at 02:20

## 2023-05-03 RX ADMIN — LEVETIRACETAM 1000 MILLIGRAM(S): 250 TABLET, FILM COATED ORAL at 06:59

## 2023-05-03 RX ADMIN — SODIUM CHLORIDE 1 GRAM(S): 9 INJECTION INTRAMUSCULAR; INTRAVENOUS; SUBCUTANEOUS at 12:33

## 2023-05-03 RX ADMIN — AMLODIPINE BESYLATE 10 MILLIGRAM(S): 2.5 TABLET ORAL at 06:58

## 2023-05-03 RX ADMIN — Medication 1 DROP(S): at 07:11

## 2023-05-03 RX ADMIN — Medication 1 DROP(S): at 22:10

## 2023-05-03 RX ADMIN — Medication 1 DROP(S): at 10:27

## 2023-05-03 RX ADMIN — Medication 1 DROP(S): at 02:20

## 2023-05-03 RX ADMIN — SODIUM CHLORIDE 1 GRAM(S): 9 INJECTION INTRAMUSCULAR; INTRAVENOUS; SUBCUTANEOUS at 06:59

## 2023-05-03 RX ADMIN — VALACYCLOVIR 1000 MILLIGRAM(S): 500 TABLET, FILM COATED ORAL at 07:00

## 2023-05-03 RX ADMIN — CARBIDOPA AND LEVODOPA 1 TABLET(S): 25; 100 TABLET ORAL at 19:50

## 2023-05-03 RX ADMIN — Medication 6 MILLIGRAM(S): at 20:53

## 2023-05-03 RX ADMIN — Medication 100 MILLIGRAM(S): at 07:02

## 2023-05-03 RX ADMIN — VALACYCLOVIR 1000 MILLIGRAM(S): 500 TABLET, FILM COATED ORAL at 18:09

## 2023-05-03 RX ADMIN — ENOXAPARIN SODIUM 40 MILLIGRAM(S): 100 INJECTION SUBCUTANEOUS at 22:10

## 2023-05-03 NOTE — H&P ADULT - ASSESSMENT
Assessment/Plan:  MAHDI KELY is a 68y with a history of       Hospital Course complicated by ***. Patient now admitted for a multidisciplinary rehab program. 05-03-23 @ 11:48    -------------    Rehab Management/MEDICAL MANAGEMENT     #Functional deficits s/p non-traumatic ICH  - Gait Instability, ADL impairments and Functional impairments: start Comprehensive Rehab Program of PT/OT/SLP  - 3 hours a day, 5 days a week  - P&O as needed   - CTH revealed L frontal lobe hemorrhage with intraventricular hemorrhage and increased size of R lateral ventricle  - S/p EVD placement on  , removed on 5/1   - Serial CTH stable  - Continue Amantadine, Sodium Chloride    #Parkinson's Disease  - Continue Sinemet, Entacopone     #Seizures  - Keppra BID     #HTN  - Amlodipine    #HSV-1 Infection  - Left cheek lesion  - 7 day course Valtrex     #Sleep  - Melatonin PRN     #Skin  - Skin on admission: Surgical site *************  - Pressure injury/Skin: OOB to Chair, PT/OT     #Pain Mgmt   - Tylenol PRN  - Lidocaine patch  - Home: Gabapentin    #GI/Bowel Mgmt   - Continent c/w Senna, Miralax     #/Bladder Mgmt   -  PVR q8h, CIC>400cc    #FEN   - Diet - Soft & Bite Sized   - Dysphagia  SLP - evaluation and treatment    #Precautions / PROPHYLAXIS:   - Falls  - ortho: Weight bearing status: WBAT   - Lungs: Aspiration, Incentive Spirometer   - DVT: Lovenox  -------------------------------------------------------------  FOLLOW UP/OUTPATIENT:    D'Amico, Randy  [phone]    Dianelys Nguyen  [address] [facility] [address]  [address]  [place] [address]  Phone: [phone]  Fax: (   )    -  Follow Up Time:    D'Amico, Randy  [facility]  NEUROSURG [address] [place] [address] S  Scheduled Appointment: 05/10/2023  -------------------------------------------------------------  MEDICAL PROGNOSIS: GOOD                                   REHAB POTENTIAL: GOOD  ESTIMATED DISPOSITION: HOME                             ELOS: 10-14 Days   EXPECTED THERAPY:     P.T. 1hr/day       O.T. 1hr/day      S.L.P. 1hr/day      EXP FREQUENCY: 5 days per 7 day period     PRESCREEN COMPARISON: I have reviewed the prescreen information and I have found no relevant changes between the preadmission screening and my post admission evaluation     RATIONALE FOR INPATIENT ADMISSION - Patient demonstrates the following: (check all that apply)  [X] Medically appropriate for rehabilitation admission  [X] Has attainable rehab goals with an appropriate initial discharge plan  [X] Has rehabilitation potential (expected to make a significant improvement within a reasonable period of time)   [X] Requires close medical managment by a rehab physician, rehab nursing care, Hospitalist and comprehensive interdisciplinary team (including PT, OT, & or SLP, Prosthetics and Orthotics)     Assessment/Plan:  MAHDI KELY is a 68 year old male with PMH of seizure, HTN, Parkinson's disease (Dx in 2012, wheelchair bound since 2020) and frequent falls; presented to [facility] on 4/17 for elective L craniotomy for meningioma resection with Dr. D'Amico. His procedure was tolerated well and was discharged to [facility] on 4/21. During transit to , he complained of incisional headache with episodes of nausea and vomiting requiring Zofran and Oxycodone, but his symptoms persisted. Upon arrival to , an RRT was called for worsening mental status and a CTH revealed an acute large left frontal hemorrhage with IVH, cerebral edema with midline shift and some effacement of L frontal horn. He was transferred back to [facility].  He underwent EVD placement on 4/22. He was extubated on 4/23, and placed on HFNC with eventual wean to NC.  His hospital course was complicated by RLL consolidation (s/p Zosyn), HTN, bradycardia (self-resolved), hyponatremia, NINFA, and L cheek herpetic lesion (requiring 7 day course of Valtrex). S/p EVD removal on 5/1. Patient now admitted for a multidisciplinary rehab program. 05-03-23 @ 11:48    -------------    Rehab Management/MEDICAL MANAGEMENT     #Functional deficits s/p non-traumatic ICH  - Gait Instability, ADL impairments and Functional impairments: start Comprehensive Rehab Program of PT/OT/SLP  - 3 hours a day, 5 days a week  - P&O as needed   - CTH revealed L frontal lobe hemorrhage with intraventricular hemorrhage and increased size of R lateral ventricle  - S/p EVD placement on 4/22, removed on 5/1   - S/p Decadron taper x1 week  - Serial CTH stable  - Continue Amantadine, Sodium Chloride    #HSV-1 Infection  - Left cheek lesion  - 7 day course Valtrex (4/28-5/5)  - F/u HIV testing    #PNA  - S/p Zosyn (4/22-4/27)   - Albuterol/Ipratropium Nebulizer  - Incentive Spirometer  - Chest PT  - Monitor secretions    #Parkinson's Disease  - Continue Sinemet, Entacopone     #Seizures  - Keppra BID     #HTN  - Amlodipine  - Goal SBP: 100-160    #Sleep  - Melatonin PRN     #Skin  - Skin on admission: Surgical site *************  - Pressure injury/Skin: OOB to Chair, PT/OT     #Pain Mgmt   - Tylenol PRN  - Lidocaine patch  - Home: Gabapentin?     #GI/Bowel Mgmt   - Continent c/w Senna, Miralax     #/Bladder Mgmt   -  PVR q8h, CIC>400cc    #FEN   - Diet - Soft & Bite Sized   - Dysphagia  SLP - evaluation and treatment    #Precautions / PROPHYLAXIS:   - Falls  - ortho: Weight bearing status: WBAT   - Lungs: Aspiration, Incentive Spirometer   - DVT: Lovenox  -------------------------------------------------------------  FOLLOW UP/OUTPATIENT:    D'Amico, Randy  [phone]    Dianelys Nguyen  [address] [place] [address]  Phone: [phone]  Fax: (   )    -  Follow Up Time:    D'Amico, Randy  [facility]  NEUROSURG [address] [place] [address] S  Scheduled Appointment: 05/10/2023  -------------------------------------------------------------  MEDICAL PROGNOSIS: GOOD                                   REHAB POTENTIAL: GOOD  ESTIMATED DISPOSITION: HOME                             ELOS: 10-14 Days   EXPECTED THERAPY:     P.T. 1hr/day       O.T. 1hr/day      S.L.P. 1hr/day      EXP FREQUENCY: 5 days per 7 day period     PRESCREEN COMPARISON: I have reviewed the prescreen information and I have found no relevant changes between the preadmission screening and my post admission evaluation     RATIONALE FOR INPATIENT ADMISSION - Patient demonstrates the following: (check all that apply)  [X] Medically appropriate for rehabilitation admission  [X] Has attainable rehab goals with an appropriate initial discharge plan  [X] Has rehabilitation potential (expected to make a significant improvement within a reasonable period of time)   [X] Requires close medical managment by a rehab physician, rehab nursing care, Hospitalist and comprehensive interdisciplinary team (including PT, OT, & or SLP, Prosthetics and Orthotics)     MAHDI KELY is a 68 year old male with PMH of seizure, HTN, Parkinson's disease (Dx in 2012, wheelchair bound since 2020) and frequent falls; presented to [facility] on 4/17 for elective L craniotomy for meningioma resection with Dr. D'Amico. His procedure was tolerated well and was discharged to [facility] on 4/21. During transit to , he complained of incisional headache with episodes of nausea and vomiting requiring Zofran and Oxycodone, but his symptoms persisted. Upon arrival to , an RRT was called for worsening mental status and a CTH revealed an acute large left frontal hemorrhage with IVH, cerebral edema with midline shift and some effacement of L frontal horn. He was transferred back to [facility].  He underwent EVD placement on 4/22. He was extubated on 4/23, and placed on HFNC with eventual wean to NC.  His hospital course was complicated by RLL consolidation (s/p Zosyn), HTN, bradycardia (self-resolved), hyponatremia, NINFA, and L cheek herpetic lesion (requiring 7 day course of Valtrex). S/p EVD removal on 5/1. Patient now admitted for a multidisciplinary rehab program. 05-03-23    -------------    Rehab Management/MEDICAL MANAGEMENT     #Functional deficits s/p non-traumatic ICH  - Gait Instability, ADL impairments and Functional impairments: start Comprehensive Rehab Program of PT/OT/SLP  - 3 hours a day, 5 days a week  - P&O as needed   - CTH revealed L frontal lobe hemorrhage with intraventricular hemorrhage and increased size of R lateral ventricle  - S/p EVD placement on 4/22, removed on 5/1   - S/p Decadron taper x1 week  - Serial CTH stable to date, last 5/2, Lovenox started 5/2  - Continue Amantadine  - Sodium Chloride tabs to q8h, Na >135, taper    #HSV-1 Infection  - Left cheek lesion  - 7 day course Valtrex (4/28-5/5)  - F/u HIV testing    #PNA  - S/p Zosyn (4/22-4/27)   - Albuterol/Ipratropium Nebulizer prn  - Incentive Spirometer  - Chest PT  - Monitor secretions    #Parkinson's Disease  - Continue Sinemet 4x per day, Entacapone 200mg q8h  - Melatonin  - bowel regimen    #Seizures  - Keppra BID  -seizure precautions     #HTN  - Amlodipine  - Goal SBP: 100-160    #Skin  - Skin on admission: scalp incision healing well, well approximated  - Pressure injury/Skin: OOB to Chair, PT/OT     #Pain Mgmt   - Tylenol PRN  - Lidocaine patch  - Home: Gabapentin-on hold per NSx    #GI/Bowel Mgmt   - Continent c/w Senna, Miralax     #/Bladder Mgmt   -  PVR q8h, CIC>400cc    #FEN   - Diet - Soft & Bite Sized   - Dysphagia  SLP - evaluation and treatment    #Precautions / PROPHYLAXIS:   - Falls  - ortho: Weight bearing status: WBAT   - Lungs: Aspiration, Incentive Spirometer   - DVT: Lovenox  -------------------------------------------------------------  FOLLOW UP/OUTPATIENT:    D'Amico, Randy  [phone]    Dianelys Nguyen  [address] [place] [address]  Phone: [phone]  Fax: (   )    -  Follow Up Time:    D'Amico, Randy  [facility]  NEUROSURG [address] [place] [address] S  Scheduled Appointment: 05/10/2023  -------------------------------------------------------------  MEDICAL PROGNOSIS: GOOD                                   REHAB POTENTIAL: GOOD  ESTIMATED DISPOSITION: HOME                             ELOS: 10-14 Days   EXPECTED THERAPY:     P.T. 1hr/day       O.T. 1hr/day      S.L.P. 1hr/day      EXP FREQUENCY: 5 days per 7 day period     PRESCREEN COMPARISON: I have reviewed the prescreen information and I have found no relevant changes between the preadmission screening and my post admission evaluation     RATIONALE FOR INPATIENT ADMISSION - Patient demonstrates the following: (check all that apply)  [X] Medically appropriate for rehabilitation admission  [X] Has attainable rehab goals with an appropriate initial discharge plan  [X] Has rehabilitation potential (expected to make a significant improvement within a reasonable period of time)   [X] Requires close medical managment by a rehab physician, rehab nursing care, Hospitalist and comprehensive interdisciplinary team (including PT, OT, & or SLP, Prosthetics and Orthotics)     MAHDI KELY is a 68 year old male with PMH of seizure, HTN, Parkinson's disease (Dx in 2012, wheelchair bound since 2020) and frequent falls; presented to [facility] on 4/17 for elective L craniotomy for meningioma resection with Dr. D'Amico. His procedure was tolerated well and was discharged to [facility] on 4/21. During transit to , he complained of incisional headache with episodes of nausea and vomiting requiring Zofran and Oxycodone, but his symptoms persisted. Upon arrival to , an RRT was called for worsening mental status and a CTH revealed an acute large left frontal hemorrhage with IVH, cerebral edema with midline shift and some effacement of L frontal horn. He was transferred back to [facility].  He underwent EVD placement on 4/22. He was extubated on 4/23, and placed on HFNC with eventual wean to NC.  His hospital course was complicated by RLL consolidation (s/p Zosyn), HTN, bradycardia (self-resolved), hyponatremia, NINFA, and L cheek herpetic lesion (requiring 7 day course of Valtrex). S/p EVD removal on 5/1. Patient now admitted for a multidisciplinary rehab program. 05-03-23    -------------    Rehab Management/MEDICAL MANAGEMENT     #Functional deficits s/p non-traumatic ICH  - Gait Instability, ADL impairments and Functional impairments: start Comprehensive Rehab Program of PT/OT/SLP  - 3 hours a day, 5 days a week  - P&O as needed   - CTH revealed L frontal lobe hemorrhage with intraventricular hemorrhage and increased size of R lateral ventricle  - S/p EVD placement on 4/22, removed on 5/1   - S/p Decadron taper x1 week  - Serial CTH stable to date, last 5/2, Lovenox started 5/2  - Continue Amantadine  - Sodium Chloride tabs to q8h, Na >135, taper    #HSV-1 Infection  - Left cheek lesion  - 7 day course Valtrex (4/28-5/5)  - F/u HIV testing    #PNA  - S/p Zosyn (4/22-4/27)   - Albuterol/Ipratropium Nebulizer prn  - Incentive Spirometer  - Chest PT  - Monitor secretions    #Parkinson's Disease  - Continue Sinemet 4x per day, Entacapone 200mg q8h  - Melatonin  - bowel regimen    #Seizures  - Keppra BID  -seizure precautions     #HTN  - Amlodipine  - Goal SBP: 100-160    #Skin  - Skin on admission: scalp incision healing well, well approximated  -pustular rash b/l armpits-ID eval requested  - Pressure injury/Skin: OOB to Chair, PT/OT     #Pain Mgmt   - Tylenol PRN  - Lidocaine patch  - Home: Gabapentin-on hold per NSx    #GI/Bowel Mgmt   - Continent c/w Senna, Miralax     #/Bladder Mgmt   -  PVR q8h, CIC>400cc    #FEN   - Diet - Soft & Bite Sized   - Dysphagia  SLP - evaluation and treatment    #Precautions / PROPHYLAXIS:   - Falls  - ortho: Weight bearing status: WBAT   - Lungs: Aspiration, Incentive Spirometer   - DVT: Lovenox  -------------------------------------------------------------  FOLLOW UP/OUTPATIENT:    D'Amico, Randy  [phone]    Dianelys Nguyen  [address] [facility]  [address]  [place] [address]  Phone: [phone]  Fax: (   )    -  Follow Up Time:    D'Amico, Randy  [facility]  NEUROSURG [address]  Scheduled Appointment: 05/10/2023  -------------------------------------------------------------  MEDICAL PROGNOSIS: GOOD                                   REHAB POTENTIAL: GOOD  ESTIMATED DISPOSITION: HOME                             ELOS: 10-14 Days   EXPECTED THERAPY:     P.T. 1hr/day       O.T. 1hr/day      S.L.P. 1hr/day      EXP FREQUENCY: 5 days per 7 day period     PRESCREEN COMPARISON: I have reviewed the prescreen information and I have found no relevant changes between the preadmission screening and my post admission evaluation     RATIONALE FOR INPATIENT ADMISSION - Patient demonstrates the following: (check all that apply)  [X] Medically appropriate for rehabilitation admission  [X] Has attainable rehab goals with an appropriate initial discharge plan  [X] Has rehabilitation potential (expected to make a significant improvement within a reasonable period of time)   [X] Requires close medical managment by a rehab physician, rehab nursing care, Hospitalist and comprehensive interdisciplinary team (including PT, OT, & or SLP, Prosthetics and Orthotics)

## 2023-05-03 NOTE — H&P ADULT - NSHPLABSRESULTS_GEN_ALL_CORE
LABS:                        13.0   6.31  )-----------( 186      ( 02 May 2023 04:44 )             39.2     05-02    139  |  107  |  16  ----------------------------<  118<H>  4.1   |  25  |  1.16    Ca    9.3      02 May 2023 04:44  Phos  3.6     05-02  Mg     2.2     05-02    IMAGING/RADIOLOGY:  CT HEAD (5/1)   IMPRESSION:  Since prior CT head 4/29/2023, stable ventricular caliber. Mild decrease  size of intraventricular hemorrhage. Otherwise no significant change..    CT HEAD (4/29) IMPRESSION:  Large left frontal hematoma is unchanged while intraventricular hemorrhage is decreasing.  Stable ventricular size and configuration; right transfrontal EVD tip does NOT appear intraventricular.    CHEST XRAY (4/24)  IMPRESSION:  New pulmonary venous congestion.  Interval extubation.  Enteric tube tip located in the region the gastric body. Confluent opacity over the left lower lung zone.  No pneumothorax.    CT HEAD (4/22)  IMPRESSION:  1.   Unchanged left frontal lobe hematoma with intraventricular hemorrhage extension, regional extra-axial hemorrhage and mass effect as noted.  2.   Unchanged white matter lesions that may be due to small vessel disease, prior radiation treatment or infectious/inflammatory disease.  Additional findings as described.    CT HEAD (4/22)   IMPRESSION:  1.   Grossly unchanged left frontal lobe intraparenchymal hematoma with intraventricular extension extra-axial hemorrhage and surrounding mass effect.  2.   Persistent 1 cm midline shift to the right.  3.   Stable ventricular size.  4.   Right-sided deviation of both anterior cerebral arteries due to mass effect. Otherwise unremarkable ACAs.  5.   Unremarkable CTA of the ovuaeo-ko-Gpbtio.    CT HEAD (4/21)    IMPRESSION:  Interval development of moderate amount of hemorrhage in the left frontal lobe post operative site with intraventricular extension and new mild increased size of the body of the right lateral ventricle. Slightly increased rightward midline shift anteriorly.  Postoperative changes left frontal craniotomy, residual vasogenic edema in the left frontal lobe and chronic microvascular changes similar to prior exam.    MR HEAD (4/18)   IMPRESSION:  1.   Since the prior study of 03/25/2023, status post left frontal craniotomy for resection of the left frontal lobe extra-axial mass. No evidence of residual tumor.  2. Associated postsurgical changes with focal restricted diffusion surrounding the surgical resection cavity compatible with devitalized brain parenchyma.      CARDIOLOGY:  TTE (4/22)   CONCLUSIONS:   1. Normal left and right ventricular size and systolic function.   2. Aortic sclerosis without significant stenosis.   3. Mild aortic regurgitation.   4. Pulmonary artery systolic pressure is 33 mmHg.   5. Nopericardial effusion.   6. Left pleural effusion.

## 2023-05-03 NOTE — PATIENT PROFILE ADULT - FALL HARM RISK - HARM RISK INTERVENTIONS

## 2023-05-03 NOTE — PROGRESS NOTE ADULT - ASSESSMENT
Neurosurgery    68 y o Male past medical hx seizures, HTN, poorly controlled Parkinson's disease w/ prog inability to walk since Nov 2022. Found to have large L frontal dural based lesion c/f meningioma w/ associated mass effect and edema. S/p L crani for meningioma resection (4/17). Discharged to [facility] on 4/21. On arrival to rehab c/o headache with persistent nausea/vomiting. CTH performed reveals new large left frontal IPH with IVH, with edema and midline shift. ICH 3. s/p bedside right frontal EVD 4/22, removed 5/1/23. Neuro stable, planning for discharge.       PLAN:  NEURO:  - neuro checks/vitals q4h   - plan for d/c today to [facility]   - s/p EVD removal 5/1  - Keppra 1 BID (in setting of seizures)  - EEG 5/1 negative  - s/p Decadron taper x 1 week   - Parkinsons: continue home sinemet (25/250 mg) 4 times/day, entacapone 200mg q8h  - amantadine 100 mg dialy  - CTH 4/24: no significant interval change, CTH 4/26 stable, 4/29 stable  - CTH 5/1 stable vents and improved IVH  - CTH 5/2 stable     CARDIOVASCULAR:  - -160  - amlodipine 10 mg qd   - TTE 4/22 mild aortic sclerosis w no stenosis, mild AR, left pleural effusion EF 71%     PULMONARY:  - nebs q6h PRN, chest PT   - re-educated on IS   - RA  - secretions improving     RENAL:  - normonatremia goal, salt tabs 1 q 6   - NS discontinued   - voiding     GI:  - soft and bite sized diet   - last BM 4/30   - bowel regimen     HEME:  - SCDs, SQL  - LE dopplers 4/22 neg     ID:  - Valtrex 4/28-5/5 for herpetic lesions L cheek; cx +HSV-1  - Zosyn (4/22-4/27) empiric PNA, RLL infiltrate  - f/u HIV testing   - f/u CSF PCR panel and cultures 4/30     ENDO:  - ISS, A1c 5.8     DISPOSITION:  - NSICU, full code, family updated   - PT/OT rec AR: pt can tolerate 3 hours of PT/OT daily   - bed at [facility], plan for discharge today 12:30PM     Assessment:  Present when checked    Discussed with Dr. D'Amico

## 2023-05-03 NOTE — H&P ADULT - ATTENDING COMMENTS
Seen and examined    68 year old male Hx  Seizure, HTN, Parkinson's disease (Dx in 2012, wheelchair bound since 2020) and frequent falls,   Recent elective Left craniotomy at [facility]  4/17  meningioma resection with Dr. D'Amico.   On T/F to acute rehab 4/21, Dxed with acute large left frontal hemorrhage with IVH, cerebral edema with midline shift and effacement of L frontal horn, Returned to [facility] and .underwent EVD placement on 4/22, post op complicated by  RLL consolidation (s/p Zosyn), HTN, bradycardia (self-resolved), hyponatremia, NINFA, and L cheek herpetic lesion (requiring 7 day course of Valtrex).. Serial CTH findings, last on 5/2, on Lovenox for dvt ppx  Acute Rehab admission to [facility] 5/3    Reports good family support, ambulatory without device and independent with ADLs prior to acute care admission  Now requiring mod/max assistance with ADLs  Has left sided weakness    Alert and awake, interactive during review  Scap surgical area, skin edges together  Dysarthric  Tolerating oral restricted diet  Masked facial expression with +ve Glabella tab  Normal LT sensation  MMT reduced motor strength left arm and leg  Mod rigidity and mild b/l hand tremors    RECENT LABS/IMAGING                        14.3   4.63  )-----------( 177      ( 04 May 2023 06:48 )             44.0     05-04    141  |  106  |  16  ----------------------------<  96  4.3   |  27  |  1.30    Ca    9.8      04 May 2023 06:48    TPro  7.2  /  Alb  3.3  /  TBili  0.6  /  DBili  x   /  AST  18  /  ALT  30  /  AlkPhos  55  05-04    DX, Non traumatic ICH, after Craniotomy for meningioma excision  - Serial CTH stable to date, last 5/2, Lovenox started 5/2  --Commence  Rehab Program of PT/OT/SLP    --Complete Valtrex treatment as recommended by ID  --Continue all supportive treatments--bowel regimen, analgesic treatment DVT PPX--Lovenox  --Advance directive to be clarified with family  --Monitor Na, and taper/d/c sodium tabs   Await collateral from family  Est dc to be decided at next IDT

## 2023-05-03 NOTE — PROGRESS NOTE ADULT - SUBJECTIVE AND OBJECTIVE BOX
S: patient seen bedisde with son present. Overall doing well. Had a headache yesterday which is improving. Son thinks Parkinson symptoms improving as well. Otherwise no complaints.     Vital Signs Last 24 Hrs  T(C): 37.2 (03 May 2023 09:17), Max: 37.5 (02 May 2023 17:40)  T(F): 98.9 (03 May 2023 09:17), Max: 99.5 (02 May 2023 17:40)  HR: 84 (03 May 2023 09:00) (56 - 84)  BP: 112/62 (03 May 2023 09:00) (107/57 - 147/93)  BP(mean): 83 (03 May 2023 09:00) (78 - 114)  RR: 20 (03 May 2023 09:00) (16 - 24)  SpO2: 98% (03 May 2023 09:00) (94% - 100%)    Parameters below as of 03 May 2023 09:00  Patient On (Oxygen Delivery Method): room air        PE:  General: NAD, laying in bed  HEENT: EOMI, NCAT  RESPIRATORY: no increased WOB, CTAB  CVS: RRR, S1S2  ABD: +BS, NT/ND  EXT: no pitting edema, legs w/ b/l boots  Neuro: alert and oriented to person, place, time  Psych: pleasant, cooperative                              13.0   6.31  )-----------( 186      ( 02 May 2023 04:44 )             39.2   05-02    139  |  107  |  16  ----------------------------<  118<H>  4.1   |  25  |  1.16    Ca    9.3      02 May 2023 04:44  Phos  3.6     05-02  Mg     2.2     05-02

## 2023-05-03 NOTE — H&P ADULT - NSHPPHYSICALEXAM_GEN_ALL_CORE
General: NAD in bed  HENT: PERRL, EOM grossly in tact. 1 cm, erythematous, non-purulent erosion- herpetic lesion on L cheek. No blisters. No facial droop.   Cardiac: Regular rate and rhythm. No murmurs.  Pulm:  on RA. clear, No rhonchi, wheezes, no cough.  Abd: Soft, non-tender, rounded. +BS  Neuro: A&O to self only. Dysarthria, hypophonia, Stuttering speech, perseverates. Dysphagia. Able to repeat, and names with delay. Impaired attention, memory. Follows simple commands.   RUE 5/5, LUE 4+/5. Resting tremor, left worse than right. Some cogwheeling.  Bilateral hip flex/extend 2/5, Bilateral knee flex/extend 2/5, dorsi/plantarflexion 4/5    Sensation grossly intact to light touch  Extremities: Skin is warm, perfused General: NAD in bed  HENT: PERRL, EOM grossly in tact. 1 cm, erythematous, non-purulent erosion- herpetic lesion on L cheek. No blisters. No facial droop.   Cardiac: Regular rate and rhythm. No murmurs.  Pulm:  on RA. clear, No rhonchi, wheezes, no cough.  Abd: Soft, non-tender, rounded. +BS  Neuro: A&O to self only. Dysarthria, hypophonia, Stuttering speech, perseverates. Dysphagia. Able to repeat, and names with delay. Impaired attention, memory. Follows simple commands.   RUE 5/5, LUE 4+/5. Resting tremor, left worse than right. Some cogwheeling.  Bilateral hip flex/extend 2/5, Bilateral knee flex/extend 2/5, dorsi/plantarflexion 4/5    Sensation grossly intact to light touch  Extremities: Skin is warm, perfused. Pustular  rash b/l armpits, erosion cheek

## 2023-05-03 NOTE — PROCEDURE NOTE - NSSITEPREP_SKIN_A_CORE
chlorhexidine
chlorhexidine/povidone iodine (if allergic to chlorhexidine)
chlorhexidine/Adherence to aseptic technique: hand hygiene prior to donning barriers (gown, gloves), don cap and mask, sterile drape over patient

## 2023-05-03 NOTE — PROGRESS NOTE ADULT - PROVIDER SPECIALTY LIST ADULT
NSICU
Neurosurgery
NSICU
Neurosurgery
Rehab Medicine
Rehab Medicine
NSICU
NSICU
Neurosurgery
Rehab Medicine
Hospitalist
NSICU

## 2023-05-03 NOTE — PROCEDURE NOTE - GENERAL PROCEDURE DETAILS
site was cleaned with Chloraprep, 10 staples removed. Site was then cleaned again with Chloraprep and inspected for remaining staples.

## 2023-05-03 NOTE — DISCHARGE NOTE NURSING/CASE MANAGEMENT/SOCIAL WORK - NSDCFUADDAPPT_GEN_ALL_CORE_FT
Please follow up with Dr. D'Amico, call the office to confirm appointment at [phone]    Please follow up with Dr. Nguyen from Neurology, call the office to schedule an appointment at [phone]    Please follow up with your primary care doctor outpatient

## 2023-05-03 NOTE — H&P ADULT - NSHPPOACENTRALVENOUSCATHETER_GEN_ALL_CORE
no
Patient will ambulate x 75 feet with RW independently in 2 weeks in order to safely navigate home

## 2023-05-03 NOTE — DISCHARGE NOTE NURSING/CASE MANAGEMENT/SOCIAL WORK - NSDCPEFALRISK_GEN_ALL_CORE
For information on Fall & Injury Prevention, visit: [email or web address] OR  [email or web address] OR  https://www.cdc.gov/steadi/patient.html

## 2023-05-03 NOTE — PROGRESS NOTE ADULT - SUBJECTIVE AND OBJECTIVE BOX
HPI:  Patient is a 68 year old male with PMH of seizures, HTN, and Parkinson's disease diagnosed in 2012 with multiple falls who initially presented to [facility] on 4/17 for elective left craniotomy for meningioma resection. He has also been wheelchair bound since about 2-3 years ago and has frequent falls related to his Parkinson's disease.  Underwent L crani for meningioma resection on 4/17. Hospital course was uncomplicated.   Discharged to [facility] on 4/21 in stable condition.     On arrival to rehab, pt c/o incisional headache with episodes of n/v during ambulance ride to rehab.  Pt given zofran and oxycodone, however, headache returned with persistent nausea and vomiting.   RRT called for worsened mental status. CT head obtained stat which showed new large left frontal hemorrhage with IVH, cerebral edema with midline shift and some effacement of L frontal horn.  Pt started on cardene, given Keppra and transferred to [facility]. Prior to transfer, mental status declined (GCS 13-> 9) with increased secretions, therefore attempted intubation; LMA airway placed.      ICH 3   NIHSS 23 (22 Apr 2023 06:28)    INTERVAL EVENTS:      HOSPITAL COURSE:  4/17: POD 0 L crani for tumor resection.   4/18: POD1 L crani tumor resection. Norvasc increased to 5mg daily. Neuro exam stable. Neurology consulted, reocmmended maintaining current parkinson's medication regimen. Had asymptomatic episode of VTACH read on telemetry, hemodynamically stable, returned back to baseline, EKG obtained negative. Cardiology consulted, reported rhythm strip was sinus, likely artifact from tremors in arms, NTD. Pt c/o worsened difficulty breathing, satting well on RA. Given duoneb treatment and CXR completed.  Postop MRI completed in evening.   4/19: POD2, SIRIA overnight,  neuro stable, SLP state may benefit from outpt speech therapy will continue to follow  4/20: POD3, SIRIA overnight. per s/s: soft/bite size diet w/ thin liquids.  4/21: POD4, SIRIA overnight, discahrge Kiran Evans AR    DISCHARGED TO [place] on 4/21:    4/22: readmitted with new large left frontal IPH with IVH, with edema and midline shift with large left frontal IPH. EVD placed, opened at 10. CTH post EVD complete. Archan[place] sump placed for GI decompression. Started 3%@80 for na goal 145-150. KUB showing small loops of gas, will start TF tomorrow. TTE mild aortic sclerosis w/ no stenosis, mild AR, left pleural effusion EF 71%. LE dopplers negative.  4/23: POD 6. EVD @ 10. POCUS with RLL consolidation, trace B lines, L lung clear, collapsable IVC. 250cc albumin and 500cc NS bolus x2 in setting of decreasing UOP. Started on SQL 40 BID, weight based. Off propofol, on CPAP 8/5, plan to extubate. Removed matthew, f/u TOV. Removed a-line. Extubated to HFNC. Decreased 3% to 30. Passed TOV.   4/24: POD 7. Tolerating HFNC. Start TF today. Na 155, stopped 3%, fentanyl d/c'd, CT stable. Trickle feeds started. PT/OT recommending AR. C/o headache, given tramadol  with resolution. S&S recommend pureed diet with thin liquids. Weaning to 6L NC. Na 155-->144, gave 225cc 3% bolus, Na-->147.   4/25: POD 8. Cont 3% at 30. EVD remains at 10. Tolerating PO. Given hydralazine 10mg x 1 for SBP 180s. Bradycardic to 53 resolved on own. Dc'd 3%, started salt tabs 2q8. Sodium goal 140-145. Sputum culture from 4/22: normal respiratory blank. Repeat 145. F/u AM sodium. ProBNP 1492. Pt removed NGT. Tolerating pureed diet.   4/26: POD 9. EVD @ 10. Nebs made PRN, amlodipine increased to 10mg. Rec'd for soft and bite size diet, CTH today with no significant interval change. EVD clamp trial ICPS hovering around 18, will monitor clinically and open if neuro change or headache, pro-bnp 1387 downtrending.   4/27: POD10. ICPs sustained 22-26 x 15 min, opened EVD. Finished course of zosyn for empiric pneumonia treatment.   4/28: POD11, Neuro stable, EVD clamped this morning, ICPs hovering higher than 20, neuro exam remains stable, denies HA, will monitor based on clinical/neuro status during clamp trial. Plan for CTH Sunday if tolerating clamp trial. F/u neurology regarding entacapone dosing. Viral PCR swab sent of herpetic lesions L cheek, f/u results.   4/29: POD12, SIRIA overnight EVD remains clamped, ICPs intermittently elevated but patient remains neuro stable and without headaches. NS at 100 started for rising Cr level. repeat CTH when clamped x 24 hrs in AM with stable MLS and mass effect. NS lowered to 75 for positive fluid status. 3 BMs today, bowel regimen dc'd.   4/30: POD13. SIRIA overnight, EVD remains clamped, ICPs <20, neuro stable, no headaches. Cont valtrex x7d. . IVF dc'd. CSF PCR panel and culture sent. EEG ordered. 250cc 3% bolus given. Protonix dc'd.  5/1: POD 14. SIRIA o/n. Salt tabs made 1 q 6. CTH showing stable vent size and improved IVH. EVD removed. EEG dc'd. Started on NS @60 while on valcyclovir  5/2: POD15. SIRIA o/n neuro stable. CTH stable, restarting SQL. started on amantadine 100 qd.   5/3: POD 16. tx to SDU.     Vital Signs Last 24 Hrs  T(C): 37.3 (03 May 2023 04:22), Max: 37.5 (02 May 2023 17:40)  T(F): 99.1 (03 May 2023 04:22), Max: 99.5 (02 May 2023 17:40)  HR: 84 (03 May 2023 09:00) (56 - 84)  BP: 112/62 (03 May 2023 09:00) (107/57 - 147/93)  BP(mean): 83 (03 May 2023 09:00) (78 - 114)  RR: 20 (03 May 2023 09:00) (16 - 24)  SpO2: 98% (03 May 2023 09:00) (94% - 100%)    Parameters below as of 03 May 2023 09:00  Patient On (Oxygen Delivery Method): room air      I&O's Summary    02 May 2023 07:01  -  03 May 2023 07:00  --------------------------------------------------------  IN: 2420 mL / OUT: 2475 mL / NET: -55 mL        PHYSICAL EXAM:  General: NAD. Sitting comfortably in bed eating breakfast w help from son.   HENT: PERRL, 3mm b/l. EOM grossly in tact. Visual acuity not assessed. 1 cm, erythematous, non-purulent herpetic lesion on L cheek. No facial droop.   Cardiac: Regular rate and rhythm. S1, S2 appreciated. No murmurs, gallops, rubs.   Pulm: Breathing comfortably on RA. CTAB. No rhonchi, wheezes, rales.   Abd: Soft, non-tender, non-distended. +BS x 4 quadrants.   Neuro: See HENT. CN II-XII grossly intact.   RUE 5/5, LUE HG 1/5, otherwise 2    Extremities:         TUBES/LINES:  [] Matthew  [] Trach  [] NGT  [] PEG  [] Wound Drains  [] Others    DIET:  [] NPO  [] Mechanical  [] Tube feeds    LABS:                        13.0   6.31  )-----------( 186      ( 02 May 2023 04:44 )             39.2     05-02    139  |  107  |  16  ----------------------------<  118<H>  4.1   |  25  |  1.16    Ca    9.3      02 May 2023 04:44  Phos  3.6     05-02  Mg     2.2     05-02              CAPILLARY BLOOD GLUCOSE      POCT Blood Glucose.: 94 mg/dL (03 May 2023 06:24)  POCT Blood Glucose.: 126 mg/dL (02 May 2023 23:42)      Drug Levels: [] N/A    CSF Analysis: [] N/A      Allergies    No Known Allergies    Intolerances      MEDICATIONS:  Antibiotics:  valACYclovir 1000 milliGRAM(s) Oral every 12 hours    Neuro:  acetaminophen   Oral Liquid .. 650 milliGRAM(s) Oral every 6 hours PRN  amantadine Syrup 100 milliGRAM(s) Oral every 24 hours  carbidopa/levodopa  25/250 1 Tablet(s) Oral <User Schedule>  entacapone 200 milliGRAM(s) Oral <User Schedule>  levETIRAcetam 1000 milliGRAM(s) Oral two times a day  ondansetron Injectable 4 milliGRAM(s) IV Push every 6 hours PRN    Anticoagulation:  enoxaparin Injectable 40 milliGRAM(s) SubCutaneous every 24 hours    OTHER:  acetylcysteine 20%  Inhalation 4 milliLiter(s) Inhalation every 6 hours PRN  albuterol/ipratropium for Nebulization 3 milliLiter(s) Nebulizer every 6 hours PRN  amLODIPine   Tablet 10 milliGRAM(s) Oral daily  artificial  tears Solution 1 Drop(s) Both EYES every 4 hours  lidocaine   4% Patch 1 Patch Transdermal every 24 hours  polyethylene glycol 3350 17 Gram(s) Oral daily  senna 2 Tablet(s) Oral at bedtime    IVF:  sodium chloride 1 Gram(s) Oral every 6 hours  sodium chloride 0.9%. 1000 milliLiter(s) IV Continuous <Continuous>    CULTURES:  Culture Results:   No growth to date. (04-30 @ 13:22)  Culture Results:   Normal Respiratory Blank present (04-22 @ 06:20)    RADIOLOGY & ADDITIONAL TESTS:      ASSESSMENT:  68y Male s/p    INTRACRANIAL HEMORRHAGE    D32.9    Handoff    MEWS Score    Parkinsons    HTN (hypertension)    History of seizures    Brain mass    Impaired gait    Meningioma    History of insomnia    Intracerebral hemorrhage    Intracerebral hemorrhage    Insertion of intravenous catheter with ultrasound guidance    No significant past surgical history    Room Service Assist    SysAdmin_VstLnk        PLAN:  NEURO:    CARDIOVASCULAR:    PULMONARY:    RENAL:    GI:    HEME:    ID:    ENDO:    DVT PROPHYLAXIS:  [] Venodynes                                [] Heparin/Lovenox    DISPOSITION:    Assessment:  Present when checked    []  GCS  E   V  M     Heart Failure: []Acute, [] acute on chronic , []chronic  Heart Failure:  [] Diastolic (HFpEF), [] Systolic (HFrEF), []Combined (HFpEF and HFrEF), [] RHF, [] Pulm HTN, [] Other    [] NINFA, [] ATN, [] AIN, [] other  [] CKD1, [] CKD2, [] CKD 3, [] CKD 4, [] CKD 5, []ESRD    Encephalopathy: [] Metabolic, [] Hepatic, [] toxic, [] Neurological, [] Other    Abnormal Nurtitional Status: [] malnurtition (see nutrition note), [ ]underweight: BMI < 19, [] morbid obesity: BMI >40, [] Cachexia    [] Sepsis  [] hypovolemic shock,[] cardiogenic shock, [] hemorrhagic shock, [] neuogenic shock  [] Acute Respiratory Failure  []Cerebral edema, [] Brain compression/ herniation,   [] Functional quadriplegia  [] Acute blood loss anemia   HPI:  Patient is a 68 year old male with PMH of seizures, HTN, and Parkinson's disease diagnosed in 2012 with multiple falls who initially presented to [facility] on 4/17 for elective left craniotomy for meningioma resection. He has also been wheelchair bound since about 2-3 years ago and has frequent falls related to his Parkinson's disease.  Underwent L crani for meningioma resection on 4/17. Hospital course was uncomplicated.   Discharged to [facility] on 4/21 in stable condition.     On arrival to rehab, pt c/o incisional headache with episodes of n/v during ambulance ride to rehab.  Pt given zofran and oxycodone, however, headache returned with persistent nausea and vomiting.   RRT called for worsened mental status. CT head obtained stat which showed new large left frontal hemorrhage with IVH, cerebral edema with midline shift and some effacement of L frontal horn.  Pt started on cardene, given Keppra and transferred to [facility]. Prior to transfer, mental status declined (GCS 13-> 9) with increased secretions, therefore attempted intubation; LMA airway placed.      ICH 3   NIHSS 23 (22 Apr 2023 06:28)    INTERVAL EVENTS:      HOSPITAL COURSE:  4/17: POD 0 L crani for tumor resection.   4/18: POD1 L crani tumor resection. Norvasc increased to 5mg daily. Neuro exam stable. Neurology consulted, reocmmended maintaining current parkinson's medication regimen. Had asymptomatic episode of VTACH read on telemetry, hemodynamically stable, returned back to baseline, EKG obtained negative. Cardiology consulted, reported rhythm strip was sinus, likely artifact from tremors in arms, NTD. Pt c/o worsened difficulty breathing, satting well on RA. Given duoneb treatment and CXR completed.  Postop MRI completed in evening.   4/19: POD2, SIRIA overnight,  neuro stable, SLP state may benefit from outpt speech therapy will continue to follow  4/20: POD3, SIRIA overnight. per s/s: soft/bite size diet w/ thin liquids.  4/21: POD4, SIRIA overnight, discahrge Kiran Evans AR    DISCHARGED TO [place] on 4/21:    4/22: readmitted with new large left frontal IPH with IVH, with edema and midline shift with large left frontal IPH. EVD placed, opened at 10. CTH post EVD complete. Archana sump placed for GI decompression. Started 3%@80 for na goal 145-150. KUB showing small loops of gas, will start TF tomorrow. TTE mild aortic sclerosis w/ no stenosis, mild AR, left pleural effusion EF 71%. LE dopplers negative.  4/23: POD 6. EVD @ 10. POCUS with RLL consolidation, trace B lines, L lung clear, collapsable IVC. 250cc albumin and 500cc NS bolus x2 in setting of decreasing UOP. Started on SQL 40 BID, weight based. Off propofol, on CPAP 8/5, plan to extubate. Removed matthew, f/u TOV. Removed a-line. Extubated to HFNC. Decreased 3% to 30. Passed TOV.   4/24: POD 7. Tolerating HFNC. Start TF today. Na 155, stopped 3%, fentanyl d/c'd, CT stable. Trickle feeds started. PT/OT recommending AR. C/o headache, given tramadol  with resolution. S&S recommend pureed diet with thin liquids. Weaning to 6L NC. Na 155-->144, gave 225cc 3% bolus, Na-->147.   4/25: POD 8. Cont 3% at 30. EVD remains at 10. Tolerating PO. Given hydralazine 10mg x 1 for SBP 180s. Bradycardic to 53 resolved on own. Dc'd 3%, started salt tabs 2q8. Sodium goal 140-145. Sputum culture from 4/22: normal respiratory blank. Repeat 145. F/u AM sodium. ProBNP 1492. Pt removed NGT. Tolerating pureed diet.   4/26: POD 9. EVD @ 10. Nebs made PRN, amlodipine increased to 10mg. Rec'd for soft and bite size diet, CTH today with no significant interval change. EVD clamp trial ICPS hovering around 18, will monitor clinically and open if neuro change or headache, pro-bnp 1387 downtrending.   4/27: POD10. ICPs sustained 22-26 x 15 min, opened EVD. Finished course of zosyn for empiric pneumonia treatment.   4/28: POD11, Neuro stable, EVD clamped this morning, ICPs hovering higher than 20, neuro exam remains stable, denies HA, will monitor based on clinical/neuro status during clamp trial. Plan for CTH Sunday if tolerating clamp trial. F/u neurology regarding entacapone dosing. Viral PCR swab sent of herpetic lesions L cheek, f/u results.   4/29: POD12, SIRIA overnight EVD remains clamped, ICPs intermittently elevated but patient remains neuro stable and without headaches. NS at 100 started for rising Cr level. repeat CTH when clamped x 24 hrs in AM with stable MLS and mass effect. NS lowered to 75 for positive fluid status. 3 BMs today, bowel regimen dc'd.   4/30: POD13. SIRIA overnight, EVD remains clamped, ICPs <20, neuro stable, no headaches. Cont valtrex x7d. . IVF dc'd. CSF PCR panel and culture sent. EEG ordered. 250cc 3% bolus given. Protonix dc'd.  5/1: POD 14. SIRIA o/n. Salt tabs made 1 q 6. CTH showing stable vent size and improved IVH. EVD removed. EEG dc'd. Started on NS @60 while on valcyclovir  5/2: POD15. SIRIA o/n neuro stable. CTH stable, restarting SQL. started on amantadine 100 qd.   5/3: POD 16. tx to SDU.     Vital Signs Last 24 Hrs  T(C): 37.3 (03 May 2023 04:22), Max: 37.5 (02 May 2023 17:40)  T(F): 99.1 (03 May 2023 04:22), Max: 99.5 (02 May 2023 17:40)  HR: 84 (03 May 2023 09:00) (56 - 84)  BP: 112/62 (03 May 2023 09:00) (107/57 - 147/93)  BP(mean): 83 (03 May 2023 09:00) (78 - 114)  RR: 20 (03 May 2023 09:00) (16 - 24)  SpO2: 98% (03 May 2023 09:00) (94% - 100%)    Parameters below as of 03 May 2023 09:00  Patient On (Oxygen Delivery Method): room air      I&O's Summary    02 May 2023 07:01  -  03 May 2023 07:00  --------------------------------------------------------  IN: 2420 mL / OUT: 2475 mL / NET: -55 mL        PHYSICAL EXAM:  General: NAD. Sitting comfortably in bed eating breakfast w help from son.   HENT: PERRL, 3mm b/l. EOM grossly in tact. Visual acuity not assessed. 1 cm, erythematous, non-purulent herpetic lesion on L cheek. No facial droop.   Cardiac: Regular rate and rhythm. S1, S2 appreciated. No murmurs, gallops, rubs.   Pulm: Breathing comfortably on RA. CTAB. No rhonchi, wheezes, rales.   Abd: Soft, non-tender, non-distended. +BS x 4 quadrants.   Neuro: See HENT. CN II-XII grossly intact.   RUE 5/5, LUE 2/5, L HG 1/5  RLE     Extremities:         TUBES/LINES:  [] Matthew  [] Trach  [] NGT  [] PEG  [] Wound Drains  [] Others    DIET:  [] NPO  [] Mechanical  [] Tube feeds    LABS:                        13.0   6.31  )-----------( 186      ( 02 May 2023 04:44 )             39.2     05-02    139  |  107  |  16  ----------------------------<  118<H>  4.1   |  25  |  1.16    Ca    9.3      02 May 2023 04:44  Phos  3.6     05-02  Mg     2.2     05-02              CAPILLARY BLOOD GLUCOSE      POCT Blood Glucose.: 94 mg/dL (03 May 2023 06:24)  POCT Blood Glucose.: 126 mg/dL (02 May 2023 23:42)      Drug Levels: [] N/A    CSF Analysis: [] N/A      Allergies    No Known Allergies    Intolerances      MEDICATIONS:  Antibiotics:  valACYclovir 1000 milliGRAM(s) Oral every 12 hours    Neuro:  acetaminophen   Oral Liquid .. 650 milliGRAM(s) Oral every 6 hours PRN  amantadine Syrup 100 milliGRAM(s) Oral every 24 hours  carbidopa/levodopa  25/250 1 Tablet(s) Oral <User Schedule>  entacapone 200 milliGRAM(s) Oral <User Schedule>  levETIRAcetam 1000 milliGRAM(s) Oral two times a day  ondansetron Injectable 4 milliGRAM(s) IV Push every 6 hours PRN    Anticoagulation:  enoxaparin Injectable 40 milliGRAM(s) SubCutaneous every 24 hours    OTHER:  acetylcysteine 20%  Inhalation 4 milliLiter(s) Inhalation every 6 hours PRN  albuterol/ipratropium for Nebulization 3 milliLiter(s) Nebulizer every 6 hours PRN  amLODIPine   Tablet 10 milliGRAM(s) Oral daily  artificial  tears Solution 1 Drop(s) Both EYES every 4 hours  lidocaine   4% Patch 1 Patch Transdermal every 24 hours  polyethylene glycol 3350 17 Gram(s) Oral daily  senna 2 Tablet(s) Oral at bedtime    IVF:  sodium chloride 1 Gram(s) Oral every 6 hours  sodium chloride 0.9%. 1000 milliLiter(s) IV Continuous <Continuous>    CULTURES:  Culture Results:   No growth to date. (04-30 @ 13:22)  Culture Results:   Normal Respiratory Blank present (04-22 @ 06:20)    RADIOLOGY & ADDITIONAL TESTS:      ASSESSMENT:  68y Male s/p    INTRACRANIAL HEMORRHAGE    D32.9    Handoff    MEWS Score    Parkinsons    HTN (hypertension)    History of seizures    Brain mass    Impaired gait    Meningioma    History of insomnia    Intracerebral hemorrhage    Intracerebral hemorrhage    Insertion of intravenous catheter with ultrasound guidance    No significant past surgical history    Room Service Assist    SysAdmin_VstLnk        PLAN:  NEURO:    CARDIOVASCULAR:    PULMONARY:    RENAL:    GI:    HEME:    ID:    ENDO:    DVT PROPHYLAXIS:  [] Venodynes                                [] Heparin/Lovenox    DISPOSITION:    Assessment:  Present when checked    []  GCS  E   V  M     Heart Failure: []Acute, [] acute on chronic , []chronic  Heart Failure:  [] Diastolic (HFpEF), [] Systolic (HFrEF), []Combined (HFpEF and HFrEF), [] RHF, [] Pulm HTN, [] Other    [] NINFA, [] ATN, [] AIN, [] other  [] CKD1, [] CKD2, [] CKD 3, [] CKD 4, [] CKD 5, []ESRD    Encephalopathy: [] Metabolic, [] Hepatic, [] toxic, [] Neurological, [] Other    Abnormal Nurtitional Status: [] malnurtition (see nutrition note), [ ]underweight: BMI < 19, [] morbid obesity: BMI >40, [] Cachexia    [] Sepsis  [] hypovolemic shock,[] cardiogenic shock, [] hemorrhagic shock, [] neuogenic shock  [] Acute Respiratory Failure  []Cerebral edema, [] Brain compression/ herniation,   [] Functional quadriplegia  [] Acute blood loss anemia   HPI:  Patient is a 68 year old male with PMH of seizures, HTN, and Parkinson's disease diagnosed in 2012 with multiple falls who initially presented to [facility] on 4/17 for elective left craniotomy for meningioma resection. He has also been wheelchair bound since about 2-3 years ago and has frequent falls related to his Parkinson's disease.  Underwent L crani for meningioma resection on 4/17. Hospital course was uncomplicated.   Discharged to [facility] on 4/21 in stable condition.     On arrival to rehab, pt c/o incisional headache with episodes of n/v during ambulance ride to rehab.  Pt given zofran and oxycodone, however, headache returned with persistent nausea and vomiting.   RRT called for worsened mental status. CT head obtained stat which showed new large left frontal hemorrhage with IVH, cerebral edema with midline shift and some effacement of L frontal horn.  Pt started on cardene, given Keppra and transferred to [facility]. Prior to transfer, mental status declined (GCS 13-> 9) with increased secretions, therefore attempted intubation; LMA airway placed.      ICH 3   NIHSS 23 (22 Apr 2023 06:28)    INTERVAL EVENTS: No acute events overnight. Neuro stable. Denies headache, nausea/vomiting, changes in vision, new onset weakness/decreased sensation, chest pain, shortness of breath.     HOSPITAL COURSE:  4/17: POD 0 L crani for tumor resection.   4/18: POD1 L crani tumor resection. Norvasc increased to 5mg daily. Neuro exam stable. Neurology consulted, reocmmended maintaining current parkinson's medication regimen. Had asymptomatic episode of VTACH read on telemetry, hemodynamically stable, returned back to baseline, EKG obtained negative. Cardiology consulted, reported rhythm strip was sinus, likely artifact from tremors in arms, NTD. Pt c/o worsened difficulty breathing, satting well on RA. Given duoneb treatment and CXR completed.  Postop MRI completed in evening.   4/19: POD2, SIRIA overnight,  neuro stable, SLP state may benefit from outpt speech therapy will continue to follow  4/20: POD3, SIRIA overnight. per s/s: soft/bite size diet w/ thin liquids.  4/21: POD4, SIRIA overnight, discahrge Kiran Evans AR    DISCHARGED TO [place] on 4/21:    4/22: readmitted with new large left frontal IPH with IVH, with edema and midline shift with large left frontal IPH. EVD placed, opened at 10. CTH post EVD complete. [place] sump placed for GI decompression. Started 3%@80 for na goal 145-150. KUB showing small loops of gas, will start TF tomorrow. TTE mild aortic sclerosis w/ no stenosis, mild AR, left pleural effusion EF 71%. LE dopplers negative.  4/23: POD 6. EVD @ 10. POCUS with RLL consolidation, trace B lines, L lung clear, collapsable IVC. 250cc albumin and 500cc NS bolus x2 in setting of decreasing UOP. Started on SQL 40 BID, weight based. Off propofol, on CPAP 8/5, plan to extubate. Removed matthew, f/u TOV. Removed a-line. Extubated to HFNC. Decreased 3% to 30. Passed TOV.   4/24: POD 7. Tolerating HFNC. Start TF today. Na 155, stopped 3%, fentanyl d/c'd, CT stable. Trickle feeds started. PT/OT recommending AR. C/o headache, given tramadol  with resolution. S&S recommend pureed diet with thin liquids. Weaning to 6L NC. Na 155-->144, gave 225cc 3% bolus, Na-->147.   4/25: POD 8. Cont 3% at 30. EVD remains at 10. Tolerating PO. Given hydralazine 10mg x 1 for SBP 180s. Bradycardic to 53 resolved on own. Dc'd 3%, started salt tabs 2q8. Sodium goal 140-145. Sputum culture from 4/22: normal respiratory blank. Repeat 145. F/u AM sodium. ProBNP 1492. Pt removed NGT. Tolerating pureed diet.   4/26: POD 9. EVD @ 10. Nebs made PRN, amlodipine increased to 10mg. Rec'd for soft and bite size diet, CTH today with no significant interval change. EVD clamp trial ICPS hovering around 18, will monitor clinically and open if neuro change or headache, pro-bnp 1387 downtrending.   4/27: POD10. ICPs sustained 22-26 x 15 min, opened EVD. Finished course of zosyn for empiric pneumonia treatment.   4/28: POD11, Neuro stable, EVD clamped this morning, ICPs hovering higher than 20, neuro exam remains stable, denies HA, will monitor based on clinical/neuro status during clamp trial. Plan for CTH Sunday if tolerating clamp trial. F/u neurology regarding entacapone dosing. Viral PCR swab sent of herpetic lesions L cheek, f/u results.   4/29: POD12, SIRIA overnight EVD remains clamped, ICPs intermittently elevated but patient remains neuro stable and without headaches. NS at 100 started for rising Cr level. repeat CTH when clamped x 24 hrs in AM with stable MLS and mass effect. NS lowered to 75 for positive fluid status. 3 BMs today, bowel regimen dc'd.   4/30: POD13. SIRIA overnight, EVD remains clamped, ICPs <20, neuro stable, no headaches. Cont valtrex x7d. . IVF dc'd. CSF PCR panel and culture sent. EEG ordered. 250cc 3% bolus given. Protonix dc'd.  5/1: POD 14. SIRIA o/n. Salt tabs made 1 q 6. CTH showing stable vent size and improved IVH. EVD removed. EEG dc'd. Started on NS @60 while on valcyclovir  5/2: POD15. SIRIA o/n neuro stable. CTH stable, restarting SQL. started on amantadine 100 qd.   5/3: POD 16. tx to SDU.     Vital Signs Last 24 Hrs  T(C): 37.3 (03 May 2023 04:22), Max: 37.5 (02 May 2023 17:40)  T(F): 99.1 (03 May 2023 04:22), Max: 99.5 (02 May 2023 17:40)  HR: 84 (03 May 2023 09:00) (56 - 84)  BP: 112/62 (03 May 2023 09:00) (107/57 - 147/93)  BP(mean): 83 (03 May 2023 09:00) (78 - 114)  RR: 20 (03 May 2023 09:00) (16 - 24)  SpO2: 98% (03 May 2023 09:00) (94% - 100%)    Parameters below as of 03 May 2023 09:00  Patient On (Oxygen Delivery Method): room air      I&O's Summary    02 May 2023 07:01  -  03 May 2023 07:00  --------------------------------------------------------  IN: 2420 mL / OUT: 2475 mL / NET: -55 mL        PHYSICAL EXAM:  General: NAD. Sitting comfortably in bed eating breakfast with help from son.   HENT: PERRL, 3mm b/l. EOM grossly in tact. Visual acuity not assessed. 1 cm, erythematous, non-purulent herpetic lesion on L cheek. No facial droop.   Cardiac: Regular rate and rhythm. S1, S2 appreciated. No murmurs, gallops, rubs.   Pulm: Breathing comfortably on RA. CTAB. No rhonchi, wheezes, rales.   Abd: Soft, non-tender, non-distended. +BS x 4 quadrants.   Neuro: A&O x2 with prompts. Stuttering but speech coherent. Affect appropriate.  See HENT. CN II-XII grossly intact.   RUE 5/5, LUE 4+/5. Bilateral tremors, left worse than right.   Bilateral hip flex/extend 3/5  Bilateral knee flex/extend, dorsi/plantarflexion 5/5    Sensation intact to light touch throughout   Extremities: Skin is warm and dry. 1+ pedal edema. DP 2+ bilaterally.     LABS:                        13.0   6.31  )-----------( 186      ( 02 May 2023 04:44 )             39.2     05-02    139  |  107  |  16  ----------------------------<  118<H>  4.1   |  25  |  1.16    Ca    9.3      02 May 2023 04:44  Phos  3.6     05-02  Mg     2.2     05-02      CAPILLARY BLOOD GLUCOSE      POCT Blood Glucose.: 94 mg/dL (03 May 2023 06:24)  POCT Blood Glucose.: 126 mg/dL (02 May 2023 23:42)    Allergies    No Known Allergies    Intolerances      MEDICATIONS:  Antibiotics:  valACYclovir 1000 milliGRAM(s) Oral every 12 hours    Neuro:  acetaminophen   Oral Liquid .. 650 milliGRAM(s) Oral every 6 hours PRN  amantadine Syrup 100 milliGRAM(s) Oral every 24 hours  carbidopa/levodopa  25/250 1 Tablet(s) Oral <User Schedule>  entacapone 200 milliGRAM(s) Oral <User Schedule>  levETIRAcetam 1000 milliGRAM(s) Oral two times a day  ondansetron Injectable 4 milliGRAM(s) IV Push every 6 hours PRN    Anticoagulation:  enoxaparin Injectable 40 milliGRAM(s) SubCutaneous every 24 hours    OTHER:  acetylcysteine 20%  Inhalation 4 milliLiter(s) Inhalation every 6 hours PRN  albuterol/ipratropium for Nebulization 3 milliLiter(s) Nebulizer every 6 hours PRN  amLODIPine   Tablet 10 milliGRAM(s) Oral daily  artificial  tears Solution 1 Drop(s) Both EYES every 4 hours  lidocaine   4% Patch 1 Patch Transdermal every 24 hours  polyethylene glycol 3350 17 Gram(s) Oral daily  senna 2 Tablet(s) Oral at bedtime    IVF:  sodium chloride 1 Gram(s) Oral every 6 hours  sodium chloride 0.9%. 1000 milliLiter(s) IV Continuous <Continuous>    CULTURES:  Culture Results:   No growth to date. (04-30 @ 13:22)  Culture Results:   Normal Respiratory Blank present (04-22 @ 06:20)    RADIOLOGY & ADDITIONAL TESTS:  CT Head 5/2 Impression:  Since prior CT head, removal of right frontal EVD with stable ventricular caliber.      ASSESSMENT:  68y Male past medical hx seizures, HTN, poorly controlled Parkinson's disease w/ prog inability to walk since Nov 2022. Found to have large L frontal dural based lesion c/f meningioma w/ associated mass effect and edema. S/p L crani for meningioma resection (4/17). Discharged to [facility] on 4/21. On arrival to rehab c/o headache with persistent nausea/vomiting. CTH performed reveals new large left frontal IPH with IVH, with edema and midline shift. ICH 3. s/p bedside right frontal EVD 4/22, removed 5/1/23. Neuro stable, planning for discharge.     INTRACRANIAL HEMORRHAGE    D32.9    Handoff    MEWS Score    Parkinsons    HTN (hypertension)    History of seizures    Brain mass    Impaired gait    Meningioma    History of insomnia    Intracerebral hemorrhage    Intracerebral hemorrhage    Insertion of intravenous catheter with ultrasound guidance    No significant past surgical history    Room Service Assist    [email or web address]      PLAN:  NEURO:  - neuro checks/vitals q4h   - plan for d/c today to [facility]   - s/p EVD removal 5/1  - Keppra 1 BID (in setting of seizures)  - EEG 5/1 negative  - s/p Decadron taper x 1 week   - Parkinsons: continue home sinemet (25/250 mg) 4 times/day, entacapone 200mg q8h  - amantadine 100 mg dialy  - CTH 4/24: no significant interval change, CTH 4/26 stable, 4/29 stable  - CTH 5/1 stable vents and improved IVH  - CTH 5/2 stable     CARDIOVASCULAR:  - -160  - amlodipine 10 mg qd   - TTE 4/22 mild aortic sclerosis w no stenosis, mild AR, left pleural effusion EF 71%     PULMONARY:  - nebs q6h PRN, chest PT   - re-educated on IS   - RA  - secretions improving     RENAL:  - normonatremia goal, salt tabs 1 q 6   - NS discontinued   - voiding     GI:  - soft and bite sized diet   - last BM 4/30   - bowel regimen     HEME:  - SCDs, SQL  - LE dopplers 4/22 neg     ID:  - Valtrex 4/28-5/5 for herpetic lesions L cheek; cx +HSV-1  - Zosyn (4/22-4/27) empiric PNA, RLL infiltrate  - f/u HIV testing   - f/u CSF PCR panel and cultures 4/30     ENDO:  - ISS, A1c 5.8     DISPOSITION:  - NSICU, full code, family updated   - PT/OT rec AR: pt can tolerate 3 hours of PT/OT daily   - bed at [facility], plan for discharge today 12:30PM     Assessment:  Present when checked    Discussed with Dr. D'Amico  HPI:  Patient is a 68 year old male with PMH of seizures, HTN, and Parkinson's disease diagnosed in 2012 with multiple falls who initially presented to [facility] on 4/17 for elective left craniotomy for meningioma resection. He has also been wheelchair bound since about 2-3 years ago and has frequent falls related to his Parkinson's disease.  Underwent L crani for meningioma resection on 4/17. Hospital course was uncomplicated.   Discharged to [facility] on 4/21 in stable condition.     On arrival to rehab, pt c/o incisional headache with episodes of n/v during ambulance ride to rehab.  Pt given zofran and oxycodone, however, headache returned with persistent nausea and vomiting.   RRT called for worsened mental status. CT head obtained stat which showed new large left frontal hemorrhage with IVH, cerebral edema with midline shift and some effacement of L frontal horn.  Pt started on cardene, given Keppra and transferred to [facility]. Prior to transfer, mental status declined (GCS 13-> 9) with increased secretions, therefore attempted intubation; LMA airway placed.      ICH 3   NIHSS 23 (22 Apr 2023 06:28)    INTERVAL EVENTS: No acute events overnight. Neuro stable. Denies headache, nausea/vomiting, changes in vision, new onset weakness/decreased sensation, chest pain, shortness of breath.     HOSPITAL COURSE:  4/17: POD 0 L crani for tumor resection.   4/18: POD1 L crani tumor resection. Norvasc increased to 5mg daily. Neuro exam stable. Neurology consulted, reocmmended maintaining current parkinson's medication regimen. Had asymptomatic episode of VTACH read on telemetry, hemodynamically stable, returned back to baseline, EKG obtained negative. Cardiology consulted, reported rhythm strip was sinus, likely artifact from tremors in arms, NTD. Pt c/o worsened difficulty breathing, satting well on RA. Given duoneb treatment and CXR completed.  Postop MRI completed in evening.   4/19: POD2, SIRIA overnight,  neuro stable, SLP state may benefit from outpt speech therapy will continue to follow  4/20: POD3, SIRIA overnight. per s/s: soft/bite size diet w/ thin liquids.  4/21: POD4, SIRIA overnight, discahrge Kiran Evans AR    DISCHARGED TO [place] on 4/21:    4/22: readmitted with new large left frontal IPH with IVH, with edema and midline shift with large left frontal IPH. EVD placed, opened at 10. CTH post EVD complete. [place] sump placed for GI decompression. Started 3%@80 for na goal 145-150. KUB showing small loops of gas, will start TF tomorrow. TTE mild aortic sclerosis w/ no stenosis, mild AR, left pleural effusion EF 71%. LE dopplers negative.  4/23: POD 6. EVD @ 10. POCUS with RLL consolidation, trace B lines, L lung clear, collapsable IVC. 250cc albumin and 500cc NS bolus x2 in setting of decreasing UOP. Started on SQL 40 BID, weight based. Off propofol, on CPAP 8/5, plan to extubate. Removed matthew, f/u TOV. Removed a-line. Extubated to HFNC. Decreased 3% to 30. Passed TOV.   4/24: POD 7. Tolerating HFNC. Start TF today. Na 155, stopped 3%, fentanyl d/c'd, CT stable. Trickle feeds started. PT/OT recommending AR. C/o headache, given tramadol  with resolution. S&S recommend pureed diet with thin liquids. Weaning to 6L NC. Na 155-->144, gave 225cc 3% bolus, Na-->147.   4/25: POD 8. Cont 3% at 30. EVD remains at 10. Tolerating PO. Given hydralazine 10mg x 1 for SBP 180s. Bradycardic to 53 resolved on own. Dc'd 3%, started salt tabs 2q8. Sodium goal 140-145. Sputum culture from 4/22: normal respiratory blank. Repeat 145. F/u AM sodium. ProBNP 1492. Pt removed NGT. Tolerating pureed diet.   4/26: POD 9. EVD @ 10. Nebs made PRN, amlodipine increased to 10mg. Rec'd for soft and bite size diet, CTH today with no significant interval change. EVD clamp trial ICPS hovering around 18, will monitor clinically and open if neuro change or headache, pro-bnp 1387 downtrending.   4/27: POD10. ICPs sustained 22-26 x 15 min, opened EVD. Finished course of zosyn for empiric pneumonia treatment.   4/28: POD11, Neuro stable, EVD clamped this morning, ICPs hovering higher than 20, neuro exam remains stable, denies HA, will monitor based on clinical/neuro status during clamp trial. Plan for CTH Sunday if tolerating clamp trial. F/u neurology regarding entacapone dosing. Viral PCR swab sent of herpetic lesions L cheek, f/u results.   4/29: POD12, SIRIA overnight EVD remains clamped, ICPs intermittently elevated but patient remains neuro stable and without headaches. NS at 100 started for rising Cr level. repeat CTH when clamped x 24 hrs in AM with stable MLS and mass effect. NS lowered to 75 for positive fluid status. 3 BMs today, bowel regimen dc'd.   4/30: POD13. SIRIA overnight, EVD remains clamped, ICPs <20, neuro stable, no headaches. Cont valtrex x7d. . IVF dc'd. CSF PCR panel and culture sent. EEG ordered. 250cc 3% bolus given. Protonix dc'd.  5/1: POD 14. SIRIA o/n. Salt tabs made 1 q 6. CTH showing stable vent size and improved IVH. EVD removed. EEG dc'd. Started on NS @60 while on valcyclovir  5/2: POD15. SIRIA o/n neuro stable. CTH stable, restarting SQL. started on amantadine 100 qd.   5/3: POD 16. tx to SDU.     Vital Signs Last 24 Hrs  T(C): 37.3 (03 May 2023 04:22), Max: 37.5 (02 May 2023 17:40)  T(F): 99.1 (03 May 2023 04:22), Max: 99.5 (02 May 2023 17:40)  HR: 84 (03 May 2023 09:00) (56 - 84)  BP: 112/62 (03 May 2023 09:00) (107/57 - 147/93)  BP(mean): 83 (03 May 2023 09:00) (78 - 114)  RR: 20 (03 May 2023 09:00) (16 - 24)  SpO2: 98% (03 May 2023 09:00) (94% - 100%)    Parameters below as of 03 May 2023 09:00  Patient On (Oxygen Delivery Method): room air      I&O's Summary    02 May 2023 07:01  -  03 May 2023 07:00  --------------------------------------------------------  IN: 2420 mL / OUT: 2475 mL / NET: -55 mL        PHYSICAL EXAM:  General: NAD. Sitting comfortably in bed eating breakfast with help from son.   HENT: PERRL, 3mm b/l. EOM grossly in tact. Visual acuity not assessed. 1 cm, erythematous, non-purulent herpetic lesion on L cheek. No facial droop.   Cardiac: Regular rate and rhythm. S1, S2 appreciated. No murmurs, gallops, rubs.   Pulm: Breathing comfortably on RA. CTAB. No rhonchi, wheezes, rales.   Abd: Soft, non-tender, non-distended. +BS x 4 quadrants.   Neuro: A&O x1 with prompts. Stuttering but speech coherent. Affect appropriate.  See HENT. CN II-XII grossly intact.   RUE 5/5, LUE 4/5. Bilateral tremors, left worse than right.   Bilateral hip flex/extend 3/5  Bilateral knee flex/extend, dorsi/plantarflexion 5/5    Sensation intact to light touch throughout   Extremities: Skin is warm and dry. 1+ pedal edema. DP 2+ bilaterally.     LABS:                        13.0   6.31  )-----------( 186      ( 02 May 2023 04:44 )             39.2     05-02    139  |  107  |  16  ----------------------------<  118<H>  4.1   |  25  |  1.16    Ca    9.3      02 May 2023 04:44  Phos  3.6     05-02  Mg     2.2     05-02      CAPILLARY BLOOD GLUCOSE      POCT Blood Glucose.: 94 mg/dL (03 May 2023 06:24)  POCT Blood Glucose.: 126 mg/dL (02 May 2023 23:42)    Allergies    No Known Allergies    Intolerances      MEDICATIONS:  Antibiotics:  valACYclovir 1000 milliGRAM(s) Oral every 12 hours    Neuro:  acetaminophen   Oral Liquid .. 650 milliGRAM(s) Oral every 6 hours PRN  amantadine Syrup 100 milliGRAM(s) Oral every 24 hours  carbidopa/levodopa  25/250 1 Tablet(s) Oral <User Schedule>  entacapone 200 milliGRAM(s) Oral <User Schedule>  levETIRAcetam 1000 milliGRAM(s) Oral two times a day  ondansetron Injectable 4 milliGRAM(s) IV Push every 6 hours PRN    Anticoagulation:  enoxaparin Injectable 40 milliGRAM(s) SubCutaneous every 24 hours    OTHER:  acetylcysteine 20%  Inhalation 4 milliLiter(s) Inhalation every 6 hours PRN  albuterol/ipratropium for Nebulization 3 milliLiter(s) Nebulizer every 6 hours PRN  amLODIPine   Tablet 10 milliGRAM(s) Oral daily  artificial  tears Solution 1 Drop(s) Both EYES every 4 hours  lidocaine   4% Patch 1 Patch Transdermal every 24 hours  polyethylene glycol 3350 17 Gram(s) Oral daily  senna 2 Tablet(s) Oral at bedtime    IVF:  sodium chloride 1 Gram(s) Oral every 6 hours  sodium chloride 0.9%. 1000 milliLiter(s) IV Continuous <Continuous>    CULTURES:  Culture Results:   No growth to date. (04-30 @ 13:22)  Culture Results:   Normal Respiratory Blank present (04-22 @ 06:20)    RADIOLOGY & ADDITIONAL TESTS:  CT Head 5/2 Impression:  Since prior CT head, removal of right frontal EVD with stable ventricular caliber.      ASSESSMENT:  68y Male past medical hx seizures, HTN, poorly controlled Parkinson's disease w/ prog inability to walk since Nov 2022. Found to have large L frontal dural based lesion c/f meningioma w/ associated mass effect and edema. S/p L crani for meningioma resection (4/17). Discharged to [facility] on 4/21. On arrival to rehab c/o headache with persistent nausea/vomiting. CTH performed reveals new large left frontal IPH with IVH, with edema and midline shift. ICH 3. s/p bedside right frontal EVD 4/22, removed 5/1/23. Neuro stable, planning for discharge.     INTRACRANIAL HEMORRHAGE    D32.9    Handoff    MEWS Score    Parkinsons    HTN (hypertension)    History of seizures    Brain mass    Impaired gait    Meningioma    History of insomnia    Intracerebral hemorrhage    Intracerebral hemorrhage    Insertion of intravenous catheter with ultrasound guidance    No significant past surgical history    Room Service Assist    [id number]      PLAN:  NEURO:  - neuro checks/vitals q4h   - plan for d/c today to [facility]   - s/p EVD removal 5/1  - Keppra 1 BID (in setting of seizures)  - EEG 5/1 negative  - s/p Decadron taper x 1 week   - Parkinsons: continue home sinemet (25/250 mg) 4 times/day, entacapone 200mg q8h  - amantadine 100 mg dialy  - CTH 4/24: no significant interval change, CTH 4/26 stable, 4/29 stable  - CTH 5/1 stable vents and improved IVH  - CTH 5/2 stable     CARDIOVASCULAR:  - -160  - amlodipine 10 mg qd   - TTE 4/22 mild aortic sclerosis w no stenosis, mild AR, left pleural effusion EF 71%     PULMONARY:  - nebs q6h PRN, chest PT   - re-educated on IS   - RA  - secretions improving     RENAL:  - normonatremia goal, salt tabs 1 q 6   - NS discontinued   - voiding     GI:  - soft and bite sized diet   - last BM 4/30   - bowel regimen     HEME:  - SCDs, SQL  - LE dopplers 4/22 neg     ID:  - Valtrex 4/28-5/5 for herpetic lesions L cheek; cx +HSV-1  - Zosyn (4/22-4/27) empiric PNA, RLL infiltrate  - f/u HIV testing   - f/u CSF PCR panel and cultures 4/30     ENDO:  - ISS, A1c 5.8     DISPOSITION:  - NSICU, full code, family updated   - PT/OT rec AR: pt can tolerate 3 hours of PT/OT daily   - bed at [facility], plan for discharge today 12:30PM     Assessment:  Present when checked    Discussed with Dr. D'Amico

## 2023-05-03 NOTE — PROGRESS NOTE ADULT - REASON FOR ADMISSION
ICH
ICH
L. frontal ICH
ICH
L. frontal ICH
Surgical resection of L frontal meningioma
ICH
L. frontal ICH
ICH
L. frontal ICH
ICH
Left frontal ICH
ICH

## 2023-05-03 NOTE — PROGRESS NOTE ADULT - SUBJECTIVE AND OBJECTIVE BOX
Physical Medicine and Rehabilitation Progress Note :       Patient is a 68y old  Male who presents with a chief complaint of Surgical resection of L frontal meningioma (03 May 2023 11:05)      HPI:  Patient is a 68 year old male with PMH of seizures, HTN, and Parkinson's disease diagnosed in 2012 with multiple falls who initially presented to [facility] on 4/17 for elective left craniotomy for meningioma resection. He has also been wheelchair bound since about 2-3 years ago and has frequent falls related to his Parkinson's disease.  Underwent L crani for meningioma resection on 4/17. Hospital course was uncomplicated.   Discharged to [facility] on 4/21 in stable condition.     On arrival to rehab, pt c/o incisional headache with episodes of n/v during ambulance ride to rehab.  Pt given zofran and oxycodone, however, headache returned with persistent nausea and vomiting.   RRT called for worsened mental status. CT head obtained stat which showed new large left frontal hemorrhage with IVH, cerebral edema with midline shift and some effacement of L frontal horn.  Pt started on cardene, given Keppra and transferred to [facility]. Prior to transfer, mental status declined (GCS 13-> 9) with increased secretions, therefore attempted intubation; LMA airway placed.      ICH 3   NIHSS 23 (22 Apr 2023 06:28)                            13.0   6.31  )-----------( 186      ( 02 May 2023 04:44 )             39.2       05-02    139  |  107  |  16  ----------------------------<  118<H>  4.1   |  25  |  1.16    Ca    9.3      02 May 2023 04:44  Phos  3.6     05-02  Mg     2.2     05-02      Vital Signs Last 24 Hrs  T(C): 37.2 (03 May 2023 09:17), Max: 37.5 (02 May 2023 17:40)  T(F): 98.9 (03 May 2023 09:17), Max: 99.5 (02 May 2023 17:40)  HR: 70 (03 May 2023 12:45) (56 - 84)  BP: 132/67 (03 May 2023 12:45) (107/57 - 147/93)  BP(mean): 93 (03 May 2023 12:45) (78 - 114)  RR: 18 (03 May 2023 12:45) (17 - 24)  SpO2: 95% (03 May 2023 12:45) (94% - 99%)    Parameters below as of 03 May 2023 12:45  Patient On (Oxygen Delivery Method): room air        MEDICATIONS  (STANDING):  amantadine Syrup 100 milliGRAM(s) Oral every 24 hours  amLODIPine   Tablet 10 milliGRAM(s) Oral daily  artificial  tears Solution 1 Drop(s) Both EYES every 4 hours  carbidopa/levodopa  25/250 1 Tablet(s) Oral <User Schedule>  enoxaparin Injectable 40 milliGRAM(s) SubCutaneous every 24 hours  entacapone 200 milliGRAM(s) Oral <User Schedule>  levETIRAcetam 1000 milliGRAM(s) Oral two times a day  lidocaine   4% Patch 1 Patch Transdermal every 24 hours  polyethylene glycol 3350 17 Gram(s) Oral daily  senna 2 Tablet(s) Oral at bedtime  sodium chloride 1 Gram(s) Oral every 6 hours  valACYclovir 1000 milliGRAM(s) Oral every 12 hours    MEDICATIONS  (PRN):  acetaminophen   Oral Liquid .. 650 milliGRAM(s) Oral every 6 hours PRN Temp greater or equal to 38C (100.4F), Mild Pain (1 - 3)  acetylcysteine 20%  Inhalation 4 milliLiter(s) Inhalation every 6 hours PRN congestion  albuterol/ipratropium for Nebulization 3 milliLiter(s) Nebulizer every 6 hours PRN Shortness of Breath and/or Wheezing  ondansetron Injectable 4 milliGRAM(s) IV Push every 6 hours PRN Nausea and/or Vomiting       5/1/2023  Functional Status Assessment :         Pain Assessment/Number Scale (0-10) Adult  Presence of Pain: denies pain/discomfort (Rating = 0)  Pain Rating (0-10): Rest: 0 (no pain/absence of nonverbal indicators of pain)  Pain Rating (0-10): Activity: 0 (no pain/absence of nonverbal indicators of pain)    Safety      AM-PAC Functional Assessment: Basic Mobility  Type of Assessment: Daily assessment  Turning from your back to your side while in a flat bed without using bedrails?: 2 = A lot of assistance  Moving from lying on your back to sitting on the flat side of a flat bed without using bedrails?: 1 = Total assistance  Moving to and from a bed to a chair (including a wheelchair)?: 1 = Total assistance  Standing up from a chair using your arms (e.g. wheelchair or bedside chair)?: 1 = Total assistance  Walking in hospital room?: 1 = Total assistance  Climbing 3-5 steps with a railing?: 1-calculated by average   Score: 7   Row Comment: Ask the patient "How much help from another person do you currently need? (If the patient hasn't done an activity recently, how much help from another person do you think he/she needs if he/she tried?)    Cognitive/Neuro      Cognitive/Neuro/Behavioral  Cognitive/Neuro/Behavioral [WDL Definition: Alert; opens eyes spontaneously; arouses to voice or touch; oriented x 4; follows commands; speech spontaneous, logical; purposeful motor response; behavior appropriate to situation]: WDL except  Level of Consciousness: lethargic  Arousal Level: arouses to voice  Orientation: disoriented to;  time  Speech: hypophonic;  minimal speech  Mood/Behavior: hypoactive (quiet, withdrawn)    Language Assistance  Preferred Language to Address Healthcare Preferred Language to Address Healthcare: English    Therapeutic Interventions      Bed Mobility  Bed Mobility Training Rolling/Turning: maximum assist (25% patient effort);  2 person assist  Bed Mobility Training Scooting: maximum assist (25% patient effort);  2 person assist  Bed Mobility Training Sit-to-Supine: maximum assist (25% patient effort);  2 person assist  Bed Mobility Training Supine-to-Sit: maximum assist (25% patient effort);  2 person assist  Bed Mobility Training Limitations: decreased ability to use arms for pushing/pulling;  decreased ability to use legs for bridging/pushing;  impaired ability to control trunk for mobility;  impaired balance;  decreased strength;  decreased flexibility;  impaired postural control    Sit-Stand Transfer Training  Transfer Training Sit-to-Stand Transfer: maximum assist (25% patient effort);  2 person assist;  hand over hand;  set-up required;  supervision;  verbal cues;  weight-bearing as tolerated   B Hand Held Assist   Transfer Training Stand-to-Sit Transfer: maximum assist (25% patient effort);  2 person assist  Sit-to-Stand Transfer Training Transfer Safety Analysis: decreased weight-shifting ability;  decreased balance;  decreased strength;  impaired balance;  impaired postural control;  performed x 2 trials    Therapeutic Exercise  Therapeutic Exercise Detail: seated - marching, long arc quad x 10 reps, UE AAROM shoulder flexion x 10 reps     Neuromuscular Re-education  Neuromuscular Re-education Detail: Pt tolerated sitting EOB for ~15 minutes with min-mod A to maintain as pt with posterior and R side lean. Trunl balnace training and WS with functional reaching to targets x 10 reps, WB onto forearms L/R x 5 reps each with min A for set-up         AM-PAC Functional Assessment: Daily Activity  Type of Assessment: Daily assessment  Putting on and taking off regular lower body clothing?: 2 = A lot of assistance  Bathing (including washing, rinsing, drying)?: 2 = A lot of assistance  Toileting, which includes using toilet, bedpan or urinal?: 2 = A lot of assistance  Putting on and taking off regular upper body clothing?: 2 = A lot of assistance  Take care of personal grooming such as brushing teeth?: 2 = A lot of assistance  Eating meals?: 2 = A lot of assistance  Score: 12   Row Comment: Ask the patient "How much help from another person do you currently need? (If the patient hasn't done an activity recently, how much help from another person do you think he/she needs if he/she tried?)    Cognitive/Neuro      Cognitive/Neuro/Behavioral  Level of Consciousness: lethargic  Arousal Level: arouses to voice  Orientation: disoriented to;  time;  situation  Speech: hypophonic  Mood/Behavior: calm;  cooperative    Language Assistance  Preferred Language to Address Healthcare Preferred Language to Address Healthcare: English    Therapeutic Interventions      Bed Mobility  Bed Mobility Training Rolling/Turning: maximum assist (25% patient effort);  2 person assist;  nonverbal cues (demo/gestures);  verbal cues  Bed Mobility Training Scooting: maximum assist (25% patient effort);  2 person assist;  nonverbal cues (demo/gestures);  verbal cues  Bed Mobility Training Bridging: maximum assist (25% patient effort);  2 person assist;  nonverbal cues (demo/gestures);  verbal cues  Bed Mobility Training Sit-to-Supine: maximum assist (25% patient effort);  2 person assist;  nonverbal cues (demo/gestures);  verbal cues  Bed Mobility Training Supine-to-Sit: maximum assist (25% patient effort);  2 person assist;  nonverbal cues (demo/gestures);  verbal cues  Bed Mobility Training Limitations: decreased ability to use legs for bridging/pushing;  decreased ability to use arms for pushing/pulling;  impaired ability to control trunk for mobility;  decreased flexibility;  decreased ROM;  impaired postural control;  impaired balance    Sit-Stand Transfer Training  Transfer Training Sit-to-Stand Transfer: maximum assist (25% patient effort);  2 person assist;  nonverbal cues (demo/gestures);  verbal cues;  b/l HHA and trunk  Transfer Training Stand-to-Sit Transfer: maximum assist (25% patient effort);  2 person assist;  nonverbal cues (demo/gestures);  verbal cues;  b/l HHA and trunk assist  Sit-to-Stand Transfer Training Transfer Safety Analysis: decreased balance;  decreased weight-shifting ability;  impaired balance;  impaired postural control;  decreased flexibility    Therapeutic Exercise  Therapeutic Exercise Detail: MECHEE functional reaching, min-mod A to initiate shoulder flexion to reach for cylindrical item, max cues throughout x5 reps. Pt able to then pass item from L hand to R hand.Completed 2 sit<>stands with max x2 person assist via b/l HHA, tolerated static stand for <10 seconds, difficulty weight shifting laterally, unable to take side steps.     Neuromuscular Re-education  Neuromuscular Re-education Detail: Pt tolerated sitting EOB for ~10 minutes with min-mod A to maintain as pt with posterior and R side lean. Engaged core strengthening exercises with lateral trunk flexion (L forearm<>midline) x5 reps with mod A to initiate L side lean, SBA to return to midline.               PM&R Impression : as above    Current Disposition Plan Recommendations :   acute rehab placement

## 2023-05-03 NOTE — DISCHARGE NOTE NURSING/CASE MANAGEMENT/SOCIAL WORK - PATIENT PORTAL LINK FT
You can access the FollowMyHealth Patient Portal offered by [facility] by registering at the following website: [email or web address]. By joining [employer]’s FollowMyHealth portal, you will also be able to view your health information using other applications (apps) compatible with our system.

## 2023-05-03 NOTE — H&P ADULT - HISTORY OF PRESENT ILLNESS
Mahdi Mas is a 68 year old male with PMH of seizure, HTN, Parkinson's disease (Dx in 2012, wheelchair bound since 2020) and frequent falls; presented to [facility] on 4/17 for elective L craniotomy for meningioma resection with Dr. D'Amico. His procedure was tolerated well and was discharged to [facility] on 4/21. During transit to , he complained of incisional headache with episodes of nausea and vomiting requiring Zofran and Oxycodone, but his symptoms persisted. Upon arrival to , an RRT was called for worsening mental status and a CTH revealed an acute large left frontal hemorrhage with IVH, cerebral edema with midline shift and some effacement of L frontal horn. His mental status continued to decline and intubation was attempted but unsuccessful. A laryngeal mask airway was placed and he was transferred back to [facility].      PM&R was consulted and deemed him an appropriate candidate for IRF. He was medically stabilized and cleared for discharge to [facility].  Mahdi Mas is a 68 year old male with PMH of seizure, HTN, Parkinson's disease (Dx in 2012, wheelchair bound since 2020) and frequent falls; presented to [facility] on 4/17 for elective L craniotomy for meningioma resection with Dr. D'Amico. His procedure was tolerated well and was discharged to [facility] on 4/21. During transit to , he complained of incisional headache with episodes of nausea and vomiting requiring Zofran and Oxycodone, but his symptoms persisted. Upon arrival to , an RRT was called for worsening mental status and a CTH revealed an acute large left frontal hemorrhage with IVH, cerebral edema with midline shift and some effacement of L frontal horn. His mental status continued to decline and intubation was attempted but unsuccessful. A laryngeal mask airway was placed and he was transferred back to [facility].  He underwent EVD placement on 4/22, and a [place] sump was placed for GI decompression. He was extubated on 4/23, and placed on HFNC with eventual wean to NC.  His hospital course was complicated by RLL consolidation (s/p Zosyn), HTN, bradycardia (self-resolved), hyponatremia, NINFA, and L cheek herpetic lesion (requiring 7 day course of Valtrex). S/p EVD removal on 5/1. PM&R was consulted and deemed him an appropriate candidate for IRF. He was medically stabilized and cleared for discharge to [facility].  67 yo male with PMHx of seizure, HTN, Parkinson's disease (Dx in 2012, wheelchair bound since 2020) and frequent falls; presented to [facility] on 4/17 for elective L craniotomy for meningioma resection with Dr. D'Amico. Tolerated well, discharged to [facility] on 4/21. During transit to , he complained of incisional headache with episodes of nausea and vomiting requiring Zofran and Oxycodone. Upon arrival to , an RRT was called for worsening mental status and CTH revealed an acute large left frontal hemorrhage with IVH, cerebral edema with midline shift and some effacement of Left frontal horn, transferred back to [facility]. He underwent EVD placement on 4/22. Extubated on 4/23, wean to NC. Hospital course was complicated by RLL PNA (s/p Zosyn), HTN, bradycardia, hyponatremia on salt tabs, NINFA, and Left cheek herpetic lesion ( 7 day Valtrex). S/p EVD removal on 5/1. CTH was stable on 5/2 and cleared for Lovenox sq. Amantadine added. PM&R was consulted and deemed him an appropriate candidate for IRF. Cleared for discharge to [facility] on 5/3.

## 2023-05-03 NOTE — H&P ADULT - NSHPSOCIALHISTORY_GEN_ALL_CORE
SOCIAL HISTORY  Smoking - Denied  EtOH - Denied   Drugs - Denied    FUNCTIONAL HISTORY (From [facility])   Patient reports living with his son in an elevator access apartment building with no DANIELITO. Patient from [facility] recently d/c from [facility] last seen by OT on 4/19/23. At rehab patient was mobilizing short distances with 2 person assist and receiving assistance for all ADL's. Prior to last admission patient was mobilizing with SC however also has a w/c and hospital bed. Patient's son reports multiple falls prior to admission. Patient is R hand dominant.    CURRENT FUNCTIONAL STATUS  - Bed Mobility: Max A  - Transfers: Min-mod A  - Gait: To be performed  - ADLs: UBD- mod A SOCIAL HISTORY  Smoking - Denied  EtOH - Denied   Drugs - Denied    FUNCTIONAL HISTORY   Lives with wife and dtr in an elevator access apartment building with no DANIELITO. WC dependent. Patient's son reports multiple falls prior to admission.    CURRENT FUNCTIONAL STATUS  - Bed Mobility: Max A  - Transfers: Min-mod A  - Gait: To be performed  - ADLs: UBD- mod A

## 2023-05-03 NOTE — PROCEDURE NOTE - PROCEDURE DATE TIME, MLM
22-Apr-2023 10:00
22-Apr-2023 04:16
22-Apr-2023 06:55
01-May-2023 15:01
30-Apr-2023 14:30
30-Apr-2023 17:05
03-May-2023 13:55
22-Apr-2023 06:56

## 2023-05-03 NOTE — PROCEDURE NOTE - NSINFORMCONSENT_GEN_A_CORE
Benefits, risks, and possible complications of procedure explained to patient/caregiver who verbalized understanding and gave verbal consent.
This was an emergent procedure.
This was an emergent procedure.
Benefits, risks, and possible complications of procedure explained to patient/caregiver who verbalized understanding and gave verbal consent.
Benefits, risks, and possible complications of procedure explained to patient/caregiver who verbalized understanding and gave written consent.
Benefits, risks, and possible complications of procedure explained to patient/caregiver who verbalized understanding and gave written consent.

## 2023-05-03 NOTE — PROCEDURE NOTE - NSPROCNAME_GEN_A_CORE
General
General
Tracheal Intubation
General
Point of Care Ultrasound Vascular Access
Point of Care Ultrasound Vascular Access
General
Arterial Puncture/Cannulation
Peripheral Line Insertion
General
External Ventricular Drain Insertion

## 2023-05-03 NOTE — PROGRESS NOTE ADULT - ASSESSMENT
#L frontal meningioma with mass effect and edema s/p resection 4/17 w/ IPH 4/22  - plan per NSGY  -Likely dc to [facility] today    #Herpes  -If treating infection, 7 day course should sufffice    #Seizure disorder  -c/w keppra a/p primary    #PNA  -Likely GNR PNA, since extubated and completed course of zosyn. Breathing comfortably on RA      #headaches  - Improving    #HTN  - continue with amlodipine    #CKD  - Stable. Avoid nephrotoxic meds    #PD with bradykinesia, rigidity and resting tremor  - continue sinemet, amantadine and entacapone

## 2023-05-03 NOTE — PATIENT PROFILE ADULT - FUNCTIONAL ASSESSMENT - BASIC MOBILITY 5.
LACTATION NOTE - INFANT    Evaluation Type  Evaluation Type: Inpatient    Problems & Assessment  Problems Diagnosed or Identified: Latch difficulty  Problems: comment/detail: MEC  Infant Assessment: Hunger cues present  Muscle tone: Appropriate for GA    F 2 = A lot of assistance

## 2023-05-03 NOTE — PROGRESS NOTE ADULT - THIS PATIENT HAS THE FOLLOWING CONDITION(S)/DIAGNOSES ON THIS ADMISSION:
None
Cerebral Edema/Brain Compression / Herniation
None
Cerebral Edema/Brain Compression / Herniation
None

## 2023-05-04 PROBLEM — I61.9 NONTRAUMATIC INTRACEREBRAL HEMORRHAGE, UNSPECIFIED: Chronic | Status: ACTIVE | Noted: 2023-04-22

## 2023-05-04 LAB
ALBUMIN SERPL ELPH-MCNC: 3.3 G/DL — SIGNIFICANT CHANGE UP (ref 3.3–5)
ALBUMIN SERPL ELPH-MCNC: 3.7 G/DL — SIGNIFICANT CHANGE UP (ref 3.3–5)
ALP SERPL-CCNC: 55 U/L — SIGNIFICANT CHANGE UP (ref 40–120)
ALP SERPL-CCNC: 62 U/L — SIGNIFICANT CHANGE UP (ref 40–120)
ALT FLD-CCNC: 19 U/L — SIGNIFICANT CHANGE UP (ref 10–45)
ALT FLD-CCNC: 30 U/L — SIGNIFICANT CHANGE UP (ref 10–45)
ANION GAP SERPL CALC-SCNC: 7 MMOL/L — SIGNIFICANT CHANGE UP (ref 5–17)
ANION GAP SERPL CALC-SCNC: 8 MMOL/L — SIGNIFICANT CHANGE UP (ref 5–17)
APTT BLD: 27.2 SEC — LOW (ref 27.5–35.5)
AST SERPL-CCNC: 18 U/L — SIGNIFICANT CHANGE UP (ref 10–40)
AST SERPL-CCNC: 26 U/L — SIGNIFICANT CHANGE UP (ref 10–40)
BASOPHILS # BLD AUTO: 0.02 K/UL — SIGNIFICANT CHANGE UP (ref 0–0.2)
BASOPHILS NFR BLD AUTO: 0.4 % — SIGNIFICANT CHANGE UP (ref 0–2)
BILIRUB SERPL-MCNC: 0.6 MG/DL — SIGNIFICANT CHANGE UP (ref 0.2–1.2)
BILIRUB SERPL-MCNC: 0.7 MG/DL — SIGNIFICANT CHANGE UP (ref 0.2–1.2)
BUN SERPL-MCNC: 14 MG/DL — SIGNIFICANT CHANGE UP (ref 7–23)
BUN SERPL-MCNC: 16 MG/DL — SIGNIFICANT CHANGE UP (ref 7–23)
CALCIUM SERPL-MCNC: 10.1 MG/DL — SIGNIFICANT CHANGE UP (ref 8.4–10.5)
CALCIUM SERPL-MCNC: 9.8 MG/DL — SIGNIFICANT CHANGE UP (ref 8.4–10.5)
CHLORIDE SERPL-SCNC: 103 MMOL/L — SIGNIFICANT CHANGE UP (ref 96–108)
CHLORIDE SERPL-SCNC: 106 MMOL/L — SIGNIFICANT CHANGE UP (ref 96–108)
CO2 SERPL-SCNC: 27 MMOL/L — SIGNIFICANT CHANGE UP (ref 22–31)
CO2 SERPL-SCNC: 28 MMOL/L — SIGNIFICANT CHANGE UP (ref 22–31)
CREAT SERPL-MCNC: 1.15 MG/DL — SIGNIFICANT CHANGE UP (ref 0.5–1.3)
CREAT SERPL-MCNC: 1.3 MG/DL — SIGNIFICANT CHANGE UP (ref 0.5–1.3)
EGFR: 59 ML/MIN/1.73M2 — LOW
EGFR: 68 ML/MIN/1.73M2 — SIGNIFICANT CHANGE UP
EOSINOPHIL # BLD AUTO: 0.13 K/UL — SIGNIFICANT CHANGE UP (ref 0–0.5)
EOSINOPHIL NFR BLD AUTO: 2.8 % — SIGNIFICANT CHANGE UP (ref 0–6)
GLUCOSE BLDC GLUCOMTR-MCNC: 110 MG/DL — HIGH (ref 70–99)
GLUCOSE SERPL-MCNC: 117 MG/DL — HIGH (ref 70–99)
GLUCOSE SERPL-MCNC: 96 MG/DL — SIGNIFICANT CHANGE UP (ref 70–99)
HCT VFR BLD CALC: 44 % — SIGNIFICANT CHANGE UP (ref 39–50)
HCT VFR BLD CALC: 44.4 % — SIGNIFICANT CHANGE UP (ref 39–50)
HGB BLD-MCNC: 14.3 G/DL — SIGNIFICANT CHANGE UP (ref 13–17)
HGB BLD-MCNC: 14.7 G/DL — SIGNIFICANT CHANGE UP (ref 13–17)
IMM GRANULOCYTES NFR BLD AUTO: 0 % — SIGNIFICANT CHANGE UP (ref 0–0.9)
INR BLD: 1.15 RATIO — SIGNIFICANT CHANGE UP (ref 0.88–1.16)
LYMPHOCYTES # BLD AUTO: 1.41 K/UL — SIGNIFICANT CHANGE UP (ref 1–3.3)
LYMPHOCYTES # BLD AUTO: 30.5 % — SIGNIFICANT CHANGE UP (ref 13–44)
MAGNESIUM SERPL-MCNC: 2.3 MG/DL — SIGNIFICANT CHANGE UP (ref 1.6–2.6)
MCHC RBC-ENTMCNC: 31.4 PG — SIGNIFICANT CHANGE UP (ref 27–34)
MCHC RBC-ENTMCNC: 31.5 PG — SIGNIFICANT CHANGE UP (ref 27–34)
MCHC RBC-ENTMCNC: 32.5 GM/DL — SIGNIFICANT CHANGE UP (ref 32–36)
MCHC RBC-ENTMCNC: 33.1 GM/DL — SIGNIFICANT CHANGE UP (ref 32–36)
MCV RBC AUTO: 95.1 FL — SIGNIFICANT CHANGE UP (ref 80–100)
MCV RBC AUTO: 96.7 FL — SIGNIFICANT CHANGE UP (ref 80–100)
MONOCYTES # BLD AUTO: 0.49 K/UL — SIGNIFICANT CHANGE UP (ref 0–0.9)
MONOCYTES NFR BLD AUTO: 10.6 % — SIGNIFICANT CHANGE UP (ref 2–14)
NEUTROPHILS # BLD AUTO: 2.58 K/UL — SIGNIFICANT CHANGE UP (ref 1.8–7.4)
NEUTROPHILS NFR BLD AUTO: 55.7 % — SIGNIFICANT CHANGE UP (ref 43–77)
NRBC # BLD: 0 /100 WBCS — SIGNIFICANT CHANGE UP (ref 0–0)
NRBC # BLD: 0 /100 WBCS — SIGNIFICANT CHANGE UP (ref 0–0)
PHOSPHATE SERPL-MCNC: 4 MG/DL — SIGNIFICANT CHANGE UP (ref 2.5–4.5)
PLATELET # BLD AUTO: 177 K/UL — SIGNIFICANT CHANGE UP (ref 150–400)
PLATELET # BLD AUTO: 208 K/UL — SIGNIFICANT CHANGE UP (ref 150–400)
POTASSIUM SERPL-MCNC: 4.3 MMOL/L — SIGNIFICANT CHANGE UP (ref 3.5–5.3)
POTASSIUM SERPL-MCNC: 4.5 MMOL/L — SIGNIFICANT CHANGE UP (ref 3.5–5.3)
POTASSIUM SERPL-SCNC: 4.3 MMOL/L — SIGNIFICANT CHANGE UP (ref 3.5–5.3)
POTASSIUM SERPL-SCNC: 4.5 MMOL/L — SIGNIFICANT CHANGE UP (ref 3.5–5.3)
PROT SERPL-MCNC: 7.2 G/DL — SIGNIFICANT CHANGE UP (ref 6–8.3)
PROT SERPL-MCNC: 8 G/DL — SIGNIFICANT CHANGE UP (ref 6–8.3)
PROTHROM AB SERPL-ACNC: 13.4 SEC — SIGNIFICANT CHANGE UP (ref 10.5–13.4)
RBC # BLD: 4.55 M/UL — SIGNIFICANT CHANGE UP (ref 4.2–5.8)
RBC # BLD: 4.67 M/UL — SIGNIFICANT CHANGE UP (ref 4.2–5.8)
RBC # FLD: 15.2 % — HIGH (ref 10.3–14.5)
RBC # FLD: 15.3 % — HIGH (ref 10.3–14.5)
SODIUM SERPL-SCNC: 138 MMOL/L — SIGNIFICANT CHANGE UP (ref 135–145)
SODIUM SERPL-SCNC: 141 MMOL/L — SIGNIFICANT CHANGE UP (ref 135–145)
WBC # BLD: 4.63 K/UL — SIGNIFICANT CHANGE UP (ref 3.8–10.5)
WBC # BLD: 6.22 K/UL — SIGNIFICANT CHANGE UP (ref 3.8–10.5)
WBC # FLD AUTO: 4.63 K/UL — SIGNIFICANT CHANGE UP (ref 3.8–10.5)
WBC # FLD AUTO: 6.22 K/UL — SIGNIFICANT CHANGE UP (ref 3.8–10.5)

## 2023-05-04 PROCEDURE — 70450 CT HEAD/BRAIN W/O DYE: CPT | Mod: 26

## 2023-05-04 PROCEDURE — 99223 1ST HOSP IP/OBS HIGH 75: CPT

## 2023-05-04 RX ORDER — LANOLIN ALCOHOL/MO/W.PET/CERES
9 CREAM (GRAM) TOPICAL AT BEDTIME
Refills: 0 | Status: DISCONTINUED | OUTPATIENT
Start: 2023-05-04 | End: 2023-05-25

## 2023-05-04 RX ADMIN — LIDOCAINE 1 PATCH: 4 CREAM TOPICAL at 20:20

## 2023-05-04 RX ADMIN — SODIUM CHLORIDE 1 GRAM(S): 9 INJECTION INTRAMUSCULAR; INTRAVENOUS; SUBCUTANEOUS at 13:11

## 2023-05-04 RX ADMIN — Medication 1 DROP(S): at 22:29

## 2023-05-04 RX ADMIN — VALACYCLOVIR 1000 MILLIGRAM(S): 500 TABLET, FILM COATED ORAL at 06:27

## 2023-05-04 RX ADMIN — POLYETHYLENE GLYCOL 3350 17 GRAM(S): 17 POWDER, FOR SOLUTION ORAL at 12:34

## 2023-05-04 RX ADMIN — CARBIDOPA AND LEVODOPA 1 TABLET(S): 25; 100 TABLET ORAL at 19:53

## 2023-05-04 RX ADMIN — ENTACAPONE 200 MILLIGRAM(S): 200 TABLET, FILM COATED ORAL at 16:07

## 2023-05-04 RX ADMIN — SENNA PLUS 2 TABLET(S): 8.6 TABLET ORAL at 22:28

## 2023-05-04 RX ADMIN — VALACYCLOVIR 1000 MILLIGRAM(S): 500 TABLET, FILM COATED ORAL at 17:26

## 2023-05-04 RX ADMIN — CARBIDOPA AND LEVODOPA 1 TABLET(S): 25; 100 TABLET ORAL at 12:34

## 2023-05-04 RX ADMIN — ENTACAPONE 200 MILLIGRAM(S): 200 TABLET, FILM COATED ORAL at 12:34

## 2023-05-04 RX ADMIN — Medication 1 DROP(S): at 09:05

## 2023-05-04 RX ADMIN — SODIUM CHLORIDE 1 GRAM(S): 9 INJECTION INTRAMUSCULAR; INTRAVENOUS; SUBCUTANEOUS at 06:27

## 2023-05-04 RX ADMIN — Medication 1 DROP(S): at 17:26

## 2023-05-04 RX ADMIN — Medication 1 DROP(S): at 06:27

## 2023-05-04 RX ADMIN — LEVETIRACETAM 1000 MILLIGRAM(S): 250 TABLET, FILM COATED ORAL at 17:26

## 2023-05-04 RX ADMIN — Medication 100 MILLIGRAM(S): at 12:34

## 2023-05-04 RX ADMIN — ENTACAPONE 200 MILLIGRAM(S): 200 TABLET, FILM COATED ORAL at 08:10

## 2023-05-04 RX ADMIN — Medication 9 MILLIGRAM(S): at 22:28

## 2023-05-04 RX ADMIN — CARBIDOPA AND LEVODOPA 1 TABLET(S): 25; 100 TABLET ORAL at 16:06

## 2023-05-04 RX ADMIN — AMLODIPINE BESYLATE 10 MILLIGRAM(S): 2.5 TABLET ORAL at 06:26

## 2023-05-04 RX ADMIN — LEVETIRACETAM 1000 MILLIGRAM(S): 250 TABLET, FILM COATED ORAL at 06:27

## 2023-05-04 RX ADMIN — Medication 1 DROP(S): at 13:11

## 2023-05-04 RX ADMIN — SODIUM CHLORIDE 1 GRAM(S): 9 INJECTION INTRAMUSCULAR; INTRAVENOUS; SUBCUTANEOUS at 22:28

## 2023-05-04 RX ADMIN — LIDOCAINE 1 PATCH: 4 CREAM TOPICAL at 08:04

## 2023-05-04 RX ADMIN — CARBIDOPA AND LEVODOPA 1 TABLET(S): 25; 100 TABLET ORAL at 08:04

## 2023-05-04 RX ADMIN — ENOXAPARIN SODIUM 40 MILLIGRAM(S): 100 INJECTION SUBCUTANEOUS at 22:28

## 2023-05-04 NOTE — CONSULT NOTE ADULT - SUBJECTIVE AND OBJECTIVE BOX
HPI:   Patient is a 68y male with a past history of Parkinsons, wheelchair bound, HTN,seizures, s/p elective craniotomy in mid  April for resection of meningioma, with brief rehab stay aborted after he developed a left frontal ICH prompting return to [facility], who was transferred 5/3 for rehab stay.  He did not require a repeat craniotomy, had brief intubation, required EVD, managed conservatively with steroids.He was treated for perioral HSV with vatrex and received 1 week of zosyn for a RLL infiltrate and was sent to rehab. He has axillary rash which has prompted ID evaluation.He is alert, has no complaints, is eating and voiding on his own.    REVIEW OF SYSTEMS:  All other review of systems negative (Comprehensive ROS)    PAST MEDICAL & SURGICAL HISTORY:  Parkinsons      HTN (hypertension)      History of seizures      Brain mass      Impaired gait      Meningioma      History of insomnia      Intracerebral hemorrhage      No significant past surgical history          Allergies    No Known Allergies    Intolerances        Antimicrobials Day #  :  valACYclovir 1000 milliGRAM(s) Oral every 12 hours    Other Medications:  acetaminophen   Oral Liquid .. 650 milliGRAM(s) Oral every 6 hours PRN  acetylcysteine 20%  Inhalation 4 milliLiter(s) Inhalation every 6 hours PRN  albuterol    90 MICROgram(s) HFA Inhaler 2 Puff(s) Inhalation every 6 hours PRN  amantadine Syrup 100 milliGRAM(s) Oral daily  amLODIPine   Tablet 10 milliGRAM(s) Oral daily  artificial  tears Solution 1 Drop(s) Both EYES every 4 hours  carbidopa/levodopa  25/250 1 Tablet(s) Oral <User Schedule>  enoxaparin Injectable 40 milliGRAM(s) SubCutaneous every 24 hours  entacapone 200 milliGRAM(s) Oral <User Schedule>  levETIRAcetam 1000 milliGRAM(s) Oral two times a day  lidocaine   4% Patch 1 Patch Transdermal every 24 hours  melatonin 9 milliGRAM(s) Oral at bedtime  polyethylene glycol 3350 17 Gram(s) Oral daily  senna 2 Tablet(s) Oral at bedtime  sodium chloride 1 Gram(s) Oral every 8 hours  tiotropium 2.5 MICROgram(s) Inhaler 2 Puff(s) Inhalation daily      FAMILY HISTORY: N/A      SOCIAL HISTORY:  Smoking: x    ETOH: x    Drug Use: x      T(F): 98 (05-04-23 @ 08:10), Max: 99.2 (05-03-23 @ 16:55)  HR: 74 (05-04-23 @ 08:10)  BP: 132/80 (05-04-23 @ 08:10)  RR: 16 (05-04-23 @ 08:10)  SpO2: 98% (05-04-23 @ 08:10)  Wt(kg): --    PHYSICAL EXAM:  General: alert, no acute distress, craniotomy incision healing well, seen in wheelchair  Eyes:  anicteric, no conjunctival injection, no discharge  Oropharynx: no lesions or injection 	  Neck: supple, without adenopathy  Lungs: clear to auscultation  Heart: regular rate and rhythm; no murmur, rubs or gallops  Abdomen: soft, nondistended, nontender, without mass or organomegaly  Skin: ? faint axillar folliculitis.   Extremities: no clubbing, cyanosis, or edema  Neurologic: alert, oriented, moves all extremities    LAB RESULTS:                        14.3   4.63  )-----------( 177      ( 04 May 2023 06:48 )             44.0     05-04    141  |  106  |  16  ----------------------------<  96  4.3   |  27  |  1.30    Ca    9.8      04 May 2023 06:48    TPro  7.2  /  Alb  3.3  /  TBili  0.6  /  DBili  x   /  AST  18  /  ALT  30  /  AlkPhos  55  05-04    LIVER FUNCTIONS - ( 04 May 2023 06:48 )  Alb: 3.3 g/dL / Pro: 7.2 g/dL / ALK PHOS: 55 U/L / ALT: 30 U/L / AST: 18 U/L / GGT: x               MICROBIOLOGY:  RECENT CULTURES:  04-30 @ 13:22 .CSF CSF     No growth to date    No organisms seen  Rare polymorphonuclear leukocytes          RADIOLOGY REVIEWED:  < from: CT Head No Cont (05.02.23 @ 12:33) >  INTERPRETATION:  Clinical history/reason for exam: Status post EVD   portable    Technique: Multiple contiguous axial CT images of the head were obtained   from the base of the skull to the vertex without the administration of IV   contrast. Coronal and sagittal reformatted images were obtained.    Comparison:CT head 5/1/2023    Findings:    Status post removal of right frontal infarct EVD. Theventricles are   stable in size and configuration from prior exam. There are stable   scattered small volume intraventricular hemorrhage.    No change in left frontal intraparenchymal hematoma with surrounding   edema and mass effect. Stable left right midline shift..    No new intracranial hemorrhage or extra-axial fluid collection. No   interval large demarcated territorial infarction..    Left frontal craniotomy is noted.. Left frontotemporal scalp fluid   collection is stable from prior exam.    The paranasal sinuses and bilateral mastoid complexes are well aerated.    Impression:    Since prior CT head, removal of right frontal EVD with stable ventricular   caliber.    --- End of Report ---    < end of copied text >  < from: Xray Chest 1 View- PORTABLE-Urgent (Xray Chest 1 View- PORTABLE-Urgent .) (04.29.23 @ 11:43) >  INTERPRETATION:  Clinical History: Congestion    Frontal examination of the chest demonstrates limited inspiration.   Cardiac remaining. No acute infiltrates. Mild dextroscoliosis thoracic   spine. Mild elevation right hemidiaphragm    IMPRESSION: No acute infiltrates    < end of copied text >

## 2023-05-04 NOTE — DIETITIAN INITIAL EVALUATION ADULT - ADD RECOMMEND
1. Continue Soft and Bite Sized IDDSI diet.   2. Recommend Ensure Plus High Protein Daily 8 oz (Provides 350 kcal, 20 grams of protein) BID to assist pt in meeting estimated protein energy needs.  3. Monitor PO intake, GI tolerance, skin integrity, labs, weight, and bowel movement regularity.   4. Honor food preferences as feasible. Assist with meals PRN and encourage PO intake.  5. Follow SLP recommendations  6. Provide ongoing diet education as needed  7. RD remains available upon request and will follow-up per protocol

## 2023-05-04 NOTE — DIETITIAN INITIAL EVALUATION ADULT - ORAL INTAKE PTA/DIET HISTORY
Per EMR 4/24 PTA patient NPO w/ tube feed, now advanced to Soft and bite sized IDDSI diet texture.  Per EMR 4/24 PTA patient NPO w/ tube feed, now advanced to Soft and bite sized IDDSI diet texture. NKFA nor food intolerances reported. Dislikes Pork.

## 2023-05-04 NOTE — DIETITIAN INITIAL EVALUATION ADULT - OTHER INFO
Reason for Admission: Functional deficits s/p non-traumatic ICH  History of Present Illness:   67 yo male with PMHx of seizure, HTN, Parkinson's disease (Dx in 2012, wheelchair bound since 2020) and frequent falls; presented to [facility] on 4/17 for elective L craniotomy for meningioma resection with Dr. D'Amico. Tolerated well, discharged to [facility] on 4/21. During transit to , he complained of incisional headache with episodes of nausea and vomiting requiring Zofran and Oxycodone. Upon arrival to , an RRT was called for worsening mental status and CTH revealed an acute large left frontal hemorrhage with IVH, cerebral edema with midline shift and some effacement of Left frontal horn, transferred back to [facility]. He underwent EVD placement on 4/22. Extubated on 4/23, wean to NC. Hospital course was complicated by RLL PNA (s/p Zosyn), HTN, bradycardia, hyponatremia on salt tabs, NINFA, and Left cheek herpetic lesion ( 7 day Valtrex). S/p EVD removal on 5/1. CTH was stable on 5/2 and cleared for Lovenox sq. Amantadine added. PM&R was consulted and deemed him an appropriate candidate for IRF. Cleared for discharge to [facility] on 5/3.     At this time patient reports fair appetite/intake consuming ~50 of meals. Recommend Ensure Plus High Protein Daily 8 oz (Provides 350 kcal, 20 grams of protein) BID to assist patient in meeting estimated energy requirements. Reviewed preferences and menu alternatives; likes chicken, turkey, fish, dairy, Berries dislikes Pork, noted in Kardex. No issues w/ chewing and swallowing current diet texture. Denies N/V/D, however recent constipation, last BM 4/30 Per nursing flowsheets. Encouraged fluid/fiber at meals to assist w/ regular BM.  .5lb 5/3 reports no weight stable. Noted patient w/ recent hyponatremia, receiving odium chloride 1 Gram(s) Oral every 8 hours.  Reason for Admission: Functional deficits s/p non-traumatic ICH  History of Present Illness:   69 yo male with PMHx of seizure, HTN, Parkinson's disease (Dx in 2012, wheelchair bound since 2020) and frequent falls; presented to [facility] on 4/17 for elective L craniotomy for meningioma resection with Dr. D'Amico. Tolerated well, discharged to [facility] on 4/21. During transit to , he complained of incisional headache with episodes of nausea and vomiting requiring Zofran and Oxycodone. Upon arrival to , an RRT was called for worsening mental status and CTH revealed an acute large left frontal hemorrhage with IVH, cerebral edema with midline shift and some effacement of Left frontal horn, transferred back to [facility]. He underwent EVD placement on 4/22. Extubated on 4/23, wean to NC. Hospital course was complicated by RLL PNA (s/p Zosyn), HTN, bradycardia, hyponatremia on salt tabs, NINFA, and Left cheek herpetic lesion ( 7 day Valtrex). S/p EVD removal on 5/1. CTH was stable on 5/2 and cleared for Lovenox sq. Amantadine added. PM&R was consulted and deemed him an appropriate candidate for IRF. Cleared for discharge to [facility] on 5/3.     At this time patient reports fair appetite/intake consuming ~50 of meals. Recommend Ensure Plus High Protein Daily 8 oz (Provides 350 kcal, 20 grams of protein) BID to assist patient in meeting estimated energy requirements. Reviewed preferences and menu alternatives; likes chicken, turkey, fish, dairy, Berries dislikes Pork, noted in Kardex. No issues w/ chewing and swallowing current diet texture. Denies N/V/D, however recent constipation, last BM 4/30 Per nursing flowsheets. Encouraged fluid/fiber at meals to assist w/ regular BM.  .5lb 5/3 reports no weight stable. Noted patient w/ recent hyponatremia, receiving sodium chloride 1 Gram(s) Oral every 8 hours.

## 2023-05-04 NOTE — DIETITIAN INITIAL EVALUATION ADULT - PERTINENT MEDS FT
MEDICATIONS  (STANDING):  amantadine Syrup 100 milliGRAM(s) Oral daily  amLODIPine   Tablet 10 milliGRAM(s) Oral daily  artificial  tears Solution 1 Drop(s) Both EYES every 4 hours  carbidopa/levodopa  25/250 1 Tablet(s) Oral <User Schedule>  enoxaparin Injectable 40 milliGRAM(s) SubCutaneous every 24 hours  entacapone 200 milliGRAM(s) Oral <User Schedule>  levETIRAcetam 1000 milliGRAM(s) Oral two times a day  lidocaine   4% Patch 1 Patch Transdermal every 24 hours  melatonin 9 milliGRAM(s) Oral at bedtime  polyethylene glycol 3350 17 Gram(s) Oral daily  senna 2 Tablet(s) Oral at bedtime  sodium chloride 1 Gram(s) Oral every 8 hours  tiotropium 2.5 MICROgram(s) Inhaler 2 Puff(s) Inhalation daily  valACYclovir 1000 milliGRAM(s) Oral every 12 hours    MEDICATIONS  (PRN):  acetaminophen   Oral Liquid .. 650 milliGRAM(s) Oral every 6 hours PRN Mild Pain (1 - 3)  acetylcysteine 20%  Inhalation 4 milliLiter(s) Inhalation every 6 hours PRN congestion  albuterol    90 MICROgram(s) HFA Inhaler 2 Puff(s) Inhalation every 6 hours PRN Shortness of Breath and/or Wheezing

## 2023-05-04 NOTE — DIETITIAN INITIAL EVALUATION ADULT - PERTINENT LABORATORY DATA
05-04    141  |  106  |  16  ----------------------------<  96  4.3   |  27  |  1.30    Ca    9.8      04 May 2023 06:48    TPro  7.2  /  Alb  3.3  /  TBili  0.6  /  DBili  x   /  AST  18  /  ALT  30  /  AlkPhos  55  05-04  A1C with Estimated Average Glucose Result: 5.8 % (04-23-23 @ 05:24)  A1C with Estimated Average Glucose Result: 5.8 % (04-18-23 @ 05:30)

## 2023-05-04 NOTE — CONSULT NOTE ADULT - SUBJECTIVE AND OBJECTIVE BOX
69 yo male with PMHx of seizure, HTN, Parkinson's disease (Dx in 2012, wheelchair bound since 2020) and frequent falls; presented to [facility] on 4/17 for elective L craniotomy for meningioma resection with Dr. D'Amico. Tolerated well, discharged to [facility] on 4/21. During transit to , he complained of incisional headache with episodes of nausea and vomiting requiring Zofran and Oxycodone. Upon arrival to , an RRT was called for worsening mental status and CTH revealed an acute large left frontal hemorrhage with IVH, cerebral edema with midline shift and some effacement of Left frontal horn, transferred back to [facility]. He underwent EVD placement on 4/22. Extubated on 4/23, wean to NC. Hospital course was complicated by RLL PNA (s/p Zosyn), HTN, bradycardia, hyponatremia on salt tabs, NINFA, and Left cheek herpetic lesion ( 7 day Valtrex). S/p EVD removal on 5/1. CTH was stable on 5/2 and cleared for Lovenox sq. Amantadine added. PM&R was consulted and deemed him an appropriate candidate for IRF. Cleared for discharge to [facility] on 5/3.     seen at  he bedside, dysarthria noted, no other complaints, no n/v, no sob, no cp.        Review of Systems:  Review of Systems: limited ROS due to cognitive deficits.      Allergies and Intolerances:        Allergies:  	No Known Allergies:     Home Medications:   * Patient Currently Takes Medications as of 03-May-2023 11:40 documented in Structured Notes  · 	acetylcysteine 20% inhalation solution: 4 milliliter(s) inhaled every 6 hours As needed congestion  · 	ocular lubricant ophthalmic solution: 1 drop(s) to each affected eye every 4 hours  · 	lidocaine 4% topical film: Apply topically to affected area once a day as needed for L thigh pain  · 	ipratropium-albuterol 0.5 mg-2.5 mg/3 mL inhalation solution: 3 milliliter(s) inhaled every 6 hours As needed Shortness of Breath and/or Wheezing  · 	valACYclovir 1 g oral tablet: 1 tab(s) orally every 12 hours for L cheek herpes lesion, last day = 5/5/23  · 	amantadine 50 mg/5 mL oral syrup: 10 milliliter(s) orally every 24 hours  · 	levETIRAcetam 1000 mg oral tablet: 1 tab(s) orally 2 times a day  · 	enoxaparin: 40 milligram(s) subcutaneous once a day for DVT prevention while at rehab  · 	senna leaf extract oral tablet: 2 tab(s) orally once a day (at bedtime)  · 	polyethylene glycol 3350 oral powder for reconstitution: 17 gram(s) orally once a day  · 	entacapone 200 mg oral tablet: 1 tab(s) orally every 8 hours  · 	acetaminophen 325 mg oral tablet: 2 tab(s) orally every 6 hours As needed Mild Pain (1 - 3)  · 	carbidopa-levodopa 25 mg-250 mg oral tablet: 1 orally 4 times a day  · 	amLODIPine 2.5 mg oral tablet: 1 orally once a day    .    Patient History:    Past Medical, Past Surgical, and Family History:  PAST MEDICAL HISTORY:  Brain mass     History of insomnia     History of seizures     HTN (hypertension)     Impaired gait     Intracerebral hemorrhage     Meningioma     Parkinsons.     PAST SURGICAL HISTORY:  No significant past surgical history.    SOCIAL HISTORY  Smoking - Denied  EtOH - Denied   Drugs - Denied      Physical Exam:     Vital Signs Last 24 Hrs  T(C): 36.7 (04 May 2023 08:10), Max: 37.3 (03 May 2023 13:00)  T(F): 98 (04 May 2023 08:10), Max: 99.2 (03 May 2023 16:55)  HR: 74 (04 May 2023 08:10) (50 - 76)  BP: 132/80 (04 May 2023 08:10) (132/67 - 168/90)  BP(mean): 93 (03 May 2023 12:45) (93 - 93)  RR: 16 (04 May 2023 08:10) (16 - 18)  SpO2: 98% (04 May 2023 08:10) (95% - 100%)    Parameters below as of 04 May 2023 08:10  Patient On (Oxygen Delivery Method): room air      GENERAL- NAD  EAR/NOSE/MOUTH/THROAT - no pharyngeal exudates, no oral leisions,  MMM  EYES- DAJUAN, conjunctiva and Sclera clear  NECK- supple  RESPIRATORY-  clear to auscultation bilaterally, non laboured breathing  CARDIOVASCULAR - SIS2, RRR  GI - soft NT BS present  EXTREMITIES- mild pedal edema b/l  NEUROLOGY- dysarthria, no new gross motor deficits   PSYCHIATRY- Awake co operative       Labs and Results:                14.3                 141  | 27   | 16           4.63  >-----------< 177     ------------------------< 96                    44.0                 4.3  | 106  | 1.30                                         Ca 9.8   Mg x     Ph x            Labs reviewed:     CXR personally reviewed: < from: Xray Chest 1 View- PORTABLE-Urgent (Xray Chest 1 View- PORTABLE-Urgent .) (04.29.23 @ 11:43) >    IMPRESSION: No acute infiltrates    < end of copied text >      ECG reviewed and interpreted: < from: 12 Lead ECG (04.21.23 @ 18:49) >    Ventricular Rate 60 BPM    Atrial Rate 60 BPM    P-R Interval 132 ms    QRS Duration 88 ms    Q-T Interval 428 ms    QTC Calculation(Bazett) 428 ms    P Axis 44 degrees    R Axis 1 degrees    T Axis 41 degrees    Diagnosis Line Normal sinus rhythm  Nonspecific T wave abnormality  Abnormal ECG  No previous ECGs available    < end of copied text >    < from: TTE Echo Complete w/o Contrast w/ Doppler (04.22.23 @ 12:24) >  ---------------------------------------------------------------------------  -----  CONCLUSIONS:     1. Normal left and right ventricular size and systolic function.   2. Aortic sclerosis without significant stenosis.   3. Mild aortic regurgitation.   4. Pulmonary artery systolic pressure is 33 mmHg.   5. Nopericardial effusion.   6. Left pleural effusion.    < end of copied text >  < from: TTE Echo Complete w/o Contrast w/ Doppler (04.22.23 @ 12:24) >  ejection fraction of 71%.    < end of copied text >      IMAGING/RADIOLOGY:  CT HEAD (5/1)   IMPRESSION:  Since prior CT head 4/29/2023, stable ventricular caliber. Mild decrease  size of intraventricular hemorrhage. Otherwise no significant change..    CT HEAD (4/29) IMPRESSION:  Large left frontal hematoma is unchanged while intraventricular hemorrhage is decreasing.  Stable ventricular size and configuration; right transfrontal EVD tip does NOT appear intraventricular.    CHEST XRAY (4/24)  IMPRESSION:  New pulmonary venous congestion.  Interval extubation.  Enteric tube tip located in the region the gastric body. Confluent opacity over the left lower lung zone.  No pneumothorax.    CT HEAD (4/22)  IMPRESSION:  1.   Unchanged left frontal lobe hematoma with intraventricular hemorrhage extension, regional extra-axial hemorrhage and mass effect as noted.  2.   Unchanged white matter lesions that may be due to small vessel disease, prior radiation treatment or infectious/inflammatory disease.  Additional findings as described.    CT HEAD (4/22)   IMPRESSION:  1.   Grossly unchanged left frontal lobe intraparenchymal hematoma with intraventricular extension extra-axial hemorrhage and surrounding mass effect.  2.   Persistent 1 cm midline shift to the right.  3.   Stable ventricular size.  4.   Right-sided deviation of both anterior cerebral arteries due to mass effect. Otherwise unremarkable ACAs.  5.   Unremarkable CTA of the [id number].    CT HEAD (4/21)    IMPRESSION:  Interval development of moderate amount of hemorrhage in the left frontal lobe post operative site with intraventricular extension and new mild increased size of the body of the right lateral ventricle. Slightly increased rightward midline shift anteriorly.  Postoperative changes left frontal craniotomy, residual vasogenic edema in the left frontal lobe and chronic microvascular changes similar to prior exam.    MR HEAD (4/18)   IMPRESSION:  1.   Since the prior study of 03/25/2023, status post left frontal craniotomy for resection of the left frontal lobe extra-axial mass. No evidence of residual tumor.  2. Associated postsurgical changes with focal restricted diffusion surrounding the surgical resection cavity compatible with devitalized brain parenchyma.        MEDICATIONS  (STANDING):  amantadine Syrup 100 milliGRAM(s) Oral daily  amLODIPine   Tablet 10 milliGRAM(s) Oral daily  artificial  tears Solution 1 Drop(s) Both EYES every 4 hours  carbidopa/levodopa  25/250 1 Tablet(s) Oral <User Schedule>  enoxaparin Injectable 40 milliGRAM(s) SubCutaneous every 24 hours  entacapone 200 milliGRAM(s) Oral <User Schedule>  levETIRAcetam 1000 milliGRAM(s) Oral two times a day  lidocaine   4% Patch 1 Patch Transdermal every 24 hours  melatonin 6 milliGRAM(s) Oral at bedtime  polyethylene glycol 3350 17 Gram(s) Oral daily  senna 2 Tablet(s) Oral at bedtime  sodium chloride 1 Gram(s) Oral every 8 hours  tiotropium 2.5 MICROgram(s) Inhaler 2 Puff(s) Inhalation daily  valACYclovir 1000 milliGRAM(s) Oral every 12 hours    MEDICATIONS  (PRN):  acetaminophen   Oral Liquid .. 650 milliGRAM(s) Oral every 6 hours PRN Mild Pain (1 - 3)  acetylcysteine 20%  Inhalation 4 milliLiter(s) Inhalation every 6 hours PRN congestion  albuterol    90 MICROgram(s) HFA Inhaler 2 Puff(s) Inhalation every 6 hours PRN Shortness of Breath and/or Wheezing

## 2023-05-04 NOTE — CHART NOTE - NSCHARTNOTEFT_GEN_A_CORE
MAHDI KELY is a 68 year old male with PMH of seizure, HTN, Parkinson's disease (Dx in 2012, wheelchair bound since 2020) and frequent falls; presented to [facility] on 4/17 for elective L craniotomy for meningioma resection with Dr. D'Amico. His procedure was tolerated well and was discharged to [facility] on 4/21. During transit to , he complained of incisional headache with episodes of nausea and vomiting requiring Zofran and Oxycodone, but his symptoms persisted. Upon arrival to , an RRT was called for worsening mental status and a CTH revealed an acute large left frontal hemorrhage with IVH, cerebral edema with midline shift and some effacement of L frontal horn. He was transferred back to [facility].  He underwent EVD placement on 4/22. He was extubated on 4/23, and placed on HFNC with eventual wean to NC.  His hospital course was complicated by RLL consolidation (s/p Zosyn), HTN, bradycardia (self-resolved), hyponatremia, NINFA, and L cheek herpetic lesion (requiring 7 day course of Valtrex). S/p EVD removal on 5/1.-    Interval history     Patient had an RRT due to AMS and unresponsiveness.   Patient wasn't responding well.   RUE tremors were noted as well.   Patient does have a baseline of A&Ox1 along with dysarthria and hypophonia.     Physical Exam: General: NAD in bed  HENT: PERRL, EOM grossly in tact.   Neuro: A&O to self only. Dysarthria. Follows simple commands.       Plan     1 ) Labs - CBC, CMP, Pro-lactin, PT, PTT and INR.   2 ) CT head with non contrast. MAHDI KELY is a 68 year old male with PMH of seizure, HTN, Parkinson's disease (Dx in 2012, wheelchair bound since 2020) and frequent falls; presented to [facility] on 4/17 for elective L craniotomy for meningioma resection with Dr. D'Amico. His procedure was tolerated well and was discharged to [facility] on 4/21. During transit to , he complained of incisional headache with episodes of nausea and vomiting requiring Zofran and Oxycodone, but his symptoms persisted. Upon arrival to , an RRT was called for worsening mental status and a CTH revealed an acute large left frontal hemorrhage with IVH, cerebral edema with midline shift and some effacement of L frontal horn. He was transferred back to [facility].  He underwent EVD placement on 4/22. He was extubated on 4/23, and placed on HFNC with eventual wean to NC.  His hospital course was complicated by RLL consolidation (s/p Zosyn), HTN, bradycardia (self-resolved), hyponatremia, NINFA, and L cheek herpetic lesion (requiring 7 day course of Valtrex). S/p EVD removal on 5/1.-    Interval history     Patient had an RRT due to AMS and unresponsiveness.   RUE tremors were noted as well.   Patient does have a baseline of A&Ox1 along with dysarthria and hypophonia.     Physical Exam: General: NAD in bed  HENT: PERRL, EOM grossly in tact.   Neuro: A&O to self only. Dysarthria. Follows simple commands.       Plan     1 ) Labs - CBC, CMP, Pro-lactin, PT, PTT and INR.   2 ) CT head with non contrast.

## 2023-05-04 NOTE — CHART NOTE - NSCHARTNOTEFT_GEN_A_CORE
[facility] Plan of Care    REHABILITATION DIAGNOSIS:  Nontraumatic intracerebral hemorrhage  (Craniotomy for meningioma excision)    COMORBIDITIES/COMPLICATING CONDITIONS IMPACTING REHABILITATION:  HEALTH ISSUES - PROBLEM Dx:    Parkinson's disease  Weakness  Viral skin infection   Hypertension  Seizure disorder  Dysarthria  Impaired gait and balance      PAST MEDICAL & SURGICAL HISTORY:  Parkinson's      HTN (hypertension)      History of seizures      Brain mass      Impaired gait      Meningioma      History of insomnia      Intracerebral hemorrhage      No significant past surgical history          Based upon consideration of the patient's impairments, functional status, complicating conditions and any other contributing factors and after information garnered from the assessments of all therapy disciplines involved in treating the patient and other pertinent clinicians:    INTERDISCIPLINARY REHABILITATION INTERVENTIONS:    [ X  ] Transfer Training  [ X  ] Bed Mobility  [ X  ] Therapeutic Exercise  [ X ] Balance/Coordination Exercises  [ X ] Locomotion retraining  [ X  ] Stairs  [  X ] Functional Transfer Training  [   ] Bowel/Bladder program  [   ] Pain Management  [   ] Skin/Wound Care  [   ] Visual/Perceptual Training  [   ] Therapeutic Recreation Activities  [   ] Neuromuscular Re-education  [ X  ] Activities of Daily Living  [   ] Speech Exercise  [   ] Swallowing Exercises  [   ] Vital Stim  [   ] Dietary Supplements  [   ] Calorie Count  [   ] Cognitive Exercises  [ x  ] Congnitive/Linguistic Treatment  [   ] Behavior Program  [   ] Neuropsych Therapy  [ X  ] Patient/Family Counseling  [ X ] Family Training  [ X  ] Community Re-entry  [   ] Orthotic Evaluation  [   ] Prosthetic Eval/Training    MEDICAL PROGNOSIS:  Fair     REHAB POTENTIAL:  Fair   EXPECTED DAILY THERAPY:         PT: 1 hr        OT: 1 hr        ST: 1 hr        P&O: will be evaluated    EXPECTED INTENSITY OF PROGRAM:  3 hrs / Day    EXPECTED FREQUENCY OF PROGRAM: 5 Days/ Week    ESTIMATED LOS:  [  ] 5-7 Days  [  ] 7-10 Days  [  ] 10- 14 Days  [  x] 14- 18 Days  [  ] 18- 21 Days    ESTIMATED DISPOSITION:  [  ] Home   [  ] Home with Outpatient Therapies  [ x ] Home with Home Therapies  [  ] Assisted Living  [  ] Nursing Home  [  ] Long Term Acute Care    INTERDISCIPLINARY FUNCTIONAL OUTCOMES/GOALS:         Gait/Mobility: 4 with device        Transfers: 4       ADLs: 4       Functional Transfers: 4       Medication Management: 4       Communication: 4       Cognitive: 4       Dysphagia: 4       Bladder 4       Bowel:  4     Functional Independent Measures:   7 = Independent  6 = Modified Independent  5 = Supervision  4 = Minimal Assist/ Contact Guard  3 = Moderate Assistance  2 = Maximum Assistance  1 = Total Assistance  0 = Unable to assess

## 2023-05-05 LAB — PROLACTIN SERPL-MCNC: 3.7 NG/ML — LOW (ref 4.1–18.4)

## 2023-05-05 PROCEDURE — 82962 GLUCOSE BLOOD TEST: CPT

## 2023-05-05 PROCEDURE — 70450 CT HEAD/BRAIN W/O DYE: CPT

## 2023-05-05 PROCEDURE — 93005 ELECTROCARDIOGRAM TRACING: CPT

## 2023-05-05 PROCEDURE — 99232 SBSQ HOSP IP/OBS MODERATE 35: CPT

## 2023-05-05 RX ORDER — PETROLATUM,WHITE
1 JELLY (GRAM) TOPICAL
Refills: 0 | Status: DISCONTINUED | OUTPATIENT
Start: 2023-05-05 | End: 2023-05-25

## 2023-05-05 RX ADMIN — SENNA PLUS 2 TABLET(S): 8.6 TABLET ORAL at 21:07

## 2023-05-05 RX ADMIN — CARBIDOPA AND LEVODOPA 1 TABLET(S): 25; 100 TABLET ORAL at 12:05

## 2023-05-05 RX ADMIN — POLYETHYLENE GLYCOL 3350 17 GRAM(S): 17 POWDER, FOR SOLUTION ORAL at 12:07

## 2023-05-05 RX ADMIN — Medication 9 MILLIGRAM(S): at 21:06

## 2023-05-05 RX ADMIN — ENOXAPARIN SODIUM 40 MILLIGRAM(S): 100 INJECTION SUBCUTANEOUS at 21:06

## 2023-05-05 RX ADMIN — Medication 1 DROP(S): at 21:06

## 2023-05-05 RX ADMIN — VALACYCLOVIR 1000 MILLIGRAM(S): 500 TABLET, FILM COATED ORAL at 17:56

## 2023-05-05 RX ADMIN — CARBIDOPA AND LEVODOPA 1 TABLET(S): 25; 100 TABLET ORAL at 08:07

## 2023-05-05 RX ADMIN — AMLODIPINE BESYLATE 10 MILLIGRAM(S): 2.5 TABLET ORAL at 05:28

## 2023-05-05 RX ADMIN — Medication 100 MILLIGRAM(S): at 12:08

## 2023-05-05 RX ADMIN — ENTACAPONE 200 MILLIGRAM(S): 200 TABLET, FILM COATED ORAL at 12:07

## 2023-05-05 RX ADMIN — CARBIDOPA AND LEVODOPA 1 TABLET(S): 25; 100 TABLET ORAL at 20:04

## 2023-05-05 RX ADMIN — SODIUM CHLORIDE 1 GRAM(S): 9 INJECTION INTRAMUSCULAR; INTRAVENOUS; SUBCUTANEOUS at 12:06

## 2023-05-05 RX ADMIN — LEVETIRACETAM 1000 MILLIGRAM(S): 250 TABLET, FILM COATED ORAL at 17:57

## 2023-05-05 RX ADMIN — Medication 1 DROP(S): at 05:28

## 2023-05-05 RX ADMIN — ENTACAPONE 200 MILLIGRAM(S): 200 TABLET, FILM COATED ORAL at 08:07

## 2023-05-05 RX ADMIN — LEVETIRACETAM 1000 MILLIGRAM(S): 250 TABLET, FILM COATED ORAL at 05:27

## 2023-05-05 RX ADMIN — VALACYCLOVIR 1000 MILLIGRAM(S): 500 TABLET, FILM COATED ORAL at 05:28

## 2023-05-05 RX ADMIN — ENTACAPONE 200 MILLIGRAM(S): 200 TABLET, FILM COATED ORAL at 16:11

## 2023-05-05 RX ADMIN — CARBIDOPA AND LEVODOPA 1 TABLET(S): 25; 100 TABLET ORAL at 16:11

## 2023-05-05 RX ADMIN — SODIUM CHLORIDE 1 GRAM(S): 9 INJECTION INTRAMUSCULAR; INTRAVENOUS; SUBCUTANEOUS at 21:07

## 2023-05-05 RX ADMIN — SODIUM CHLORIDE 1 GRAM(S): 9 INJECTION INTRAMUSCULAR; INTRAVENOUS; SUBCUTANEOUS at 05:28

## 2023-05-05 NOTE — PROGRESS NOTE ADULT - ASSESSMENT
MAHDI KELY is a 68 year old male with PMH of seizure, HTN, Parkinson's disease (Dx in 2012, wheelchair bound since 2020) and frequent falls; presented to [facility] on 4/17 for elective L craniotomy for meningioma resection with Dr. D'Amico. His procedure was tolerated well and was discharged to [facility] on 4/21. During transit to , he complained of incisional headache with episodes of nausea and vomiting requiring Zofran and Oxycodone, but his symptoms persisted. Upon arrival to , an RRT was called for worsening mental status and a CTH revealed an acute large left frontal hemorrhage with IVH, cerebral edema with midline shift and some effacement of L frontal horn. He was transferred back to [facility].  He underwent EVD placement on 4/22. He was extubated on 4/23, and placed on HFNC with eventual wean to NC.  His hospital course was complicated by RLL consolidation (s/p Zosyn), HTN, bradycardia (self-resolved), hyponatremia, NINFA, and L cheek herpetic lesion (requiring 7 day course of Valtrex). S/p EVD removal on 5/1. Patient now admitted for a multidisciplinary rehab program. 05-03-23    * More engaging during review  * Interval CTH 5/4 for AMS showed reduction of left frontal hemorrhage    Rehab Management/MEDICAL MANAGEMENT     #Functional deficits s/p non-traumatic ICH  - Gait Instability, ADL impairments and Functional impairments: start Comprehensive Rehab Program of PT/OT/SLP  - 3 hours a day, 5 days a week  - P&O as needed   - CTH revealed L frontal lobe hemorrhage with intraventricular hemorrhage and increased size of R lateral ventricle  - S/p EVD placement on 4/22, removed on 5/1   - S/p Decadron taper x1 week  - Serial CTH 5/4, for interval AMS showed reduction of left frontal hemorrhage  - Continue Amantadine  - Sodium Chloride tabs to q8h, Na >135, taper    #HSV-1 Infection  - Left cheek lesion  - 7 day course Valtrex (4/28-5/5)  - F/u HIV testing    #PNA  - S/p Zosyn (4/22-4/27)   - Albuterol/Ipratropium Nebulizer prn  - Incentive Spirometer  - Chest PT  - Monitor secretions    #Parkinson's Disease  - Continue Sinemet 4x per day, Entacapone 200mg q8h  - Melatonin  - bowel regimen    #Seizures  - Keppra BID  -seizure precautions     #HTN  - Amlodipine  - Goal SBP: 100-160    #Skin  - Skin: scalp incision healing well, well approximated  -pustular rash b/l armpits-ID eval requested  - Pressure injury/Skin: OOB to Chair, PT/OT     #Pain Mgmt   - Tylenol PRN  - Lidocaine patch  - Home: Gabapentin-on hold per NSx    #GI/Bowel Mgmt   - Continent c/w Senna, Miralax     #/Bladder Mgmt   -  PVR q8h, CIC>400cc    #FEN   - Diet - Soft & Bite Sized   - Dysphagia  SLP - evaluation and treatment    #Precautions / PROPHYLAXIS:   - Falls  - ortho: Weight bearing status: WBAT   - Lungs: Aspiration, Incentive Spirometer   - DVT: Lovenox  -------------------------------------------------------------  FOLLOW UP/OUTPATIENT:    D'Amico, Randy  [phone]    Dianelys Nguyen  [address] [place] [address]  Phone: [phone]  Fax: (   )    -  Follow Up Time:    D'Amico, Randy  [facility] Physician [facility]  NEUROSURG [address]  Scheduled Appointment: 05/10/2023  -------------------------------------------------------------      Non critical care  Time based billing  25 mins spent, patient review, review of investigations and lab results and d/w patient and family

## 2023-05-05 NOTE — PROGRESS NOTE ADULT - ATTENDING COMMENTS
Seen and examined    68 year old male Hx  Seizure, HTN, Parkinson's disease (Dx in 2012, wheelchair bound since 2020) and frequent falls,   Recent elective Left craniotomy at [facility]  4/17  meningioma resection with Dr. D'Amico.   On T/F to acute rehab 4/21, Dxed with acute large left frontal hemorrhage with IVH, cerebral edema with midline shift and effacement of L frontal horn, Returned to [facility] and .underwent EVD placement on 4/22, post op complicated by  RLL consolidation (s/p Zosyn), HTN, bradycardia (self-resolved), hyponatremia, NINFA, and L cheek herpetic lesion (requiring 7 day course of Valtrex).. Serial CTH findings, last on 5/2, on Lovenox for dvt ppx  Acute Rehab admission to [facility] 5/3    Reports good family support, ambulatory without device and independent with ADLs prior to acute care admission  Now requiring mod/max assistance with ADLs  Has left sided weakness    Alert and awake, interactive during review  Scap surgical area, skin edges together  Dysarthric  Tolerating oral restricted diet  Masked facial expression with +ve Glabella tab  Normal LT sensation  MMT reduced motor strength left arm and leg  Mod rigidity and mild b/l hand tremors    RECENT LABS/IMAGING                        14.3   4.63  )-----------( 177      ( 04 May 2023 06:48 )             44.0     05-04    141  |  106  |  16  ----------------------------<  96  4.3   |  27  |  1.30    Ca    9.8      04 May 2023 06:48    TPro  7.2  /  Alb  3.3  /  TBili  0.6  /  DBili  x   /  AST  18  /  ALT  30  /  AlkPhos  55  05-04    DX, Non traumatic ICH, after Craniotomy for meningioma excision  - Serial CTH stable to date, last 5/2, Lovenox started 5/2  --Commence  Rehab Program of PT/OT/SLP    --Complete Valtrex treatment as recommended by ID  --Continue all supportive treatments--bowel regimen, analgesic treatment DVT PPX--Lovenox  --Advance directive to be clarified with family  --Monitor Na, and taper/d/c sodium tabs   Await collateral from family  Est dc to be decided at next IDT

## 2023-05-05 NOTE — PROVIDER CONTACT NOTE (OTHER) - BACKGROUND
68 Y M post L craniotomy on 4/17 and nontraumatic intracerebral hemorrhage on 4/21. PMH of seizure, HTN, Parkinson's disease.

## 2023-05-05 NOTE — PROVIDER CONTACT NOTE (OTHER) - SITUATION
Pt was alert when nurse assessed. After 2 mins, pt was unresponsive and was not able to open his eyes/answer the questions. BP = 149/82, HR = 81, T = 98.8, RR =16, SpO2 = 95% on 1L of O2.

## 2023-05-05 NOTE — PROGRESS NOTE ADULT - SUBJECTIVE AND OBJECTIVE BOX
Subjective  Seen and examined   Rapid response treatment yesterday evening for AMS and decreased responsiveness  CTH showed improvement---- showed reduction of left frontal hemorrhage  He reports no acute symptoms this time  Slept well  Tolerating oral diet  Speech is low tone, difficulty with words, often frustrated by inability to speak clearly  Otherwise, reports no head ache or chest pain     ROS: no head ache, nausea/vomiting or dyspnea  Dysarthria, hypophonia, tremors  LBM 5/4    ID review appreciated--no acute infective process  Complete 1 wk course of oral antiviral treatment    Vital Signs Last 24 Hrs  T(C): 36.9 (05 May 2023 08:53), Max: 37.1 (04 May 2023 20:10)  T(F): 98.5 (05 May 2023 08:53), Max: 98.8 (04 May 2023 20:10)  HR: 72 (05 May 2023 08:53) (62 - 81)  BP: 131/81 (05 May 2023 08:53) (131/81 - 153/90)  RR: 16 (05 May 2023 08:53) (16 - 16)  SpO2: 100% (05 May 2023 08:53) (95% - 100%)  O2 Parameters below as of 05 May 2023 08:53  Patient On (Oxygen Delivery Method): room air      Physical Exam:   General: Sitting on his wheel chair   HENT: PERRL, EOM grossly in tact. 1 cm, erythematous, herpetic lesion on L cheek. No blisters. No facial droop.   Cardiac: Regular rate and rhythm. No murmurs.  Pulm:  on RA. clear, No rhonchi, wheezes, no cough.  Abd: Soft, non-tender, rounded. +BS  Ext--Left check herpetic lesion, resolving    Neuro: A&O to self only. Dysarthria, hypophonia, Stuttering speech, perseverates. Dysphagia.  RUE 5/5, LUE 4+/5. Resting tremor,both hands L>R  Sensation grossly intact to light touch  MMT--UE 4/5  Lower extremities 3/5  Extremities: Skin is warm, perfused. Pustular  rash b/l armpits, erosion     RECENT LABS/IMAGING                        14.7   6.22  )-----------( 208      ( 04 May 2023 20:18 )             44.4     05-04    138  |  103  |  14  ----------------------------<  117<H>  4.5   |  28  |  1.15    Ca    10.1      04 May 2023 20:18  Phos  4.0     05-04  Mg     2.3     05-04    TPro  8.0  /  Alb  3.7  /  TBili  0.7  /  DBili  x   /  AST  26  /  ALT  19  /  AlkPhos  62  05-04    PT/INR - ( 04 May 2023 20:26 )   PT: 13.4 sec;   INR: 1.15 ratio         PTT - ( 04 May 2023 20:26 )  PTT:27.2 sec      < from: CT Head No Cont (05.04.23 @ 20:30) >        INTERPRETATION:  CLINICAL INDICATION: Follow-up left frontal parenchymal   hemorrhage    5mm axial sections of the brain were obtained from base to vertex,   without the intravenous administration of contrast material. Coronal and   sagittal computer generated reconstructed views are available.    Comparison is made with the prior CT of 5/2/2023.    The large left frontal parenchymal hemorrhage is slightly smaller and   slightly less dense compared with the prior exam without change in   vasogenic edema. There is no change in mass effect on the left frontal   horn or midline shift to the right. There is a left frontal craniotomy   defect Mildatrophy is identified with ventricular and sulcal prominence.   Extensive small vessel white matter ischemic changes are noted.    IMPRESSION: Slightly decreased size and density to the large left frontal   parenchymal hemorrhage compared with 5/2/2023. No change in mass effect   on the left frontal horn or midline shift to the right. Left frontal   craniotomy.    < end of copied text >      MEDICATIONS  (STANDING):  amantadine Syrup 100 milliGRAM(s) Oral daily  amLODIPine   Tablet 10 milliGRAM(s) Oral daily  AQUAPHOR (petrolatum Ointment) 1 Application(s) Topical two times a day  artificial  tears Solution 1 Drop(s) Both EYES every 4 hours  carbidopa/levodopa  25/250 1 Tablet(s) Oral <User Schedule>  enoxaparin Injectable 40 milliGRAM(s) SubCutaneous every 24 hours  entacapone 200 milliGRAM(s) Oral <User Schedule>  levETIRAcetam 1000 milliGRAM(s) Oral two times a day  lidocaine   4% Patch 1 Patch Transdermal every 24 hours  melatonin 9 milliGRAM(s) Oral at bedtime  polyethylene glycol 3350 17 Gram(s) Oral daily  senna 2 Tablet(s) Oral at bedtime  sodium chloride 1 Gram(s) Oral every 8 hours  tiotropium 2.5 MICROgram(s) Inhaler 2 Puff(s) Inhalation daily  valACYclovir 1000 milliGRAM(s) Oral every 12 hours    MEDICATIONS  (PRN):  acetaminophen   Oral Liquid .. 650 milliGRAM(s) Oral every 6 hours PRN Mild Pain (1 - 3)  acetylcysteine 20%  Inhalation 4 milliLiter(s) Inhalation every 6 hours PRN congestion  albuterol    90 MICROgram(s) HFA Inhaler 2 Puff(s) Inhalation every 6 hours PRN Shortness of Breath and/or Wheezing

## 2023-05-05 NOTE — PROVIDER CONTACT NOTE (OTHER) - ASSESSMENT
BP = 149/82, HR = 81, T = 98.8, RR =16, SpO2 = 95% on 1L of O2.  Bilateral tremors on arm and hands. Twitching on bilateral face noticed. Right facial twitching is worse than L. Pt was not able to open L eye.

## 2023-05-06 DIAGNOSIS — I10 ESSENTIAL (PRIMARY) HYPERTENSION: ICD-10-CM

## 2023-05-06 DIAGNOSIS — I61.9 NONTRAUMATIC INTRACEREBRAL HEMORRHAGE, UNSPECIFIED: ICD-10-CM

## 2023-05-06 DIAGNOSIS — G20 PARKINSON'S DISEASE: ICD-10-CM

## 2023-05-06 DIAGNOSIS — E78.5 HYPERLIPIDEMIA, UNSPECIFIED: ICD-10-CM

## 2023-05-06 DIAGNOSIS — E87.1 HYPO-OSMOLALITY AND HYPONATREMIA: ICD-10-CM

## 2023-05-06 PROCEDURE — 99232 SBSQ HOSP IP/OBS MODERATE 35: CPT

## 2023-05-06 RX ADMIN — Medication 1 APPLICATION(S): at 05:25

## 2023-05-06 RX ADMIN — SODIUM CHLORIDE 1 GRAM(S): 9 INJECTION INTRAMUSCULAR; INTRAVENOUS; SUBCUTANEOUS at 14:50

## 2023-05-06 RX ADMIN — ENTACAPONE 200 MILLIGRAM(S): 200 TABLET, FILM COATED ORAL at 16:02

## 2023-05-06 RX ADMIN — Medication 1 DROP(S): at 17:45

## 2023-05-06 RX ADMIN — Medication 1 DROP(S): at 21:52

## 2023-05-06 RX ADMIN — SODIUM CHLORIDE 1 GRAM(S): 9 INJECTION INTRAMUSCULAR; INTRAVENOUS; SUBCUTANEOUS at 21:53

## 2023-05-06 RX ADMIN — VALACYCLOVIR 1000 MILLIGRAM(S): 500 TABLET, FILM COATED ORAL at 05:14

## 2023-05-06 RX ADMIN — Medication 1 APPLICATION(S): at 20:02

## 2023-05-06 RX ADMIN — Medication 1 DROP(S): at 05:13

## 2023-05-06 RX ADMIN — ENTACAPONE 200 MILLIGRAM(S): 200 TABLET, FILM COATED ORAL at 08:07

## 2023-05-06 RX ADMIN — Medication 9 MILLIGRAM(S): at 21:52

## 2023-05-06 RX ADMIN — ENOXAPARIN SODIUM 40 MILLIGRAM(S): 100 INJECTION SUBCUTANEOUS at 21:52

## 2023-05-06 RX ADMIN — LEVETIRACETAM 1000 MILLIGRAM(S): 250 TABLET, FILM COATED ORAL at 05:13

## 2023-05-06 RX ADMIN — Medication 1 DROP(S): at 10:21

## 2023-05-06 RX ADMIN — LEVETIRACETAM 1000 MILLIGRAM(S): 250 TABLET, FILM COATED ORAL at 17:45

## 2023-05-06 RX ADMIN — ENTACAPONE 200 MILLIGRAM(S): 200 TABLET, FILM COATED ORAL at 11:59

## 2023-05-06 RX ADMIN — CARBIDOPA AND LEVODOPA 1 TABLET(S): 25; 100 TABLET ORAL at 20:01

## 2023-05-06 RX ADMIN — CARBIDOPA AND LEVODOPA 1 TABLET(S): 25; 100 TABLET ORAL at 11:59

## 2023-05-06 RX ADMIN — AMLODIPINE BESYLATE 10 MILLIGRAM(S): 2.5 TABLET ORAL at 05:18

## 2023-05-06 RX ADMIN — SODIUM CHLORIDE 1 GRAM(S): 9 INJECTION INTRAMUSCULAR; INTRAVENOUS; SUBCUTANEOUS at 05:14

## 2023-05-06 RX ADMIN — Medication 1 DROP(S): at 14:50

## 2023-05-06 RX ADMIN — VALACYCLOVIR 1000 MILLIGRAM(S): 500 TABLET, FILM COATED ORAL at 17:45

## 2023-05-06 RX ADMIN — CARBIDOPA AND LEVODOPA 1 TABLET(S): 25; 100 TABLET ORAL at 16:02

## 2023-05-06 RX ADMIN — CARBIDOPA AND LEVODOPA 1 TABLET(S): 25; 100 TABLET ORAL at 08:08

## 2023-05-06 RX ADMIN — Medication 100 MILLIGRAM(S): at 11:59

## 2023-05-06 NOTE — PROGRESS NOTE ADULT - SUBJECTIVE AND OBJECTIVE BOX
Patient is a 68y old  Male who presents with a chief complaint of Functional deficits s/p non-traumatic ICH (05 May 2023 09:37)      History, interim events and clinically pertinent issues reviewed; patient interviewed and examined.   He is without complaints this AM lying in bed but awake and responsive.  He denies HA's, CP, palpitations, shortness of breath or upper respiratory symptoms, nausea, vomiting, diarrhea, constipation, dysuria, bruising/bleeding and all other systems were reviewed as negative      ALLERGIES:  No Known Allergies    MEDICATIONS  (STANDING):  amantadine Syrup 100 milliGRAM(s) Oral daily  amLODIPine   Tablet 10 milliGRAM(s) Oral daily  AQUAPHOR (petrolatum Ointment) 1 Application(s) Topical two times a day  artificial  tears Solution 1 Drop(s) Both EYES every 4 hours  carbidopa/levodopa  25/250 1 Tablet(s) Oral <User Schedule>  enoxaparin Injectable 40 milliGRAM(s) SubCutaneous every 24 hours  entacapone 200 milliGRAM(s) Oral <User Schedule>  levETIRAcetam 1000 milliGRAM(s) Oral two times a day  lidocaine   4% Patch 1 Patch Transdermal every 24 hours  melatonin 9 milliGRAM(s) Oral at bedtime  polyethylene glycol 3350 17 Gram(s) Oral daily  senna 2 Tablet(s) Oral at bedtime  sodium chloride 1 Gram(s) Oral every 8 hours  tiotropium 2.5 MICROgram(s) Inhaler 2 Puff(s) Inhalation daily  valACYclovir 1000 milliGRAM(s) Oral every 12 hours    MEDICATIONS  (PRN):  acetaminophen   Oral Liquid .. 650 milliGRAM(s) Oral every 6 hours PRN Mild Pain (1 - 3)  acetylcysteine 20%  Inhalation 4 milliLiter(s) Inhalation every 6 hours PRN congestion  albuterol    90 MICROgram(s) HFA Inhaler 2 Puff(s) Inhalation every 6 hours PRN Shortness of Breath and/or Wheezing    Vital Signs Last 24 Hrs  T(F): 97.8 (05 May 2023 20:05), Max: 98.5 (05 May 2023 08:53)  HR: 62 (06 May 2023 05:20) (62 - 72)  BP: 150/77 (06 May 2023 05:20) (122/75 - 150/77)  RR: 16 (05 May 2023 20:05) (16 - 16)  SpO2: 98% (05 May 2023 20:05) (98% - 100%)  I&O's Summary    05 May 2023 07:01  -  06 May 2023 07:00  --------------------------------------------------------  IN: 0 mL / OUT: 200 mL / NET: -200 mL      BMI (kg/m2): 28.5 (05-03-23 @ 16:55)          PHYSICAL EXAM:  General: NAD, A/O x 3  ENT: MMM  Neck: Supple, No JVD  Lungs: Clear to auscultation bilaterally  Cardio: RRR, S1/S2, No murmurs  Abdomen: Soft, Nontender, Nondistended; Bowel sounds present  Extremities: No calf tenderness, No pitting edema      LABS:                        14.7   6.22  )-----------( 208      ( 04 May 2023 20:18 )             44.4       05-04    138  |  103  |  14  ----------------------------<  117  4.5   |  28  |  1.15    Ca    10.1      04 May 2023 20:18  Phos  4.0     05-04  Mg     2.3     05-04    TPro  8.0  /  Alb  3.7  /  TBili  0.7  /  DBili  x   /  AST  26  /  ALT  19  /  AlkPhos  62  05-04       PT/INR - ( 04 May 2023 20:26 )   PT: 13.4 sec;   INR: 1.15 ratio         PTT - ( 04 May 2023 20:26 )  PTT:27.2 sec         04-23 Chol 151 mg/dL LDL -- HDL 55 mg/dL Trig 115 mg/dL                Culture - CSF with Gram Stain (collected 30 Apr 2023 13:22)  Source: .CSF CSF  Gram Stain (30 Apr 2023 15:26):    No organisms seen    Rare polymorphonuclear leukocytes  Preliminary Report (03 May 2023 13:32):    No growth to date      COVID-19 PCR: NotDetec (04-19-23 @ 23:15)

## 2023-05-06 NOTE — PROGRESS NOTE ADULT - SUBJECTIVE AND OBJECTIVE BOX
Chief complaint: no  new complaints     Patient is a 68y old  Male who presents with a chief complaint of Functional deficits s/p non-traumatic ICH (06 May 2023 07:54)        HEALTH ISSUES - PROBLEM Dx:  ICH (intracerebral hemorrhage)    Parkinsons    Hypertension    Hyponatremia    HLD (hyperlipidemia)                PAST MEDICAL & SURGICAL HISTORY:  Parkinsons      HTN (hypertension)      History of seizures      Brain mass      Impaired gait      Meningioma      History of insomnia      Intracerebral hemorrhage      No significant past surgical history        VITALS  Vital Signs Last 24 Hrs  T(C): 37.2 (06 May 2023 08:12), Max: 37.2 (06 May 2023 08:12)  T(F): 98.9 (06 May 2023 08:12), Max: 98.9 (06 May 2023 08:12)  HR: 57 (06 May 2023 08:12) (57 - 70)  BP: 114/72 (06 May 2023 08:12) (114/72 - 150/77)  BP(mean): --  RR: 16 (06 May 2023 08:12) (16 - 16)  SpO2: 96% (06 May 2023 08:12) (96% - 98%)    Parameters below as of 06 May 2023 08:12  Patient On (Oxygen Delivery Method): room air          PHYSICAL EXAM  Constitutional - NAD, Comfortable  HEENT - NCAT, EOMI  Neck - Supple, No limited ROM  Chest - CTA bilaterally  Cardiovascular - RRR, S1S2  Abdomen -  Soft, NTND  Extremities -  No calf tenderness   Neurologic Exam -                    Cognitive - Awake, Alert     No new focal deficits                    RECENT LABS                        14.7   6.22  )-----------( 208      ( 04 May 2023 20:18 )             44.4     05-04    138  |  103  |  14  ----------------------------<  117<H>  4.5   |  28  |  1.15    Ca    10.1      04 May 2023 20:18  Phos  4.0     05-04  Mg     2.3     05-04    TPro  8.0  /  Alb  3.7  /  TBili  0.7  /  DBili  x   /  AST  26  /  ALT  19  /  AlkPhos  62  05-04    PT/INR - ( 04 May 2023 20:26 )   PT: 13.4 sec;   INR: 1.15 ratio         PTT - ( 04 May 2023 20:26 )  PTT:27.2 sec      CURRENT MEDICATIONS    MEDICATIONS  (STANDING):  amantadine Syrup 100 milliGRAM(s) Oral daily  amLODIPine   Tablet 10 milliGRAM(s) Oral daily  AQUAPHOR (petrolatum Ointment) 1 Application(s) Topical two times a day  artificial  tears Solution 1 Drop(s) Both EYES every 4 hours  carbidopa/levodopa  25/250 1 Tablet(s) Oral <User Schedule>  enoxaparin Injectable 40 milliGRAM(s) SubCutaneous every 24 hours  entacapone 200 milliGRAM(s) Oral <User Schedule>  levETIRAcetam 1000 milliGRAM(s) Oral two times a day  lidocaine   4% Patch 1 Patch Transdermal every 24 hours  melatonin 9 milliGRAM(s) Oral at bedtime  polyethylene glycol 3350 17 Gram(s) Oral daily  senna 2 Tablet(s) Oral at bedtime  sodium chloride 1 Gram(s) Oral every 8 hours  tiotropium 2.5 MICROgram(s) Inhaler 2 Puff(s) Inhalation daily  valACYclovir 1000 milliGRAM(s) Oral every 12 hours    MEDICATIONS  (PRN):  acetaminophen   Oral Liquid .. 650 milliGRAM(s) Oral every 6 hours PRN Mild Pain (1 - 3)  acetylcysteine 20%  Inhalation 4 milliLiter(s) Inhalation every 6 hours PRN congestion  albuterol    90 MICROgram(s) HFA Inhaler 2 Puff(s) Inhalation every 6 hours PRN Shortness of Breath and/or Wheezing    ASSESSMENT & PLAN          GI/Bowel Management - Dulcolax PRN, Fleet PRN   Management - Toilet Q2  Skin - Turn Q2  Pain - Tylenol PRN  DVT PPX - Lovenox      Continue comprehensive acute rehab program consisting of 3hrs/day of OT/PT and SLP.

## 2023-05-06 NOTE — PROGRESS NOTE ADULT - ASSESSMENT
MAHDI KELY is a 68 year old male with PMH of seizure, HTN, Parkinson's disease (Dx in 2012, wheelchair bound since 2020) and frequent falls; presented to [facility] on 4/17 for elective L craniotomy for meningioma resection with Dr. D'Amico. His procedure was tolerated well and was discharged to [place] [facility] on 4/21. During transit to , he complained of incisional headache with episodes of nausea and vomiting requiring Zofran and Oxycodone, but his symptoms persisted. Upon arrival to , an RRT was called for worsening mental status and a CTH revealed an acute large left frontal hemorrhage with IVH, cerebral edema with midline shift and some effacement of L frontal horn. He was transferred back to [facility].  He underwent EVD placement on 4/22. He was extubated on 4/23, and placed on HFNC with eventual wean to NC.  His hospital course was complicated by RLL consolidation (s/p Zosyn), HTN, bradycardia (self-resolved), hyponatremia, NINFA, and L cheek herpetic lesion (requiring 7 day course of Valtrex). S/p EVD removal on 5/1. Patient now admitted for a multidisciplinary rehab program- pt/ot/dvt pp    #s/p non-traumatic ICH  - CTH revealed Left frontal lobe hemorrhage with intraventricular hemorrhage and increased size of Rt lateral ventricle  - S/p EVD placement on 4/22, removed on 5/1   - S/p Decadron taper x1 week  - Continue Amantadine  - continue comprehensive acute rehabg program PT OT ST    # hyponatriamia-resolved  - continueSodium Chloride tabs     #HSV-1 Infection  - Left cheek lesion  - 7 day course Valtrex (4/28-5/5)    #PNA  - S/p Zosyn (4/22-4/27)   - Albuterol/Ipratropium Nebulizer prn    #Parkinson's Disease  - Continue Sinemet, Entacapone  - Melatonin    #Seizures  - Keppra BID  - seizure precautions     #HTN  - reviewed flow sheets systolic ranging 120-150s  - continue Amlodipine      - DVT ppx: Lovenox

## 2023-05-07 PROCEDURE — 99232 SBSQ HOSP IP/OBS MODERATE 35: CPT

## 2023-05-07 RX ADMIN — CARBIDOPA AND LEVODOPA 1 TABLET(S): 25; 100 TABLET ORAL at 08:01

## 2023-05-07 RX ADMIN — LEVETIRACETAM 1000 MILLIGRAM(S): 250 TABLET, FILM COATED ORAL at 05:04

## 2023-05-07 RX ADMIN — AMLODIPINE BESYLATE 10 MILLIGRAM(S): 2.5 TABLET ORAL at 05:04

## 2023-05-07 RX ADMIN — Medication 1 DROP(S): at 17:14

## 2023-05-07 RX ADMIN — POLYETHYLENE GLYCOL 3350 17 GRAM(S): 17 POWDER, FOR SOLUTION ORAL at 11:56

## 2023-05-07 RX ADMIN — ENTACAPONE 200 MILLIGRAM(S): 200 TABLET, FILM COATED ORAL at 16:13

## 2023-05-07 RX ADMIN — Medication 1 DROP(S): at 14:02

## 2023-05-07 RX ADMIN — ENTACAPONE 200 MILLIGRAM(S): 200 TABLET, FILM COATED ORAL at 11:56

## 2023-05-07 RX ADMIN — SENNA PLUS 2 TABLET(S): 8.6 TABLET ORAL at 20:09

## 2023-05-07 RX ADMIN — Medication 1 DROP(S): at 20:10

## 2023-05-07 RX ADMIN — CARBIDOPA AND LEVODOPA 1 TABLET(S): 25; 100 TABLET ORAL at 20:08

## 2023-05-07 RX ADMIN — SODIUM CHLORIDE 1 GRAM(S): 9 INJECTION INTRAMUSCULAR; INTRAVENOUS; SUBCUTANEOUS at 14:02

## 2023-05-07 RX ADMIN — VALACYCLOVIR 1000 MILLIGRAM(S): 500 TABLET, FILM COATED ORAL at 05:04

## 2023-05-07 RX ADMIN — Medication 1 DROP(S): at 02:38

## 2023-05-07 RX ADMIN — Medication 9 MILLIGRAM(S): at 20:09

## 2023-05-07 RX ADMIN — CARBIDOPA AND LEVODOPA 1 TABLET(S): 25; 100 TABLET ORAL at 11:56

## 2023-05-07 RX ADMIN — LEVETIRACETAM 1000 MILLIGRAM(S): 250 TABLET, FILM COATED ORAL at 17:15

## 2023-05-07 RX ADMIN — Medication 100 MILLIGRAM(S): at 11:56

## 2023-05-07 RX ADMIN — Medication 1 APPLICATION(S): at 05:05

## 2023-05-07 RX ADMIN — Medication 1 DROP(S): at 05:04

## 2023-05-07 RX ADMIN — VALACYCLOVIR 1000 MILLIGRAM(S): 500 TABLET, FILM COATED ORAL at 17:15

## 2023-05-07 RX ADMIN — SODIUM CHLORIDE 1 GRAM(S): 9 INJECTION INTRAMUSCULAR; INTRAVENOUS; SUBCUTANEOUS at 05:04

## 2023-05-07 RX ADMIN — ENOXAPARIN SODIUM 40 MILLIGRAM(S): 100 INJECTION SUBCUTANEOUS at 21:09

## 2023-05-07 RX ADMIN — CARBIDOPA AND LEVODOPA 1 TABLET(S): 25; 100 TABLET ORAL at 16:13

## 2023-05-07 RX ADMIN — ENTACAPONE 200 MILLIGRAM(S): 200 TABLET, FILM COATED ORAL at 08:02

## 2023-05-07 RX ADMIN — Medication 1 APPLICATION(S): at 17:15

## 2023-05-07 RX ADMIN — SODIUM CHLORIDE 1 GRAM(S): 9 INJECTION INTRAMUSCULAR; INTRAVENOUS; SUBCUTANEOUS at 20:08

## 2023-05-07 NOTE — PROGRESS NOTE ADULT - SUBJECTIVE AND OBJECTIVE BOX
Chief complaint: no new complaints     Patient is a 68y old  Male who presents with a chief complaint of Functional deficits s/p non-traumatic ICH (07 May 2023 08:19)        HEALTH ISSUES - PROBLEM Dx:  ICH (intracerebral hemorrhage)    Parkinsons    Hypertension    Hyponatremia    HLD (hyperlipidemia)                PAST MEDICAL & SURGICAL HISTORY:  Parkinsons      HTN (hypertension)      History of seizures      Brain mass      Impaired gait      Meningioma      History of insomnia      Intracerebral hemorrhage      No significant past surgical history        VITALS  Vital Signs Last 24 Hrs  T(C): 36.6 (07 May 2023 08:06), Max: 36.6 (06 May 2023 20:05)  T(F): 97.9 (07 May 2023 08:06), Max: 97.9 (06 May 2023 20:05)  HR: 73 (07 May 2023 08:06) (60 - 74)  BP: 155/96 (07 May 2023 08:06) (133/80 - 157/87)  BP(mean): --  RR: 16 (07 May 2023 08:06) (16 - 16)  SpO2: 97% (07 May 2023 08:06) (96% - 98%)    Parameters below as of 07 May 2023 08:06  Patient On (Oxygen Delivery Method): room air          PHYSICAL EXAM  Constitutional - NAD, Comfortable  HEENT - NCAT, EOMI  Neck - Supple, No limited ROM  Chest - CTA bilaterally  Cardiovascular - RRR, S1S2  Abdomen - BS+, Soft, NTND  Extremities - No C/C/E, No calf tenderness   Neurologic Exam -                    Cognitive -  AAO X3     No new focal deficits                      CURRENT MEDICATIONS    MEDICATIONS  (STANDING):  amantadine Syrup 100 milliGRAM(s) Oral daily  amLODIPine   Tablet 10 milliGRAM(s) Oral daily  AQUAPHOR (petrolatum Ointment) 1 Application(s) Topical two times a day  artificial  tears Solution 1 Drop(s) Both EYES every 4 hours  carbidopa/levodopa  25/250 1 Tablet(s) Oral <User Schedule>  enoxaparin Injectable 40 milliGRAM(s) SubCutaneous every 24 hours  entacapone 200 milliGRAM(s) Oral <User Schedule>  levETIRAcetam 1000 milliGRAM(s) Oral two times a day  lidocaine   4% Patch 1 Patch Transdermal every 24 hours  melatonin 9 milliGRAM(s) Oral at bedtime  polyethylene glycol 3350 17 Gram(s) Oral daily  senna 2 Tablet(s) Oral at bedtime  sodium chloride 1 Gram(s) Oral every 8 hours  tiotropium 2.5 MICROgram(s) Inhaler 2 Puff(s) Inhalation daily  valACYclovir 1000 milliGRAM(s) Oral every 12 hours    MEDICATIONS  (PRN):  acetaminophen   Oral Liquid .. 650 milliGRAM(s) Oral every 6 hours PRN Mild Pain (1 - 3)  acetylcysteine 20%  Inhalation 4 milliLiter(s) Inhalation every 6 hours PRN congestion  albuterol    90 MICROgram(s) HFA Inhaler 2 Puff(s) Inhalation every 6 hours PRN Shortness of Breath and/or Wheezing    ASSESSMENT & PLAN          GI/Bowel Management - Dulcolax PRN, Fleet PRN   Management - Toilet Q2  Skin - Turn Q2  Pain - Tylenol PRN  DVT PPX - Lovenox      Continue comprehensive acute rehab program consisting of 3hrs/day of OT/PT and SLP.

## 2023-05-07 NOTE — PROGRESS NOTE ADULT - SUBJECTIVE AND OBJECTIVE BOX
Patient is a 68y old  Male who presents with a chief complaint of Functional deficits s/p non-traumatic ICH (06 May 2023 11:49)    Patient examined and interviewed. There were no acute medical events yesterday or overnight and patient is without complaints this morning.  He was resting, but arousable / responsive.    REVIEW OF SYMPTOMS: patient denies HA's, CP, palpitations, shortness of breath or upper respiratory symptoms, nausea, vomiting, diarrhea, constipation, dysuria, bruising/bleeding and all other systems were reviewed as negative        ALLERGIES:  No Known Allergies    MEDICATIONS  (STANDING):  amantadine Syrup 100 milliGRAM(s) Oral daily  amLODIPine   Tablet 10 milliGRAM(s) Oral daily  AQUAPHOR (petrolatum Ointment) 1 Application(s) Topical two times a day  artificial  tears Solution 1 Drop(s) Both EYES every 4 hours  carbidopa/levodopa  25/250 1 Tablet(s) Oral <User Schedule>  enoxaparin Injectable 40 milliGRAM(s) SubCutaneous every 24 hours  entacapone 200 milliGRAM(s) Oral <User Schedule>  levETIRAcetam 1000 milliGRAM(s) Oral two times a day  lidocaine   4% Patch 1 Patch Transdermal every 24 hours  melatonin 9 milliGRAM(s) Oral at bedtime  polyethylene glycol 3350 17 Gram(s) Oral daily  senna 2 Tablet(s) Oral at bedtime  sodium chloride 1 Gram(s) Oral every 8 hours  tiotropium 2.5 MICROgram(s) Inhaler 2 Puff(s) Inhalation daily  valACYclovir 1000 milliGRAM(s) Oral every 12 hours    MEDICATIONS  (PRN):  acetaminophen   Oral Liquid .. 650 milliGRAM(s) Oral every 6 hours PRN Mild Pain (1 - 3)  acetylcysteine 20%  Inhalation 4 milliLiter(s) Inhalation every 6 hours PRN congestion  albuterol    90 MICROgram(s) HFA Inhaler 2 Puff(s) Inhalation every 6 hours PRN Shortness of Breath and/or Wheezing    Vital Signs Last 24 Hrs  T(F): 97.9 (07 May 2023 08:06), Max: 97.9 (06 May 2023 20:05)  HR: 73 (07 May 2023 08:06) (60 - 74)  BP: 155/96 (07 May 2023 08:06) (133/80 - 157/87)  RR: 16 (07 May 2023 08:06) (16 - 16)  SpO2: 97% (07 May 2023 08:06) (96% - 98%)  I&O's Summary    BMI (kg/m2): 28.5 (05-03-23 @ 16:55)          PHYSICAL EXAM:  General: NAD, Alert, responsive  ENT: MMM  Neck: Supple, No JVD  Lungs: Clear to auscultation bilaterally  Cardio: RRR, S1/S2, No murmurs  Abdomen: Soft, Nontender, Nondistended; Bowel sounds present  Extremities: No calf tenderness, No pitting edema  Neuro: no new deficits    LABS:                        14.7   6.22  )-----------( 208      ( 04 May 2023 20:18 )             44.4       05-04    138  |  103  |  14  ----------------------------<  117  4.5   |  28  |  1.15    Ca    10.1      04 May 2023 20:18  Phos  4.0     05-04  Mg     2.3     05-04    TPro  8.0  /  Alb  3.7  /  TBili  0.7  /  DBili  x   /  AST  26  /  ALT  19  /  AlkPhos  62  05-04       PT/INR - ( 04 May 2023 20:26 )   PT: 13.4 sec;   INR: 1.15 ratio         PTT - ( 04 May 2023 20:26 )  PTT:27.2 sec         04-23 Chol 151 mg/dL LDL -- HDL 55 mg/dL Trig 115 mg/dL                Culture - CSF with Gram Stain (collected 30 Apr 2023 13:22)  Source: .CSF CSF  Gram Stain (30 Apr 2023 15:26):    No organisms seen    Rare polymorphonuclear leukocytes  Preliminary Report (03 May 2023 13:32):    No growth to date      COVID-19 PCR: NotDetec (04-19-23 @ 23:15)

## 2023-05-07 NOTE — PROGRESS NOTE ADULT - NSPROGADDITIONALINFOA_GEN_ALL_CORE
I have personally interviewed and examined this patient, reviewed pertinent clinical information, and performed the evaluation and management services provided at today's visit for inpatient medical follow up    I am available to discuss any issues related to the medical care of this patient on the unit, or by phone at [phone]      will d/w PMR team on IDR
I have personally interviewed and examined this patient, reviewed pertinent clinical information, and performed the evaluation and management services provided at today's visit for inpatient medical follow up    I am available to discuss any issues related to the medical care of this patient on the unit, or by phone at [phone]      will d/w PMR team on IDR

## 2023-05-07 NOTE — PROGRESS NOTE ADULT - ASSESSMENT
MAHDI KELY is a 68 year old male with PMH of seizure, HTN, Parkinson's disease (Dx in 2012, wheelchair bound since 2020) and frequent falls; presented to [facility] on 4/17 for elective L craniotomy for meningioma resection with Dr. D'Amico. His procedure was tolerated well and was discharged to [facility] on 4/21. During transit to , he complained of incisional headache with episodes of nausea and vomiting requiring Zofran and Oxycodone, but his symptoms persisted. Upon arrival to , an RRT was called for worsening mental status and a CTH revealed an acute large left frontal hemorrhage with IVH, cerebral edema with midline shift and some effacement of L frontal horn. He was transferred back to [facility].  He underwent EVD placement on 4/22. He was extubated on 4/23, and placed on HFNC with eventual wean to NC.  His hospital course was complicated by RLL consolidation (s/p Zosyn), HTN, bradycardia (self-resolved), hyponatremia, NINFA, and L cheek herpetic lesion (requiring 7 day course of Valtrex). S/p EVD removal on 5/1. Patient now admitted for a multidisciplinary rehab program- pt/ot/dvt pp    #s/p non-traumatic ICH  - CTH revealed Left frontal lobe hemorrhage with intraventricular hemorrhage and increased size of Rt lateral ventricle  - S/p EVD placement on 4/22, removed on 5/1   - S/p Decadron taper x1 week  - Continue Amantadine  - continue comprehensive acute rehabg program PT OT ST    # hyponatriamia-resolved  - continue Sodium Chloride tabs     #HSV-1 Infection  - Left cheek lesion  - 7 day course Valtrex (4/28-5/5)    #PNA  - S/p Zosyn (4/22-4/27)   - Albuterol/Ipratropium Nebulizer prn    #Parkinson's Disease  - Continue Sinemet, Entacapone  - Melatonin    #Seizures  - Keppra BID  - seizure precautions     #HTN  - reviewed flow sheets systolic ranging 120-150s  - continue Amlodipine      - DVT ppx: Lovenox

## 2023-05-08 DIAGNOSIS — Z99.3 DEPENDENCE ON WHEELCHAIR: ICD-10-CM

## 2023-05-08 DIAGNOSIS — G93.5 COMPRESSION OF BRAIN: ICD-10-CM

## 2023-05-08 DIAGNOSIS — N18.9 CHRONIC KIDNEY DISEASE, UNSPECIFIED: ICD-10-CM

## 2023-05-08 DIAGNOSIS — R29.6 REPEATED FALLS: ICD-10-CM

## 2023-05-08 DIAGNOSIS — Z86.011 PERSONAL HISTORY OF BENIGN NEOPLASM OF THE BRAIN: ICD-10-CM

## 2023-05-08 DIAGNOSIS — I12.9 HYPERTENSIVE CHRONIC KIDNEY DISEASE WITH STAGE 1 THROUGH STAGE 4 CHRONIC KIDNEY DISEASE, OR UNSPECIFIED CHRONIC KIDNEY DISEASE: ICD-10-CM

## 2023-05-08 DIAGNOSIS — I61.5 NONTRAUMATIC INTRACEREBRAL HEMORRHAGE, INTRAVENTRICULAR: ICD-10-CM

## 2023-05-08 DIAGNOSIS — B00.9 HERPESVIRAL INFECTION, UNSPECIFIED: ICD-10-CM

## 2023-05-08 DIAGNOSIS — G20 PARKINSON'S DISEASE: ICD-10-CM

## 2023-05-08 DIAGNOSIS — J15.6 PNEUMONIA DUE TO OTHER GRAM-NEGATIVE BACTERIA: ICD-10-CM

## 2023-05-08 DIAGNOSIS — G47.00 INSOMNIA, UNSPECIFIED: ICD-10-CM

## 2023-05-08 DIAGNOSIS — G40.909 EPILEPSY, UNSPECIFIED, NOT INTRACTABLE, WITHOUT STATUS EPILEPTICUS: ICD-10-CM

## 2023-05-08 DIAGNOSIS — I61.9 NONTRAUMATIC INTRACEREBRAL HEMORRHAGE, UNSPECIFIED: ICD-10-CM

## 2023-05-08 DIAGNOSIS — G93.6 CEREBRAL EDEMA: ICD-10-CM

## 2023-05-08 DIAGNOSIS — J69.0 PNEUMONITIS DUE TO INHALATION OF FOOD AND VOMIT: ICD-10-CM

## 2023-05-08 DIAGNOSIS — R14.0 ABDOMINAL DISTENSION (GASEOUS): ICD-10-CM

## 2023-05-08 DIAGNOSIS — Z78.1 PHYSICAL RESTRAINT STATUS: ICD-10-CM

## 2023-05-08 DIAGNOSIS — J90 PLEURAL EFFUSION, NOT ELSEWHERE CLASSIFIED: ICD-10-CM

## 2023-05-08 DIAGNOSIS — M79.652 PAIN IN LEFT THIGH: ICD-10-CM

## 2023-05-08 LAB
ALBUMIN SERPL ELPH-MCNC: 3.4 G/DL — SIGNIFICANT CHANGE UP (ref 3.3–5)
ALP SERPL-CCNC: 55 U/L — SIGNIFICANT CHANGE UP (ref 40–120)
ALT FLD-CCNC: 27 U/L — SIGNIFICANT CHANGE UP (ref 10–45)
ANION GAP SERPL CALC-SCNC: 9 MMOL/L — SIGNIFICANT CHANGE UP (ref 5–17)
AST SERPL-CCNC: 16 U/L — SIGNIFICANT CHANGE UP (ref 10–40)
BASOPHILS # BLD AUTO: 0.01 K/UL — SIGNIFICANT CHANGE UP (ref 0–0.2)
BASOPHILS NFR BLD AUTO: 0.3 % — SIGNIFICANT CHANGE UP (ref 0–2)
BILIRUB SERPL-MCNC: 0.6 MG/DL — SIGNIFICANT CHANGE UP (ref 0.2–1.2)
BUN SERPL-MCNC: 12 MG/DL — SIGNIFICANT CHANGE UP (ref 7–23)
CALCIUM SERPL-MCNC: 10 MG/DL — SIGNIFICANT CHANGE UP (ref 8.4–10.5)
CHLORIDE SERPL-SCNC: 105 MMOL/L — SIGNIFICANT CHANGE UP (ref 96–108)
CO2 SERPL-SCNC: 27 MMOL/L — SIGNIFICANT CHANGE UP (ref 22–31)
CREAT SERPL-MCNC: 1.2 MG/DL — SIGNIFICANT CHANGE UP (ref 0.5–1.3)
EGFR: 65 ML/MIN/1.73M2 — SIGNIFICANT CHANGE UP
EOSINOPHIL # BLD AUTO: 0.07 K/UL — SIGNIFICANT CHANGE UP (ref 0–0.5)
EOSINOPHIL NFR BLD AUTO: 2.2 % — SIGNIFICANT CHANGE UP (ref 0–6)
GLUCOSE SERPL-MCNC: 95 MG/DL — SIGNIFICANT CHANGE UP (ref 70–99)
HCT VFR BLD CALC: 42.4 % — SIGNIFICANT CHANGE UP (ref 39–50)
HGB BLD-MCNC: 14.1 G/DL — SIGNIFICANT CHANGE UP (ref 13–17)
IMM GRANULOCYTES NFR BLD AUTO: 0.3 % — SIGNIFICANT CHANGE UP (ref 0–0.9)
LYMPHOCYTES # BLD AUTO: 1.58 K/UL — SIGNIFICANT CHANGE UP (ref 1–3.3)
LYMPHOCYTES # BLD AUTO: 49.4 % — HIGH (ref 13–44)
MCHC RBC-ENTMCNC: 32 PG — SIGNIFICANT CHANGE UP (ref 27–34)
MCHC RBC-ENTMCNC: 33.3 GM/DL — SIGNIFICANT CHANGE UP (ref 32–36)
MCV RBC AUTO: 96.4 FL — SIGNIFICANT CHANGE UP (ref 80–100)
MONOCYTES # BLD AUTO: 0.27 K/UL — SIGNIFICANT CHANGE UP (ref 0–0.9)
MONOCYTES NFR BLD AUTO: 8.4 % — SIGNIFICANT CHANGE UP (ref 2–14)
NEUTROPHILS # BLD AUTO: 1.26 K/UL — LOW (ref 1.8–7.4)
NEUTROPHILS NFR BLD AUTO: 39.4 % — LOW (ref 43–77)
NRBC # BLD: 0 /100 WBCS — SIGNIFICANT CHANGE UP (ref 0–0)
PLATELET # BLD AUTO: 186 K/UL — SIGNIFICANT CHANGE UP (ref 150–400)
POTASSIUM SERPL-MCNC: 4.5 MMOL/L — SIGNIFICANT CHANGE UP (ref 3.5–5.3)
POTASSIUM SERPL-SCNC: 4.5 MMOL/L — SIGNIFICANT CHANGE UP (ref 3.5–5.3)
PROT SERPL-MCNC: 7.6 G/DL — SIGNIFICANT CHANGE UP (ref 6–8.3)
RBC # BLD: 4.4 M/UL — SIGNIFICANT CHANGE UP (ref 4.2–5.8)
RBC # FLD: 15 % — HIGH (ref 10.3–14.5)
SODIUM SERPL-SCNC: 141 MMOL/L — SIGNIFICANT CHANGE UP (ref 135–145)
WBC # BLD: 3.2 K/UL — LOW (ref 3.8–10.5)
WBC # FLD AUTO: 3.2 K/UL — LOW (ref 3.8–10.5)

## 2023-05-08 PROCEDURE — 99232 SBSQ HOSP IP/OBS MODERATE 35: CPT

## 2023-05-08 RX ADMIN — SODIUM CHLORIDE 1 GRAM(S): 9 INJECTION INTRAMUSCULAR; INTRAVENOUS; SUBCUTANEOUS at 22:31

## 2023-05-08 RX ADMIN — VALACYCLOVIR 1000 MILLIGRAM(S): 500 TABLET, FILM COATED ORAL at 17:04

## 2023-05-08 RX ADMIN — SODIUM CHLORIDE 1 GRAM(S): 9 INJECTION INTRAMUSCULAR; INTRAVENOUS; SUBCUTANEOUS at 05:26

## 2023-05-08 RX ADMIN — Medication 1 APPLICATION(S): at 05:25

## 2023-05-08 RX ADMIN — CARBIDOPA AND LEVODOPA 1 TABLET(S): 25; 100 TABLET ORAL at 12:05

## 2023-05-08 RX ADMIN — ENTACAPONE 200 MILLIGRAM(S): 200 TABLET, FILM COATED ORAL at 07:56

## 2023-05-08 RX ADMIN — SENNA PLUS 2 TABLET(S): 8.6 TABLET ORAL at 22:31

## 2023-05-08 RX ADMIN — POLYETHYLENE GLYCOL 3350 17 GRAM(S): 17 POWDER, FOR SOLUTION ORAL at 12:05

## 2023-05-08 RX ADMIN — CARBIDOPA AND LEVODOPA 1 TABLET(S): 25; 100 TABLET ORAL at 20:06

## 2023-05-08 RX ADMIN — CARBIDOPA AND LEVODOPA 1 TABLET(S): 25; 100 TABLET ORAL at 15:54

## 2023-05-08 RX ADMIN — SODIUM CHLORIDE 1 GRAM(S): 9 INJECTION INTRAMUSCULAR; INTRAVENOUS; SUBCUTANEOUS at 17:01

## 2023-05-08 RX ADMIN — Medication 9 MILLIGRAM(S): at 22:30

## 2023-05-08 RX ADMIN — LEVETIRACETAM 1000 MILLIGRAM(S): 250 TABLET, FILM COATED ORAL at 17:04

## 2023-05-08 RX ADMIN — Medication 1 DROP(S): at 12:04

## 2023-05-08 RX ADMIN — AMLODIPINE BESYLATE 10 MILLIGRAM(S): 2.5 TABLET ORAL at 05:26

## 2023-05-08 RX ADMIN — ENTACAPONE 200 MILLIGRAM(S): 200 TABLET, FILM COATED ORAL at 12:05

## 2023-05-08 RX ADMIN — Medication 1 DROP(S): at 23:09

## 2023-05-08 RX ADMIN — LEVETIRACETAM 1000 MILLIGRAM(S): 250 TABLET, FILM COATED ORAL at 05:25

## 2023-05-08 RX ADMIN — VALACYCLOVIR 1000 MILLIGRAM(S): 500 TABLET, FILM COATED ORAL at 05:25

## 2023-05-08 RX ADMIN — Medication 1 DROP(S): at 05:26

## 2023-05-08 RX ADMIN — Medication 100 MILLIGRAM(S): at 12:05

## 2023-05-08 RX ADMIN — ENOXAPARIN SODIUM 40 MILLIGRAM(S): 100 INJECTION SUBCUTANEOUS at 22:30

## 2023-05-08 RX ADMIN — CARBIDOPA AND LEVODOPA 1 TABLET(S): 25; 100 TABLET ORAL at 07:56

## 2023-05-08 RX ADMIN — ENTACAPONE 200 MILLIGRAM(S): 200 TABLET, FILM COATED ORAL at 17:01

## 2023-05-08 NOTE — PROGRESS NOTE ADULT - SUBJECTIVE AND OBJECTIVE BOX
Subjective  Seen and examined   No acute medical complaint   Tolerating oral diet  Reports normal sleep    ROS: no head ache, nausea/vomiting or dyspnea  Dysarthria, hypophonia, tremors  LBM 5/5    Therapy:  Engaging in therapy  Speech deficits still apparent  LE motor function improving, but still prominent on lower extremities    Vital Signs Last 24 Hrs  T(C): 36.7 (08 May 2023 07:54), Max: 36.7 (07 May 2023 20:10)  T(F): 98 (08 May 2023 07:54), Max: 98 (07 May 2023 20:10)  HR: 67 (08 May 2023 07:54) (64 - 76)  BP: 130/80 (08 May 2023 07:54) (119/75 - 158/81)  RR: 16 (08 May 2023 07:54) (16 - 16)  SpO2: 98% (08 May 2023 07:54) (97% - 98%)  O2 Parameters below as of 08 May 2023 07:54  Patient On (Oxygen Delivery Method): room air      Physical Exam:   General: Sitting on his wheel chair   HENT: PERRL, EOM grossly in tact. 1 cm, erythematous,   Cardiac: Regular rate and rhythm. No murmurs.  Pulm:  on RA. clear, No rhonchi, wheezes, no cough.  Abd: Soft, non-tender, rounded. +BS  Ext--no ulcers    Neuro: A&O to self only. Dysarthria, hypophonia, Stuttering speech, perseverates. Dysphagia.  RUE 5/5, LUE 4+/5. Resting tremor,both hands L>R  Sensation grossly intact to light touch  MMT--UE 4/5  Lower extremities 3/5  Extremities: Skin is warm, perfused. Pustular  rash b/l armpits, erosion     RECENT LABS/IMAGING                        14.1   3.20  )-----------( 186      ( 08 May 2023 07:00 )             42.4     05-08    141  |  105  |  12  ----------------------------<  95  4.5   |  27  |  1.20    Ca    10.0      08 May 2023 07:00    TPro  7.6  /  Alb  3.4  /  TBili  0.6  /  DBili  x   /  AST  16  /  ALT  27  /  AlkPhos  55  05-08    MEDICATIONS  (STANDING):  amantadine Syrup 100 milliGRAM(s) Oral daily  amLODIPine   Tablet 10 milliGRAM(s) Oral daily  AQUAPHOR (petrolatum Ointment) 1 Application(s) Topical two times a day  artificial  tears Solution 1 Drop(s) Both EYES every 4 hours  carbidopa/levodopa  25/250 1 Tablet(s) Oral <User Schedule>  enoxaparin Injectable 40 milliGRAM(s) SubCutaneous every 24 hours  entacapone 200 milliGRAM(s) Oral <User Schedule>  levETIRAcetam 1000 milliGRAM(s) Oral two times a day  lidocaine   4% Patch 1 Patch Transdermal every 24 hours  melatonin 9 milliGRAM(s) Oral at bedtime  polyethylene glycol 3350 17 Gram(s) Oral daily  senna 2 Tablet(s) Oral at bedtime  sodium chloride 1 Gram(s) Oral every 8 hours  tiotropium 2.5 MICROgram(s) Inhaler 2 Puff(s) Inhalation daily  valACYclovir 1000 milliGRAM(s) Oral every 12 hours    MEDICATIONS  (PRN):  acetaminophen   Oral Liquid .. 650 milliGRAM(s) Oral every 6 hours PRN Mild Pain (1 - 3)  acetylcysteine 20%  Inhalation 4 milliLiter(s) Inhalation every 6 hours PRN congestion  albuterol    90 MICROgram(s) HFA Inhaler 2 Puff(s) Inhalation every 6 hours PRN Shortness of Breath and/or Wheezing             Subjective  Seen and examined   No acute medical complaint   Tolerating oral diet  Reports normal sleep  Staff noted difficulty with arousal in the morning   Tremors are more prominent  afternoon as noted by nursing   Patient and family requests neurology review     ROS: no head ache, nausea/vomiting or dyspnea  Dysarthria, hypophonia, tremors  LBM 5/7    Therapy:  Engaging in therapy  Speech deficits still apparent  LE motor function improving, but still prominent on lower extremities    Lab results reviewed, isolated low Wbc, will monitor    Vital Signs Last 24 Hrs  T(C): 36.7 (08 May 2023 07:54), Max: 36.7 (07 May 2023 20:10)  T(F): 98 (08 May 2023 07:54), Max: 98 (07 May 2023 20:10)  HR: 67 (08 May 2023 07:54) (64 - 76)  BP: 130/80 (08 May 2023 07:54) (119/75 - 158/81)  RR: 16 (08 May 2023 07:54) (16 - 16)  SpO2: 98% (08 May 2023 07:54) (97% - 98%)  O2 Parameters below as of 08 May 2023 07:54  Patient On (Oxygen Delivery Method): room air      Physical Exam:   General: Sitting on his wheel chair   HENT: PERRL, EOM grossly in tact.,   Cardiac: Regular rate.  Pulm:  on RA. clear, No rhonchi, wheezes, no cough.  Abd: Soft, non-tender, rounded. +BS  Ext--no ulcers    Neuro: A&O to self only. Dysarthria, hypophonia, Stuttering speech, perseverates. Dysphagia.  RUE 5/5, LUE 4+/5. Resting tremor,both hands L>R  Sensation grossly intact to light touch  MMT--UE 4/5  Lower extremities 3/5  Extremities: Skin is warm, perfused. Pustular  rash b/l armpits, erosion     RECENT LABS/IMAGING                        14.1   3.20  )-----------( 186      ( 08 May 2023 07:00 )             42.4     05-08    141  |  105  |  12  ----------------------------<  95  4.5   |  27  |  1.20    Ca    10.0      08 May 2023 07:00    TPro  7.6  /  Alb  3.4  /  TBili  0.6  /  DBili  x   /  AST  16  /  ALT  27  /  AlkPhos  55  05-08    MEDICATIONS  (STANDING):  amantadine Syrup 100 milliGRAM(s) Oral daily  amLODIPine   Tablet 10 milliGRAM(s) Oral daily  AQUAPHOR (petrolatum Ointment) 1 Application(s) Topical two times a day  artificial  tears Solution 1 Drop(s) Both EYES every 4 hours  carbidopa/levodopa  25/250 1 Tablet(s) Oral <User Schedule>  enoxaparin Injectable 40 milliGRAM(s) SubCutaneous every 24 hours  entacapone 200 milliGRAM(s) Oral <User Schedule>  levETIRAcetam 1000 milliGRAM(s) Oral two times a day  lidocaine   4% Patch 1 Patch Transdermal every 24 hours  melatonin 9 milliGRAM(s) Oral at bedtime  polyethylene glycol 3350 17 Gram(s) Oral daily  senna 2 Tablet(s) Oral at bedtime  sodium chloride 1 Gram(s) Oral every 8 hours  tiotropium 2.5 MICROgram(s) Inhaler 2 Puff(s) Inhalation daily  valACYclovir 1000 milliGRAM(s) Oral every 12 hours    MEDICATIONS  (PRN):  acetaminophen   Oral Liquid .. 650 milliGRAM(s) Oral every 6 hours PRN Mild Pain (1 - 3)  acetylcysteine 20%  Inhalation 4 milliLiter(s) Inhalation every 6 hours PRN congestion  albuterol    90 MICROgram(s) HFA Inhaler 2 Puff(s) Inhalation every 6 hours PRN Shortness of Breath and/or Wheezing

## 2023-05-08 NOTE — PROGRESS NOTE ADULT - ASSESSMENT
MAHDI KELY is a 68 year old male with PMH of seizure, HTN, Parkinson's disease (Dx in 2012, wheelchair bound since 2020) and frequent falls; presented to [facility] on 4/17 for elective L craniotomy for meningioma resection with Dr. D'Amico. His procedure was tolerated well and was discharged to [facility] on 4/21. During transit to , he complained of incisional headache with episodes of nausea and vomiting requiring Zofran and Oxycodone, but his symptoms persisted. Upon arrival to , an RRT was called for worsening mental status and a CTH revealed an acute large left frontal hemorrhage with IVH, cerebral edema with midline shift and some effacement of L frontal horn. He was transferred back to [facility].  He underwent EVD placement on 4/22. He was extubated on 4/23, and placed on HFNC with eventual wean to NC.  His hospital course was complicated by RLL consolidation (s/p Zosyn), HTN, bradycardia (self-resolved), hyponatremia, NINFA, and L cheek herpetic lesion (requiring 7 day course of Valtrex). S/p EVD removal on 5/1. Patient now admitted for a multidisciplinary rehab program. 05-03-23    * Speech deficits still apparent, * Motor function improving  * Remove IV line     Rehab Management/MEDICAL MANAGEMENT     #Functional deficits s/p non-traumatic ICH  - Gait Instability, ADL impairments and Functional impairments: start Comprehensive Rehab Program of PT/OT/SLP  - 3 hours a day, 5 days a week  - P&O as needed   - CTH revealed L frontal lobe hemorrhage with intraventricular hemorrhage and increased size of R lateral ventricle  - S/p EVD placement on 4/22, removed on 5/1   - S/p Decadron taper x1 week  - Serial CTH 5/4, for interval AMS showed reduction of left frontal hemorrhage  - Continue Amantadine  - Sodium Chloride tabs to q8h, Na >135, taper    #HSV-1 Infection  - Left cheek lesion  - 7 day course Valtrex (4/28-5/5)  - F/u HIV testing    #PNA  - S/p Zosyn (4/22-4/27)   - Albuterol/Ipratropium Nebulizer prn  - Incentive Spirometer  - Chest PT  - Monitor secretions    #Parkinson's Disease  - Continue Sinemet 4x per day, Entacapone 200mg q8h  - Melatonin  - bowel regimen    #Seizures  - Keppra BID  -seizure precautions     #HTN  - Amlodipine  - Goal SBP: 100-160    #Skin  - Skin: scalp incision healing well, well approximated  - Pustular rash b/l armpits-ID eval requested  - Pressure injury/Skin: OOB to Chair, PT/OT     #Pain Mgmt   - Tylenol PRN  - Lidocaine patch  - Home: Gabapentin-on hold per NSx    #GI/Bowel Mgmt   - Continent c/w Senna, Miralax     #/Bladder Mgmt   -  PVR q8h, CIC>400cc    #FEN   - Diet - Soft & Bite Sized   - Dysphagia  SLP - evaluation and treatment    #Precautions / PROPHYLAXIS:   - Falls  - ortho: Weight bearing status: WBAT   - Lungs: Aspiration, Incentive Spirometer   - DVT: Lovenox  -------------------------------------------------------------  FOLLOW UP/OUTPATIENT:    D'Amico, Randy  [phone]    Dianelys Nguyen  [address] [place] [address], [place] [address]  Phone: [phone]  Fax: (   )    -  Follow Up Time:    D'Amico, Randy  [facility] [place] [facility]  NEUROSURG [address]  Scheduled Appointment: 05/10/2023  -------------------------------------------------------------    Non critical care  Time based billing  30 mins spent, patient review, review of investigations and lab results discussed with patient, and d/w patient.  MAHDI KELY is a 68 year old male with PMH of seizure, HTN, Parkinson's disease (Dx in 2012, wheelchair bound since 2020) and frequent falls; presented to [facility] on 4/17 for elective L craniotomy for meningioma resection with Dr. D'Amico. His procedure was tolerated well and was discharged to [facility] on 4/21. During transit to , he complained of incisional headache with episodes of nausea and vomiting requiring Zofran and Oxycodone, but his symptoms persisted. Upon arrival to , an RRT was called for worsening mental status and a CTH revealed an acute large left frontal hemorrhage with IVH, cerebral edema with midline shift and some effacement of L frontal horn. He was transferred back to [facility].  He underwent EVD placement on 4/22. He was extubated on 4/23, and placed on HFNC with eventual wean to NC.  His hospital course was complicated by RLL consolidation (s/p Zosyn), HTN, bradycardia (self-resolved), hyponatremia, NINFA, and L cheek herpetic lesion (requiring 7 day course of Valtrex). S/p EVD removal on 5/1. Patient now admitted for a multidisciplinary rehab program. 05-03-23    * Speech deficits still apparent, * Motor function improving  * Remove IV line   * isolated low Wbc, will monitor  * Await HIV screen, done prior to rehab admission   * Neurology review for Parkinson's disease, progressive hand tremors    Rehab Management/MEDICAL MANAGEMENT     #Functional deficits s/p non-traumatic ICH  - Gait Instability, ADL impairments and Functional impairments: start Comprehensive Rehab Program of PT/OT/SLP  - 3 hours a day, 5 days a week  - P&O as needed   - CTH revealed L frontal lobe hemorrhage with intraventricular hemorrhage and increased size of R lateral ventricle  - S/p EVD placement on 4/22, removed on 5/1   - S/p Decadron taper x1 week, completed  - Serial CTH 5/4, for interval AMS showed reduction of left frontal hemorrhage  - Continue Amantadine  - Sodium Chloride tabs to q8h, Na >135, taper    #HSV-1 Infection, Left cheek s/p valtrex completed 5/5  - F/u HIV testing    #PNA  - S/p Zosyn (4/22-4/27)   - Albuterol/Ipratropium Nebulizer prn  - Incentive Spirometer  - Chest PT  - Monitor secretions    #Parkinson's Disease  - Continue Sinemet 4x per day, Entacapone 200mg q8h  - Melatonin  - bowel regimen  -- Neurology review for Parkinson's disease, progressive hand tremors    #Seizures  - Keppra BID  -seizure precautions     #HTN  - Amlodipine  - Goal SBP: 100-160    #Skin  - Skin: scalp incision healing well, well approximated  - Pustular rash b/l armpits-ID eval requested  - Pressure injury/Skin: OOB to Chair, PT/OT     #Pain Mgmt   - Tylenol PRN  - Lidocaine patch  - Home: Gabapentin-on hold per NSx    #GI/Bowel Mgmt   - Continent c/w Senna, Miralax     #/Bladder Mgmt   -  PVR q8h, CIC>400cc    #FEN   - Diet - Soft & Bite Sized   - Dysphagia  SLP - evaluation and treatment    #Precautions / PROPHYLAXIS:   - Falls  - ortho: Weight bearing status: WBAT   - Lungs: Aspiration, Incentive Spirometer   - DVT: Lovenox  -------------------------------------------------------------  FOLLOW UP/OUTPATIENT:    D'Amico, Randy  [phone]    Dianelys Nguyen  [address] [place] [address]  Phone: [phone]  Fax: (   )    -  Follow Up Time:    D'Amico, Randy  [facility]  NEUROSURG [address] [place] [address] S  Scheduled Appointment: 05/10/2023  -------------------------------------------------------------    Non critical care  Time based billing  30 mins spent, patient review, review of investigations and lab results discussed with patient, and d/w patient.

## 2023-05-09 PROCEDURE — 99232 SBSQ HOSP IP/OBS MODERATE 35: CPT

## 2023-05-09 PROCEDURE — 99222 1ST HOSP IP/OBS MODERATE 55: CPT

## 2023-05-09 RX ADMIN — Medication 1 DROP(S): at 16:10

## 2023-05-09 RX ADMIN — CARBIDOPA AND LEVODOPA 1 TABLET(S): 25; 100 TABLET ORAL at 19:47

## 2023-05-09 RX ADMIN — Medication 1 DROP(S): at 22:16

## 2023-05-09 RX ADMIN — CARBIDOPA AND LEVODOPA 1 TABLET(S): 25; 100 TABLET ORAL at 12:02

## 2023-05-09 RX ADMIN — SODIUM CHLORIDE 1 GRAM(S): 9 INJECTION INTRAMUSCULAR; INTRAVENOUS; SUBCUTANEOUS at 16:10

## 2023-05-09 RX ADMIN — VALACYCLOVIR 1000 MILLIGRAM(S): 500 TABLET, FILM COATED ORAL at 06:03

## 2023-05-09 RX ADMIN — CARBIDOPA AND LEVODOPA 1 TABLET(S): 25; 100 TABLET ORAL at 07:52

## 2023-05-09 RX ADMIN — VALACYCLOVIR 1000 MILLIGRAM(S): 500 TABLET, FILM COATED ORAL at 17:37

## 2023-05-09 RX ADMIN — SENNA PLUS 2 TABLET(S): 8.6 TABLET ORAL at 22:17

## 2023-05-09 RX ADMIN — SODIUM CHLORIDE 1 GRAM(S): 9 INJECTION INTRAMUSCULAR; INTRAVENOUS; SUBCUTANEOUS at 06:03

## 2023-05-09 RX ADMIN — LEVETIRACETAM 1000 MILLIGRAM(S): 250 TABLET, FILM COATED ORAL at 06:03

## 2023-05-09 RX ADMIN — ENOXAPARIN SODIUM 40 MILLIGRAM(S): 100 INJECTION SUBCUTANEOUS at 22:16

## 2023-05-09 RX ADMIN — AMLODIPINE BESYLATE 10 MILLIGRAM(S): 2.5 TABLET ORAL at 06:03

## 2023-05-09 RX ADMIN — Medication 1 DROP(S): at 12:02

## 2023-05-09 RX ADMIN — ENTACAPONE 200 MILLIGRAM(S): 200 TABLET, FILM COATED ORAL at 16:10

## 2023-05-09 RX ADMIN — Medication 100 MILLIGRAM(S): at 12:02

## 2023-05-09 RX ADMIN — SODIUM CHLORIDE 1 GRAM(S): 9 INJECTION INTRAMUSCULAR; INTRAVENOUS; SUBCUTANEOUS at 22:17

## 2023-05-09 RX ADMIN — ENTACAPONE 200 MILLIGRAM(S): 200 TABLET, FILM COATED ORAL at 07:52

## 2023-05-09 RX ADMIN — Medication 1 APPLICATION(S): at 06:01

## 2023-05-09 RX ADMIN — Medication 1 APPLICATION(S): at 16:10

## 2023-05-09 RX ADMIN — Medication 1 DROP(S): at 07:52

## 2023-05-09 RX ADMIN — LEVETIRACETAM 1000 MILLIGRAM(S): 250 TABLET, FILM COATED ORAL at 17:36

## 2023-05-09 RX ADMIN — Medication 1 DROP(S): at 06:01

## 2023-05-09 RX ADMIN — CARBIDOPA AND LEVODOPA 1 TABLET(S): 25; 100 TABLET ORAL at 16:09

## 2023-05-09 RX ADMIN — ENTACAPONE 200 MILLIGRAM(S): 200 TABLET, FILM COATED ORAL at 12:02

## 2023-05-09 RX ADMIN — Medication 9 MILLIGRAM(S): at 22:17

## 2023-05-09 NOTE — PROGRESS NOTE ADULT - ASSESSMENT
MAHDI KELY is a 68 year old male with PMH of seizure, HTN, Parkinson's disease (Dx in 2012, wheelchair bound since 2020) and frequent falls; presented to [facility] on 4/17 for elective L craniotomy for meningioma resection with Dr. D'Amico. His procedure was tolerated well and was discharged to [facility] on 4/21. During transit to , he complained of incisional headache with episodes of nausea and vomiting requiring Zofran and Oxycodone, but his symptoms persisted. Upon arrival to , an RRT was called for worsening mental status and a CTH revealed an acute large left frontal hemorrhage with IVH, cerebral edema with midline shift and some effacement of L frontal horn. He was transferred back to [facility].  He underwent EVD placement on 4/22. He was extubated on 4/23, and placed on HFNC with eventual wean to NC.  His hospital course was complicated by RLL consolidation (s/p Zosyn), HTN, bradycardia (self-resolved), hyponatremia, NINFA, and L cheek herpetic lesion (requiring 7 day course of Valtrex). S/p EVD removal on 5/1. Patient now admitted for a multidisciplinary rehab program. 05-03-23    * Speech deficits still apparent, * Isolated low Wbc, will monitor  * Await HIV screen, done prior to rehab admission   * Await Neurology review for Parkinson's disease, progressive hand tremors    Rehab Management/MEDICAL MANAGEMENT     #Functional deficits s/p non-traumatic ICH  - Gait Instability, ADL impairments and Functional impairments: start Comprehensive Rehab Program of PT/OT/SLP  - 3 hours a day, 5 days a week  - P&O as needed   - CTH revealed L frontal lobe hemorrhage with intraventricular hemorrhage and increased size of R lateral ventricle  - S/p EVD placement on 4/22, removed on 5/1   - S/p Decadron taper x1 week, completed  - Serial CTH 5/4, for interval AMS showed reduction of left frontal hemorrhage  - Continue Amantadine  - Sodium Chloride tabs to q8h, Na >135, taper    #HSV-1 Infection, Left cheek s/p valtrex completed 5/5  - F/u awaiting result of HIV testing, done during acute care    #PNA  - S/p Zosyn (4/22-4/27)   - Albuterol/Ipratropium Nebulizer prn  - Incentive Spirometer    #Parkinson's Disease  - Continue Sinemet 4x per day, Entacapone 200mg q8h  - Melatonin  - bowel regimen  -- Neurology review for Parkinson's disease, progressive hand tremors    #Seizures  - Keppra BID  -seizure precautions     #HTN  - Amlodipine  - Goal SBP: 100-160    #Skin  - Skin: scalp incision healing well, well approximated  - Pustular rash b/l armpits-ID eval requested  - Pressure injury/Skin: OOB to Chair, PT/OT     #Pain Mgmt   - Tylenol PRN  - Lidocaine patch  - Home: Gabapentin-on hold per NSx    #GI/Bowel Mgmt   - Continent c/w Senna, Miralax     #/Bladder Mgmt   -  PVR q8h, CIC>400cc    #FEN   - Diet - Soft & Bite Sized   - Dysphagia  SLP - evaluation and treatment    #Precautions / PROPHYLAXIS:   - Falls  - ortho: Weight bearing status: WBAT   - Lungs: Aspiration, Incentive Spirometer   - DVT: Lovenox  -------------------------------------------------------------  FOLLOW UP/OUTPATIENT:    D'Amico, Randy  [phone]    Dianelys Nguyen  [address] [place] [address]  Phone: [phone]  Fax: (   )    -  Follow Up Time:    D'Amico, Randy  [facility]  NEUROSURG [address] [place] [address]  Scheduled Appointment: 05/10/2023  -------------------------------------------------------------     MAHDI KELY is a 68 year old male with PMH of seizure, HTN, Parkinson's disease (Dx in 2012, wheelchair bound since 2020) and frequent falls; presented to [facility] on 4/17 for elective L craniotomy for meningioma resection with Dr. D'Amico. His procedure was tolerated well and was discharged to [facility] on 4/21. During transit to , he complained of incisional headache with episodes of nausea and vomiting requiring Zofran and Oxycodone, but his symptoms persisted. Upon arrival to , an RRT was called for worsening mental status and a CTH revealed an acute large left frontal hemorrhage with IVH, cerebral edema with midline shift and some effacement of L frontal horn. He was transferred back to [facility].  He underwent EVD placement on 4/22. He was extubated on 4/23, and placed on HFNC with eventual wean to NC.  His hospital course was complicated by RLL consolidation (s/p Zosyn), HTN, bradycardia (self-resolved), hyponatremia, NINFA, and L cheek herpetic lesion (requiring 7 day course of Valtrex). S/p EVD removal on 5/1. Patient now admitted for a multidisciplinary rehab program. 05-03-23    * Speech deficits still apparent, * Isolated low Wbc, will monitor  * Await HIV screen, done prior to rehab admission   * Await Neurology review for Parkinson's disease, progressive hand tremors    Rehab Management/MEDICAL MANAGEMENT     #Functional deficits s/p non-traumatic ICH  - Gait Instability, ADL impairments and Functional impairments: start Comprehensive Rehab Program of PT/OT/SLP  - 3 hours a day, 5 days a week  - P&O as needed   - CTH revealed L frontal lobe hemorrhage with intraventricular hemorrhage and increased size of R lateral ventricle  - S/p EVD placement on 4/22, removed on 5/1   - S/p Decadron taper x1 week, completed  - Serial CTH 5/4, for interval AMS showed reduction of left frontal hemorrhage  - Continue Amantadine  - Sodium Chloride tabs to q8h, Na >135, taper    #HSV-1 Infection, Left cheek s/p valtrex completed 5/5  - F/u awaiting result of HIV testing, done during acute care    #PNA  - S/p Zosyn (4/22-4/27)   - Albuterol/Ipratropium Nebulizer prn  - Incentive Spirometer    #Parkinson's Disease  - Continue Sinemet 4x per day, Entacapone 200mg q8h  - Melatonin  - bowel regimen  -- Neurology review for Parkinson's disease, progressive hand tremors    #Seizures  - Keppra BID  -seizure precautions     #HTN  - Amlodipine  - Goal SBP: 100-160    #Skin  - Skin: scalp incision healing well, well approximated  - Pustular rash b/l armpits-ID eval requested  - Pressure injury/Skin: OOB to Chair, PT/OT     #Pain Mgmt   - Tylenol PRN  - Lidocaine patch  - Home: Gabapentin-on hold per NSx    #GI/Bowel Mgmt   - Continent c/w Senna, Miralax     #/Bladder Mgmt   -  PVR q8h, CIC>400cc    #FEN   - Diet - Soft & Bite Sized   - Dysphagia  SLP - evaluation and treatment    #Precautions / PROPHYLAXIS:   - Falls  - ortho: Weight bearing status: WBAT   - Lungs: Aspiration, Incentive Spirometer   - DVT: Lovenox  -------------------------------------------------------------    Liaison with family 5/9--i called family--spoke with patient's daughter (with ph number in EMR for spouse), she reports that patient's on, will be visiting patient tomorrow am and will discuss update with me  There was no response to my call to patient's son on ph     FOLLOW UP/OUTPATIENT:    D'Amico, Randy  [phone]    Dianelys Nguyen  [address] [address]  Phone: [phone]  Fax: (   )    -  Follow Up Time:    D'Amico, Randy  [facility]  NEUROSURG [address]  Scheduled Appointment: 05/10/2023  -------------------------------------------------------------     MAHDI KELY is a 68 year old male with PMH of seizure, HTN, Parkinson's disease (Dx in 2012, wheelchair bound since 2020) and frequent falls; presented to [facility] on 4/17 for elective L craniotomy for meningioma resection with Dr. D'Amico. His procedure was tolerated well and was discharged to [facility] on 4/21. During transit to , he complained of incisional headache with episodes of nausea and vomiting requiring Zofran and Oxycodone, but his symptoms persisted. Upon arrival to , an RRT was called for worsening mental status and a CTH revealed an acute large left frontal hemorrhage with IVH, cerebral edema with midline shift and some effacement of L frontal horn. He was transferred back to [facility].  He underwent EVD placement on 4/22. He was extubated on 4/23, and placed on HFNC with eventual wean to NC.  His hospital course was complicated by RLL consolidation (s/p Zosyn), HTN, bradycardia (self-resolved), hyponatremia, NINFA, and L cheek herpetic lesion (requiring 7 day course of Valtrex). S/p EVD removal on 5/1. Patient now admitted for a multidisciplinary rehab program. 05-03-23    * Speech deficits still apparent, * Isolated low Wbc, will monitor  * Await HIV screen, done prior to rehab admission   * Await Neurology review for Parkinson's disease, progressive hand tremors    Rehab Management/MEDICAL MANAGEMENT     #Functional deficits s/p non-traumatic ICH  - Gait Instability, ADL impairments and Functional impairments: start Comprehensive Rehab Program of PT/OT/SLP  - 3 hours a day, 5 days a week  - P&O as needed   - CTH revealed L frontal lobe hemorrhage with intraventricular hemorrhage and increased size of R lateral ventricle  - S/p EVD placement on 4/22, removed on 5/1   - S/p Decadron taper x1 week, completed  - Serial CTH 5/4, for interval AMS showed reduction of left frontal hemorrhage  - Continue Amantadine  - Sodium Chloride tabs to q8h, Na >135, taper    #HSV-1 Infection, Left cheek s/p valtrex completed 5/5  - F/u awaiting result of HIV testing, done during acute care    #PNA  - S/p Zosyn (4/22-4/27)   - Albuterol/Ipratropium Nebulizer prn  - Incentive Spirometer    #Parkinson's Disease  - Continue Sinemet 4x per day, Entacapone 200mg q8h  - Melatonin  - bowel regimen  -- Neurology review for Parkinson's disease, progressive hand tremors    #Seizures  - Keppra BID  -seizure precautions     #HTN  - Amlodipine  - Goal SBP: 100-160    #Skin  - Skin: scalp incision healing well, well approximated  - Pustular rash b/l armpits-ID eval requested  - Pressure injury/Skin: OOB to Chair, PT/OT     #Pain Mgmt   - Tylenol PRN  - Lidocaine patch  - Home: Gabapentin-on hold per NSx    #GI/Bowel Mgmt   - Continent c/w Senna, Miralax     #/Bladder Mgmt   -  PVR q8h, CIC>400cc    #FEN   - Diet - Soft & Bite Sized   - Dysphagia  SLP - evaluation and treatment    #Precautions / PROPHYLAXIS:   - Falls  - ortho: Weight bearing status: WBAT   - Lungs: Aspiration, Incentive Spirometer   - DVT: Lovenox  -------------------------------------------------------------    Liaison with family 5/9--i called family--spoke with patient's daughter (with ph number in EMR for spouse), she reports that patient's on, will be visiting patient tomorrow am and will discuss update with me  There was no response to my call to patient's son on ph     IDT 5/9  Lives with family--wife, daughter and son ,wc ambulatory at baseline  SLP--Soft bite sized thin diet  Severe cognitive deficits, attention deficits, safety, poor carry over  OT--Elisabet assist for self care, Mod assistance for transfers, Noted trying to lean out of bed  PT--Max assistance for bed transfer, ambulating 40ft with RW WC follow through  Barriers--hand tremors Cognitive deficit, occasionally trying to climb out of bed occasionally, bed alarm to be active at all times, nursing to consider t/f to room near nursing station when available   Goals--Min assist to [facility] for ADLs,   Est dc 5/23 to home       FOLLOW UP/OUTPATIENT:    D'Amico, Randy  [phone]    Dianelys Nguyen  [address] [place] [address], [place] [address]  Phone: [phone]  Fax: (   )    -  Follow Up Time:    D'Amico, Randy  [facility]  NEUROSURG [address] [place] [address] S  Scheduled Appointment: 05/10/2023  -------------------------------------------------------------

## 2023-05-09 NOTE — CONSULT NOTE ADULT - SUBJECTIVE AND OBJECTIVE BOX
Movement Disorders Neurology  Consult Note    HPI Movement Disorders Neurology  Consult Note    HPI:  67 yo male with PMHx of seizure, HTN, Parkinson's disease (Dx in 2012, wheelchair bound since 2020) and frequent falls; presented to [facility] on 4/17 for elective L craniotomy for meningioma resection with Dr. D'Amico. Tolerated well, discharged to [facility] on 4/21. During transit to , he complained of incisional headache with episodes of nausea and vomiting requiring Zofran and Oxycodone. Upon arrival to , an RRT was called for worsening mental status and CTH revealed an acute large left frontal hemorrhage with IVH, cerebral edema with midline shift and some effacement of Left frontal horn, transferred back to [facility]. He underwent EVD placement on 4/22. Extubated on 4/23, wean to NC. Hospital course was complicated by RLL PNA (s/p Zosyn), HTN, bradycardia, hyponatremia on salt tabs, NINFA, and Left cheek herpetic lesion (on Valtrex). S/p EVD removal on 5/1. CTH was stable on 5/2 and cleared for Lovenox sq. Amantadine added. PM&R was consulted and deemed him an appropriate candidate for IRF. Cleared for discharge to [facility] on 5/3.     Movement Disorders Neurology was consulted for Parkinson's disease management with increased tremors.     Current PD meds:  Amantadine 100mg daily syrup  Melatonin 9mg at bedtime  Carbidopa-Levodopa 25-250mg 1 tab at 4y-38d-8o-8p   Entacapone 200mg 1 tab at 8a-12p-4p    Patient is also on Keppra 1000mg BID Movement Disorders Neurology  Consult Note    HPI:  67 yo male with PMHx of seizure, HTN, Parkinson's disease (Dx in 2012, wheelchair bound since 2020) and frequent falls; presented to [facility] on 4/17 for elective L craniotomy for meningioma resection with Dr. D'Amico. Tolerated well, discharged to [facility] on 4/21. During transit to , he complained of incisional headache with episodes of nausea and vomiting requiring Zofran and Oxycodone. Upon arrival to , an RRT was called for worsening mental status and CTH revealed an acute large left frontal hemorrhage with IVH, cerebral edema with midline shift and some effacement of Left frontal horn, transferred back to [facility]. He underwent EVD placement on 4/22. Extubated on 4/23, wean to NC. Hospital course was complicated by RLL PNA (s/p Zosyn), HTN, bradycardia, hyponatremia on salt tabs, NINFA, and Left cheek herpetic lesion (on Valtrex). S/p EVD removal on 5/1. CTH was stable on 5/2 and cleared for Lovenox sq. Amantadine added. PM&R was consulted and deemed him an appropriate candidate for IRF. Cleared for discharge to [facility] on 5/3.     Movement Disorders Neurology was consulted for Parkinson's disease management with increased tremors.   The patient is a poor historian, but does participate in some conversation and follows simple commands.     He denies constipation.  He reports hallucinations where he sees people.   He denies dizziness/lightheadedness.   Unclear if he has a history of talking in sleep or acting out dreams.    Current PD meds:  Amantadine 100mg daily syrup  Melatonin 9mg at bedtime  Carbidopa-Levodopa 25-250mg 1 tab at 2i-25r-8d-8p   Entacapone 200mg 1 tab at 8a-12p-4p    Patient is also on Keppra 1000mg BID

## 2023-05-09 NOTE — PROGRESS NOTE ADULT - SUBJECTIVE AND OBJECTIVE BOX
Subjective  Seen and examined  Reports uneventful sleep  Hand tremors still apparent    ROS: no head ache, nausea/vomiting or dyspnea,   Dysarthria, hypophonia, tremors both hands   LBM 5/8    Therapy:  Engaging in therapy, working on  Speech deficits still apparent  LE motor function improving, but still prominent on lower extremities    Awaiting review by Dr Chi, Neurologist/movement disorder specialist  for progressive hand tremors  Vital Signs Last 24 Hrs  T(C): 36.7 (09 May 2023 08:31), Max: 36.7 (08 May 2023 19:46)  T(F): 98 (09 May 2023 08:31), Max: 98 (08 May 2023 19:46)  HR: 65 (09 May 2023 08:31) (55 - 77)  BP: 131/84 (09 May 2023 08:31) (122/78 - 157/82)  RR: 15 (09 May 2023 08:31) (15 - 16)  SpO2: 99% (09 May 2023 08:31) (95% - 100%)  O2 Parameters below as of 09 May 2023 08:31  Patient On (Oxygen Delivery Method): room air      EXAM  General: Sitting on his wheel chair   HENT: PERRL, EOM grossly in tact.,   Cardiac: Regular rate.  Pulm:  on RA. clear, No rhonchi, wheezes, no cough.  Abd: Soft, non-tender, rounded. +BS  Ext--no ulcers    Neuro:   Alert and awake, Dysarthria, hypophonia, Stuttering speech, Dysphagia.  RUE 5/5, LUE 4+/5. Resting tremor, both hands L>R  Sensation grossly intact to light touch  MMT--UE 4/5  Lower extremities 3/5  Extremities: Skin is warm, perfused. Pustular  rash b/l armpits, erosion    RECENT LABS/IMAGING                        14.1   3.20  )-----------( 186      ( 08 May 2023 07:00 )             42.4     05-08    141  |  105  |  12  ----------------------------<  95  4.5   |  27  |  1.20    Ca    10.0      08 May 2023 07:00    TPro  7.6  /  Alb  3.4  /  TBili  0.6  /  DBili  x   /  AST  16  /  ALT  27  /  AlkPhos  55  05-08    MEDICATIONS  (STANDING):  amantadine Syrup 100 milliGRAM(s) Oral daily  amLODIPine   Tablet 10 milliGRAM(s) Oral daily  AQUAPHOR (petrolatum Ointment) 1 Application(s) Topical two times a day  artificial  tears Solution 1 Drop(s) Both EYES every 4 hours  carbidopa/levodopa  25/250 1 Tablet(s) Oral <User Schedule>  enoxaparin Injectable 40 milliGRAM(s) SubCutaneous every 24 hours  entacapone 200 milliGRAM(s) Oral <User Schedule>  levETIRAcetam 1000 milliGRAM(s) Oral two times a day  lidocaine   4% Patch 1 Patch Transdermal every 24 hours  melatonin 9 milliGRAM(s) Oral at bedtime  polyethylene glycol 3350 17 Gram(s) Oral daily  senna 2 Tablet(s) Oral at bedtime  sodium chloride 1 Gram(s) Oral every 8 hours  tiotropium 2.5 MICROgram(s) Inhaler 2 Puff(s) Inhalation daily  valACYclovir 1000 milliGRAM(s) Oral every 12 hours    MEDICATIONS  (PRN):  acetaminophen   Oral Liquid .. 650 milliGRAM(s) Oral every 6 hours PRN Mild Pain (1 - 3)  acetylcysteine 20%  Inhalation 4 milliLiter(s) Inhalation every 6 hours PRN congestion  albuterol    90 MICROgram(s) HFA Inhaler 2 Puff(s) Inhalation every 6 hours PRN Shortness of Breath and/or Wheezing             Subjective  Seen and examined  Reports uneventful sleep  Hand tremors still apparent    ROS: no head ache, nausea/vomiting or dyspnea,   Dysarthria, hypophonia, tremors both hands   LBM 5/8    Therapy:  Engaging in therapy, working on  Speech deficits still apparent  LE motor function improving, but still prominent on lower extremities    i called family--spoke with patient's daughter (with ph number in EMR for spouse), she reports that patient's on, will be visiting patient tomorrow am and will discuss update with me  There was no response to my call to patient's son on ph     Awaiting review by Dr Chi, Neurologist/movement disorder specialist  for progressive hand tremors  Vital Signs Last 24 Hrs  T(C): 36.7 (09 May 2023 08:31), Max: 36.7 (08 May 2023 19:46)  T(F): 98 (09 May 2023 08:31), Max: 98 (08 May 2023 19:46)  HR: 65 (09 May 2023 08:31) (55 - 77)  BP: 131/84 (09 May 2023 08:31) (122/78 - 157/82)  RR: 15 (09 May 2023 08:31) (15 - 16)  SpO2: 99% (09 May 2023 08:31) (95% - 100%)  O2 Parameters below as of 09 May 2023 08:31  Patient On (Oxygen Delivery Method): room air      EXAM  General: Sitting on his wheel chair   HENT: PERRL, EOM grossly in tact.,   Cardiac: Regular rate.  Pulm:  on RA. clear, No rhonchi, wheezes, no cough.  Abd: Soft, non-tender, rounded. +BS  Ext--no ulcers    Neuro:   Alert and awake, Dysarthria, hypophonia, Stuttering speech, Dysphagia.  RUE 5/5, LUE 4+/5. Resting tremor, both hands L>R  Sensation grossly intact to light touch  MMT--UE 4/5  Lower extremities 3/5  Extremities: Skin is warm, perfused. Pustular  rash b/l armpits, erosion    RECENT LABS/IMAGING                        14.1   3.20  )-----------( 186      ( 08 May 2023 07:00 )             42.4     05-08    141  |  105  |  12  ----------------------------<  95  4.5   |  27  |  1.20    Ca    10.0      08 May 2023 07:00    TPro  7.6  /  Alb  3.4  /  TBili  0.6  /  DBili  x   /  AST  16  /  ALT  27  /  AlkPhos  55  05-08    MEDICATIONS  (STANDING):  amantadine Syrup 100 milliGRAM(s) Oral daily  amLODIPine   Tablet 10 milliGRAM(s) Oral daily  AQUAPHOR (petrolatum Ointment) 1 Application(s) Topical two times a day  artificial  tears Solution 1 Drop(s) Both EYES every 4 hours  carbidopa/levodopa  25/250 1 Tablet(s) Oral <User Schedule>  enoxaparin Injectable 40 milliGRAM(s) SubCutaneous every 24 hours  entacapone 200 milliGRAM(s) Oral <User Schedule>  levETIRAcetam 1000 milliGRAM(s) Oral two times a day  lidocaine   4% Patch 1 Patch Transdermal every 24 hours  melatonin 9 milliGRAM(s) Oral at bedtime  polyethylene glycol 3350 17 Gram(s) Oral daily  senna 2 Tablet(s) Oral at bedtime  sodium chloride 1 Gram(s) Oral every 8 hours  tiotropium 2.5 MICROgram(s) Inhaler 2 Puff(s) Inhalation daily  valACYclovir 1000 milliGRAM(s) Oral every 12 hours    MEDICATIONS  (PRN):  acetaminophen   Oral Liquid .. 650 milliGRAM(s) Oral every 6 hours PRN Mild Pain (1 - 3)  acetylcysteine 20%  Inhalation 4 milliLiter(s) Inhalation every 6 hours PRN congestion  albuterol    90 MICROgram(s) HFA Inhaler 2 Puff(s) Inhalation every 6 hours PRN Shortness of Breath and/or Wheezing             Subjective  Seen and examined  Reports uneventful sleep  Hand tremors still apparent    ROS: no head ache, nausea/vomiting or dyspnea,   Dysarthria, hypophonia, tremors both hands   LBM 5/8    Therapy:    IDT today--progress as noted    i called family--spoke with patient's daughter (with ph number in EMR for spouse), she reports that patient's on, will be visiting patient tomorrow am and will discuss update with me  There was no response to my call to patient's son on ph     Awaiting review by Dr Chi, Neurologist/movement disorder specialist  for progressive hand tremors  Vital Signs Last 24 Hrs  T(C): 36.7 (09 May 2023 08:31), Max: 36.7 (08 May 2023 19:46)  T(F): 98 (09 May 2023 08:31), Max: 98 (08 May 2023 19:46)  HR: 65 (09 May 2023 08:31) (55 - 77)  BP: 131/84 (09 May 2023 08:31) (122/78 - 157/82)  RR: 15 (09 May 2023 08:31) (15 - 16)  SpO2: 99% (09 May 2023 08:31) (95% - 100%)  O2 Parameters below as of 09 May 2023 08:31  Patient On (Oxygen Delivery Method): room air      EXAM  General: Sitting on his wheel chair   HENT: PERRL, EOM grossly in tact.,   Cardiac: Regular rate.  Pulm:  on RA. clear, No rhonchi, wheezes, no cough.  Abd: Soft, non-tender, rounded. +BS  Ext--no ulcers    Neuro:   Alert and awake, Dysarthria, hypophonia, Stuttering speech, Dysphagia.  RUE 5/5, LUE 4+/5. Resting tremor, both hands L>R  Sensation grossly intact to light touch  MMT--UE 4/5  Lower extremities 3/5  Extremities: Skin is warm, perfused. Pustular  rash b/l armpits, erosion    RECENT LABS/IMAGING                        14.1   3.20  )-----------( 186      ( 08 May 2023 07:00 )             42.4     05-08    141  |  105  |  12  ----------------------------<  95  4.5   |  27  |  1.20    Ca    10.0      08 May 2023 07:00    TPro  7.6  /  Alb  3.4  /  TBili  0.6  /  DBili  x   /  AST  16  /  ALT  27  /  AlkPhos  55  05-08    MEDICATIONS  (STANDING):  amantadine Syrup 100 milliGRAM(s) Oral daily  amLODIPine   Tablet 10 milliGRAM(s) Oral daily  AQUAPHOR (petrolatum Ointment) 1 Application(s) Topical two times a day  artificial  tears Solution 1 Drop(s) Both EYES every 4 hours  carbidopa/levodopa  25/250 1 Tablet(s) Oral <User Schedule>  enoxaparin Injectable 40 milliGRAM(s) SubCutaneous every 24 hours  entacapone 200 milliGRAM(s) Oral <User Schedule>  levETIRAcetam 1000 milliGRAM(s) Oral two times a day  lidocaine   4% Patch 1 Patch Transdermal every 24 hours  melatonin 9 milliGRAM(s) Oral at bedtime  polyethylene glycol 3350 17 Gram(s) Oral daily  senna 2 Tablet(s) Oral at bedtime  sodium chloride 1 Gram(s) Oral every 8 hours  tiotropium 2.5 MICROgram(s) Inhaler 2 Puff(s) Inhalation daily  valACYclovir 1000 milliGRAM(s) Oral every 12 hours    MEDICATIONS  (PRN):  acetaminophen   Oral Liquid .. 650 milliGRAM(s) Oral every 6 hours PRN Mild Pain (1 - 3)  acetylcysteine 20%  Inhalation 4 milliLiter(s) Inhalation every 6 hours PRN congestion  albuterol    90 MICROgram(s) HFA Inhaler 2 Puff(s) Inhalation every 6 hours PRN Shortness of Breath and/or Wheezing

## 2023-05-09 NOTE — CONSULT NOTE ADULT - ASSESSMENT
Patient is a 68y male with a past history of Parkinsons, wheelchair bound, HTN,seizures, s/p elective craniotomy in mid  April for resection of meningioma, with brief rehab stay aborted after he developed a left frontal ICH prompting return to [facility], who was transferred 5/3 for rehab stay.  He did not require a repeat craniotomy, had brief intubation, required EVD, managed conservatively with steroids.He was treated for perioral HSV with valtrex and received 1 week of zosyn for a RLL infiltrate and was sent to rehab. He has axillary rash which has prompted ID evaluation.He is alert, has no complaints, is eating and voiding on his own.  Axillary rash appears fairly nonspecific, favor just local care.Perhaps just topical nystatin powder.  No signs of active pneumonia, no evidence to support CNS infection, and HSV appears controlled.  Suggest:  1. No need for additional antibiotic intervention   2. Limit valtrex to 7 days as per [facility]  3. No signs of active infection, we will not actively follow, please call if ID issue arise.
MAHDI KELY is a 68 year old male with PMH of seizure, HTN, Parkinson's disease (Dx in 2012, wheelchair bound since 2020) and frequent falls; presented to [facility] on 4/17 for elective L craniotomy for meningioma resection with Dr. D'Amico. His procedure was tolerated well and was discharged to [facility] on 4/21. During transit to , he complained of incisional headache with episodes of nausea and vomiting requiring Zofran and Oxycodone, but his symptoms persisted. Upon arrival to , an RRT was called for worsening mental status and a CTH revealed an acute large left frontal hemorrhage with IVH, cerebral edema with midline shift and some effacement of L frontal horn. He was transferred back to [facility].  He underwent EVD placement on 4/22. He was extubated on 4/23, and placed on HFNC with eventual wean to NC.  His hospital course was complicated by RLL consolidation (s/p Zosyn), HTN, bradycardia (self-resolved), hyponatremia, NINFA, and L cheek herpetic lesion (requiring 7 day course of Valtrex). S/p EVD removal on 5/1. Patient now admitted for a multidisciplinary rehab program- pt/ot/dvt pp    #s/p non-traumatic ICH  - CTH revealed L frontal lobe hemorrhage with intraventricular hemorrhage and increased size of R lateral ventricle  - S/p EVD placement on 4/22, removed on 5/1   - S/p Decadron taper x1 week  - Serial CTH stable to date, last 5/2, Lovenox started 5/2  - Continue Amantadine    # hyponatriamia-  - Sodium Chloride tabs     #HSV-1 Infection  - Left cheek lesion  - 7 day course Valtrex (4/28-5/5)    #PNA  - S/p Zosyn (4/22-4/27)   - Albuterol/Ipratropium Nebulizer prn    #Parkinson's Disease  - Continue Sinemet, Entacapone  - Melatonin    #Seizures  - Keppra BID  - seizure precautions     #HTN  - Amlodipine      - DVT ppx: Lovenox    will follow  d/w dr. luna  time spent in e/m visit- 81 min
The patient's physical examination is suggestive of parkinsonism, however, he has also had other brain injuries, including his recent meningioma resection and large left frontal lobe intraparenchymal hemorrhage that may be contributing to his poor cognition. He has been non-ambulatory for a long time.    It is unclear whether increasing his PD related medications will be beneficial as they can also contribute to confusion and hallucinations. Per the chart, he is ambulating with assistance and a walker. He is on a decent dose of Levodopa and Entacapone can sometimes make confusion and hallucinations worse. It may be prudent to give a trial of stopping Entacapone and monitoring on Levodopa only.     Amantadine can be increased to 100mg at 8a and 4p to attempt in controlling his resting tremors better. However, based on my examination, there was frequent fluctuation in the tremors and are likely being triggered by anxiety. Potentially treating his anxiety would lessen his tremors. Would recommend Lexapro. Consideration can be given to starting a benzodiazepine PRN for anxiety.   Can consider Rivastigmine for further executive functioning improvement and coordination.   As the patient's hemorrhage improves, his functioning will also likely improve.     Remainder of care per primary team.    Thank you for the consult.

## 2023-05-09 NOTE — CHART NOTE - NSCHARTNOTEFT_GEN_A_CORE
Nutrition Follow Up Note  Source: Medical Record [X] Patient [X] Family [ ]         Diet: Soft and bite-sized, Ensure Plus High Protein (provides 350 kcal, 20 g protein/serving) BID  Pt tolerating diet with good PO intake per nursing flow sheets, eating >75% of meals. Pt reports good PO intake + good acceptance of oral nutrition supplement. Denies nausea, vomiting, diarrhea, constipation.     Enteral/Parenteral Nutrition: N/A    Current Weight: 176 lbs (5/3)    Pertinent Medications: MEDICATIONS  (STANDING):  amantadine Syrup 100 milliGRAM(s) Oral daily  amLODIPine   Tablet 10 milliGRAM(s) Oral daily  AQUAPHOR (petrolatum Ointment) 1 Application(s) Topical two times a day  artificial  tears Solution 1 Drop(s) Both EYES every 4 hours  carbidopa/levodopa  25/250 1 Tablet(s) Oral <User Schedule>  enoxaparin Injectable 40 milliGRAM(s) SubCutaneous every 24 hours  entacapone 200 milliGRAM(s) Oral <User Schedule>  levETIRAcetam 1000 milliGRAM(s) Oral two times a day  lidocaine   4% Patch 1 Patch Transdermal every 24 hours  melatonin 9 milliGRAM(s) Oral at bedtime  polyethylene glycol 3350 17 Gram(s) Oral daily  senna 2 Tablet(s) Oral at bedtime  sodium chloride 1 Gram(s) Oral every 8 hours  tiotropium 2.5 MICROgram(s) Inhaler 2 Puff(s) Inhalation daily  valACYclovir 1000 milliGRAM(s) Oral every 12 hours    MEDICATIONS  (PRN):  acetaminophen   Oral Liquid .. 650 milliGRAM(s) Oral every 6 hours PRN Mild Pain (1 - 3)  acetylcysteine 20%  Inhalation 4 milliLiter(s) Inhalation every 6 hours PRN congestion  albuterol    90 MICROgram(s) HFA Inhaler 2 Puff(s) Inhalation every 6 hours PRN Shortness of Breath and/or Wheezing      Pertinent Labs:  05-08 Na141 mmol/L Glu 95 mg/dL K+ 4.5 mmol/L Cr  1.20 mg/dL BUN 12 mg/dL 05-04 Phos 4.0 mg/dL 05-08 Alb 3.4 g/dL 04-23 Chol 151 mg/dL LDL --    HDL 55 mg/dL Trig 115 mg/dL        Skin: incontinence associated dermatitis per nursing flow sheets.     Edema: No edema per nursing flow sheets     Last BM: on 5/7 Per nursing flowsheets     Estimated Needs:   [X] No Change since Previous Assessment  [ ] Recalculated:     Previous Nutrition Diagnosis:   Inadequate energy intake    Nutrition Diagnosis is   [X] Resolved - pt eating >50% of meals consistently        New Nutrition Diagnosis: [X] Not Applicable  [ ] Inadequate Protein Energy Intake   [ ] Inadequate Oral Intake   [ ] Excessive Energy Intake   [ ] Increased Nutrient Needs   [ ] Obesity   [ ] Altered GI Function   [ ] Unintended Weight Loss   [ ] Food & Nutrition Related Knowledge Deficit  [ ] Limited Adherence to nutrition related recommendations   [ ] Malnutrition      Interventions:   1. Recommend continuing with current plan of care  2. Obtain and honor food preferences as able  3. Follow SLP recommendations.     Monitoring & Evaluation:   [X] Weights   [X] PO Intake   [X] Follow Up (Per Protocol)  [X] Tolerance to Diet Prescription   [X] Other: Labs     RD Remains Available.  Jovana Ng RD

## 2023-05-09 NOTE — CONSULT NOTE ADULT - REASON FOR ADMISSION
Functional deficits s/p non-traumatic ICH
s/p non-traumatic ICH
Functional deficits s/p non-traumatic ICH

## 2023-05-09 NOTE — CONSULT NOTE ADULT - MOTOR
There were intermittent tremors of variable frequency, including high amplitude, worse on the left.  There is mild rigidity bilaterally.  There is mild bradykinesia, left more than right.  Strength is normal bilaterally

## 2023-05-10 PROCEDURE — 70450 CT HEAD/BRAIN W/O DYE: CPT | Mod: 26

## 2023-05-10 PROCEDURE — 99232 SBSQ HOSP IP/OBS MODERATE 35: CPT

## 2023-05-10 RX ADMIN — LEVETIRACETAM 1000 MILLIGRAM(S): 250 TABLET, FILM COATED ORAL at 08:03

## 2023-05-10 RX ADMIN — SODIUM CHLORIDE 1 GRAM(S): 9 INJECTION INTRAMUSCULAR; INTRAVENOUS; SUBCUTANEOUS at 16:09

## 2023-05-10 RX ADMIN — CARBIDOPA AND LEVODOPA 1 TABLET(S): 25; 100 TABLET ORAL at 12:20

## 2023-05-10 RX ADMIN — LIDOCAINE 1 PATCH: 4 CREAM TOPICAL at 20:00

## 2023-05-10 RX ADMIN — CARBIDOPA AND LEVODOPA 1 TABLET(S): 25; 100 TABLET ORAL at 16:08

## 2023-05-10 RX ADMIN — Medication 100 MILLIGRAM(S): at 12:21

## 2023-05-10 RX ADMIN — ENOXAPARIN SODIUM 40 MILLIGRAM(S): 100 INJECTION SUBCUTANEOUS at 21:05

## 2023-05-10 RX ADMIN — LEVETIRACETAM 1000 MILLIGRAM(S): 250 TABLET, FILM COATED ORAL at 18:37

## 2023-05-10 RX ADMIN — ENTACAPONE 200 MILLIGRAM(S): 200 TABLET, FILM COATED ORAL at 16:09

## 2023-05-10 RX ADMIN — SODIUM CHLORIDE 1 GRAM(S): 9 INJECTION INTRAMUSCULAR; INTRAVENOUS; SUBCUTANEOUS at 20:02

## 2023-05-10 RX ADMIN — VALACYCLOVIR 1000 MILLIGRAM(S): 500 TABLET, FILM COATED ORAL at 08:11

## 2023-05-10 RX ADMIN — ENTACAPONE 200 MILLIGRAM(S): 200 TABLET, FILM COATED ORAL at 08:15

## 2023-05-10 RX ADMIN — LIDOCAINE 1 PATCH: 4 CREAM TOPICAL at 19:00

## 2023-05-10 RX ADMIN — CARBIDOPA AND LEVODOPA 1 TABLET(S): 25; 100 TABLET ORAL at 08:03

## 2023-05-10 RX ADMIN — SODIUM CHLORIDE 1 GRAM(S): 9 INJECTION INTRAMUSCULAR; INTRAVENOUS; SUBCUTANEOUS at 08:03

## 2023-05-10 RX ADMIN — Medication 1 APPLICATION(S): at 18:39

## 2023-05-10 RX ADMIN — AMLODIPINE BESYLATE 10 MILLIGRAM(S): 2.5 TABLET ORAL at 08:03

## 2023-05-10 RX ADMIN — LIDOCAINE 1 PATCH: 4 CREAM TOPICAL at 08:04

## 2023-05-10 RX ADMIN — Medication 1 APPLICATION(S): at 08:04

## 2023-05-10 RX ADMIN — CARBIDOPA AND LEVODOPA 1 TABLET(S): 25; 100 TABLET ORAL at 20:02

## 2023-05-10 RX ADMIN — SENNA PLUS 2 TABLET(S): 8.6 TABLET ORAL at 20:02

## 2023-05-10 RX ADMIN — ENTACAPONE 200 MILLIGRAM(S): 200 TABLET, FILM COATED ORAL at 12:20

## 2023-05-10 RX ADMIN — Medication 9 MILLIGRAM(S): at 20:01

## 2023-05-10 NOTE — PROGRESS NOTE ADULT - ASSESSMENT
MAHDI KELY is a 68 year old male with PMH of seizure, HTN, Parkinson's disease (Dx in 2012, wheelchair bound since 2020) and frequent falls; presented to [facility] on 4/17 for elective L craniotomy for meningioma resection with Dr. D'Amico. His procedure was tolerated well and was discharged to [facility] on 4/21. During transit to , he complained of incisional headache with episodes of nausea and vomiting requiring Zofran and Oxycodone, but his symptoms persisted. Upon arrival to , an RRT was called for worsening mental status and a CTH revealed an acute large left frontal hemorrhage with IVH, cerebral edema with midline shift and some effacement of L frontal horn. He was transferred back to [facility].  He underwent EVD placement on 4/22. He was extubated on 4/23, and placed on HFNC with eventual wean to NC.  His hospital course was complicated by RLL consolidation (s/p Zosyn), HTN, bradycardia (self-resolved), hyponatremia, NINFA, and L cheek herpetic lesion (requiring 7 day course of Valtrex). S/p EVD removal on 5/1. Patient now admitted for a multidisciplinary rehab program. 05-03-23    * Speech deficits still apparent, r    * Await Neurology review for Parkinson's disease, progressive hand tremors   * Discussed update with family, will revise DC date after d/w team   * Fall episode in his room, no injury f/u CTH report  (I called radiology and informed them to process order as STAT)   * Impaired safety awareness ,but no agitation, probably due to disinhibition due to effects of brain injury    Rehab Management/MEDICAL MANAGEMENT     #Functional deficits s/p non-traumatic ICH  - Gait Instability, ADL impairments and Functional impairments: start Comprehensive Rehab Program of PT/OT/SLP  - 3 hours a day, 5 days a week  - P&O as needed   - CTH revealed L frontal lobe hemorrhage with intraventricular hemorrhage and increased size of R lateral ventricle  - S/p EVD placement on 4/22, removed on 5/1   - S/p Decadron taper x1 week, completed  - Serial CTH 5/4, for interval AMS showed reduction of left frontal hemorrhage  - Continue Amantadine  - Sodium Chloride tabs to q8h, Na >135, taper    * Fall episode in his room, no injury f/u CTH report  * Impaired safety awareness ,but no agitation, probably due to disinhibition due to effects of brain injury  --Move to room near nursing station when possible  --Neuro obs in 30mins      #HSV-1 Infection, Left cheek s/p valtrex completed 5/5  - F/u awaiting result of HIV testing, done during acute care    #PNA  - S/p Zosyn (4/22-4/27)   - Albuterol/Ipratropium Nebulizer prn  - Incentive Spirometer    #Parkinson's Disease  - Continue Sinemet 4x per day, Entacapone 200mg q8h  - Melatonin  - bowel regimen  -- Neurology review for Parkinson's disease, progressive hand tremors    #Seizures  - Keppra BID  -seizure precautions     #HTN  - Amlodipine  - Goal SBP: 100-160    #Skin  - Skin: scalp incision healing well, well approximated  - Pustular rash b/l armpits-ID eval requested  - Pressure injury/Skin: OOB to Chair, PT/OT     #Pain Mgmt   - Tylenol PRN  - Lidocaine patch  - Home: Gabapentin-on hold per NSx    #GI/Bowel Mgmt   - Continent c/w Senna, Miralax     #/Bladder Mgmt --Voiding       #FEN   - Diet - Soft & Bite Sized   - Dysphagia  SLP - evaluation and treatment    #Precautions / PROPHYLAXIS:   - Falls  - ortho: Weight bearing status: WBAT   - Lungs: Aspiration, Incentive Spirometer   - DVT: Lovenox  -------------------------------------------------------------    Liaison with family 5/9--i called family--spoke with patient's daughter (with ph number in EMR for spouse), she reports that patient's on, will be visiting patient tomorrow am and will discuss update with me  There was no response to my call to patient's son on ph     5/10--I called on ph and d/w Mr Hearn, son, as noted above    IDT 5/9  Lives with family--wife, daughter and son ,wc ambulatory at baseline  SLP--Soft bite sized thin diet  Severe cognitive deficits, attention deficits, safety, poor carry over  OT--Elisabet assist for self care, Mod assistance for transfers, Noted trying to lean out of bed  PT--Max assistance for bed transfer, ambulating 40ft with RW WC follow through  Barriers--hand tremors Cognitive deficit, occasionally trying to climb out of bed occasionally, bed alarm to be active at all times, nursing to consider t/f to room near nursing station when available   Goals--Min assist to CGA for ADLs,   Est dc 5/23 to home  (will be revised at subsequent IDT)       FOLLOW UP/OUTPATIENT:    D'Amico, Randy  [phone]    Dianelys Nguyen  [address] [place] [address]  Phone: [phone]  Fax: (   )    -  Follow Up Time:    D'Amico, Randy  [facility]  NEUROSURG [address] [place] [address] S  Scheduled Appointment: 05/10/2023  -------------------------------------------------------------

## 2023-05-10 NOTE — PROGRESS NOTE ADULT - SUBJECTIVE AND OBJECTIVE BOX
Subjective  Seen and examined  Reports uneventful sleep  Initially declined his AM meds but took them shortly afterwards  Hand tremors still apparent, worse with activity    ROS: no head ache, nausea/vomiting or dyspnea,   Dysarthria, hypophonia, tremors both hands   LBM 5/10    Therapy:    Observed, ambulating with walker, minimal distance, with mod assistance      Vital Signs Last 24 Hrs  T(C): 36.7 (10 May 2023 11:56), Max: 36.8 (10 May 2023 08:00)  T(F): 98 (10 May 2023 11:56), Max: 98.3 (10 May 2023 08:00)  HR: 102 (10 May 2023 11:56) (55 - 102)  BP: 107/75 (10 May 2023 11:56) (107/75 - 145/84)  RR: 16 (10 May 2023 11:56) (15 - 16)  SpO2: 99% (10 May 2023 11:56) (96% - 99%)  O2 Parameters below as of 10 May 2023 11:56  Patient On (Oxygen Delivery Method): room air    EXAM  General: Sitting on his wheel chair   HENT: PERRL, EOM grossly in tact.,   Cardiac: Regular rate.  Pulm:  clear  Abd: Soft, non-tender, rounded. +BS  Ext--no ulcers    Neuro:   Alert and awake, Dysarthria, hypophonia, Stuttering speech, Dysphagia.  RUE 5/5, LUE 4+/5. Resting tremor, both hands L>R  Sensation grossly intact to light touch  MMT--UE 4/5  Lower extremities 3/5  Extremities: Skin is warm, perfused. Pustular  rash b/l armpits, erosion    RECENT LABS/IMAGING                        14.1   3.20  )-----------( 186      ( 08 May 2023 07:00 )             42.4     05-08    141  |  105  |  12  ----------------------------<  95  4.5   |  27  |  1.20    Ca    10.0      08 May 2023 07:00    TPro  7.6  /  Alb  3.4  /  TBili  0.6  /  DBili  x   /  AST  16  /  ALT  27  /  AlkPhos  55  05-08    MEDICATIONS  (STANDING):  amantadine Syrup 100 milliGRAM(s) Oral daily  amLODIPine   Tablet 10 milliGRAM(s) Oral daily  AQUAPHOR (petrolatum Ointment) 1 Application(s) Topical two times a day  artificial  tears Solution 1 Drop(s) Both EYES every 4 hours  carbidopa/levodopa  25/250 1 Tablet(s) Oral <User Schedule>  enoxaparin Injectable 40 milliGRAM(s) SubCutaneous every 24 hours  entacapone 200 milliGRAM(s) Oral <User Schedule>  levETIRAcetam 1000 milliGRAM(s) Oral two times a day  lidocaine   4% Patch 1 Patch Transdermal every 24 hours  melatonin 9 milliGRAM(s) Oral at bedtime  polyethylene glycol 3350 17 Gram(s) Oral daily  senna 2 Tablet(s) Oral at bedtime  sodium chloride 1 Gram(s) Oral every 8 hours  tiotropium 2.5 MICROgram(s) Inhaler 2 Puff(s) Inhalation daily    MEDICATIONS  (PRN):  acetaminophen   Oral Liquid .. 650 milliGRAM(s) Oral every 6 hours PRN Mild Pain (1 - 3)  acetylcysteine 20%  Inhalation 4 milliLiter(s) Inhalation every 6 hours PRN congestion  albuterol    90 MICROgram(s) HFA Inhaler 2 Puff(s) Inhalation every 6 hours PRN Shortness of Breath and/or Wheezing          FALL EPISODE around 11.50 am, after earlier review today  Patient was noted to be on the floor in his room, during routing check by staff  On interview, he reports that he slid down from his WC  He was unable to state if he unlocked the seat belt prior to this  He denies any head strike, seizure disorder, plapitation of pain symptoms    Exam was unremarkable for any acute neurological signs  No injury at surgical area  Alert and oriented  Dysarthric, baseline   Able to count fingers, no facial asymmetry  Reflexes 1+  Motor exam unchanged     CTH was ordered and repeat neuro obs in 30 mins time     I called son on phone  ph  to update him on patient's status and fall episode. Mr Hearn   I discussed update from last IDT, safety concern and recent mild impulsivity which family noted since recent surgery  We discussed current anticipated dc date, family requested additional time for therapy to address patient's functional deficits and to get a review by movement disorder specialist  I informed him that patient's deficits are global and multifactorial, being addressed  I discussed insurance limitations and options of DANIEL, he is agreeable to extension of few days from current date up to CM date and they anticipate home dc  I discussed fall episode and plan for imaging and observation, he was happy with the explanation  He reports that he will be visiting patient shortly

## 2023-05-10 NOTE — PROVIDER CONTACT NOTE (FALL NOTIFICATION) - ASSESSMENT
Pt denies pain, states "nothing happened". VS as documented. Denies hitting head. Neuro check as documented, no acute change noted. Jacquelyn lifted back to bed after MD assessment

## 2023-05-11 LAB
ALBUMIN SERPL ELPH-MCNC: 3.3 G/DL — SIGNIFICANT CHANGE UP (ref 3.3–5)
ALP SERPL-CCNC: 55 U/L — SIGNIFICANT CHANGE UP (ref 40–120)
ALT FLD-CCNC: 15 U/L — SIGNIFICANT CHANGE UP (ref 10–45)
ANION GAP SERPL CALC-SCNC: 9 MMOL/L — SIGNIFICANT CHANGE UP (ref 5–17)
AST SERPL-CCNC: 14 U/L — SIGNIFICANT CHANGE UP (ref 10–40)
BASOPHILS # BLD AUTO: 0.02 K/UL — SIGNIFICANT CHANGE UP (ref 0–0.2)
BASOPHILS NFR BLD AUTO: 0.5 % — SIGNIFICANT CHANGE UP (ref 0–2)
BILIRUB SERPL-MCNC: 0.5 MG/DL — SIGNIFICANT CHANGE UP (ref 0.2–1.2)
BUN SERPL-MCNC: 19 MG/DL — SIGNIFICANT CHANGE UP (ref 7–23)
CALCIUM SERPL-MCNC: 9.6 MG/DL — SIGNIFICANT CHANGE UP (ref 8.4–10.5)
CHLORIDE SERPL-SCNC: 108 MMOL/L — SIGNIFICANT CHANGE UP (ref 96–108)
CO2 SERPL-SCNC: 27 MMOL/L — SIGNIFICANT CHANGE UP (ref 22–31)
CREAT SERPL-MCNC: 1.25 MG/DL — SIGNIFICANT CHANGE UP (ref 0.5–1.3)
EGFR: 62 ML/MIN/1.73M2 — SIGNIFICANT CHANGE UP
EOSINOPHIL # BLD AUTO: 0.17 K/UL — SIGNIFICANT CHANGE UP (ref 0–0.5)
EOSINOPHIL NFR BLD AUTO: 4.7 % — SIGNIFICANT CHANGE UP (ref 0–6)
GLUCOSE SERPL-MCNC: 97 MG/DL — SIGNIFICANT CHANGE UP (ref 70–99)
HCT VFR BLD CALC: 40.5 % — SIGNIFICANT CHANGE UP (ref 39–50)
HGB BLD-MCNC: 13 G/DL — SIGNIFICANT CHANGE UP (ref 13–17)
IMM GRANULOCYTES NFR BLD AUTO: 0.3 % — SIGNIFICANT CHANGE UP (ref 0–0.9)
LYMPHOCYTES # BLD AUTO: 1.95 K/UL — SIGNIFICANT CHANGE UP (ref 1–3.3)
LYMPHOCYTES # BLD AUTO: 53.6 % — HIGH (ref 13–44)
MCHC RBC-ENTMCNC: 31.6 PG — SIGNIFICANT CHANGE UP (ref 27–34)
MCHC RBC-ENTMCNC: 32.1 GM/DL — SIGNIFICANT CHANGE UP (ref 32–36)
MCV RBC AUTO: 98.3 FL — SIGNIFICANT CHANGE UP (ref 80–100)
MONOCYTES # BLD AUTO: 0.35 K/UL — SIGNIFICANT CHANGE UP (ref 0–0.9)
MONOCYTES NFR BLD AUTO: 9.6 % — SIGNIFICANT CHANGE UP (ref 2–14)
NEUTROPHILS # BLD AUTO: 1.14 K/UL — LOW (ref 1.8–7.4)
NEUTROPHILS NFR BLD AUTO: 31.3 % — LOW (ref 43–77)
NRBC # BLD: 0 /100 WBCS — SIGNIFICANT CHANGE UP (ref 0–0)
PLATELET # BLD AUTO: 187 K/UL — SIGNIFICANT CHANGE UP (ref 150–400)
POTASSIUM SERPL-MCNC: 3.6 MMOL/L — SIGNIFICANT CHANGE UP (ref 3.5–5.3)
POTASSIUM SERPL-SCNC: 3.6 MMOL/L — SIGNIFICANT CHANGE UP (ref 3.5–5.3)
PROT SERPL-MCNC: 7 G/DL — SIGNIFICANT CHANGE UP (ref 6–8.3)
RBC # BLD: 4.12 M/UL — LOW (ref 4.2–5.8)
RBC # FLD: 15.3 % — HIGH (ref 10.3–14.5)
SODIUM SERPL-SCNC: 144 MMOL/L — SIGNIFICANT CHANGE UP (ref 135–145)
WBC # BLD: 3.64 K/UL — LOW (ref 3.8–10.5)
WBC # FLD AUTO: 3.64 K/UL — LOW (ref 3.8–10.5)

## 2023-05-11 PROCEDURE — 99231 SBSQ HOSP IP/OBS SF/LOW 25: CPT

## 2023-05-11 RX ORDER — AMANTADINE HCL 100 MG
100 CAPSULE ORAL
Refills: 0 | Status: DISCONTINUED | OUTPATIENT
Start: 2023-05-11 | End: 2023-05-25

## 2023-05-11 RX ADMIN — AMLODIPINE BESYLATE 10 MILLIGRAM(S): 2.5 TABLET ORAL at 05:19

## 2023-05-11 RX ADMIN — ENTACAPONE 200 MILLIGRAM(S): 200 TABLET, FILM COATED ORAL at 08:01

## 2023-05-11 RX ADMIN — Medication 1 APPLICATION(S): at 05:19

## 2023-05-11 RX ADMIN — LEVETIRACETAM 1000 MILLIGRAM(S): 250 TABLET, FILM COATED ORAL at 05:19

## 2023-05-11 RX ADMIN — ENTACAPONE 200 MILLIGRAM(S): 200 TABLET, FILM COATED ORAL at 16:02

## 2023-05-11 RX ADMIN — CARBIDOPA AND LEVODOPA 1 TABLET(S): 25; 100 TABLET ORAL at 08:01

## 2023-05-11 RX ADMIN — CARBIDOPA AND LEVODOPA 1 TABLET(S): 25; 100 TABLET ORAL at 19:50

## 2023-05-11 RX ADMIN — SODIUM CHLORIDE 1 GRAM(S): 9 INJECTION INTRAMUSCULAR; INTRAVENOUS; SUBCUTANEOUS at 16:01

## 2023-05-11 RX ADMIN — SODIUM CHLORIDE 1 GRAM(S): 9 INJECTION INTRAMUSCULAR; INTRAVENOUS; SUBCUTANEOUS at 05:20

## 2023-05-11 RX ADMIN — Medication 1 DROP(S): at 21:08

## 2023-05-11 RX ADMIN — CARBIDOPA AND LEVODOPA 1 TABLET(S): 25; 100 TABLET ORAL at 16:02

## 2023-05-11 RX ADMIN — Medication 100 MILLIGRAM(S): at 11:55

## 2023-05-11 RX ADMIN — CARBIDOPA AND LEVODOPA 1 TABLET(S): 25; 100 TABLET ORAL at 11:54

## 2023-05-11 RX ADMIN — SENNA PLUS 2 TABLET(S): 8.6 TABLET ORAL at 21:08

## 2023-05-11 RX ADMIN — Medication 100 MILLIGRAM(S): at 17:53

## 2023-05-11 RX ADMIN — ENTACAPONE 200 MILLIGRAM(S): 200 TABLET, FILM COATED ORAL at 11:54

## 2023-05-11 RX ADMIN — SODIUM CHLORIDE 1 GRAM(S): 9 INJECTION INTRAMUSCULAR; INTRAVENOUS; SUBCUTANEOUS at 21:08

## 2023-05-11 RX ADMIN — Medication 9 MILLIGRAM(S): at 21:09

## 2023-05-11 RX ADMIN — LEVETIRACETAM 1000 MILLIGRAM(S): 250 TABLET, FILM COATED ORAL at 17:15

## 2023-05-11 RX ADMIN — ENOXAPARIN SODIUM 40 MILLIGRAM(S): 100 INJECTION SUBCUTANEOUS at 21:09

## 2023-05-11 NOTE — PROGRESS NOTE ADULT - ASSESSMENT
MAHDI KELY is a 68 year old male with PMH of seizure, HTN, Parkinson's disease (Dx in 2012, wheelchair bound since 2020) and frequent falls; presented to [facility] on 4/17 for elective L craniotomy for meningioma resection with Dr. D'Amico. His procedure was tolerated well and was discharged to [facility] on 4/21. During transit to , he complained of incisional headache with episodes of nausea and vomiting requiring Zofran and Oxycodone, but his symptoms persisted. Upon arrival to , an RRT was called for worsening mental status and a CTH revealed an acute large left frontal hemorrhage with IVH, cerebral edema with midline shift and some effacement of L frontal horn. He was transferred back to [facility].  He underwent EVD placement on 4/22. He was extubated on 4/23, and placed on HFNC with eventual wean to NC.  His hospital course was complicated by RLL consolidation (s/p Zosyn), HTN, bradycardia (self-resolved), hyponatremia, NINFA, and L cheek herpetic lesion (requiring 7 day course of Valtrex). S/p EVD removal on 5/1. Patient now admitted for a multidisciplinary rehab program. 05-03-23    * Speech deficits still apparent,   * Impaired safety awareness ,but no agitation, probably due to disinhibition due to effects of brain injury  * Interval decreased size of intracranial hemorrhage    Rehab Management/MEDICAL MANAGEMENT   #Functional deficits s/p non-traumatic ICH  - Gait Instability, ADL impairments and Functional impairments: start Comprehensive Rehab Program of PT/OT/SLP  - 3 hours a day, 5 days a week  - P&O as needed   - CTH revealed L frontal lobe hemorrhage with intraventricular hemorrhage and increased size of R lateral ventricle  - S/p EVD placement on 4/22, removed on 5/1   - S/p Decadron taper x1 week, completed  - Serial CTH 5/4, for interval AMS showed reduction of left frontal hemorrhage  - Continue Amantadine  - Sodium Chloride tabs to q8h, Na >135, taper    * Fall episode in his room, no injury 5/10-- CTH reduced size of intracranial hemorrhage  * Impaired safety awareness ,but no agitation, probably due to disinhibition due to effects of brain injury  --Move to room near nursing station when possible    #HSV-1 Infection, Left cheek s/p valtrex completed 5/5  - F/u awaiting result of HIV testing, done during acute care    #PNA  - S/p Zosyn (4/22-4/27)   - Albuterol/Ipratropium Nebulizer prn  - Incentive Spirometer    #Parkinson's Disease  - Continue Sinemet 4x per day, Entacapone 200mg q8h  - Melatonin  - bowel regimen  -- Neurology review for Parkinson's disease, progressive hand tremors    #Seizures  - Keppra BID  -seizure precautions     #HTN  - Amlodipine  - Goal SBP: 100-160    #Skin  - Skin: scalp incision healing well, well approximated  - Pustular rash b/l armpits-ID eval requested  - Pressure injury/Skin: OOB to Chair, PT/OT     #Pain Mgmt   - Tylenol PRN  - Lidocaine patch  - Home: Gabapentin-on hold per NSx    #GI/Bowel Mgmt   - Continent c/w Senna, Miralax     #/Bladder Mgmt --Voiding       #FEN   - Diet - Soft & Bite Sized   - Dysphagia  SLP - evaluation and treatment    #Precautions / PROPHYLAXIS:   - Falls  - ortho: Weight bearing status: WBAT   - Lungs: Aspiration, Incentive Spirometer   - DVT: Lovenox  -------------------------------------------------------------    Liaison with family 5/9--i called family--spoke with patient's daughter (with ph number in EMR for spouse), she reports that patient's on, will be visiting patient tomorrow am and will discuss update with me  There was no response to my call to patient's son on ph     5/10--I called on ph and d/w Mr Nadiya, son, as noted above    IDT 5/9  Lives with family--wife, daughter and son ,wc ambulatory at baseline  SLP--Soft bite sized thin diet  Severe cognitive deficits, attention deficits, safety, poor carry over  OT--Elisabet assist for self care, Mod assistance for transfers, Noted trying to lean out of bed  PT--Max assistance for bed transfer, ambulating 40ft with RW WC follow through  Barriers--hand tremors Cognitive deficit, occasionally trying to climb out of bed occasionally, bed alarm to be active at all times, nursing to consider t/f to room near nursing station when available   Goals--Min assist to CGA for ADLs,   Est dc 5/23 to home  (will be revised at subsequent IDT)       FOLLOW UP/OUTPATIENT:    D'Amico, Randy  [phone]    Dianelys Nguyen  [address] [address]  Phone: [phone]  Fax: (   )    -  Follow Up Time:    D'Amico, Randy  [facility]  NEUROSURG [address] [place] [address] S  Scheduled Appointment: 05/10/2023  -------------------------------------------------------------

## 2023-05-11 NOTE — PROGRESS NOTE ADULT - SUBJECTIVE AND OBJECTIVE BOX
Subjective  Seen and examined, observed in therapy  He reports no pain or acute distress      ROS: no head ache, nausea/vomiting or dyspnea,   Dysarthria, hypophonia, tremors both hands   LBM 5/10    Therapy:  Engaging and ambulating with walker, with mod assistance, limited distance    CTH--Yesterday post fall, shows interval decrease of previously seen hemorrhage    Vital Signs Last 24 Hrs  T(C): 36.8 (10 May 2023 19:40), Max: 36.8 (10 May 2023 19:40)  T(F): 98.2 (10 May 2023 19:40), Max: 98.2 (10 May 2023 19:40)  HR: 67 (11 May 2023 05:15) (67 - 77)  BP: 131/82 (11 May 2023 05:15) (127/81 - 131/82)  RR: 15 (10 May 2023 19:40) (15 - 15)  SpO2: 97% (10 May 2023 19:40) (97% - 97%)  O2 Parameters below as of 10 May 2023 19:40  Patient On (Oxygen Delivery Method): room air      EXAM  General: Sitting on his wheel chair   HENT: PERRL, EOM grossly in tact.,   Cardiac: Regular rate.  Pulm:  clear  Abd: Soft, non-tender, rounded. +BS  Ext--no ulcers    Neuro:   Alert and awake, Dysarthria, hypophonia, Stuttering speech,   RUE 5/5, LUE 4+/5. Resting tremor, both hands L>R  Sensation grossly intact to light touch  MMT--UE 4/5  Lower extremities 3/5  Extremities: Skin is warm, perfused. Pustular  rash b/l armpits, erosion    RECENT LABS/IMAGING                        13.0   3.64  )-----------( 187      ( 11 May 2023 07:06 )             40.5     05-11    144  |  108  |  19  ----------------------------<  97  3.6   |  27  |  1.25    Ca    9.6      11 May 2023 07:06    TPro  7.0  /  Alb  3.3  /  TBili  0.5  /  DBili  x   /  AST  14  /  ALT  15  /  AlkPhos  55  05-11      PROCEDURE DATE:  05/10/2023          INTERPRETATION:  Clinical indication : status post fall.    Multiple axial sections were performed from base skull to vertex without   contrast enhancement. Coronal and sagittal reconstructions performed.    This exam is compared with prior head CT performed on May 4, 2023.    Postoperative changes compatible the high left frontal craniotomy is   again seen.    Abnormal high attenuation involvingleft frontal region is again   identified which is consistent with patient's known acute parenchymal   hemorrhage. This finding measures approximately 5.8 x 3.7 cm and   previously measured approximately 5.9 x 4.2 cm. There is slight decrease   attenuation seen which compatible expected evolutionary changes. Mass   effect on the anterior body of the left lateral ventricle is again seen   with left-to-right shift (1.6 cm) seen.    The size and configuration of the ventricles appears unchanged.    The right matter lucency is again seen.    Evaluation of the osseous appropriate window aside from postop changes   appear normal    The visualized paranasal sinuses mastoid and mastoid clear.    IMPRESSION: Acute parenchymal hemorrhage with postop changes again seen   as described above.      MEDICATIONS  (STANDING):  amantadine Syrup 100 milliGRAM(s) Oral daily  amLODIPine   Tablet 10 milliGRAM(s) Oral daily  AQUAPHOR (petrolatum Ointment) 1 Application(s) Topical two times a day  artificial  tears Solution 1 Drop(s) Both EYES every 4 hours  carbidopa/levodopa  25/250 1 Tablet(s) Oral <User Schedule>  enoxaparin Injectable 40 milliGRAM(s) SubCutaneous every 24 hours  entacapone 200 milliGRAM(s) Oral <User Schedule>  levETIRAcetam 1000 milliGRAM(s) Oral two times a day  lidocaine   4% Patch 1 Patch Transdermal every 24 hours  melatonin 9 milliGRAM(s) Oral at bedtime  polyethylene glycol 3350 17 Gram(s) Oral daily  senna 2 Tablet(s) Oral at bedtime  sodium chloride 1 Gram(s) Oral every 8 hours  tiotropium 2.5 MICROgram(s) Inhaler 2 Puff(s) Inhalation daily    MEDICATIONS  (PRN):  acetaminophen   Oral Liquid .. 650 milliGRAM(s) Oral every 6 hours PRN Mild Pain (1 - 3)  acetylcysteine 20%  Inhalation 4 milliLiter(s) Inhalation every 6 hours PRN congestion  albuterol    90 MICROgram(s) HFA Inhaler 2 Puff(s) Inhalation every 6 hours PRN Shortness of Breath and/or Wheezing

## 2023-05-12 PROCEDURE — 99232 SBSQ HOSP IP/OBS MODERATE 35: CPT

## 2023-05-12 RX ADMIN — Medication 100 MILLIGRAM(S): at 18:24

## 2023-05-12 RX ADMIN — Medication 1 DROP(S): at 22:13

## 2023-05-12 RX ADMIN — AMLODIPINE BESYLATE 10 MILLIGRAM(S): 2.5 TABLET ORAL at 06:07

## 2023-05-12 RX ADMIN — SODIUM CHLORIDE 1 GRAM(S): 9 INJECTION INTRAMUSCULAR; INTRAVENOUS; SUBCUTANEOUS at 16:18

## 2023-05-12 RX ADMIN — Medication 1 DROP(S): at 16:21

## 2023-05-12 RX ADMIN — CARBIDOPA AND LEVODOPA 1 TABLET(S): 25; 100 TABLET ORAL at 07:59

## 2023-05-12 RX ADMIN — ENTACAPONE 200 MILLIGRAM(S): 200 TABLET, FILM COATED ORAL at 12:07

## 2023-05-12 RX ADMIN — LEVETIRACETAM 1000 MILLIGRAM(S): 250 TABLET, FILM COATED ORAL at 18:24

## 2023-05-12 RX ADMIN — SODIUM CHLORIDE 1 GRAM(S): 9 INJECTION INTRAMUSCULAR; INTRAVENOUS; SUBCUTANEOUS at 22:13

## 2023-05-12 RX ADMIN — Medication 9 MILLIGRAM(S): at 22:13

## 2023-05-12 RX ADMIN — Medication 1 APPLICATION(S): at 18:46

## 2023-05-12 RX ADMIN — CARBIDOPA AND LEVODOPA 1 TABLET(S): 25; 100 TABLET ORAL at 12:07

## 2023-05-12 RX ADMIN — ENOXAPARIN SODIUM 40 MILLIGRAM(S): 100 INJECTION SUBCUTANEOUS at 22:13

## 2023-05-12 RX ADMIN — Medication 100 MILLIGRAM(S): at 07:59

## 2023-05-12 RX ADMIN — POLYETHYLENE GLYCOL 3350 17 GRAM(S): 17 POWDER, FOR SOLUTION ORAL at 12:07

## 2023-05-12 RX ADMIN — CARBIDOPA AND LEVODOPA 1 TABLET(S): 25; 100 TABLET ORAL at 20:03

## 2023-05-12 RX ADMIN — CARBIDOPA AND LEVODOPA 1 TABLET(S): 25; 100 TABLET ORAL at 16:06

## 2023-05-12 RX ADMIN — ENTACAPONE 200 MILLIGRAM(S): 200 TABLET, FILM COATED ORAL at 07:59

## 2023-05-12 RX ADMIN — ENTACAPONE 200 MILLIGRAM(S): 200 TABLET, FILM COATED ORAL at 16:18

## 2023-05-12 RX ADMIN — SENNA PLUS 2 TABLET(S): 8.6 TABLET ORAL at 22:13

## 2023-05-12 NOTE — PROGRESS NOTE ADULT - ASSESSMENT
MAHDI KELY is a 68 year old male with PMH of seizure, HTN, Parkinson's disease (Dx in 2012, wheelchair bound since 2020) and frequent falls; presented to [facility] on 4/17 for elective L craniotomy for meningioma resection with Dr. D'Amico. His procedure was tolerated well and was discharged to [facility] on 4/21. During transit to , he complained of incisional headache with episodes of nausea and vomiting requiring Zofran and Oxycodone, but his symptoms persisted. Upon arrival to , an RRT was called for worsening mental status and a CTH revealed an acute large left frontal hemorrhage with IVH, cerebral edema with midline shift and some effacement of L frontal horn. He was transferred back to [facility].  He underwent EVD placement on 4/22. He was extubated on 4/23, and placed on HFNC with eventual wean to NC.  His hospital course was complicated by RLL consolidation (s/p Zosyn), HTN, bradycardia (self-resolved), hyponatremia, NINFA, and L cheek herpetic lesion (requiring 7 day course of Valtrex). S/p EVD removal on 5/1. Patient now admitted for a multidisciplinary rehab program. 05-03-23    * Tolerating increased dose amantadine  * Neurology recs appreciated and being implemented    Rehab Management/MEDICAL MANAGEMENT   #Functional deficits s/p non-traumatic ICH  - Gait Instability, ADL impairments and Functional impairments: start Comprehensive Rehab Program of PT/OT/SLP  - 3 hours a day, 5 days a week  - P&O as needed   - CTH revealed L frontal lobe hemorrhage with intraventricular hemorrhage and increased size of R lateral ventricle  - S/p EVD placement on 4/22, removed on 5/1   - S/p Decadron taper x1 week, completed  - Serial CTH 5/4, for interval AMS showed reduction of left frontal hemorrhage  - Continue Amantadine  - Sodium Chloride tabs to q8h, Na >135, taper    * Fall episode in his room, no injury 5/10-- CTH reduced size of intracranial hemorrhage  * Impaired safety awareness ,but no agitation, probably due to disinhibition due to effects of brain injury  --Move to room near nursing station when possible    #HSV-1 Infection, Left cheek s/p valtrex completed 5/5  - F/u awaiting result of HIV testing, done during acute care    #PNA  - S/p Zosyn (4/22-4/27)   - Albuterol/Ipratropium Nebulizer prn  - Incentive Spirometer    #Parkinson's Disease  - Continue Sinemet 4x per day, Entacapone 200mg q8h  - Melatonin  - bowel regimen  -- Neurology review for Parkinson's disease, progressive hand tremors 5/11  Commenced increased dose amantadine, will monitor,  will consider starting SSRI (mirtazepine or lexapro),  other recs including trial of benzodiazepine (clonazepam) and rivastigmine will be considered afterwards, while considering risk ot reducing seizure threshold    #Seizures  - Keppra BID  -seizure precautions     #HTN  - Amlodipine  - Goal SBP: 100-160    #Skin  - Skin: scalp incision healing well, well approximated  - Pustular rash b/l armpits-ID eval requested  - Pressure injury/Skin: OOB to Chair, PT/OT     #Pain Mgmt   - Tylenol PRN  - Lidocaine patch  - Home: Gabapentin-on hold per NSx    #GI/Bowel Mgmt   - Continent c/w Senna, Miralax     #/Bladder Mgmt --Voiding       #FEN   - Diet - Soft & Bite Sized   - Dysphagia  SLP - evaluation and treatment    #Precautions / PROPHYLAXIS:   - Falls  - ortho: Weight bearing status: WBAT   - Lungs: Aspiration, Incentive Spirometer   - DVT: Lovenox  -------------------------------------------------------------    Liaison with family 5/9--i called family--spoke with patient's daughter (with ph number in EMR for spouse), she reports that patient's on, will be visiting patient tomorrow am and will discuss update with me  There was no response to my call to patient's son on ph     5/10--I called on ph and d/w Mr Hearn, son, as noted above    IDT 5/9  Lives with family--wife, daughter and son ,wc ambulatory at baseline  SLP--Soft bite sized thin diet  Severe cognitive deficits, attention deficits, safety, poor carry over  OT--Elisabet assist for self care, Mod assistance for transfers, Noted trying to lean out of bed  PT--Max assistance for bed transfer, ambulating 40ft with RW WC follow through  Barriers--hand tremors Cognitive deficit, occasionally trying to climb out of bed occasionally, bed alarm to be active at all times, nursing to consider t/f to room near nursing station when available   Goals--Min assist to CGA for ADLs,   Est dc 5/23 to home  (will be revised at subsequent IDT)       FOLLOW UP/OUTPATIENT:    D'Amico, Randy  [phone]    Dianelys Nguyen  [address] [place] [address]  Phone: [phone]  Fax: (   )    -  Follow Up Time:    D'Amico, Randy  [facility] Physician [facility]  NEUROSURG [address] [place] [address] S  Scheduled Appointment: 05/10/2023  -------------------------------------------------------------

## 2023-05-12 NOTE — PROGRESS NOTE ADULT - SUBJECTIVE AND OBJECTIVE BOX
Subjective  Seen and examined, observed in therapy  Slept well  Tolerating oral diet  Tolerating increased dose of amantadine from yesterday      ROS: no head ache, nausea/vomiting or dyspnea,   Dysarthria, hypophonia, tremors both hands less prominent today    LBM 5/11    Therapy:  Observed ambulating with walker, with mod assistance, limited distance    Neurology review and recs appreciated  Commenced increased dose amantadine, will monitor,  will consider starting SSRI (mirtazepine or lexapro),  other recs including trial of benzodiazepine (clonazepam) and rivastigmine will be considered afterwards, while considering risk ot reducing seizure threshold    Vital Signs Last 24 Hrs  T(C): 36.6 (12 May 2023 09:08), Max: 36.9 (11 May 2023 19:39)  T(F): 97.8 (12 May 2023 09:08), Max: 98.5 (11 May 2023 19:39)  HR: 54 (12 May 2023 09:08) (54 - 66)  BP: 142/91 (12 May 2023 09:08) (119/75 - 145/86)  RR: 15 (12 May 2023 09:08) (15 - 15)  SpO2: 98% (12 May 2023 09:08) (98% - 100%)  O2 Parameters below as of 12 May 2023 09:08  Patient On (Oxygen Delivery Method): room air      EXAM  General: Comfortable in bed   HENT: PERRL, EOM grossly in tact.,   Cardiac: Regular rate.  Pulm:  clear  Abd: Soft, non-tender, rounded. +BS  Ext--no ulcers    Neuro:   Alert and awake, Dysarthria, hypophonia, Stuttering speech,   RUE 5/5, LUE 4+/5. Resting tremor, both hands L>R  Sensation grossly intact to light touch  MMT--UE 4/5  Lower extremities 3/5  Extremities: Skin is warm, perfused.     RECENT LABS/IMAGING                        13.0   3.64  )-----------( 187      ( 11 May 2023 07:06 )             40.5     05-11    144  |  108  |  19  ----------------------------<  97  3.6   |  27  |  1.25    Ca    9.6      11 May 2023 07:06    TPro  7.0  /  Alb  3.3  /  TBili  0.5  /  DBili  x   /  AST  14  /  ALT  15  /  AlkPhos  55  05-11      MEDICATIONS  (STANDING):  amantadine Syrup 100 milliGRAM(s) Oral two times a day  amLODIPine   Tablet 10 milliGRAM(s) Oral daily  AQUAPHOR (petrolatum Ointment) 1 Application(s) Topical two times a day  artificial  tears Solution 1 Drop(s) Both EYES every 4 hours  carbidopa/levodopa  25/250 1 Tablet(s) Oral <User Schedule>  enoxaparin Injectable 40 milliGRAM(s) SubCutaneous every 24 hours  entacapone 200 milliGRAM(s) Oral <User Schedule>  levETIRAcetam 1000 milliGRAM(s) Oral two times a day  lidocaine   4% Patch 1 Patch Transdermal every 24 hours  melatonin 9 milliGRAM(s) Oral at bedtime  polyethylene glycol 3350 17 Gram(s) Oral daily  senna 2 Tablet(s) Oral at bedtime  sodium chloride 1 Gram(s) Oral every 8 hours  tiotropium 2.5 MICROgram(s) Inhaler 2 Puff(s) Inhalation daily    MEDICATIONS  (PRN):  acetaminophen   Oral Liquid .. 650 milliGRAM(s) Oral every 6 hours PRN Mild Pain (1 - 3)  acetylcysteine 20%  Inhalation 4 milliLiter(s) Inhalation every 6 hours PRN congestion  albuterol    90 MICROgram(s) HFA Inhaler 2 Puff(s) Inhalation every 6 hours PRN Shortness of Breath and/or Wheezing

## 2023-05-12 NOTE — PROGRESS NOTE ADULT - ASSESSMENT
MAHDI KELY is a 68 year old male with PMH of seizure, HTN, Parkinson's disease (Dx in 2012, wheelchair bound since 2020) and frequent falls; presented to [facility] on 4/17 for elective L craniotomy for meningioma resection with Dr. D'Amico. His procedure was tolerated well and was discharged to [facility] on 4/21. During transit to , he complained of incisional headache with episodes of nausea and vomiting requiring Zofran and Oxycodone, but his symptoms persisted. Upon arrival to , an RRT was called for worsening mental status and a CTH revealed an acute large left frontal hemorrhage with IVH, cerebral edema with midline shift and some effacement of L frontal horn. He was transferred back to [facility].  He underwent EVD placement on 4/22. He was extubated on 4/23, and placed on HFNC with eventual wean to NC.  His hospital course was complicated by RLL consolidation (s/p Zosyn), HTN, bradycardia (self-resolved), hyponatremia, NINFA, and L cheek herpetic lesion (requiring 7 day course of Valtrex). S/p EVD removal on 5/1. Patient now admitted for a multidisciplinary rehab program- pt/ot/dvt pp    # Debility s/p non-traumatic ICH  - CTH revealed Left frontal lobe hemorrhage with intraventricular hemorrhage and increased size of Rt lateral ventricle  - S/p EVD placement on 4/22, removed on 5/1   - S/p Decadron taper x1 week  - Continue Amantadine  - continue comprehensive acute rehabg program PT OT ST    # hyponatremia  -resolved  - continue Sodium Chloride tabs     #HSV-1 Infection  - Left cheek lesion  - 7 day course Valtrex (4/28-5/5)    #PNA  - S/p Zosyn (4/22-4/27)   - Albuterol/Ipratropium Nebulizer prn    #Parkinson's Disease  - Continue Sinemet, Entacapone  - Melatonin  - movement disorder specialist following    #Seizures  - Keppra BID  - seizure precautions     #HTN  - reviewed flow sheets systolic ranging 120-150s  - continue Amlodipine      - DVT ppx: Lovenox

## 2023-05-12 NOTE — PROGRESS NOTE ADULT - SUBJECTIVE AND OBJECTIVE BOX
Medicine Progress Note    Patient is a 68y old  Male who presents with a chief complaint of Functional deficits s/p non-traumatic ICH (12 May 2023 10:14)      SUBJECTIVE / OVERNIGHT EVENTS:  no complaints  tolerating therapy.     ADDITIONAL REVIEW OF SYSTEMS:  denied fever/chills/CP/SOB/cough/palpitation/dizziness/abdominal pian/nausea/vomiting/diarrhoea/constipation/dysuria/leg or calf pain/headaches.all other ROS neg    MEDICATIONS  (STANDING):  amantadine Syrup 100 milliGRAM(s) Oral two times a day  amLODIPine   Tablet 10 milliGRAM(s) Oral daily  AQUAPHOR (petrolatum Ointment) 1 Application(s) Topical two times a day  artificial  tears Solution 1 Drop(s) Both EYES every 4 hours  carbidopa/levodopa  25/250 1 Tablet(s) Oral <User Schedule>  enoxaparin Injectable 40 milliGRAM(s) SubCutaneous every 24 hours  entacapone 200 milliGRAM(s) Oral <User Schedule>  levETIRAcetam 1000 milliGRAM(s) Oral two times a day  lidocaine   4% Patch 1 Patch Transdermal every 24 hours  melatonin 9 milliGRAM(s) Oral at bedtime  polyethylene glycol 3350 17 Gram(s) Oral daily  senna 2 Tablet(s) Oral at bedtime  sodium chloride 1 Gram(s) Oral every 8 hours  tiotropium 2.5 MICROgram(s) Inhaler 2 Puff(s) Inhalation daily    MEDICATIONS  (PRN):  acetaminophen   Oral Liquid .. 650 milliGRAM(s) Oral every 6 hours PRN Mild Pain (1 - 3)  acetylcysteine 20%  Inhalation 4 milliLiter(s) Inhalation every 6 hours PRN congestion  albuterol    90 MICROgram(s) HFA Inhaler 2 Puff(s) Inhalation every 6 hours PRN Shortness of Breath and/or Wheezing    CAPILLARY BLOOD GLUCOSE        I&O's Summary      PHYSICAL EXAM:  Vital Signs Last 24 Hrs  T(C): 36.6 (12 May 2023 09:08), Max: 36.9 (11 May 2023 19:39)  T(F): 97.8 (12 May 2023 09:08), Max: 98.5 (11 May 2023 19:39)  HR: 54 (12 May 2023 09:08) (54 - 66)  BP: 142/91 (12 May 2023 09:08) (119/75 - 145/86)  BP(mean): --  RR: 15 (12 May 2023 09:08) (15 - 15)  SpO2: 98% (12 May 2023 09:08) (98% - 100%)    Parameters below as of 12 May 2023 09:08  Patient On (Oxygen Delivery Method): room air    GENERAL: Not in distress. Alert    HEENT: clear conjuctiva, MMM. no pallor or icterus  CARDIOVASCULAR: RRR S1, S2. No murmur/rubs/gallop  LUNGS: BLAE+, no rales, no wheezing, no rhonchi.    ABDOMEN: ND. Soft,  NT, no guarding / rebound / rigidity. BS normoactive  BACK: No spine tenderness.  EXTREMITIES: no edema. no leg or calf TP. tremor  SKIN: warm and dry  PSYCHIATRIC: Calm.  No agitation.  CNS: AAO. mask face. mild bradykinesia, mild rigidity, hypophonia    LABS:                        13.0   3.64  )-----------( 187      ( 11 May 2023 07:06 )             40.5     05-11    144  |  108  |  19  ----------------------------<  97  3.6   |  27  |  1.25    Ca    9.6      11 May 2023 07:06    TPro  7.0  /  Alb  3.3  /  TBili  0.5  /  DBili  x   /  AST  14  /  ALT  15  /  AlkPhos  55  05-11              COVID-19 PCR: NotDetec (19 Apr 2023 23:15)      RADIOLOGY & ADDITIONAL TESTS:  Imaging from Last 24 Hours:    Electrocardiogram/QTc Interval:    COORDINATION OF CARE:  Care Discussed with Consultants/Other Providers:

## 2023-05-13 PROCEDURE — 99232 SBSQ HOSP IP/OBS MODERATE 35: CPT

## 2023-05-13 RX ADMIN — Medication 100 MILLIGRAM(S): at 17:16

## 2023-05-13 RX ADMIN — CARBIDOPA AND LEVODOPA 1 TABLET(S): 25; 100 TABLET ORAL at 16:06

## 2023-05-13 RX ADMIN — AMLODIPINE BESYLATE 10 MILLIGRAM(S): 2.5 TABLET ORAL at 07:58

## 2023-05-13 RX ADMIN — Medication 1 APPLICATION(S): at 17:17

## 2023-05-13 RX ADMIN — POLYETHYLENE GLYCOL 3350 17 GRAM(S): 17 POWDER, FOR SOLUTION ORAL at 12:03

## 2023-05-13 RX ADMIN — ENTACAPONE 200 MILLIGRAM(S): 200 TABLET, FILM COATED ORAL at 12:02

## 2023-05-13 RX ADMIN — Medication 1 DROP(S): at 17:16

## 2023-05-13 RX ADMIN — Medication 1 DROP(S): at 07:58

## 2023-05-13 RX ADMIN — ENTACAPONE 200 MILLIGRAM(S): 200 TABLET, FILM COATED ORAL at 07:59

## 2023-05-13 RX ADMIN — CARBIDOPA AND LEVODOPA 1 TABLET(S): 25; 100 TABLET ORAL at 12:03

## 2023-05-13 RX ADMIN — SODIUM CHLORIDE 1 GRAM(S): 9 INJECTION INTRAMUSCULAR; INTRAVENOUS; SUBCUTANEOUS at 16:21

## 2023-05-13 RX ADMIN — CARBIDOPA AND LEVODOPA 1 TABLET(S): 25; 100 TABLET ORAL at 07:58

## 2023-05-13 RX ADMIN — Medication 100 MILLIGRAM(S): at 07:59

## 2023-05-13 RX ADMIN — Medication 1 APPLICATION(S): at 07:59

## 2023-05-13 RX ADMIN — LEVETIRACETAM 1000 MILLIGRAM(S): 250 TABLET, FILM COATED ORAL at 17:16

## 2023-05-13 RX ADMIN — ENOXAPARIN SODIUM 40 MILLIGRAM(S): 100 INJECTION SUBCUTANEOUS at 20:24

## 2023-05-13 RX ADMIN — LEVETIRACETAM 1000 MILLIGRAM(S): 250 TABLET, FILM COATED ORAL at 07:58

## 2023-05-13 RX ADMIN — ENTACAPONE 200 MILLIGRAM(S): 200 TABLET, FILM COATED ORAL at 16:07

## 2023-05-13 RX ADMIN — Medication 9 MILLIGRAM(S): at 20:25

## 2023-05-13 RX ADMIN — SODIUM CHLORIDE 1 GRAM(S): 9 INJECTION INTRAMUSCULAR; INTRAVENOUS; SUBCUTANEOUS at 07:58

## 2023-05-13 RX ADMIN — Medication 1 DROP(S): at 20:25

## 2023-05-13 RX ADMIN — CARBIDOPA AND LEVODOPA 1 TABLET(S): 25; 100 TABLET ORAL at 20:10

## 2023-05-13 RX ADMIN — Medication 1 DROP(S): at 12:02

## 2023-05-13 RX ADMIN — SODIUM CHLORIDE 1 GRAM(S): 9 INJECTION INTRAMUSCULAR; INTRAVENOUS; SUBCUTANEOUS at 20:24

## 2023-05-13 NOTE — PROGRESS NOTE ADULT - SUBJECTIVE AND OBJECTIVE BOX
Medicine Progress Note    Patient is a 68y old  Male who presents with a chief complaint of Functional deficits s/p non-traumatic ICH (13 May 2023 11:42)      SUBJECTIVE / OVERNIGHT EVENTS:  seen and examined  Chart reviewed  No overnight events  Limb weakness improving with therapy  BM+  No pain  No complaints    ADDITIONAL REVIEW OF SYSTEMS:  denied fever/chills/CP/SOB/cough/palpitation/dizziness/abdominal pian/nausea/vomiting/diarrhoea/constipation/dysuria/leg or calf pain/headaches.all other ROS neg    MEDICATIONS  (STANDING):  amantadine Syrup 100 milliGRAM(s) Oral two times a day  amLODIPine   Tablet 10 milliGRAM(s) Oral daily  AQUAPHOR (petrolatum Ointment) 1 Application(s) Topical two times a day  artificial  tears Solution 1 Drop(s) Both EYES every 4 hours  carbidopa/levodopa  25/250 1 Tablet(s) Oral <User Schedule>  enoxaparin Injectable 40 milliGRAM(s) SubCutaneous every 24 hours  entacapone 200 milliGRAM(s) Oral <User Schedule>  levETIRAcetam 1000 milliGRAM(s) Oral two times a day  lidocaine   4% Patch 1 Patch Transdermal every 24 hours  melatonin 9 milliGRAM(s) Oral at bedtime  polyethylene glycol 3350 17 Gram(s) Oral daily  senna 2 Tablet(s) Oral at bedtime  sodium chloride 1 Gram(s) Oral every 8 hours  tiotropium 2.5 MICROgram(s) Inhaler 2 Puff(s) Inhalation daily    MEDICATIONS  (PRN):  acetaminophen   Oral Liquid .. 650 milliGRAM(s) Oral every 6 hours PRN Mild Pain (1 - 3)  acetylcysteine 20%  Inhalation 4 milliLiter(s) Inhalation every 6 hours PRN congestion  albuterol    90 MICROgram(s) HFA Inhaler 2 Puff(s) Inhalation every 6 hours PRN Shortness of Breath and/or Wheezing    CAPILLARY BLOOD GLUCOSE        I&O's Summary      PHYSICAL EXAM:  Vital Signs Last 24 Hrs  T(C): 37.2 (12 May 2023 20:09), Max: 37.2 (12 May 2023 20:09)  T(F): 98.9 (12 May 2023 20:09), Max: 98.9 (12 May 2023 20:09)  HR: 60 (13 May 2023 06:27) (60 - 69)  BP: 137/88 (13 May 2023 06:27) (116/72 - 137/88)  BP(mean): --  RR: 16 (13 May 2023 06:27) (16 - 16)  SpO2: 99% (13 May 2023 06:27) (96% - 99%)    Parameters below as of 13 May 2023 06:27  Patient On (Oxygen Delivery Method): room air        LABS:                    COVID-19 PCR: NotDetec (19 Apr 2023 23:15)      RADIOLOGY & ADDITIONAL TESTS:  Imaging from Last 24 Hours:    Electrocardiogram/QTc Interval:    COORDINATION OF CARE:  Care Discussed with Consultants/Other Providers:

## 2023-05-13 NOTE — PROGRESS NOTE ADULT - SUBJECTIVE AND OBJECTIVE BOX
Cc: Gait dysfunction due to left frontal hemorrhage    HPI: Patient with no new medical issues today. denies complaint, denies feeling anxious.  Pain controlled, no chest pain, no N/V, no Fevers/Chills. No other new ROS  Has been tolerating rehabilitation program.    acetaminophen   Oral Liquid .. 650 milliGRAM(s) Oral every 6 hours PRN  acetylcysteine 20%  Inhalation 4 milliLiter(s) Inhalation every 6 hours PRN  albuterol    90 MICROgram(s) HFA Inhaler 2 Puff(s) Inhalation every 6 hours PRN  amantadine Syrup 100 milliGRAM(s) Oral two times a day  amLODIPine   Tablet 10 milliGRAM(s) Oral daily  AQUAPHOR (petrolatum Ointment) 1 Application(s) Topical two times a day  artificial  tears Solution 1 Drop(s) Both EYES every 4 hours  carbidopa/levodopa  25/250 1 Tablet(s) Oral <User Schedule>  enoxaparin Injectable 40 milliGRAM(s) SubCutaneous every 24 hours  entacapone 200 milliGRAM(s) Oral <User Schedule>  levETIRAcetam 1000 milliGRAM(s) Oral two times a day  lidocaine   4% Patch 1 Patch Transdermal every 24 hours  melatonin 9 milliGRAM(s) Oral at bedtime  polyethylene glycol 3350 17 Gram(s) Oral daily  senna 2 Tablet(s) Oral at bedtime  sodium chloride 1 Gram(s) Oral every 8 hours  tiotropium 2.5 MICROgram(s) Inhaler 2 Puff(s) Inhalation daily      T(C): 37.2 (05-12-23 @ 20:09), Max: 37.2 (05-12-23 @ 20:09)  HR: 60 (05-13-23 @ 06:27) (60 - 69)  BP: 137/88 (05-13-23 @ 06:27) (116/72 - 137/88)  RR: 16 (05-13-23 @ 06:27) (16 - 16)  SpO2: 99% (05-13-23 @ 06:27) (96% - 99%)    In NAD  HEENT- EOMI  Heart- RRR, S1S2  Lungs- no respiratory distress  Abd- + BS, NT  Ext- No calf pain  Neuro- Exam unchanged  LE weakness        Imp: MAHDI KELY is a 68 year old male with PMH of seizure, HTN, Parkinson's disease (Dx in 2012, wheelchair bound since 2020) and frequent falls; presented to [facility] on 4/17 for elective L craniotomy for meningioma resection with Dr. D'Amico. His procedure was tolerated well and was discharged to [facility] on 4/21. During transit to , he complained of incisional headache with episodes of nausea and vomiting requiring Zofran and Oxycodone, but his symptoms persisted. Upon arrival to , an RRT was called for worsening mental status and a CTH revealed an acute large left frontal hemorrhage with IVH, cerebral edema with midline shift and some effacement of L frontal horn. He was transferred back to [facility].  He underwent EVD placement on 4/22. He was extubated on 4/23, and placed on HFNC with eventual wean to NC.  His hospital course was complicated by RLL consolidation (s/p Zosyn), HTN, bradycardia (self-resolved), hyponatremia, NINFA, and L cheek herpetic lesion (requiring 7 day course of Valtrex). S/p EVD removal on 5/1. Patient now admitted for a multidisciplinary rehab program. 05-03-23      Plan:  - Continue therapies  - DVT prophylaxis- lovenox  - Skin- Turn q2h, check skin daily  - Continue current medications; patient medically stable.   - Patient is stable to continue current rehabilitation program.

## 2023-05-13 NOTE — PROGRESS NOTE ADULT - ASSESSMENT
MAHDI KELY is a 68 year old male with PMH of seizure, HTN, Parkinson's disease (Dx in 2012, wheelchair bound since 2020) and frequent falls; presented to [facility] on 4/17 for elective L craniotomy for meningioma resection with Dr. D'Amico. His procedure was tolerated well and was discharged to [place] [facility] on 4/21. During transit to , he complained of incisional headache with episodes of nausea and vomiting requiring Zofran and Oxycodone, but his symptoms persisted. Upon arrival to , an RRT was called for worsening mental status and a CTH revealed an acute large left frontal hemorrhage with IVH, cerebral edema with midline shift and some effacement of L frontal horn. He was transferred back to [facility].  He underwent EVD placement on 4/22. He was extubated on 4/23, and placed on HFNC with eventual wean to NC.  His hospital course was complicated by RLL consolidation (s/p Zosyn), HTN, bradycardia (self-resolved), hyponatremia, NINFA, and L cheek herpetic lesion (requiring 7 day course of Valtrex). S/p EVD removal on 5/1. Patient now admitted for a multidisciplinary rehab program- pt/ot/dvt pp    # Debility s/p non-traumatic ICH  - CTH revealed Left frontal lobe hemorrhage with intraventricular hemorrhage and increased size of Rt lateral ventricle  - S/p EVD placement on 4/22, removed on 5/1   - S/p Decadron taper x1 week  - Continue Amantadine  - continue comprehensive acute rehabg program PT OT ST    # hyponatremia  -resolved  - continue Sodium Chloride tabs     #HSV-1 Infection  - Left cheek lesion  - 7 day course Valtrex (4/28-5/5)    #PNA  - S/p Zosyn (4/22-4/27)   - Albuterol/Ipratropium Nebulizer prn    #Parkinson's Disease  - Continue Sinemet, Entacapone  - Melatonin  - movement disorder specialist following    #Seizures  - Keppra BID  - seizure precautions     #HTN  - reviewed flow sheets systolic ranging 120-150s  - continue Amlodipine      - DVT ppx: Lovenox

## 2023-05-14 PROCEDURE — 99232 SBSQ HOSP IP/OBS MODERATE 35: CPT

## 2023-05-14 RX ADMIN — POLYETHYLENE GLYCOL 3350 17 GRAM(S): 17 POWDER, FOR SOLUTION ORAL at 12:06

## 2023-05-14 RX ADMIN — ENTACAPONE 200 MILLIGRAM(S): 200 TABLET, FILM COATED ORAL at 12:06

## 2023-05-14 RX ADMIN — SENNA PLUS 2 TABLET(S): 8.6 TABLET ORAL at 19:54

## 2023-05-14 RX ADMIN — Medication 1 DROP(S): at 06:36

## 2023-05-14 RX ADMIN — ENOXAPARIN SODIUM 40 MILLIGRAM(S): 100 INJECTION SUBCUTANEOUS at 19:55

## 2023-05-14 RX ADMIN — Medication 9 MILLIGRAM(S): at 19:57

## 2023-05-14 RX ADMIN — SODIUM CHLORIDE 1 GRAM(S): 9 INJECTION INTRAMUSCULAR; INTRAVENOUS; SUBCUTANEOUS at 06:37

## 2023-05-14 RX ADMIN — Medication 100 MILLIGRAM(S): at 08:01

## 2023-05-14 RX ADMIN — Medication 1 DROP(S): at 10:15

## 2023-05-14 RX ADMIN — CARBIDOPA AND LEVODOPA 1 TABLET(S): 25; 100 TABLET ORAL at 12:06

## 2023-05-14 RX ADMIN — Medication 1 DROP(S): at 19:16

## 2023-05-14 RX ADMIN — LEVETIRACETAM 1000 MILLIGRAM(S): 250 TABLET, FILM COATED ORAL at 06:37

## 2023-05-14 RX ADMIN — ENTACAPONE 200 MILLIGRAM(S): 200 TABLET, FILM COATED ORAL at 15:25

## 2023-05-14 RX ADMIN — LEVETIRACETAM 1000 MILLIGRAM(S): 250 TABLET, FILM COATED ORAL at 18:45

## 2023-05-14 RX ADMIN — Medication 1 DROP(S): at 13:25

## 2023-05-14 RX ADMIN — LIDOCAINE 1 PATCH: 4 CREAM TOPICAL at 08:02

## 2023-05-14 RX ADMIN — Medication 1 DROP(S): at 19:55

## 2023-05-14 RX ADMIN — CARBIDOPA AND LEVODOPA 1 TABLET(S): 25; 100 TABLET ORAL at 15:07

## 2023-05-14 RX ADMIN — CARBIDOPA AND LEVODOPA 1 TABLET(S): 25; 100 TABLET ORAL at 19:55

## 2023-05-14 RX ADMIN — Medication 1 APPLICATION(S): at 06:50

## 2023-05-14 RX ADMIN — CARBIDOPA AND LEVODOPA 1 TABLET(S): 25; 100 TABLET ORAL at 08:02

## 2023-05-14 RX ADMIN — SODIUM CHLORIDE 1 GRAM(S): 9 INJECTION INTRAMUSCULAR; INTRAVENOUS; SUBCUTANEOUS at 19:57

## 2023-05-14 RX ADMIN — AMLODIPINE BESYLATE 10 MILLIGRAM(S): 2.5 TABLET ORAL at 06:37

## 2023-05-14 RX ADMIN — Medication 1 APPLICATION(S): at 19:16

## 2023-05-14 RX ADMIN — Medication 100 MILLIGRAM(S): at 18:45

## 2023-05-14 RX ADMIN — SODIUM CHLORIDE 1 GRAM(S): 9 INJECTION INTRAMUSCULAR; INTRAVENOUS; SUBCUTANEOUS at 14:29

## 2023-05-14 RX ADMIN — ENTACAPONE 200 MILLIGRAM(S): 200 TABLET, FILM COATED ORAL at 08:02

## 2023-05-14 NOTE — PROGRESS NOTE ADULT - SUBJECTIVE AND OBJECTIVE BOX
Cc: Gait dysfunction 2/2  left frontal hemorrhage      HPI: Patient with no new medical issues today.  Pain controlled, no chest pain, no N/V, no Fevers/Chills. No other new ROS  Has been tolerating rehabilitation program.    acetaminophen   Oral Liquid .. 650 milliGRAM(s) Oral every 6 hours PRN  acetylcysteine 20%  Inhalation 4 milliLiter(s) Inhalation every 6 hours PRN  albuterol    90 MICROgram(s) HFA Inhaler 2 Puff(s) Inhalation every 6 hours PRN  amantadine Syrup 100 milliGRAM(s) Oral two times a day  amLODIPine   Tablet 10 milliGRAM(s) Oral daily  AQUAPHOR (petrolatum Ointment) 1 Application(s) Topical two times a day  artificial  tears Solution 1 Drop(s) Both EYES every 4 hours  carbidopa/levodopa  25/250 1 Tablet(s) Oral <User Schedule>  enoxaparin Injectable 40 milliGRAM(s) SubCutaneous every 24 hours  entacapone 200 milliGRAM(s) Oral <User Schedule>  levETIRAcetam 1000 milliGRAM(s) Oral two times a day  lidocaine   4% Patch 1 Patch Transdermal every 24 hours  melatonin 9 milliGRAM(s) Oral at bedtime  polyethylene glycol 3350 17 Gram(s) Oral daily  senna 2 Tablet(s) Oral at bedtime  sodium chloride 1 Gram(s) Oral every 8 hours  tiotropium 2.5 MICROgram(s) Inhaler 2 Puff(s) Inhalation daily      T(C): 36.3 (05-14-23 @ 08:45), Max: 36.4 (05-13-23 @ 20:34)  HR: 64 (05-14-23 @ 06:50) (64 - 65)  BP: 144/89 (05-14-23 @ 06:50) (119/75 - 144/89)  RR: 16 (05-14-23 @ 08:45) (16 - 16)  SpO2: 100% (05-14-23 @ 08:45) (98% - 100%)     In NAD  HEENT- EOMI  Heart- RRR, S1S2  Lungs- no respiratory distress  Abd- + BS, NT  Ext- No calf pain  Neuro- Exam unchanged  LE weakness        Imp: MAHDI KELY is a 68 year old male with PMH of seizure, HTN, Parkinson's disease (Dx in 2012, wheelchair bound since 2020) and frequent falls; presented to [facility] on 4/17 for elective L craniotomy for meningioma resection with Dr. D'Amico. His procedure was tolerated well and was discharged to [place] [facility] on 4/21. During transit to , he complained of incisional headache with episodes of nausea and vomiting requiring Zofran and Oxycodone, but his symptoms persisted. Upon arrival to , an RRT was called for worsening mental status and a CTH revealed an acute large left frontal hemorrhage with IVH, cerebral edema with midline shift and some effacement of L frontal horn. He was transferred back to [facility].  He underwent EVD placement on 4/22. He was extubated on 4/23, and placed on HFNC with eventual wean to NC.  His hospital course was complicated by RLL consolidation (s/p Zosyn), HTN, bradycardia (self-resolved), hyponatremia, NINFA, and L cheek herpetic lesion (requiring 7 day course of Valtrex). S/p EVD removal on 5/1. Patient now admitted for a multidisciplinary rehab program. 05-03-23      Plan:  - Continue therapies  - DVT prophylaxis- lovenox  - Skin- Turn q2h, check skin daily  - Continue current medications; patient medically stable.   - Patient is stable to continue current rehabilitation program.

## 2023-05-14 NOTE — PROGRESS NOTE ADULT - ASSESSMENT
MAHDI KELY is a 68 year old male with PMH of seizure, HTN, Parkinson's disease (Dx in 2012, wheelchair bound since 2020) and frequent falls; presented to [facility] on 4/17 for elective L craniotomy for meningioma resection with Dr. D'Amico. His procedure was tolerated well and was discharged to [place] [facility] on 4/21. During transit to , he complained of incisional headache with episodes of nausea and vomiting requiring Zofran and Oxycodone, but his symptoms persisted. Upon arrival to , an RRT was called for worsening mental status and a CTH revealed an acute large left frontal hemorrhage with IVH, cerebral edema with midline shift and some effacement of L frontal horn. He was transferred back to [facility].  He underwent EVD placement on 4/22. He was extubated on 4/23, and placed on HFNC with eventual wean to NC.  His hospital course was complicated by RLL consolidation (s/p Zosyn), HTN, bradycardia (self-resolved), hyponatremia, NINFA, and L cheek herpetic lesion (requiring 7 day course of Valtrex). S/p EVD removal on 5/1. Patient now admitted for a multidisciplinary rehab program- pt/ot/dvt pp    # Debility s/p non-traumatic ICH  - CTH revealed Left frontal lobe hemorrhage with intraventricular hemorrhage and increased size of Rt lateral ventricle  - S/p EVD placement on 4/22, removed on 5/1   - S/p Decadron taper x1 week  - Continue Amantadine  - continue comprehensive acute rehabg program PT OT ST    # hyponatremia  -resolved  - continue Sodium Chloride tabs     #HSV-1 Infection  - Left cheek lesion  - 7 day course Valtrex (4/28-5/5)    #PNA  - S/p Zosyn (4/22-4/27)   - Albuterol/Ipratropium Nebulizer prn    #Parkinson's Disease  - Continue Sinemet, Entacapone  - Melatonin  - movement disorder specialist following    #Seizures  - Keppra BID  - seizure precautions     #HTN  - reviewed flow sheets systolic ranging 120-150s  - continue Amlodipine      - DVT ppx: Lovenox

## 2023-05-14 NOTE — PROGRESS NOTE ADULT - SUBJECTIVE AND OBJECTIVE BOX
Medicine Progress Note    Patient is a 68y old  Male who presents with a chief complaint of Functional deficits s/p non-traumatic ICH (14 May 2023 11:16)      SUBJECTIVE / OVERNIGHT EVENTS:  no complaints    ADDITIONAL REVIEW OF SYSTEMS:  denied fever/chills/CP/SOB/cough/palpitation/dizziness/abdominal pian/nausea/vomiting/diarrhoea/constipation/dysuria/leg or calf pain/headaches.all other ROS neg    MEDICATIONS  (STANDING):  amantadine Syrup 100 milliGRAM(s) Oral two times a day  amLODIPine   Tablet 10 milliGRAM(s) Oral daily  AQUAPHOR (petrolatum Ointment) 1 Application(s) Topical two times a day  artificial  tears Solution 1 Drop(s) Both EYES every 4 hours  carbidopa/levodopa  25/250 1 Tablet(s) Oral <User Schedule>  enoxaparin Injectable 40 milliGRAM(s) SubCutaneous every 24 hours  entacapone 200 milliGRAM(s) Oral <User Schedule>  levETIRAcetam 1000 milliGRAM(s) Oral two times a day  lidocaine   4% Patch 1 Patch Transdermal every 24 hours  melatonin 9 milliGRAM(s) Oral at bedtime  polyethylene glycol 3350 17 Gram(s) Oral daily  senna 2 Tablet(s) Oral at bedtime  sodium chloride 1 Gram(s) Oral every 8 hours  tiotropium 2.5 MICROgram(s) Inhaler 2 Puff(s) Inhalation daily    MEDICATIONS  (PRN):  acetaminophen   Oral Liquid .. 650 milliGRAM(s) Oral every 6 hours PRN Mild Pain (1 - 3)  acetylcysteine 20%  Inhalation 4 milliLiter(s) Inhalation every 6 hours PRN congestion  albuterol    90 MICROgram(s) HFA Inhaler 2 Puff(s) Inhalation every 6 hours PRN Shortness of Breath and/or Wheezing    CAPILLARY BLOOD GLUCOSE        I&O's Summary    13 May 2023 07:01  -  14 May 2023 07:00  --------------------------------------------------------  IN: 0 mL / OUT: 100 mL / NET: -100 mL        PHYSICAL EXAM:  Vital Signs Last 24 Hrs  T(C): 36.3 (14 May 2023 08:45), Max: 36.4 (13 May 2023 20:34)  T(F): 97.3 (14 May 2023 08:45), Max: 97.5 (13 May 2023 20:34)  HR: 64 (14 May 2023 06:50) (64 - 65)  BP: 144/89 (14 May 2023 06:50) (119/75 - 144/89)  BP(mean): --  RR: 16 (14 May 2023 08:45) (16 - 16)  SpO2: 100% (14 May 2023 08:45) (98% - 100%)    Parameters below as of 14 May 2023 08:45  Patient On (Oxygen Delivery Method): room air    GENERAL: Not in distress. Alert    HEENT: clear conjuctiva, MMM. no pallor or icterus  CARDIOVASCULAR: RRR S1, S2. No murmur/rubs/gallop  LUNGS: BLAE+, no rales, no wheezing, no rhonchi.    ABDOMEN: ND. Soft,  NT, no guarding / rebound / rigidity. BS normoactive  BACK: No spine tenderness.  EXTREMITIES: no edema. no leg or calf TP. tremor  SKIN: warm and dry  PSYCHIATRIC: Calm.  No agitation.  CNS: AAO. mask face. mild bradykinesia, mild rigidity, hypophonia    LABS:                    COVID-19 PCR: NotDetec (19 Apr 2023 23:15)      RADIOLOGY & ADDITIONAL TESTS:  Imaging from Last 24 Hours:    Electrocardiogram/QTc Interval:    COORDINATION OF CARE:  Care Discussed with Consultants/Other Providers:

## 2023-05-15 LAB
ALBUMIN SERPL ELPH-MCNC: 3.2 G/DL — LOW (ref 3.3–5)
ALP SERPL-CCNC: 55 U/L — SIGNIFICANT CHANGE UP (ref 40–120)
ALT FLD-CCNC: 20 U/L — SIGNIFICANT CHANGE UP (ref 10–45)
ANION GAP SERPL CALC-SCNC: 7 MMOL/L — SIGNIFICANT CHANGE UP (ref 5–17)
AST SERPL-CCNC: 12 U/L — SIGNIFICANT CHANGE UP (ref 10–40)
BASOPHILS # BLD AUTO: 0.01 K/UL — SIGNIFICANT CHANGE UP (ref 0–0.2)
BASOPHILS NFR BLD AUTO: 0.4 % — SIGNIFICANT CHANGE UP (ref 0–2)
BILIRUB SERPL-MCNC: 0.4 MG/DL — SIGNIFICANT CHANGE UP (ref 0.2–1.2)
BUN SERPL-MCNC: 14 MG/DL — SIGNIFICANT CHANGE UP (ref 7–23)
CALCIUM SERPL-MCNC: 9.4 MG/DL — SIGNIFICANT CHANGE UP (ref 8.4–10.5)
CHLORIDE SERPL-SCNC: 109 MMOL/L — HIGH (ref 96–108)
CO2 SERPL-SCNC: 29 MMOL/L — SIGNIFICANT CHANGE UP (ref 22–31)
CREAT SERPL-MCNC: 1.26 MG/DL — SIGNIFICANT CHANGE UP (ref 0.5–1.3)
CULTURE RESULTS: NO GROWTH — SIGNIFICANT CHANGE UP
EGFR: 61 ML/MIN/1.73M2 — SIGNIFICANT CHANGE UP
EOSINOPHIL # BLD AUTO: 0.12 K/UL — SIGNIFICANT CHANGE UP (ref 0–0.5)
EOSINOPHIL NFR BLD AUTO: 4.4 % — SIGNIFICANT CHANGE UP (ref 0–6)
GLUCOSE SERPL-MCNC: 103 MG/DL — HIGH (ref 70–99)
HCT VFR BLD CALC: 39.6 % — SIGNIFICANT CHANGE UP (ref 39–50)
HGB BLD-MCNC: 12.9 G/DL — LOW (ref 13–17)
IMM GRANULOCYTES NFR BLD AUTO: 0 % — SIGNIFICANT CHANGE UP (ref 0–0.9)
LYMPHOCYTES # BLD AUTO: 1.61 K/UL — SIGNIFICANT CHANGE UP (ref 1–3.3)
LYMPHOCYTES # BLD AUTO: 59.4 % — HIGH (ref 13–44)
MCHC RBC-ENTMCNC: 31.6 PG — SIGNIFICANT CHANGE UP (ref 27–34)
MCHC RBC-ENTMCNC: 32.6 GM/DL — SIGNIFICANT CHANGE UP (ref 32–36)
MCV RBC AUTO: 97.1 FL — SIGNIFICANT CHANGE UP (ref 80–100)
MONOCYTES # BLD AUTO: 0.32 K/UL — SIGNIFICANT CHANGE UP (ref 0–0.9)
MONOCYTES NFR BLD AUTO: 11.8 % — SIGNIFICANT CHANGE UP (ref 2–14)
NEUTROPHILS # BLD AUTO: 0.65 K/UL — LOW (ref 1.8–7.4)
NEUTROPHILS NFR BLD AUTO: 24 % — LOW (ref 43–77)
NRBC # BLD: 0 /100 WBCS — SIGNIFICANT CHANGE UP (ref 0–0)
PLATELET # BLD AUTO: 195 K/UL — SIGNIFICANT CHANGE UP (ref 150–400)
POTASSIUM SERPL-MCNC: 4.2 MMOL/L — SIGNIFICANT CHANGE UP (ref 3.5–5.3)
POTASSIUM SERPL-SCNC: 4.2 MMOL/L — SIGNIFICANT CHANGE UP (ref 3.5–5.3)
PROT SERPL-MCNC: 6.8 G/DL — SIGNIFICANT CHANGE UP (ref 6–8.3)
RBC # BLD: 4.08 M/UL — LOW (ref 4.2–5.8)
RBC # FLD: 14.8 % — HIGH (ref 10.3–14.5)
SODIUM SERPL-SCNC: 145 MMOL/L — SIGNIFICANT CHANGE UP (ref 135–145)
SPECIMEN SOURCE: SIGNIFICANT CHANGE UP
WBC # BLD: 2.71 K/UL — LOW (ref 3.8–10.5)
WBC # FLD AUTO: 2.71 K/UL — LOW (ref 3.8–10.5)

## 2023-05-15 PROCEDURE — 99232 SBSQ HOSP IP/OBS MODERATE 35: CPT

## 2023-05-15 RX ADMIN — ENOXAPARIN SODIUM 40 MILLIGRAM(S): 100 INJECTION SUBCUTANEOUS at 20:07

## 2023-05-15 RX ADMIN — Medication 100 MILLIGRAM(S): at 05:04

## 2023-05-15 RX ADMIN — CARBIDOPA AND LEVODOPA 1 TABLET(S): 25; 100 TABLET ORAL at 12:06

## 2023-05-15 RX ADMIN — CARBIDOPA AND LEVODOPA 1 TABLET(S): 25; 100 TABLET ORAL at 18:36

## 2023-05-15 RX ADMIN — Medication 1 DROP(S): at 05:04

## 2023-05-15 RX ADMIN — Medication 1 DROP(S): at 10:04

## 2023-05-15 RX ADMIN — AMLODIPINE BESYLATE 10 MILLIGRAM(S): 2.5 TABLET ORAL at 05:04

## 2023-05-15 RX ADMIN — ENTACAPONE 200 MILLIGRAM(S): 200 TABLET, FILM COATED ORAL at 08:17

## 2023-05-15 RX ADMIN — Medication 1 DROP(S): at 13:17

## 2023-05-15 RX ADMIN — SENNA PLUS 2 TABLET(S): 8.6 TABLET ORAL at 20:07

## 2023-05-15 RX ADMIN — ENTACAPONE 200 MILLIGRAM(S): 200 TABLET, FILM COATED ORAL at 18:36

## 2023-05-15 RX ADMIN — CARBIDOPA AND LEVODOPA 1 TABLET(S): 25; 100 TABLET ORAL at 20:05

## 2023-05-15 RX ADMIN — LIDOCAINE 1 PATCH: 4 CREAM TOPICAL at 05:14

## 2023-05-15 RX ADMIN — Medication 1 DROP(S): at 20:06

## 2023-05-15 RX ADMIN — Medication 100 MILLIGRAM(S): at 18:36

## 2023-05-15 RX ADMIN — LEVETIRACETAM 1000 MILLIGRAM(S): 250 TABLET, FILM COATED ORAL at 18:36

## 2023-05-15 RX ADMIN — Medication 9 MILLIGRAM(S): at 20:06

## 2023-05-15 RX ADMIN — POLYETHYLENE GLYCOL 3350 17 GRAM(S): 17 POWDER, FOR SOLUTION ORAL at 12:07

## 2023-05-15 RX ADMIN — Medication 1 APPLICATION(S): at 18:37

## 2023-05-15 RX ADMIN — LEVETIRACETAM 1000 MILLIGRAM(S): 250 TABLET, FILM COATED ORAL at 05:04

## 2023-05-15 RX ADMIN — SODIUM CHLORIDE 1 GRAM(S): 9 INJECTION INTRAMUSCULAR; INTRAVENOUS; SUBCUTANEOUS at 15:08

## 2023-05-15 RX ADMIN — CARBIDOPA AND LEVODOPA 1 TABLET(S): 25; 100 TABLET ORAL at 08:17

## 2023-05-15 RX ADMIN — ENTACAPONE 200 MILLIGRAM(S): 200 TABLET, FILM COATED ORAL at 12:06

## 2023-05-15 RX ADMIN — SODIUM CHLORIDE 1 GRAM(S): 9 INJECTION INTRAMUSCULAR; INTRAVENOUS; SUBCUTANEOUS at 20:05

## 2023-05-15 RX ADMIN — Medication 1 DROP(S): at 18:36

## 2023-05-15 RX ADMIN — Medication 1 APPLICATION(S): at 05:05

## 2023-05-15 RX ADMIN — SODIUM CHLORIDE 1 GRAM(S): 9 INJECTION INTRAMUSCULAR; INTRAVENOUS; SUBCUTANEOUS at 05:11

## 2023-05-15 NOTE — PROGRESS NOTE ADULT - ASSESSMENT
MAHDI KELY is a 68 year old male with PMH of seizure, HTN, Parkinson's disease (Dx in 2012, wheelchair bound since 2020) and frequent falls; presented to [facility] on 4/17 for elective L craniotomy for meningioma resection with Dr. D'Amico. His procedure was tolerated well and was discharged to [place] [facility] on 4/21. During transit to , he complained of incisional headache with episodes of nausea and vomiting requiring Zofran and Oxycodone, but his symptoms persisted. Upon arrival to , an RRT was called for worsening mental status and a CTH revealed an acute large left frontal hemorrhage with IVH, cerebral edema with midline shift and some effacement of L frontal horn. He was transferred back to [facility].  He underwent EVD placement on 4/22. He was extubated on 4/23, and placed on HFNC with eventual wean to NC.  His hospital course was complicated by RLL consolidation (s/p Zosyn), HTN, bradycardia (self-resolved), hyponatremia, NINFA, and L cheek herpetic lesion (requiring 7 day course of Valtrex). S/p EVD removal on 5/1. Patient now admitted for a multidisciplinary rehab program. 05-03-23    * Tolerating increased dose amantadine * Continue to monitor rehab progress * Plan for DC home 5/25 *     Rehab Management/MEDICAL MANAGEMENT   #Functional deficits s/p non-traumatic ICH  - Gait Instability, ADL impairments and Functional impairments: start Comprehensive Rehab Program of PT/OT/SLP  - 3 hours a day, 5 days a week  - P&O as needed   - CTH revealed L frontal lobe hemorrhage with intraventricular hemorrhage and increased size of R lateral ventricle  - S/p EVD placement on 4/22, removed on 5/1   - S/p Decadron taper x1 week, completed  - Serial CTH 5/4, for interval AMS showed reduction of left frontal hemorrhage  - Continue Amantadine  - Sodium Chloride tabs to q8h, Na >135, taper    * Fall episode in his room, no injury 5/10-- CTH reduced size of intracranial hemorrhage  * Impaired safety awareness ,but no agitation, probably due to disinhibition due to effects of brain injury  --Move to room near nursing station when possible    #HSV-1 Infection, Left cheek s/p valtrex completed 5/5  - F/u awaiting result of HIV testing, done during acute care    #PNA  - S/p Zosyn (4/22-4/27)   - Albuterol/Ipratropium Nebulizer prn  - Incentive Spirometer    #Parkinson's Disease  - Continue Sinemet 4x per day, Entacapone 200mg q8h  - Melatonin  - bowel regimen  -- Neurology review for Parkinson's disease, progressive hand tremors 5/11  ---Amantadine 100mg BID - tolerating well. Consider starting SSRI (mirtazepine or lexapro) for anxiety,  --- Or consider trial of benzodiazepine (clonazepam) and rivastigmine will be considered afterwards, while considering risk ot reducing seizure threshold    #Seizures  - Keppra BID  -seizure precautions     #HTN  - Amlodipine  - Goal SBP: 100-160    #Skin  - Skin: scalp incision healing well, well approximated  - Pustular rash b/l armpits-ID eval requested  - Pressure injury/Skin: OOB to Chair, PT/OT     #Pain Mgmt   - Tylenol PRN  - Lidocaine patch  - Home: Gabapentin-on hold per NSx    #GI/Bowel Mgmt   - Continent c/w Senna, Miralax     #/Bladder Mgmt --Voiding     #FEN   - Diet - Soft & Bite Sized   - Dysphagia  SLP - evaluation and treatment    #Precautions / PROPHYLAXIS:   - Falls  - ortho: Weight bearing status: WBAT   - Lungs: Aspiration, Incentive Spirometer   - DVT: Lovenox  -------------------------------------------------------------  Dr. Cassidy's Liason with family/providers:     Liaison with family 5/9--i called family--spoke with patient's daughter (with ph number in EMR for spouse), she reports that patient's on, will be visiting patient tomorrow am and will discuss update with me  There was no response to my call to patient's son on ph     5/10--I called on ph and d/w Mr Hearn, son, as noted above  -------------------------------------------------------------  IDT 5/9  Lives with family--wife, daughter and son , ambulatory at baseline  SLP--Soft bite sized thin diet  Severe cognitive deficits, attention deficits, safety, poor carry over  OT--Elisabet assist for self care, Mod assistance for transfers, Noted trying to lean out of bed  PT--Max assistance for bed transfer, ambulating 40ft with RW  follow through  Barriers--hand tremors Cognitive deficit, occasionally trying to climb out of bed occasionally, bed alarm to be active at all times, nursing to consider t/f to room near nursing station when available   Goals--Min assist to CGA for ADLs,   Est dc 5/23 to home  (will be revised at subsequent IDT)   ------------------------------------------------------  FOLLOW UP/OUTPATIENT:    D'Amico, Randy  [phone]    Dianelys Nguyen  [address] [place] [address], [place] [address]  Phone: [phone]  Fax: (   )    -  Follow Up Time:    D'Amico, Randy  [facility]  NEUROSURG [facility]  Scheduled Appointment: 05/10/2023  -------------------------------------------------------------     MAHDI KELY is a 68 year old male with PMH of seizure, HTN, Parkinson's disease (Dx in 2012, wheelchair bound since 2020) and frequent falls; presented to [facility] on 4/17 for elective L craniotomy for meningioma resection with Dr. D'Amico. His procedure was tolerated well and was discharged to [place] [facility] on 4/21. During transit to , he complained of incisional headache with episodes of nausea and vomiting requiring Zofran and Oxycodone, but his symptoms persisted. Upon arrival to , an RRT was called for worsening mental status and a CTH revealed an acute large left frontal hemorrhage with IVH, cerebral edema with midline shift and some effacement of L frontal horn. He was transferred back to [facility].  He underwent EVD placement on 4/22. He was extubated on 4/23, and placed on HFNC with eventual wean to NC.  His hospital course was complicated by RLL consolidation (s/p Zosyn), HTN, bradycardia (self-resolved), hyponatremia, NINFA, and L cheek herpetic lesion (requiring 7 day course of Valtrex). S/p EVD removal on 5/1. Patient now admitted for a multidisciplinary rehab program. 05-03-23    * Tolerating increased dose amantadine   * Speech is getting clearer   * Continue to monitor rehab progress * Plan for DC home 5/25      Rehab Management/MEDICAL MANAGEMENT   #Functional deficits s/p non-traumatic ICH  - Gait Instability, ADL impairments and Functional impairments: start Comprehensive Rehab Program of PT/OT/SLP  - 3 hours a day, 5 days a week  - P&O as needed   - CTH revealed L frontal lobe hemorrhage with intraventricular hemorrhage and increased size of R lateral ventricle  - S/p EVD placement on 4/22, removed on 5/1   - S/p Decadron taper x1 week, completed  - Serial CTH 5/4, for interval AMS showed reduction of left frontal hemorrhage  - Continue Amantadine  - Sodium Chloride tabs to q8h, Na >135, taper    * Fall episode in his room, no injury 5/10-- CTH reduced size of intracranial hemorrhage  * Impaired safety awareness ,but no agitation, probably due to disinhibition due to effects of brain injury  --Move to room near nursing station when possible    #HSV-1 Infection, Left cheek s/p valtrex completed 5/5  - F/u awaiting result of HIV testing, done during acute care    #PNA  - S/p Zosyn (4/22-4/27)   - Albuterol/Ipratropium Nebulizer prn  - Incentive Spirometer    #Parkinson's Disease  - Continue Sinemet 4x per day, Entacapone 200mg q8h  - Melatonin  - bowel regimen  -- Neurology review for Parkinson's disease, progressive hand tremors 5/11  ---Amantadine 100mg BID - tolerating well. Consider starting SSRI (mirtazepine or lexapro) for anxiety,  --- Or consider trial of benzodiazepine (clonazepam) and rivastigmine will be considered afterwards, while considering risk ot reducing seizure threshold    #Seizures  - Keppra BID  -seizure precautions     #HTN  - Amlodipine  - Goal SBP: 100-160    #Skin  - Skin: scalp incision healing well, well approximated  - Pustular rash b/l armpits-ID eval requested  - Pressure injury/Skin: OOB to Chair, PT/OT     #Pain Mgmt   - Tylenol PRN  - Lidocaine patch  - Home: Gabapentin-on hold per NSx    #GI/Bowel Mgmt   - Continent c/w Senna, Miralax     #/Bladder Mgmt --Voiding     #FEN   - Diet - Soft & Bite Sized   - Dysphagia  SLP - evaluation and treatment    #Precautions / PROPHYLAXIS:   - Falls  - ortho: Weight bearing status: WBAT   - Lungs: Aspiration, Incentive Spirometer   - DVT: Lovenox  -------------------------------------------------------------  Dr. Cassidy's Liason with family/providers:     Liaison with family 5/9--i called family--spoke with patient's daughter (with ph number in EMR for spouse), she reports that patient's on, will be visiting patient tomorrow am and will discuss update with me  There was no response to my call to patient's son on ph     5/10--I called on ph and d/w Mr Hearn, son, as noted above  -------------------------------------------------------------  IDT 5/9  Lives with family--wife, daughter and son ,wc ambulatory at baseline  SLP--Soft bite sized thin diet  Severe cognitive deficits, attention deficits, safety, poor carry over  OT--Elisabet assist for self care, Mod assistance for transfers, Noted trying to lean out of bed  PT--Max assistance for bed transfer, ambulating 40ft with RW WC follow through  Barriers--hand tremors Cognitive deficit, occasionally trying to climb out of bed occasionally, bed alarm to be active at all times, nursing to consider t/f to room near nursing station when available   Goals--Min assist to CGA for ADLs,   Est dc 5/23 to home  (will be revised at subsequent IDT)   ------------------------------------------------------  FOLLOW UP/OUTPATIENT:    D'Amico, Randy  [phone]    Dianelys Nguyen  [address] [place] [address], [place] [address]  Phone: [phone]  Fax: (   )    -  Follow Up Time:    D'Amico, Randy  [facility]  NEUROSURG [address]  Scheduled Appointment: 05/10/2023  -------------------------------------------------------------

## 2023-05-15 NOTE — PROGRESS NOTE ADULT - SUBJECTIVE AND OBJECTIVE BOX
Subjective  Patient seen and examined at bedside this AM.  Admits to sleeping well and having a good weekend.  Last BM on 5/15, per staff.  Dysarthria, hypophonia, tremors of both hands continue to improve.  Discussed importance of seatbelt and safety when in WC.  No other complaints at this time.    Denies headache, dizziness, visual changes, chest pain, SOB/NEVILLE, abdominal pain, nausea, vomiting, diarrhea, dysuria, numbness or tingling.     Therapy:  Observed ambulating with walker, with mod assistance, limited distance    Vital Signs Last 24 Hrs  T(C): 36.7 (14 May 2023 20:15), Max: 36.7 (14 May 2023 20:15)  T(F): 98.1 (14 May 2023 20:15), Max: 98.1 (14 May 2023 20:15)  HR: 68 (14 May 2023 20:15) (64 - 68)  BP: 131/74 (14 May 2023 20:15) (123/70 - 131/74)  BP(mean): --  RR: 16 (14 May 2023 20:15) (16 - 16)  SpO2: 98% (14 May 2023 20:15) (98% - 98%)      EXAM  General: Comfortable in bed   HENT: PERRL, EOM grossly in tact.,   Cardiac: Regular rate.  Pulm:  clear  Abd: Soft, non-tender, rounded. +BS  Ext--no ulcers    Neuro:   Alert and awake, Dysarthria, hypophonia, Stuttering speech,   RUE 5/5, LUE 4+/5. Resting tremor, both hands L>R  Sensation grossly intact to light touch  MMT--UE 4/5  Lower extremities 3/5  Extremities: Skin is warm, perfused.     LABS:                        12.9   2.71  )-----------( 195      ( 15 May 2023 05:50 )             39.6     05-15    145  |  109<H>  |  14  ----------------------------<  103<H>  4.2   |  29  |  1.26    Ca    9.4      15 May 2023 05:50    TPro  6.8  /  Alb  3.2<L>  /  TBili  0.4  /  DBili  x   /  AST  12  /  ALT  20  /  AlkPhos  55  05-15          MEDICATIONS  (STANDING):  amantadine Syrup 100 milliGRAM(s) Oral two times a day  amLODIPine   Tablet 10 milliGRAM(s) Oral daily  AQUAPHOR (petrolatum Ointment) 1 Application(s) Topical two times a day  artificial  tears Solution 1 Drop(s) Both EYES every 4 hours  carbidopa/levodopa  25/250 1 Tablet(s) Oral <User Schedule>  enoxaparin Injectable 40 milliGRAM(s) SubCutaneous every 24 hours  entacapone 200 milliGRAM(s) Oral <User Schedule>  levETIRAcetam 1000 milliGRAM(s) Oral two times a day  lidocaine   4% Patch 1 Patch Transdermal every 24 hours  melatonin 9 milliGRAM(s) Oral at bedtime  polyethylene glycol 3350 17 Gram(s) Oral daily  senna 2 Tablet(s) Oral at bedtime  sodium chloride 1 Gram(s) Oral every 8 hours  tiotropium 2.5 MICROgram(s) Inhaler 2 Puff(s) Inhalation daily    MEDICATIONS  (PRN):  acetaminophen   Oral Liquid .. 650 milliGRAM(s) Oral every 6 hours PRN Mild Pain (1 - 3)  acetylcysteine 20%  Inhalation 4 milliLiter(s) Inhalation every 6 hours PRN congestion  albuterol    90 MICROgram(s) HFA Inhaler 2 Puff(s) Inhalation every 6 hours PRN Shortness of Breath and/or Wheezing       Subjective  Patient seen and examined at bedside this AM.  Admits to sleeping well and having a good weekend.  Last BM on 5/15, per staff.  Dysarthria, hypophonia, tremors of both hands continue to improve.  Discussed importance of seatbelt and safety when in WC.  No other complaints at this time.    Denies headache, dizziness, visual changes, chest pain, SOB/NEVILLE, abdominal pain, nausea, vomiting, diarrhea, dysuria, numbness or tingling.     Therapy:  Speech is getting clearer, tremors as less prominent at rest  ambulating with walker, with mod assistance, limited distance    Vital Signs Last 24 Hrs  T(C): 36.7 (14 May 2023 20:15), Max: 36.7 (14 May 2023 20:15)  T(F): 98.1 (14 May 2023 20:15), Max: 98.1 (14 May 2023 20:15)  HR: 68 (14 May 2023 20:15) (64 - 68)  BP: 131/74 (14 May 2023 20:15) (123/70 - 131/74)  RR: 16 (14 May 2023 20:15) (16 - 16)  SpO2: 98% (14 May 2023 20:15) (98% - 98%)      EXAM  General: Comfortable in bed   HENT: PERRL, EOM grossly in tact.,   Cardiac: Regular rate.  Pulm:  clear  Abd: Soft, non-tender, rounded. +BS  Ext--no ulcers    Neuro:   Alert and awake, Dysarthria, hypophonia, Stuttering speech, tremors less prominent, speech getting clearer  RUE 5/5, LUE 4+/5. Resting tremor, both hands L>R  Sensation grossly intact to light touch  MMT--UE 4/5  Lower extremities 3/5  Extremities: Skin is warm, perfused.     LABS:                        12.9   2.71  )-----------( 195      ( 15 May 2023 05:50 )             39.6     05-15    145  |  109<H>  |  14  ----------------------------<  103<H>  4.2   |  29  |  1.26    Ca    9.4      15 May 2023 05:50    TPro  6.8  /  Alb  3.2<L>  /  TBili  0.4  /  DBili  x   /  AST  12  /  ALT  20  /  AlkPhos  55  05-15          MEDICATIONS  (STANDING):  amantadine Syrup 100 milliGRAM(s) Oral two times a day  amLODIPine   Tablet 10 milliGRAM(s) Oral daily  AQUAPHOR (petrolatum Ointment) 1 Application(s) Topical two times a day  artificial  tears Solution 1 Drop(s) Both EYES every 4 hours  carbidopa/levodopa  25/250 1 Tablet(s) Oral <User Schedule>  enoxaparin Injectable 40 milliGRAM(s) SubCutaneous every 24 hours  entacapone 200 milliGRAM(s) Oral <User Schedule>  levETIRAcetam 1000 milliGRAM(s) Oral two times a day  lidocaine   4% Patch 1 Patch Transdermal every 24 hours  melatonin 9 milliGRAM(s) Oral at bedtime  polyethylene glycol 3350 17 Gram(s) Oral daily  senna 2 Tablet(s) Oral at bedtime  sodium chloride 1 Gram(s) Oral every 8 hours  tiotropium 2.5 MICROgram(s) Inhaler 2 Puff(s) Inhalation daily    MEDICATIONS  (PRN):  acetaminophen   Oral Liquid .. 650 milliGRAM(s) Oral every 6 hours PRN Mild Pain (1 - 3)  acetylcysteine 20%  Inhalation 4 milliLiter(s) Inhalation every 6 hours PRN congestion  albuterol    90 MICROgram(s) HFA Inhaler 2 Puff(s) Inhalation every 6 hours PRN Shortness of Breath and/or Wheezing

## 2023-05-16 PROCEDURE — 99232 SBSQ HOSP IP/OBS MODERATE 35: CPT

## 2023-05-16 RX ORDER — POLYETHYLENE GLYCOL 3350 17 G/17G
17 POWDER, FOR SOLUTION ORAL
Refills: 0 | Status: DISCONTINUED | OUTPATIENT
Start: 2023-05-16 | End: 2023-05-25

## 2023-05-16 RX ADMIN — Medication 1 APPLICATION(S): at 17:33

## 2023-05-16 RX ADMIN — Medication 1 DROP(S): at 17:33

## 2023-05-16 RX ADMIN — POLYETHYLENE GLYCOL 3350 17 GRAM(S): 17 POWDER, FOR SOLUTION ORAL at 17:33

## 2023-05-16 RX ADMIN — CARBIDOPA AND LEVODOPA 1 TABLET(S): 25; 100 TABLET ORAL at 19:51

## 2023-05-16 RX ADMIN — CARBIDOPA AND LEVODOPA 1 TABLET(S): 25; 100 TABLET ORAL at 12:09

## 2023-05-16 RX ADMIN — ENOXAPARIN SODIUM 40 MILLIGRAM(S): 100 INJECTION SUBCUTANEOUS at 19:51

## 2023-05-16 RX ADMIN — SODIUM CHLORIDE 1 GRAM(S): 9 INJECTION INTRAMUSCULAR; INTRAVENOUS; SUBCUTANEOUS at 05:14

## 2023-05-16 RX ADMIN — Medication 100 MILLIGRAM(S): at 05:15

## 2023-05-16 RX ADMIN — CARBIDOPA AND LEVODOPA 1 TABLET(S): 25; 100 TABLET ORAL at 15:58

## 2023-05-16 RX ADMIN — SENNA PLUS 2 TABLET(S): 8.6 TABLET ORAL at 19:51

## 2023-05-16 RX ADMIN — LEVETIRACETAM 1000 MILLIGRAM(S): 250 TABLET, FILM COATED ORAL at 17:33

## 2023-05-16 RX ADMIN — Medication 100 MILLIGRAM(S): at 17:32

## 2023-05-16 RX ADMIN — Medication 9 MILLIGRAM(S): at 19:51

## 2023-05-16 RX ADMIN — ENTACAPONE 200 MILLIGRAM(S): 200 TABLET, FILM COATED ORAL at 15:58

## 2023-05-16 RX ADMIN — Medication 1 DROP(S): at 12:19

## 2023-05-16 RX ADMIN — ENTACAPONE 200 MILLIGRAM(S): 200 TABLET, FILM COATED ORAL at 12:20

## 2023-05-16 RX ADMIN — ENTACAPONE 200 MILLIGRAM(S): 200 TABLET, FILM COATED ORAL at 08:01

## 2023-05-16 RX ADMIN — AMLODIPINE BESYLATE 10 MILLIGRAM(S): 2.5 TABLET ORAL at 05:15

## 2023-05-16 RX ADMIN — SODIUM CHLORIDE 1 GRAM(S): 9 INJECTION INTRAMUSCULAR; INTRAVENOUS; SUBCUTANEOUS at 12:20

## 2023-05-16 RX ADMIN — LEVETIRACETAM 1000 MILLIGRAM(S): 250 TABLET, FILM COATED ORAL at 05:15

## 2023-05-16 RX ADMIN — CARBIDOPA AND LEVODOPA 1 TABLET(S): 25; 100 TABLET ORAL at 08:02

## 2023-05-16 RX ADMIN — Medication 1 DROP(S): at 19:50

## 2023-05-16 RX ADMIN — Medication 1 APPLICATION(S): at 05:14

## 2023-05-16 RX ADMIN — Medication 1 DROP(S): at 05:14

## 2023-05-16 RX ADMIN — Medication 1 DROP(S): at 08:01

## 2023-05-16 NOTE — CHART NOTE - NSCHARTNOTEFT_GEN_A_CORE
Nutrition Follow Up Note  Source: Medical Record [X] Patient [X] Family [ ]         Diet: Soft and bite-sized, Ensure Plus High Protein BID (provides 700 kcal, 40 g protein if 100% consumed)  Pt tolerating diet with good PO intake per nursing flow sheets, eating >75% of meals. Pt reports fair appetite. Reports good acceptance of Ensure Plus High Protein (provides 350 kcal, 20 g protein/serving). Encouraged PO intake to assist in goals of rehab/therapies. Denies nausea, vomiting, diarrhea, constipation.     Enteral/Parenteral Nutrition: N/A    Current Weight: 173.9 lbs (5/13)    Pertinent Medications: MEDICATIONS  (STANDING):  amantadine Syrup 100 milliGRAM(s) Oral two times a day  amLODIPine   Tablet 10 milliGRAM(s) Oral daily  AQUAPHOR (petrolatum Ointment) 1 Application(s) Topical two times a day  artificial  tears Solution 1 Drop(s) Both EYES every 4 hours  carbidopa/levodopa  25/250 1 Tablet(s) Oral <User Schedule>  enoxaparin Injectable 40 milliGRAM(s) SubCutaneous every 24 hours  entacapone 200 milliGRAM(s) Oral <User Schedule>  levETIRAcetam 1000 milliGRAM(s) Oral two times a day  lidocaine   4% Patch 1 Patch Transdermal every 24 hours  melatonin 9 milliGRAM(s) Oral at bedtime  polyethylene glycol 3350 17 Gram(s) Oral two times a day  senna 2 Tablet(s) Oral at bedtime  sodium chloride 1 Gram(s) Oral every 8 hours  tiotropium 2.5 MICROgram(s) Inhaler 2 Puff(s) Inhalation daily    MEDICATIONS  (PRN):  acetaminophen   Oral Liquid .. 650 milliGRAM(s) Oral every 6 hours PRN Mild Pain (1 - 3)  acetylcysteine 20%  Inhalation 4 milliLiter(s) Inhalation every 6 hours PRN congestion  albuterol    90 MICROgram(s) HFA Inhaler 2 Puff(s) Inhalation every 6 hours PRN Shortness of Breath and/or Wheezing      Pertinent Labs:  05-15 Na145 mmol/L Glu 103 mg/dL<H> K+ 4.2 mmol/L Cr  1.26 mg/dL BUN 14 mg/dL 05-15 Alb 3.2 g/dL<L> 04-23 Chol 151 mg/dL LDL --    HDL 55 mg/dL Trig 115 mg/dL        Skin: incontinence associated dermatitis per nursing flow sheets.     Edema: No edema per nursing flow sheets     Last BM: on 5/15 Per nursing flowsheets     Estimated Needs:   [X] No Change since Previous Assessment  [ ] Recalculated:     Previous Nutrition Diagnosis:   No active nutrition diagnosis at this time    Nutrition Diagnosis is  [X] Not Applicable      New Nutrition Diagnosis: [X] Not Applicable  [ ] Inadequate Protein Energy Intake   [ ] Inadequate Oral Intake   [ ] Excessive Energy Intake   [ ] Increased Nutrient Needs   [ ] Obesity   [ ] Altered GI Function   [ ] Unintended Weight Loss   [ ] Food & Nutrition Related Knowledge Deficit  [ ] Limited Adherence to nutrition related recommendations   [ ] Malnutrition      Interventions:   1. Recommend continuing with current plan of care  2. encourage PO intake  3. Follow SLP recommendations    Monitoring & Evaluation:   [X] Weights   [X] PO Intake   [X] Follow Up (Per Protocol)  [X] Tolerance to Diet Prescription   [X] Other: Labs    RD Remains Available.  Jovana Ng RD

## 2023-05-16 NOTE — PROGRESS NOTE ADULT - SUBJECTIVE AND OBJECTIVE BOX
Subjective ******************  Patient seen and examined at bedside this AM.  Admits to sleeping well and having a good weekend.  Last BM on 5/15, per staff.  Dysarthria, hypophonia, tremors of both hands continue to improve.  Discussed importance of seatbelt and safety when in WC.  No other complaints at this time.    Denies headache, dizziness, visual changes, chest pain, SOB/NEVILLE, abdominal pain, nausea, vomiting, diarrhea, dysuria, numbness or tingling.     Therapy:  Speech is getting clearer, tremors as less prominent at rest  ambulating with walker, with mod assistance, limited distance    Vital Signs Last 24 Hrs  T(C): 36.7 (14 May 2023 20:15), Max: 36.7 (14 May 2023 20:15)  T(F): 98.1 (14 May 2023 20:15), Max: 98.1 (14 May 2023 20:15)  HR: 68 (14 May 2023 20:15) (64 - 68)  BP: 131/74 (14 May 2023 20:15) (123/70 - 131/74)  RR: 16 (14 May 2023 20:15) (16 - 16)  SpO2: 98% (14 May 2023 20:15) (98% - 98%)      EXAM  General: Comfortable in bed   HENT: PERRL, EOM grossly in tact.,   Cardiac: Regular rate.  Pulm:  clear  Abd: Soft, non-tender, rounded. +BS  Ext--no ulcers    Neuro:   Alert and awake, Dysarthria, hypophonia, Stuttering speech, tremors less prominent, speech getting clearer  RUE 5/5, LUE 4+/5. Resting tremor, both hands L>R  Sensation grossly intact to light touch  MMT--UE 4/5  Lower extremities 3/5  Extremities: Skin is warm, perfused.     LABS:                        12.9   2.71  )-----------( 195      ( 15 May 2023 05:50 )             39.6     05-15    145  |  109<H>  |  14  ----------------------------<  103<H>  4.2   |  29  |  1.26    Ca    9.4      15 May 2023 05:50    TPro  6.8  /  Alb  3.2<L>  /  TBili  0.4  /  DBili  x   /  AST  12  /  ALT  20  /  AlkPhos  55  05-15          MEDICATIONS  (STANDING):  amantadine Syrup 100 milliGRAM(s) Oral two times a day  amLODIPine   Tablet 10 milliGRAM(s) Oral daily  AQUAPHOR (petrolatum Ointment) 1 Application(s) Topical two times a day  artificial  tears Solution 1 Drop(s) Both EYES every 4 hours  carbidopa/levodopa  25/250 1 Tablet(s) Oral <User Schedule>  enoxaparin Injectable 40 milliGRAM(s) SubCutaneous every 24 hours  entacapone 200 milliGRAM(s) Oral <User Schedule>  levETIRAcetam 1000 milliGRAM(s) Oral two times a day  lidocaine   4% Patch 1 Patch Transdermal every 24 hours  melatonin 9 milliGRAM(s) Oral at bedtime  polyethylene glycol 3350 17 Gram(s) Oral daily  senna 2 Tablet(s) Oral at bedtime  sodium chloride 1 Gram(s) Oral every 8 hours  tiotropium 2.5 MICROgram(s) Inhaler 2 Puff(s) Inhalation daily    MEDICATIONS  (PRN):  acetaminophen   Oral Liquid .. 650 milliGRAM(s) Oral every 6 hours PRN Mild Pain (1 - 3)  acetylcysteine 20%  Inhalation 4 milliLiter(s) Inhalation every 6 hours PRN congestion  albuterol    90 MICROgram(s) HFA Inhaler 2 Puff(s) Inhalation every 6 hours PRN Shortness of Breath and/or Wheezing       Subjective   Patient seen and examined at bedside this AM.  Admits to sleeping well.  Last BM on 5/15, per staff.  Dysarthria, hypophonia, tremors of both hands continue to improve.  No other complaints at this time.    Denies headache, dizziness, visual changes, chest pain, SOB/NEVILLE, abdominal pain, nausea, vomiting, diarrhea, dysuria, numbness or tingling.     Therapy:  Speech is getting clearer, tremors as less prominent at rest  ambulating with walker, with mod assistance, limited distance    Vital Signs Last 24 Hrs  T(C): 36.7 (15 May 2023 20:43), Max: 36.8 (15 May 2023 10:23)  T(F): 98 (15 May 2023 20:43), Max: 98.2 (15 May 2023 10:23)  HR: 69 (15 May 2023 20:43) (69 - 70)  BP: 124/72 (15 May 2023 20:43) (113/69 - 124/72)  BP(mean): --  RR: 16 (15 May 2023 20:43) (16 - 16)  SpO2: 98% (15 May 2023 20:43) (97% - 98%)      EXAM  General: Comfortable in bed   HENT: PERRL, EOM grossly in tact.,   Cardiac: Regular rate.  Pulm:  clear  Abd: Soft, non-tender, rounded. +BS  Ext--no ulcers    Neuro:   Alert and awake, Dysarthria, hypophonia, Stuttering speech, tremors less prominent, speech getting clearer  RUE 5/5, LUE 4+/5. Resting tremor, both hands L>R  Sensation grossly intact to light touch  MMT--UE 4/5  Lower extremities 3/5  Extremities: Skin is warm, perfused.     LABS:                        12.9   2.71  )-----------( 195      ( 15 May 2023 05:50 )             39.6     05-15    145  |  109<H>  |  14  ----------------------------<  103<H>  4.2   |  29  |  1.26    Ca    9.4      15 May 2023 05:50    TPro  6.8  /  Alb  3.2<L>  /  TBili  0.4  /  DBili  x   /  AST  12  /  ALT  20  /  AlkPhos  55  05-15          MEDICATIONS  (STANDING):  amantadine Syrup 100 milliGRAM(s) Oral two times a day  amLODIPine   Tablet 10 milliGRAM(s) Oral daily  AQUAPHOR (petrolatum Ointment) 1 Application(s) Topical two times a day  artificial  tears Solution 1 Drop(s) Both EYES every 4 hours  carbidopa/levodopa  25/250 1 Tablet(s) Oral <User Schedule>  enoxaparin Injectable 40 milliGRAM(s) SubCutaneous every 24 hours  entacapone 200 milliGRAM(s) Oral <User Schedule>  levETIRAcetam 1000 milliGRAM(s) Oral two times a day  lidocaine   4% Patch 1 Patch Transdermal every 24 hours  melatonin 9 milliGRAM(s) Oral at bedtime  polyethylene glycol 3350 17 Gram(s) Oral two times a day   senna 2 Tablet(s) Oral at bedtime  sodium chloride 1 Gram(s) Oral every 8 hours  tiotropium 2.5 MICROgram(s) Inhaler 2 Puff(s) Inhalation daily    MEDICATIONS  (PRN):  acetaminophen   Oral Liquid .. 650 milliGRAM(s) Oral every 6 hours PRN Mild Pain (1 - 3)  acetylcysteine 20%  Inhalation 4 milliLiter(s) Inhalation every 6 hours PRN congestion  albuterol    90 MICROgram(s) HFA Inhaler 2 Puff(s) Inhalation every 6 hours PRN Shortness of Breath and/or Wheezing       Subjective   Patient seen and examined at bedside this AM.  Admits to sleeping well.  Last BM on 5/15, per staff.  Dysarthria, hypophonia, tremors of both hands continue to improve.  No other complaints at this time.    Denies headache, dizziness, visual changes, chest pain, SOB/NEVILLE, abdominal pain, nausea, vomiting, diarrhea, dysuria, numbness or tingling.     Therapy:  Speech is getting clearer, tremors as less prominent at rest  ambulating with walker, with mod assistance, limited distance  Patient happy with his progress, less impulsive    Discussed update with patient's family     Vital Signs Last 24 Hrs  T(C): 36.7 (15 May 2023 20:43), Max: 36.8 (15 May 2023 10:23)  T(F): 98 (15 May 2023 20:43), Max: 98.2 (15 May 2023 10:23)  HR: 69 (15 May 2023 20:43) (69 - 70)  BP: 124/72 (15 May 2023 20:43) (113/69 - 124/72)  BP(mean): --  RR: 16 (15 May 2023 20:43) (16 - 16)  SpO2: 98% (15 May 2023 20:43) (97% - 98%)      EXAM  General: Comfortable in bed   HENT: PERRL, EOM grossly in tact.,   Cardiac: Regular rate.  Pulm:  clear  Abd: Soft, non-tender, rounded. +BS  Ext--no ulcers    Neuro:   Alert and awake, Dysarthria, hypophonia, Stuttering speech, tremors less prominent, speech getting clearer  RUE 5/5, LUE 4+/5. Resting tremor, both hands L>R  Sensation grossly intact to light touch  MMT--UE 4/5  Lower extremities 3/5  Extremities: Skin is warm, perfused.     LABS:                        12.9   2.71  )-----------( 195      ( 15 May 2023 05:50 )             39.6     05-15    145  |  109<H>  |  14  ----------------------------<  103<H>  4.2   |  29  |  1.26    Ca    9.4      15 May 2023 05:50    TPro  6.8  /  Alb  3.2<L>  /  TBili  0.4  /  DBili  x   /  AST  12  /  ALT  20  /  AlkPhos  55  05-15          MEDICATIONS  (STANDING):  amantadine Syrup 100 milliGRAM(s) Oral two times a day  amLODIPine   Tablet 10 milliGRAM(s) Oral daily  AQUAPHOR (petrolatum Ointment) 1 Application(s) Topical two times a day  artificial  tears Solution 1 Drop(s) Both EYES every 4 hours  carbidopa/levodopa  25/250 1 Tablet(s) Oral <User Schedule>  enoxaparin Injectable 40 milliGRAM(s) SubCutaneous every 24 hours  entacapone 200 milliGRAM(s) Oral <User Schedule>  levETIRAcetam 1000 milliGRAM(s) Oral two times a day  lidocaine   4% Patch 1 Patch Transdermal every 24 hours  melatonin 9 milliGRAM(s) Oral at bedtime  polyethylene glycol 3350 17 Gram(s) Oral two times a day   senna 2 Tablet(s) Oral at bedtime  sodium chloride 1 Gram(s) Oral every 8 hours  tiotropium 2.5 MICROgram(s) Inhaler 2 Puff(s) Inhalation daily    MEDICATIONS  (PRN):  acetaminophen   Oral Liquid .. 650 milliGRAM(s) Oral every 6 hours PRN Mild Pain (1 - 3)  acetylcysteine 20%  Inhalation 4 milliLiter(s) Inhalation every 6 hours PRN congestion  albuterol    90 MICROgram(s) HFA Inhaler 2 Puff(s) Inhalation every 6 hours PRN Shortness of Breath and/or Wheezing

## 2023-05-16 NOTE — PROGRESS NOTE ADULT - ASSESSMENT
68 year old male PMH seizure, HTN, Parkinson's disease (Dx in 2012, wheelchair bound since 2020) and frequent falls; presented to [facility] on 4/17 for elective L craniotomy for meningioma resection with Dr. D'Amico. His procedure was tolerated well and was discharged to [facility] on 4/21. During transit to , he complained of incisional headache with episodes of nausea and vomiting requiring Zofran and Oxycodone, but his symptoms persisted. Upon arrival to , an RRT was called for worsening mental status and a CTH revealed an acute large left frontal hemorrhage with IVH, cerebral edema with midline shift and some effacement of L frontal horn. He was transferred back to [facility].  He underwent EVD placement on 4/22. He was extubated on 4/23, and placed on HFNC with eventual wean to NC.  His hospital course was complicated by RLL consolidation (s/p Zosyn), HTN, bradycardia (self-resolved), hyponatremia, NINFA, and L cheek herpetic lesion (requiring 7 day course of Valtrex). S/p EVD removal on 5/1. Patient now admitted for a multidisciplinary rehab program    #Debility s/p non-traumatic ICH  - CTH revealed Left frontal lobe hemorrhage with intraventricular hemorrhage and increased size of Rt lateral ventricle  - S/p EVD placement on 4/22, removed on 5/1   - S/p Decadron taper  - Continue Amantadine  - continue comprehensive acute rehab program    #hyponatremia  - resolved  - continue Sodium Chloride tabs- consider taper     #HSV-1 Infection  - Left cheek lesion  - completed valtrex course    #PNA  - S/p Zosyn course  - Albuterol/Ipratropium Nebulizer prn    #Parkinson's Disease  - Continue Sinemet, Entacapone  - Melatonin  - movement disorder specialist following    #Seizures  - Keppra BID  - seizure precautions     #HTN  - reviewed flow sheets systolic ranging 120-150s  - continue Amlodipine    #DVT ppx  - Lovenox

## 2023-05-16 NOTE — PROGRESS NOTE ADULT - SUBJECTIVE AND OBJECTIVE BOX
Patient is a 68y old  Male who presents with a chief complaint of Functional deficits s/p non-traumatic ICH (16 May 2023 08:11)      Patient seen and examined at bedside. No events overnight. Patient denies acute complaints at this time, no chest pain, sob, abd pain, headache, changes in vision, nausea or vomiting    ALLERGIES:  No Known Allergies    MEDICATIONS  (STANDING):  amantadine Syrup 100 milliGRAM(s) Oral two times a day  amLODIPine   Tablet 10 milliGRAM(s) Oral daily  AQUAPHOR (petrolatum Ointment) 1 Application(s) Topical two times a day  artificial  tears Solution 1 Drop(s) Both EYES every 4 hours  carbidopa/levodopa  25/250 1 Tablet(s) Oral <User Schedule>  enoxaparin Injectable 40 milliGRAM(s) SubCutaneous every 24 hours  entacapone 200 milliGRAM(s) Oral <User Schedule>  levETIRAcetam 1000 milliGRAM(s) Oral two times a day  lidocaine   4% Patch 1 Patch Transdermal every 24 hours  melatonin 9 milliGRAM(s) Oral at bedtime  polyethylene glycol 3350 17 Gram(s) Oral two times a day  senna 2 Tablet(s) Oral at bedtime  sodium chloride 1 Gram(s) Oral every 8 hours  tiotropium 2.5 MICROgram(s) Inhaler 2 Puff(s) Inhalation daily    MEDICATIONS  (PRN):  acetaminophen   Oral Liquid .. 650 milliGRAM(s) Oral every 6 hours PRN Mild Pain (1 - 3)  acetylcysteine 20%  Inhalation 4 milliLiter(s) Inhalation every 6 hours PRN congestion  albuterol    90 MICROgram(s) HFA Inhaler 2 Puff(s) Inhalation every 6 hours PRN Shortness of Breath and/or Wheezing    Vital Signs Last 24 Hrs  T(F): 98 (15 May 2023 20:43), Max: 98 (15 May 2023 20:43)  HR: 69 (15 May 2023 20:43) (69 - 69)  BP: 124/72 (15 May 2023 20:43) (124/72 - 124/72)  RR: 16 (15 May 2023 20:43) (16 - 16)  SpO2: 98% (15 May 2023 20:43) (98% - 98%)  I&O's Summary      PHYSICAL EXAM:  GENERAL: NAD, laying in bed  ENMT: Moist mucous membranes, no thrush  NECK: Supple, No JVD  CHEST/LUNG: Clear to auscultation bilaterally, good air entry, non-labored breathing  HEART: RRR; S1/S2, No murmur  ABDOMEN: Soft, Nontender, Nondistended; Bowel sounds present  EXTREMITIES: No calf tenderness, No cyanosis, No edema  SKIN: Warm, perfused  PSYCH: Normal mood, Normal affect    LABS:                        12.9   2.71  )-----------( 195      ( 15 May 2023 05:50 )             39.6     05-15    145  |  109  |  14  ----------------------------<  103  4.2   |  29  |  1.26    Ca    9.4      15 May 2023 05:50    TPro  6.8  /  Alb  3.2  /  TBili  0.4  /  DBili  x   /  AST  12  /  ALT  20  /  AlkPhos  55  05-15              04-23 Chol 151 mg/dL LDL -- HDL 55 mg/dL Trig 115 mg/dL                      COVID-19 PCR: NotDetec (04-19-23 @ 23:15)      RADIOLOGY & ADDITIONAL TESTS:    Care Discussed with Consultants/Other Providers:

## 2023-05-16 NOTE — PROGRESS NOTE ADULT - ASSESSMENT
MAHDI KELY is a 68 year old male with PMH of seizure, HTN, Parkinson's disease (Dx in 2012, wheelchair bound since 2020) and frequent falls; presented to [facility] on 4/17 for elective L craniotomy for meningioma resection with Dr. D'Amico. His procedure was tolerated well and was discharged to [facility] on 4/21. During transit to , he complained of incisional headache with episodes of nausea and vomiting requiring Zofran and Oxycodone, but his symptoms persisted. Upon arrival to , an RRT was called for worsening mental status and a CTH revealed an acute large left frontal hemorrhage with IVH, cerebral edema with midline shift and some effacement of L frontal horn. He was transferred back to [facility].  He underwent EVD placement on 4/22. He was extubated on 4/23, and placed on HFNC with eventual wean to NC.  His hospital course was complicated by RLL consolidation (s/p Zosyn), HTN, bradycardia (self-resolved), hyponatremia, NINFA, and L cheek herpetic lesion (requiring 7 day course of Valtrex). S/p EVD removal on 5/1. Patient now admitted for a multidisciplinary rehab program. 05-03-23    * Tolerating increased dose amantadine   * Speech is getting clearer   * Continue to monitor rehab progress * Plan for DC home 5/25      Rehab Management/MEDICAL MANAGEMENT   #Functional deficits s/p non-traumatic ICH  - Gait Instability, ADL impairments and Functional impairments: start Comprehensive Rehab Program of PT/OT/SLP  - 3 hours a day, 5 days a week  - P&O as needed   - CTH revealed L frontal lobe hemorrhage with intraventricular hemorrhage and increased size of R lateral ventricle  - S/p EVD placement on 4/22, removed on 5/1   - S/p Decadron taper x1 week, completed  - Serial CTH 5/4, for interval AMS showed reduction of left frontal hemorrhage  - Continue Amantadine  - Sodium Chloride tabs to q8h, Na >135, taper    * Fall episode in his room, no injury 5/10-- CTH reduced size of intracranial hemorrhage  * Impaired safety awareness ,but no agitation, probably due to disinhibition due to effects of brain injury  --Move to room near nursing station when possible    #HSV-1 Infection, Left cheek s/p valtrex completed 5/5  - F/u awaiting result of HIV testing, done during acute care    #PNA  - S/p Zosyn (4/22-4/27)   - Albuterol/Ipratropium Nebulizer prn  - Incentive Spirometer    #Parkinson's Disease  - Continue Sinemet 4x per day, Entacapone 200mg q8h  - Melatonin  - bowel regimen  -- Neurology review for Parkinson's disease, progressive hand tremors 5/11  ---Amantadine 100mg BID - tolerating well. Consider starting SSRI (mirtazepine or lexapro) for anxiety,  --- Or consider trial of benzodiazepine (clonazepam) and rivastigmine will be considered afterwards, while considering risk ot reducing seizure threshold    #Seizures  - Keppra BID  -seizure precautions     #HTN  - Amlodipine  - Goal SBP: 100-160    #Skin  - Skin: scalp incision healing well, well approximated  - Pustular rash b/l armpits-ID eval requested  - Pressure injury/Skin: OOB to Chair, PT/OT     #Pain Mgmt   - Tylenol PRN  - Lidocaine patch  - Home: Gabapentin-on hold per NSx    #GI/Bowel Mgmt   - Continent c/w Senna, Miralax     #/Bladder Mgmt --Voiding     #FEN   - Diet - Soft & Bite Sized   - Dysphagia  SLP - evaluation and treatment    #Precautions / PROPHYLAXIS:   - Falls  - ortho: Weight bearing status: WBAT   - Lungs: Aspiration, Incentive Spirometer   - DVT: Lovenox  -------------------------------------------------------------  Dr. Cassidy's Liason with family/providers:     Liaison with family 5/9--i called family--spoke with patient's daughter (with ph number in EMR for spouse), she reports that patient's on, will be visiting patient tomorrow am and will discuss update with me  There was no response to my call to patient's son on ph     5/10--I called on ph and d/w Mr Hearn, son, as noted above  -------------------------------------------------------------  IDT 5/9  Lives with family--wife, daughter and son ,wc ambulatory at baseline  SLP--Soft bite sized thin diet  Severe cognitive deficits, attention deficits, safety, poor carry over  OT--Elisabet assist for self care, Mod assistance for transfers, Noted trying to lean out of bed  PT--Max assistance for bed transfer, ambulating 40ft with RW WC follow through  Barriers--hand tremors Cognitive deficit, occasionally trying to climb out of bed occasionally, bed alarm to be active at all times, nursing to consider t/f to room near nursing station when available   Goals--Min assist to CGA for ADLs,   Est dc 5/23 to home  (will be revised at subsequent IDT)   ------------------------------------------------------  FOLLOW UP/OUTPATIENT:    D'Amico, Randy  [phone]    Dianelys Nguyen  [address] [place] [address], [place] [address]  Phone: [phone]  Fax: (   )    -  Follow Up Time:    D'Amico, Randy  [facility] Physician [facility]  NEUROSURG [address]  Scheduled Appointment: 05/10/2023  -------------------------------------------------------------     MAHDI KELY is a 68 year old male with PMH of seizure, HTN, Parkinson's disease (Dx in 2012, wheelchair bound since 2020) and frequent falls; presented to [facility] on 4/17 for elective L craniotomy for meningioma resection with Dr. D'Amico. His procedure was tolerated well and was discharged to [facility] on 4/21. During transit to , he complained of incisional headache with episodes of nausea and vomiting requiring Zofran and Oxycodone, but his symptoms persisted. Upon arrival to , an RRT was called for worsening mental status and a CTH revealed an acute large left frontal hemorrhage with IVH, cerebral edema with midline shift and some effacement of L frontal horn. He was transferred back to [facility].  He underwent EVD placement on 4/22. He was extubated on 4/23, and placed on HFNC with eventual wean to NC.  His hospital course was complicated by RLL consolidation (s/p Zosyn), HTN, bradycardia (self-resolved), hyponatremia, NINFA, and L cheek herpetic lesion (requiring 7 day course of Valtrex). S/p EVD removal on 5/1. Patient now admitted for a multidisciplinary rehab program. 05-03-23    * Tolerating increased dose amantadine   * Speech is getting clearer * Miralax to BID * Continue to monitor rehab progress * Plan for DC home 5/25      Rehab Management/MEDICAL MANAGEMENT   #Functional deficits s/p non-traumatic ICH  - Gait Instability, ADL impairments and Functional impairments: start Comprehensive Rehab Program of PT/OT/SLP  - 3 hours a day, 5 days a week  - P&O as needed   - CTH revealed L frontal lobe hemorrhage with intraventricular hemorrhage and increased size of R lateral ventricle  - S/p EVD placement on 4/22, removed on 5/1   - S/p Decadron taper x1 week, completed  - Serial CTH 5/4, for interval AMS showed reduction of left frontal hemorrhage  - Continue Amantadine  - Sodium Chloride tabs to q8h, Na >135, taper    * Fall episode in his room, no injury 5/10-- CTH reduced size of intracranial hemorrhage  * Impaired safety awareness ,but no agitation, probably due to disinhibition due to effects of brain injury  --Move to room near nursing station when possible    #HSV-1 Infection, Left cheek s/p valtrex completed 5/5  - F/u awaiting result of HIV testing, done during acute care    #PNA  - S/p Zosyn (4/22-4/27)   - Albuterol/Ipratropium Nebulizer prn  - Incentive Spirometer    #Parkinson's Disease  - Continue Sinemet 4x per day, Entacapone 200mg q8h  - Melatonin  - bowel regimen  -- Neurology review for Parkinson's disease, progressive hand tremors 5/11  ---Amantadine 100mg BID - tolerating well. Consider starting SSRI (mirtazepine or lexapro) for anxiety,  --- Or consider trial of benzodiazepine (clonazepam) and rivastigmine will be considered afterwards, while considering risk ot reducing seizure threshold    #Seizures  - Keppra BID  -seizure precautions     #HTN  - Amlodipine  - Goal SBP: 100-160    #Skin  - Skin: scalp incision healing well, well approximated  - Pustular rash b/l armpits-ID eval requested  - Pressure injury/Skin: OOB to Chair, PT/OT     #Pain Mgmt   - Tylenol PRN  - Lidocaine patch  - Home: Gabapentin-on hold per NSx    #GI/Bowel Mgmt   - Continent c/w Senna, Miralax BID    #/Bladder Mgmt --Voiding     #FEN   - Diet - Soft & Bite Sized   - Dysphagia  SLP - evaluation and treatment    #Precautions / PROPHYLAXIS:   - Falls  - ortho: Weight bearing status: WBAT   - Lungs: Aspiration, Incentive Spirometer   - DVT: Lovenox  -------------------------------------------------------------  Dr. Cassidy's Liason with family/providers:     Liaison with family 5/9--i called family--spoke with patient's daughter (with ph number in EMR for spouse), she reports that patient's on, will be visiting patient tomorrow am and will discuss update with me  There was no response to my call to patient's son on ph     5/10--I called on ph and d/w Mr Hearn, son, as noted above  -------------------------------------------------------------  IDT 5/9  Lives with family--wife, daughter and son ,wc ambulatory at baseline  SLP--Soft bite sized thin diet  Severe cognitive deficits, attention deficits, safety, poor carry over  OT--Elisabet assist for self care, Mod assistance for transfers, Noted trying to lean out of bed  PT--Max assistance for bed transfer, ambulating 40ft with RW WC follow through  Barriers--hand tremors Cognitive deficit, occasionally trying to climb out of bed occasionally, bed alarm to be active at all times, nursing to consider t/f to room near nursing station when available   Goals--Min assist to CGA for ADLs,   Est dc 5/23 to home  (will be revised at subsequent IDT)   ------------------------------------------------------  FOLLOW UP/OUTPATIENT:    D'Amico, Randy  [phone]    Dianelys Nguyen  [address] [place] [address]  Phone: [phone]  Fax: (   )    -  Follow Up Time:    D'Amico, Randy  [facility] Physician [facility]  NEUROSURG [address]  Scheduled Appointment: 05/10/2023  -------------------------------------------------------------     MAHDI KELY is a 68 year old male with PMH of seizure, HTN, Parkinson's disease (Dx in 2012, wheelchair bound since 2020) and frequent falls; presented to [facility] on 4/17 for elective L craniotomy for meningioma resection with Dr. D'Amico. His procedure was tolerated well and was discharged to [facility] on 4/21. During transit to , he complained of incisional headache with episodes of nausea and vomiting requiring Zofran and Oxycodone, but his symptoms persisted. Upon arrival to , an RRT was called for worsening mental status and a CTH revealed an acute large left frontal hemorrhage with IVH, cerebral edema with midline shift and some effacement of L frontal horn. He was transferred back to [facility].  He underwent EVD placement on 4/22. He was extubated on 4/23, and placed on HFNC with eventual wean to NC.  His hospital course was complicated by RLL consolidation (s/p Zosyn), HTN, bradycardia (self-resolved), hyponatremia, NINFA, and L cheek herpetic lesion (requiring 7 day course of Valtrex). S/p EVD removal on 5/1. Patient now admitted for a multidisciplinary rehab program. 05-03-23    * Tolerating increased dose amantadine   * Speech is getting clearer * Miralax to BID * Continue to monitor rehab progress * Plan for DC home 5/25      Rehab Management/MEDICAL MANAGEMENT   #Functional deficits s/p non-traumatic ICH  - Gait Instability, ADL impairments and Functional impairments: start Comprehensive Rehab Program of PT/OT/SLP  - 3 hours a day, 5 days a week  - P&O as needed   - CTH revealed L frontal lobe hemorrhage with intraventricular hemorrhage and increased size of R lateral ventricle  - S/p EVD placement on 4/22, removed on 5/1   - S/p Decadron taper x1 week, completed  - Serial CTH 5/4, for interval AMS showed reduction of left frontal hemorrhage  - Continue Amantadine  - Sodium Chloride tabs to q8h, Na >135, taper    * Fall episode in his room, no injury 5/10-- CTH reduced size of intracranial hemorrhage  * Impaired safety awareness ,but no agitation, probably due to disinhibition due to effects of brain injury  --Move to room near nursing station when possible    #HSV-1 Infection, Left cheek s/p valtrex completed 5/5  - F/u awaiting result of HIV testing, done during acute care    #PNA  - S/p Zosyn (4/22-4/27)   - Albuterol/Ipratropium Nebulizer prn  - Incentive Spirometer    #Parkinson's Disease  - Continue Sinemet 4x per day, Entacapone 200mg q8h  - Melatonin  - bowel regimen  -- Neurology review for Parkinson's disease, progressive hand tremors 5/11  ---Amantadine 100mg BID - tolerating well. Consider starting SSRI (mirtazepine or lexapro) for anxiety,  --- Or consider trial of benzodiazepine (clonazepam) and rivastigmine will be considered afterwards, while considering risk ot reducing seizure threshold    #Seizures  - Keppra BID  -seizure precautions     #HTN  - Amlodipine  - Goal SBP: 100-160    #Skin  - Skin: scalp incision healing well, well approximated  - Pustular rash b/l armpits-ID eval requested  - Pressure injury/Skin: OOB to Chair, PT/OT     #Pain Mgmt   - Tylenol PRN  - Lidocaine patch  - Home: Gabapentin-on hold per NSx    #GI/Bowel Mgmt   - Continent c/w Senna, Miralax BID    #/Bladder Mgmt --Voiding     #FEN   - Diet - Soft & Bite Sized   - Dysphagia  SLP - evaluation and treatment    #Precautions / PROPHYLAXIS:   - Falls  - ortho: Weight bearing status: WBAT   - Lungs: Aspiration, Incentive Spirometer   - DVT: Lovenox  -------------------------------------------------------------  Dr. Cassidy's Liason with family/providers:     Liaison with family 5/9--i called family--spoke with patient's daughter (with ph number in EMR for spouse), she reports that patient's on, will be visiting patient tomorrow am and will discuss update with me  There was no response to my call to patient's son on ph     5/10--I called on ph and d/w Mr Hearn, son, as noted above  5/16--Called patient' family on phone, discussed treatment update, functional progress and dc plan   -------------------------------------------------------------  IDT 5/9  Lives with family--wife, daughter and son , ambulatory at baseline  SLP--Soft bite sized thin diet  Severe cognitive deficits, attention deficits, safety, poor carry over  OT--Elisabet assist for self care, Mod assistance for transfers, Noted trying to lean out of bed  PT--Max assistance for bed transfer, ambulating 40ft with RW WC follow through  Barriers--hand tremors Cognitive deficit, occasionally trying to climb out of bed occasionally, bed alarm to be active at all times, nursing to consider t/f to room near nursing station when available   Goals--Min assist to CGA for ADLs,   Est dc 5/23 to home  (will be revised at subsequent IDT)   ------------------------------------------------------  FOLLOW UP/OUTPATIENT:    D'Amico, Randy  [phone]    Dianelys Nguyen  [address] [place] [address]  Phone: [phone]  Fax: (   )    -  Follow Up Time:    D'Amico, Randy  [facility] Physician [facility]  NEUROSURG [address] [place] [address]  Scheduled Appointment: 05/10/2023  -------------------------------------------------------------     MAHDI KELY is a 68 year old male with PMH of seizure, HTN, Parkinson's disease (Dx in 2012, wheelchair bound since 2020) and frequent falls; presented to [facility] on 4/17 for elective L craniotomy for meningioma resection with Dr. D'Amico. His procedure was tolerated well and was discharged to [facility] on 4/21. During transit to , he complained of incisional headache with episodes of nausea and vomiting requiring Zofran and Oxycodone, but his symptoms persisted. Upon arrival to , an RRT was called for worsening mental status and a CTH revealed an acute large left frontal hemorrhage with IVH, cerebral edema with midline shift and some effacement of L frontal horn. He was transferred back to [facility].  He underwent EVD placement on 4/22. He was extubated on 4/23, and placed on HFNC with eventual wean to NC.  His hospital course was complicated by RLL consolidation (s/p Zosyn), HTN, bradycardia (self-resolved), hyponatremia, NINFA, and L cheek herpetic lesion (requiring 7 day course of Valtrex). S/p EVD removal on 5/1. Patient now admitted for a multidisciplinary rehab program. 05-03-23    * Tolerating increased dose amantadine   * Speech is getting clearer * Miralax to BID * Continue to monitor rehab progress * Plan for DC home 5/25      Rehab Management/MEDICAL MANAGEMENT   #Functional deficits s/p non-traumatic ICH  - Gait Instability, ADL impairments and Functional impairments: start Comprehensive Rehab Program of PT/OT/SLP  - 3 hours a day, 5 days a week  - P&O as needed   - CTH revealed L frontal lobe hemorrhage with intraventricular hemorrhage and increased size of R lateral ventricle  - S/p EVD placement on 4/22, removed on 5/1   - S/p Decadron taper x1 week, completed  - Serial CTH 5/4, for interval AMS showed reduction of left frontal hemorrhage  - Continue Amantadine  - Sodium Chloride tabs to q8h, Na >135, taper    * Fall episode in his room, no injury 5/10-- CTH reduced size of intracranial hemorrhage  * Impaired safety awareness ,but no agitation, probably due to disinhibition due to effects of brain injury  --Move to room near nursing station when possible    #HSV-1 Infection, Left cheek s/p valtrex completed 5/5  - F/u awaiting result of HIV testing, done during acute care    #PNA  - S/p Zosyn (4/22-4/27)   - Albuterol/Ipratropium Nebulizer prn  - Incentive Spirometer    #Parkinson's Disease  - Continue Sinemet 4x per day, Entacapone 200mg q8h  - Melatonin  - bowel regimen  -- Neurology review for Parkinson's disease, progressive hand tremors 5/11  ---Amantadine 100mg BID - tolerating well. Consider starting SSRI (mirtazepine or lexapro) for anxiety,  --- Or consider trial of benzodiazepine (clonazepam) and rivastigmine will be considered afterwards, while considering risk ot reducing seizure threshold    #Seizures  - Keppra BID  -seizure precautions     #HTN  - Amlodipine  - Goal SBP: 100-160    #Skin  - Skin: scalp incision healing well, well approximated  - Pustular rash b/l armpits-ID eval requested  - Pressure injury/Skin: OOB to Chair, PT/OT     #Pain Mgmt   - Tylenol PRN  - Lidocaine patch  - Home: Gabapentin-on hold per NSx    #GI/Bowel Mgmt   - Continent c/w Senna, Miralax BID    #/Bladder Mgmt --Voiding     #FEN   - Diet - Soft & Bite Sized   - Dysphagia  SLP - evaluation and treatment    #Precautions / PROPHYLAXIS:   - Falls  - ortho: Weight bearing status: WBAT   - Lungs: Aspiration, Incentive Spirometer   - DVT: Lovenox  -------------------------------------------------------------  Dr. Cassidy's Liason with family/providers:     Liaison with family 5/9--i called family--spoke with patient's daughter (with ph number in EMR for spouse), she reports that patient's on, will be visiting patient tomorrow am and will discuss update with me  There was no response to my call to patient's son on ph     5/10--I called on ph and d/w Mr Hearn, son, as noted above  5/16--Called patient' family on phone, discussed treatment update, functional progress and dc plan   -------------------------------------------------------------  IDT conference on  5/16   TDD: 5/25 to home  Barriers: Safety, difficulty managing turning  Social Work: Lives with daughter and son  OT: Max to total A for ADLs.   PT: Min-mod A for transfers. Ambulated 10 ft with RW with min A and WCF.  SLP: Soft & bite sized with TLs.  Will need family training  ------------------------------------------------------  FOLLOW UP/OUTPATIENT:    D'Amico, Randy  [phone]    Dianelys Nguyen  [address] [place] [address]  Phone: [phone]  Fax: (   )    -  Follow Up Time:    D'Amico, Randy  [facility]  NEUROSURG [address] [place], [place] [address]  Scheduled Appointment: 05/10/2023  -------------------------------------------------------------

## 2023-05-17 ENCOUNTER — APPOINTMENT (OUTPATIENT)
Dept: NEUROSURGERY | Facility: CLINIC | Age: 68
End: 2023-05-17
Payer: MEDICARE

## 2023-05-17 PROCEDURE — 99232 SBSQ HOSP IP/OBS MODERATE 35: CPT

## 2023-05-17 RX ADMIN — POLYETHYLENE GLYCOL 3350 17 GRAM(S): 17 POWDER, FOR SOLUTION ORAL at 06:01

## 2023-05-17 RX ADMIN — LEVETIRACETAM 1000 MILLIGRAM(S): 250 TABLET, FILM COATED ORAL at 17:46

## 2023-05-17 RX ADMIN — Medication 1 DROP(S): at 09:29

## 2023-05-17 RX ADMIN — AMLODIPINE BESYLATE 10 MILLIGRAM(S): 2.5 TABLET ORAL at 06:01

## 2023-05-17 RX ADMIN — Medication 1 APPLICATION(S): at 17:45

## 2023-05-17 RX ADMIN — CARBIDOPA AND LEVODOPA 1 TABLET(S): 25; 100 TABLET ORAL at 19:55

## 2023-05-17 RX ADMIN — Medication 100 MILLIGRAM(S): at 06:01

## 2023-05-17 RX ADMIN — CARBIDOPA AND LEVODOPA 1 TABLET(S): 25; 100 TABLET ORAL at 16:03

## 2023-05-17 RX ADMIN — Medication 1 DROP(S): at 17:45

## 2023-05-17 RX ADMIN — Medication 9 MILLIGRAM(S): at 19:56

## 2023-05-17 RX ADMIN — Medication 1 DROP(S): at 06:00

## 2023-05-17 RX ADMIN — ENTACAPONE 200 MILLIGRAM(S): 200 TABLET, FILM COATED ORAL at 16:03

## 2023-05-17 RX ADMIN — ENOXAPARIN SODIUM 40 MILLIGRAM(S): 100 INJECTION SUBCUTANEOUS at 19:55

## 2023-05-17 RX ADMIN — Medication 1 DROP(S): at 13:29

## 2023-05-17 RX ADMIN — CARBIDOPA AND LEVODOPA 1 TABLET(S): 25; 100 TABLET ORAL at 08:07

## 2023-05-17 RX ADMIN — ENTACAPONE 200 MILLIGRAM(S): 200 TABLET, FILM COATED ORAL at 08:07

## 2023-05-17 RX ADMIN — SENNA PLUS 2 TABLET(S): 8.6 TABLET ORAL at 19:56

## 2023-05-17 RX ADMIN — LEVETIRACETAM 1000 MILLIGRAM(S): 250 TABLET, FILM COATED ORAL at 06:01

## 2023-05-17 RX ADMIN — ENTACAPONE 200 MILLIGRAM(S): 200 TABLET, FILM COATED ORAL at 11:44

## 2023-05-17 RX ADMIN — Medication 100 MILLIGRAM(S): at 17:46

## 2023-05-17 RX ADMIN — CARBIDOPA AND LEVODOPA 1 TABLET(S): 25; 100 TABLET ORAL at 11:44

## 2023-05-17 RX ADMIN — Medication 1 APPLICATION(S): at 06:02

## 2023-05-17 RX ADMIN — Medication 1 DROP(S): at 19:54

## 2023-05-17 NOTE — PROGRESS NOTE ADULT - ASSESSMENT
MAHDI KELY is a 68 year old male with PMH of seizure, HTN, Parkinson's disease (Dx in 2012, wheelchair bound since 2020) and frequent falls; presented to [facility] on 4/17 for elective L craniotomy for meningioma resection with Dr. D'Amico. His procedure was tolerated well and was discharged to [facility] on 4/21. During transit to , he complained of incisional headache with episodes of nausea and vomiting requiring Zofran and Oxycodone, but his symptoms persisted. Upon arrival to , an RRT was called for worsening mental status and a CTH revealed an acute large left frontal hemorrhage with IVH, cerebral edema with midline shift and some effacement of L frontal horn. He was transferred back to [facility].  He underwent EVD placement on 4/22. He was extubated on 4/23, and placed on HFNC with eventual wean to NC.  His hospital course was complicated by RLL consolidation (s/p Zosyn), HTN, bradycardia (self-resolved), hyponatremia, NINFA, and L cheek herpetic lesion (requiring 7 day course of Valtrex). S/p EVD removal on 5/1. Patient now admitted for a multidisciplinary rehab program. 05-03-23    * Speech is getting clearer * Continue to monitor rehab progress * Plan for DC home 5/25      Rehab Management/MEDICAL MANAGEMENT   #Functional deficits s/p non-traumatic ICH  - Gait Instability, ADL impairments and Functional impairments: start Comprehensive Rehab Program of PT/OT/SLP  - 3 hours a day, 5 days a week  - P&O as needed   - CTH revealed L frontal lobe hemorrhage with intraventricular hemorrhage and increased size of R lateral ventricle  - S/p EVD placement on 4/22, removed on 5/1   - S/p Decadron taper x1 week, completed  - Serial CTH 5/4, for interval AMS showed reduction of left frontal hemorrhage  - Continue Amantadine  - Sodium Chloride tabs to q8h, Na >135, taper    * Fall episode in his room, no injury 5/10-- CTH reduced size of intracranial hemorrhage  * Impaired safety awareness ,but no agitation, probably due to disinhibition due to effects of brain injury  --Move to room near nursing station when possible    #HSV-1 Infection, Left cheek s/p valtrex completed 5/5  - F/u awaiting result of HIV testing, done during acute care    #PNA  - S/p Zosyn (4/22-4/27)   - Albuterol/Ipratropium Nebulizer prn  - Incentive Spirometer    #Parkinson's Disease  - Continue Sinemet 4x per day, Entacapone 200mg q8h  - Melatonin  - bowel regimen  -- Neurology review for Parkinson's disease, progressive hand tremors 5/11  ---Amantadine 100mg BID - tolerating well. Consider starting SSRI (mirtazepine or lexapro) for anxiety,  --- Or consider trial of benzodiazepine (clonazepam) and rivastigmine will be considered afterwards, while considering risk ot reducing seizure threshold    #Seizures  - Keppra BID  -seizure precautions     #HTN  - Amlodipine  - Goal SBP: 100-160    #Skin  - Skin: scalp incision healing well, well approximated  - Pustular rash b/l armpits-ID eval requested  - Pressure injury/Skin: OOB to Chair, PT/OT     #Pain Mgmt   - Tylenol PRN  - Lidocaine patch  - Home: Gabapentin-on hold per NSx    #GI/Bowel Mgmt   - Continent c/w Senna, Miralax BID    #/Bladder Mgmt --Voiding     #FEN   - Diet - Soft & Bite Sized   - Dysphagia  SLP - evaluation and treatment    #Precautions / PROPHYLAXIS:   - Falls  - ortho: Weight bearing status: WBAT   - Lungs: Aspiration, Incentive Spirometer   - DVT: Lovenox  -------------------------------------------------------------  Dr. Cassidy's Liason with family/providers:     Liaison with family 5/9--i called family--spoke with patient's daughter (with ph number in EMR for spouse), she reports that patient's on, will be visiting patient tomorrow am and will discuss update with me  There was no response to my call to patient's son on ph     5/10--I called on ph and d/w Mr Hearn, son, as noted above  5/16--Called patient' family on phone, discussed treatment update, functional progress and dc plan   -------------------------------------------------------------  IDT conference on  5/16   TDD: 5/25 to home  Barriers: Safety, difficulty managing turning  Social Work: Lives with daughter and son  OT: Max to total A for ADLs.   PT: Min-mod A for transfers. Ambulated 10 ft with RW with min A and WCF.  SLP: Soft & bite sized with TLs.  Will need family training  ------------------------------------------------------  FOLLOW UP/OUTPATIENT:    D'Amico, Randy  [phone]    Dianelys Nguyen  [address] [place] [address]  Phone: [phone]  Fax: (   )    -  Follow Up Time:    D'Amico, Randy  [facility]  NEUROSURG [address] [place] [address] S  Scheduled Appointment: 05/10/2023  -------------------------------------------------------------

## 2023-05-17 NOTE — PROGRESS NOTE ADULT - SUBJECTIVE AND OBJECTIVE BOX
Subjective   Patient seen and examined at bedside this AM.  Admits to sleeping well.  Last BM on 5/17, per staff.  Dysarthria, hypophonia, tremors of both hands continue to improve.  No other complaints at this time.    Denies headache, dizziness, visual changes, chest pain, SOB/NEVILLE, abdominal pain, nausea, vomiting, diarrhea, dysuria, numbness or tingling.     Therapy:  Speech is getting clearer, tremors as less prominent at rest  ambulating with walker, with mod assistance, limited distance  Patient happy with his progress, less impulsive    Vital Signs Last 24 Hrs  T(C): 36.8 (17 May 2023 08:13), Max: 36.8 (17 May 2023 08:13)  T(F): 98.3 (17 May 2023 08:13), Max: 98.3 (17 May 2023 08:13)  HR: 72 (17 May 2023 08:13) (72 - 72)  BP: 105/72 (17 May 2023 08:13) (105/72 - 121/69)  BP(mean): --  RR: 16 (17 May 2023 08:13) (16 - 16)  SpO2: 100% (17 May 2023 08:13) (98% - 100%)      EXAM  General: Comfortable in bed   HENT: PERRL, EOM grossly in tact.,   Cardiac: Regular rate.  Pulm:  clear  Abd: Soft, non-tender, rounded. +BS  Ext--no ulcers    Neuro:   Alert and awake, Dysarthria, hypophonia, Stuttering speech, tremors less prominent, speech getting clearer  RUE 5/5, LUE 4+/5. Resting tremor, both hands L>R  Sensation grossly intact to light touch  MMT--UE 4/5  Lower extremities 3/5  Extremities: Skin is warm, perfused.     LABS:                        12.9   2.71  )-----------( 195      ( 15 May 2023 05:50 )             39.6     05-15    145  |  109<H>  |  14  ----------------------------<  103<H>  4.2   |  29  |  1.26    Ca    9.4      15 May 2023 05:50    TPro  6.8  /  Alb  3.2<L>  /  TBili  0.4  /  DBili  x   /  AST  12  /  ALT  20  /  AlkPhos  55  05-15          MEDICATIONS  (STANDING):  amantadine Syrup 100 milliGRAM(s) Oral two times a day  amLODIPine   Tablet 10 milliGRAM(s) Oral daily  AQUAPHOR (petrolatum Ointment) 1 Application(s) Topical two times a day  artificial  tears Solution 1 Drop(s) Both EYES every 4 hours  carbidopa/levodopa  25/250 1 Tablet(s) Oral <User Schedule>  enoxaparin Injectable 40 milliGRAM(s) SubCutaneous every 24 hours  entacapone 200 milliGRAM(s) Oral <User Schedule>  levETIRAcetam 1000 milliGRAM(s) Oral two times a day  lidocaine   4% Patch 1 Patch Transdermal every 24 hours  melatonin 9 milliGRAM(s) Oral at bedtime  polyethylene glycol 3350 17 Gram(s) Oral two times a day   senna 2 Tablet(s) Oral at bedtime  sodium chloride 1 Gram(s) Oral every 8 hours  tiotropium 2.5 MICROgram(s) Inhaler 2 Puff(s) Inhalation daily    MEDICATIONS  (PRN):  acetaminophen   Oral Liquid .. 650 milliGRAM(s) Oral every 6 hours PRN Mild Pain (1 - 3)  acetylcysteine 20%  Inhalation 4 milliLiter(s) Inhalation every 6 hours PRN congestion  albuterol    90 MICROgram(s) HFA Inhaler 2 Puff(s) Inhalation every 6 hours PRN Shortness of Breath and/or Wheezing

## 2023-05-18 LAB
ALBUMIN SERPL ELPH-MCNC: 3.3 G/DL — SIGNIFICANT CHANGE UP (ref 3.3–5)
ALP SERPL-CCNC: 53 U/L — SIGNIFICANT CHANGE UP (ref 40–120)
ALT FLD-CCNC: 24 U/L — SIGNIFICANT CHANGE UP (ref 10–45)
ANION GAP SERPL CALC-SCNC: 8 MMOL/L — SIGNIFICANT CHANGE UP (ref 5–17)
AST SERPL-CCNC: 12 U/L — SIGNIFICANT CHANGE UP (ref 10–40)
BASOPHILS # BLD AUTO: 0.01 K/UL — SIGNIFICANT CHANGE UP (ref 0–0.2)
BASOPHILS NFR BLD AUTO: 0.2 % — SIGNIFICANT CHANGE UP (ref 0–2)
BILIRUB SERPL-MCNC: 0.3 MG/DL — SIGNIFICANT CHANGE UP (ref 0.2–1.2)
BUN SERPL-MCNC: 16 MG/DL — SIGNIFICANT CHANGE UP (ref 7–23)
CALCIUM SERPL-MCNC: 9.4 MG/DL — SIGNIFICANT CHANGE UP (ref 8.4–10.5)
CHLORIDE SERPL-SCNC: 106 MMOL/L — SIGNIFICANT CHANGE UP (ref 96–108)
CO2 SERPL-SCNC: 27 MMOL/L — SIGNIFICANT CHANGE UP (ref 22–31)
CREAT SERPL-MCNC: 1.47 MG/DL — HIGH (ref 0.5–1.3)
EGFR: 51 ML/MIN/1.73M2 — LOW
EOSINOPHIL # BLD AUTO: 0.1 K/UL — SIGNIFICANT CHANGE UP (ref 0–0.5)
EOSINOPHIL NFR BLD AUTO: 1.8 % — SIGNIFICANT CHANGE UP (ref 0–6)
GLUCOSE SERPL-MCNC: 99 MG/DL — SIGNIFICANT CHANGE UP (ref 70–99)
HCT VFR BLD CALC: 39.2 % — SIGNIFICANT CHANGE UP (ref 39–50)
HGB BLD-MCNC: 12.8 G/DL — LOW (ref 13–17)
IMM GRANULOCYTES NFR BLD AUTO: 0.4 % — SIGNIFICANT CHANGE UP (ref 0–0.9)
LYMPHOCYTES # BLD AUTO: 1.77 K/UL — SIGNIFICANT CHANGE UP (ref 1–3.3)
LYMPHOCYTES # BLD AUTO: 31.6 % — SIGNIFICANT CHANGE UP (ref 13–44)
MCHC RBC-ENTMCNC: 31.7 PG — SIGNIFICANT CHANGE UP (ref 27–34)
MCHC RBC-ENTMCNC: 32.7 GM/DL — SIGNIFICANT CHANGE UP (ref 32–36)
MCV RBC AUTO: 97 FL — SIGNIFICANT CHANGE UP (ref 80–100)
MONOCYTES # BLD AUTO: 0.58 K/UL — SIGNIFICANT CHANGE UP (ref 0–0.9)
MONOCYTES NFR BLD AUTO: 10.4 % — SIGNIFICANT CHANGE UP (ref 2–14)
NEUTROPHILS # BLD AUTO: 3.12 K/UL — SIGNIFICANT CHANGE UP (ref 1.8–7.4)
NEUTROPHILS NFR BLD AUTO: 55.6 % — SIGNIFICANT CHANGE UP (ref 43–77)
NRBC # BLD: 0 /100 WBCS — SIGNIFICANT CHANGE UP (ref 0–0)
PLATELET # BLD AUTO: 214 K/UL — SIGNIFICANT CHANGE UP (ref 150–400)
POTASSIUM SERPL-MCNC: 3.7 MMOL/L — SIGNIFICANT CHANGE UP (ref 3.5–5.3)
POTASSIUM SERPL-SCNC: 3.7 MMOL/L — SIGNIFICANT CHANGE UP (ref 3.5–5.3)
PROT SERPL-MCNC: 6.7 G/DL — SIGNIFICANT CHANGE UP (ref 6–8.3)
RBC # BLD: 4.04 M/UL — LOW (ref 4.2–5.8)
RBC # FLD: 15 % — HIGH (ref 10.3–14.5)
SODIUM SERPL-SCNC: 141 MMOL/L — SIGNIFICANT CHANGE UP (ref 135–145)
WBC # BLD: 5.6 K/UL — SIGNIFICANT CHANGE UP (ref 3.8–10.5)
WBC # FLD AUTO: 5.6 K/UL — SIGNIFICANT CHANGE UP (ref 3.8–10.5)

## 2023-05-18 PROCEDURE — 99232 SBSQ HOSP IP/OBS MODERATE 35: CPT

## 2023-05-18 PROCEDURE — 70450 CT HEAD/BRAIN W/O DYE: CPT | Mod: 26

## 2023-05-18 RX ADMIN — AMLODIPINE BESYLATE 10 MILLIGRAM(S): 2.5 TABLET ORAL at 05:03

## 2023-05-18 RX ADMIN — CARBIDOPA AND LEVODOPA 1 TABLET(S): 25; 100 TABLET ORAL at 15:54

## 2023-05-18 RX ADMIN — Medication 1 APPLICATION(S): at 17:18

## 2023-05-18 RX ADMIN — POLYETHYLENE GLYCOL 3350 17 GRAM(S): 17 POWDER, FOR SOLUTION ORAL at 05:02

## 2023-05-18 RX ADMIN — SENNA PLUS 2 TABLET(S): 8.6 TABLET ORAL at 23:15

## 2023-05-18 RX ADMIN — Medication 100 MILLIGRAM(S): at 05:02

## 2023-05-18 RX ADMIN — LEVETIRACETAM 1000 MILLIGRAM(S): 250 TABLET, FILM COATED ORAL at 17:17

## 2023-05-18 RX ADMIN — ENTACAPONE 200 MILLIGRAM(S): 200 TABLET, FILM COATED ORAL at 12:06

## 2023-05-18 RX ADMIN — POLYETHYLENE GLYCOL 3350 17 GRAM(S): 17 POWDER, FOR SOLUTION ORAL at 17:17

## 2023-05-18 RX ADMIN — CARBIDOPA AND LEVODOPA 1 TABLET(S): 25; 100 TABLET ORAL at 12:06

## 2023-05-18 RX ADMIN — Medication 1 DROP(S): at 15:54

## 2023-05-18 RX ADMIN — LEVETIRACETAM 1000 MILLIGRAM(S): 250 TABLET, FILM COATED ORAL at 05:03

## 2023-05-18 RX ADMIN — Medication 100 MILLIGRAM(S): at 17:17

## 2023-05-18 RX ADMIN — CARBIDOPA AND LEVODOPA 1 TABLET(S): 25; 100 TABLET ORAL at 19:49

## 2023-05-18 RX ADMIN — CARBIDOPA AND LEVODOPA 1 TABLET(S): 25; 100 TABLET ORAL at 08:01

## 2023-05-18 RX ADMIN — ENTACAPONE 200 MILLIGRAM(S): 200 TABLET, FILM COATED ORAL at 08:01

## 2023-05-18 RX ADMIN — Medication 1 DROP(S): at 05:01

## 2023-05-18 RX ADMIN — Medication 1 DROP(S): at 17:18

## 2023-05-18 RX ADMIN — Medication 9 MILLIGRAM(S): at 23:15

## 2023-05-18 RX ADMIN — ENTACAPONE 200 MILLIGRAM(S): 200 TABLET, FILM COATED ORAL at 15:54

## 2023-05-18 RX ADMIN — Medication 1 DROP(S): at 12:05

## 2023-05-18 RX ADMIN — Medication 1 APPLICATION(S): at 05:02

## 2023-05-18 NOTE — PROGRESS NOTE ADULT - ASSESSMENT
MAHDI KELY is a 68 year old male with PMH of seizure, HTN, Parkinson's disease (Dx in 2012, wheelchair bound since 2020) and frequent falls; presented to [facility] on 4/17 for elective L craniotomy for meningioma resection with Dr. D'Amico. His procedure was tolerated well and was discharged to [facility] on 4/21. During transit to , he complained of incisional headache with episodes of nausea and vomiting requiring Zofran and Oxycodone, but his symptoms persisted. Upon arrival to , an RRT was called for worsening mental status and a CTH revealed an acute large left frontal hemorrhage with IVH, cerebral edema with midline shift and some effacement of L frontal horn. He was transferred back to [facility].  He underwent EVD placement on 4/22. He was extubated on 4/23, and placed on HFNC with eventual wean to NC.  His hospital course was complicated by RLL consolidation (s/p Zosyn), HTN, bradycardia (self-resolved), hyponatremia, NINFA, and L cheek herpetic lesion (requiring 7 day course of Valtrex). S/p EVD removal on 5/1. Patient now admitted for a multidisciplinary rehab program. 05-03-23    * Noted to have increased stuttering and tremors - repeat CTH* Continue to monitor rehab progress * Plan for DC home 5/25 *     Rehab Management/MEDICAL MANAGEMENT   #Functional deficits s/p non-traumatic ICH  - Gait Instability, ADL impairments and Functional impairments: start Comprehensive Rehab Program of PT/OT/SLP  - 3 hours a day, 5 days a week  - P&O as needed   - CTH revealed L frontal lobe hemorrhage with intraventricular hemorrhage and increased size of R lateral ventricle  - S/p EVD placement on 4/22, removed on 5/1   - S/p Decadron taper x1 week, completed  - Serial CTH 5/4, for interval AMS showed reduction of left frontal hemorrhage  - Continue Amantadine  - Sodium Chloride tabs to q8h, Na >135, taper  - 5/18: Stuttering and tremors noted - repeat CTH    * Fall episode in his room, no injury 5/10-- CTH reduced size of intracranial hemorrhage  * Impaired safety awareness ,but no agitation, probably due to disinhibition due to effects of brain injury  --Move to room near nursing station when possible    #HSV-1 Infection, Left cheek s/p valtrex completed 5/5  - F/u awaiting result of HIV testing, done during acute care    #PNA  - S/p Zosyn (4/22-4/27)   - Albuterol/Ipratropium Nebulizer prn  - Incentive Spirometer    #Parkinson's Disease  - Continue Sinemet 4x per day, Entacapone 200mg q8h  - Melatonin  - bowel regimen  -- Neurology review for Parkinson's disease, progressive hand tremors 5/11  ---Amantadine 100mg BID - tolerating well. Consider starting SSRI (mirtazepine or lexapro) for anxiety,  --- Or consider trial of benzodiazepine (clonazepam) and rivastigmine will be considered afterwards, while considering risk ot reducing seizure threshold    #Seizures  - Keppra BID  -seizure precautions     #HTN  - Amlodipine  - Goal SBP: 100-160    #Skin  - Skin: scalp incision healing well, well approximated  - Pustular rash b/l armpits-ID eval requested  - Pressure injury/Skin: OOB to Chair, PT/OT     #Pain Mgmt   - Tylenol PRN  - Lidocaine patch  - Home: Gabapentin-on hold per NSx    #GI/Bowel Mgmt   - Continent c/w Senna, Miralax BID    #/Bladder Mgmt --Voiding     #FEN   - Diet - Soft & Bite Sized   - Dysphagia  SLP - evaluation and treatment    #Precautions / PROPHYLAXIS:   - Falls  - ortho: Weight bearing status: WBAT   - Lungs: Aspiration, Incentive Spirometer   - DVT: Lovenox  -------------------------------------------------------------  Dr. Cassidy's Liason with family/providers:     Liaison with family 5/9--i called family--spoke with patient's daughter (with ph number in EMR for spouse), she reports that patient's on, will be visiting patient tomorrow am and will discuss update with me  There was no response to my call to patient's son on ph     5/10--I called on ph and d/w Mr Hearn, son, as noted above  5/16--Called patient' family on phone, discussed treatment update, functional progress and dc plan   -------------------------------------------------------------  IDT conference on  5/16   TDD: 5/25 to home  Barriers: Safety, difficulty managing turning  Social Work: Lives with daughter and son  OT: Max to total A for ADLs.   PT: Min-mod A for transfers. Ambulated 10 ft with RW with min A and WCF.  SLP: Soft & bite sized with TLs.  Will need family training  ------------------------------------------------------  FOLLOW UP/OUTPATIENT:    D'Amico, Randy  [phone]    Dianelys Nguyen  [address] [place] [address]  Phone: [phone]  Fax: (   )    -  Follow Up Time:    D'Amico, Randy  [facility]  NEUROSURG [address] [place] [address] S  Scheduled Appointment: 05/10/2023  ------------------------------------------------------------- MAHDI KELY is a 68 year old male with PMH of seizure, HTN, Parkinson's disease (Dx in 2012, wheelchair bound since 2020) and frequent falls; presented to [facility] on 4/17 for elective L craniotomy for meningioma resection with Dr. D'Amico. His procedure was tolerated well and was discharged to [facility] on 4/21. During transit to , he complained of incisional headache with episodes of nausea and vomiting requiring Zofran and Oxycodone, but his symptoms persisted. Upon arrival to , an RRT was called for worsening mental status and a CTH revealed an acute large left frontal hemorrhage with IVH, cerebral edema with midline shift and some effacement of L frontal horn. He was transferred back to [facility].  He underwent EVD placement on 4/22. He was extubated on 4/23, and placed on HFNC with eventual wean to NC.  His hospital course was complicated by RLL consolidation (s/p Zosyn), HTN, bradycardia (self-resolved), hyponatremia, NINFA, and L cheek herpetic lesion (requiring 7 day course of Valtrex). S/p EVD removal on 5/1. Patient now admitted for a multidisciplinary rehab program. 05-03-23    * Noted to have increased stuttering and tremors - repeat CTH* Continue to monitor rehab progress   * Plan for DC home 5/25     Rehab Management/MEDICAL MANAGEMENT   #Functional deficits s/p non-traumatic ICH  - Gait Instability, ADL impairments and Functional impairments: start Comprehensive Rehab Program of PT/OT/SLP  - 3 hours a day, 5 days a week  - P&O as needed   - CTH revealed L frontal lobe hemorrhage with intraventricular hemorrhage and increased size of R lateral ventricle  - S/p EVD placement on 4/22, removed on 5/1   - S/p Decadron taper x1 week, completed  - Serial CTH 5/4, for interval AMS showed reduction of left frontal hemorrhage  - Continue Amantadine  - Sodium Chloride tabs to q8h, Na >135, taper  - 5/18: Stuttering and tremors noted - repeat CTH    * Fall episode in his room, no injury 5/10-- CTH reduced size of intracranial hemorrhage  * Impaired safety awareness ,but no agitation, probably due to disinhibition due to effects of brain injury  --Move to room near nursing station when possible    #HSV-1 Infection, Left cheek s/p valtrex completed 5/5  - F/u awaiting result of HIV testing, done during acute care    #PNA  - S/p Zosyn (4/22-4/27)   - Albuterol/Ipratropium Nebulizer prn  - Incentive Spirometer    #Parkinson's Disease  - Continue Sinemet 4x per day, Entacapone 200mg q8h  - Melatonin  - bowel regimen  -- Neurology review for Parkinson's disease, progressive hand tremors 5/11  ---Amantadine 100mg BID - tolerating well. Consider starting SSRI (mirtazepine or lexapro) for anxiety,  --- Or consider trial of benzodiazepine (clonazepam) and rivastigmine will be considered afterwards, while considering risk ot reducing seizure threshold    #Seizures  - Keppra BID  -seizure precautions     #HTN  - Amlodipine  - Goal SBP: 100-160    #Skin  - Skin: scalp incision healing well, well approximated  - Pustular rash b/l armpits-ID eval requested  - Pressure injury/Skin: OOB to Chair, PT/OT     #Pain Mgmt   - Tylenol PRN  - Lidocaine patch  - Home: Gabapentin-on hold per NSx    #GI/Bowel Mgmt   - Continent c/w Senna, Miralax BID    #/Bladder Mgmt --Voiding     #FEN   - Diet - Soft & Bite Sized   - Dysphagia  SLP - evaluation and treatment    #Precautions / PROPHYLAXIS:   - Falls  - ortho: Weight bearing status: WBAT   - Lungs: Aspiration, Incentive Spirometer   - DVT: Lovenox  -------------------------------------------------------------  Dr. Cassidy's Liason with family/providers:     Liaison with family 5/9--i called family--spoke with patient's daughter (with ph number in EMR for spouse), she reports that patient's on, will be visiting patient tomorrow am and will discuss update with me  There was no response to my call to patient's son on ph     5/10--I called on ph and d/w Mr Hearn, son, as noted above  5/16--Called patient' family on phone, discussed treatment update, functional progress and dc plan   -------------------------------------------------------------  IDT conference on  5/16   TDD: 5/25 to home  Barriers: Safety, difficulty managing turning  Social Work: Lives with daughter and son  OT: Max to total A for ADLs.   PT: Min-mod A for transfers. Ambulated 10 ft with RW with min A and WCF.  SLP: Soft & bite sized with TLs.  Will need family training  ------------------------------------------------------  FOLLOW UP/OUTPATIENT:    D'Amico, Randy  [phone]    Dianelys Nguyen  [address] [place] [address]  Phone: [phone]  Fax: (   )    -  Follow Up Time:    D'Amico, Randy  [facility]  NEUROSURG [address] [place] [address] S  Scheduled Appointment: 05/10/2023  -------------------------------------------------------------

## 2023-05-18 NOTE — PROGRESS NOTE ADULT - SUBJECTIVE AND OBJECTIVE BOX
Subjective   Patient seen and examined at bedside this AM.  Admits to sleeping well.  Last BM on 5/17, per staff.  Noted to have increased dysarthria and stuttering today.  Await repeat CT head.  No other complaints at this time.    Denies headache, dizziness, visual changes, chest pain, SOB/NEVILLE, abdominal pain, nausea, vomiting, diarrhea, dysuria, numbness or tingling.     Therapy:  Monitor speech and tremors  ambulating with walker, with mod assistance, limited distance  Patient happy with his progress, less impulsive      Vital Signs Last 24 Hrs  T(C): 36.3 (18 May 2023 09:10), Max: 36.7 (18 May 2023 05:06)  T(F): 97.3 (18 May 2023 09:10), Max: 98 (18 May 2023 05:06)  HR: 59 (18 May 2023 09:10) (58 - 59)  BP: 129/83 (18 May 2023 09:10) (129/83 - 133/83)  BP(mean): --  RR: 16 (18 May 2023 09:10) (16 - 16)  SpO2: 98% (18 May 2023 09:10) (98% - 99%)      EXAM  General: Comfortable in bed   HENT: PERRL, EOM grossly in tact.,   Cardiac: Regular rate.  Pulm:  clear  Abd: Soft, non-tender, rounded. +BS  Ext--no ulcers    Neuro:   Alert and awake, Dysarthria, hypophonia, Stuttering speech, tremors less prominent, speech getting clearer  RUE 5/5, LUE 4+/5. Resting tremor, both hands L>R  Sensation grossly intact to light touch  MMT--UE 4/5  Lower extremities 3/5  Extremities: Skin is warm, perfused.     LABS:                        12.8   5.60  )-----------( 214      ( 18 May 2023 06:40 )             39.2     05-18    141  |  106  |  16  ----------------------------<  99  3.7   |  27  |  1.47<H>    Ca    9.4      18 May 2023 06:40    TPro  6.7  /  Alb  3.3  /  TBili  0.3  /  DBili  x   /  AST  12  /  ALT  24  /  AlkPhos  53  05-18      MEDICATIONS  (STANDING):  amantadine Syrup 100 milliGRAM(s) Oral two times a day  amLODIPine   Tablet 10 milliGRAM(s) Oral daily  AQUAPHOR (petrolatum Ointment) 1 Application(s) Topical two times a day  artificial  tears Solution 1 Drop(s) Both EYES every 4 hours  carbidopa/levodopa  25/250 1 Tablet(s) Oral <User Schedule>  enoxaparin Injectable 40 milliGRAM(s) SubCutaneous every 24 hours  entacapone 200 milliGRAM(s) Oral <User Schedule>  levETIRAcetam 1000 milliGRAM(s) Oral two times a day  lidocaine   4% Patch 1 Patch Transdermal every 24 hours  melatonin 9 milliGRAM(s) Oral at bedtime  polyethylene glycol 3350 17 Gram(s) Oral two times a day  senna 2 Tablet(s) Oral at bedtime  tiotropium 2.5 MICROgram(s) Inhaler 2 Puff(s) Inhalation daily    MEDICATIONS  (PRN):  acetaminophen   Oral Liquid .. 650 milliGRAM(s) Oral every 6 hours PRN Mild Pain (1 - 3)  acetylcysteine 20%  Inhalation 4 milliLiter(s) Inhalation every 6 hours PRN congestion  albuterol    90 MICROgram(s) HFA Inhaler 2 Puff(s) Inhalation every 6 hours PRN Shortness of Breath and/or Wheezing     Subjective   Patient seen and examined at bedside this AM.  Admits to sleeping well.  Last BM on 5/17, per staff.  Noted to have increased dysarthria and stuttering today.  Await repeat CT head.  No other complaints at this time.    ROS  Denies headache, dizziness, visual changes, chest pain, SOB/NEVILLE, abdominal pain, nausea, vomiting, diarrhea, dysuria, numbness or tingling.     Therapy--increased prominence of slurred speech after initial improvement  Slow in therapy, but ambulating increasing distance    Labs reviewed--normal CBC and renal fxn  Will get CTH to r/o interval structural change      Vital Signs Last 24 Hrs  T(C): 36.3 (18 May 2023 09:10), Max: 36.7 (18 May 2023 05:06)  T(F): 97.3 (18 May 2023 09:10), Max: 98 (18 May 2023 05:06)  HR: 59 (18 May 2023 09:10) (58 - 59)  BP: 129/83 (18 May 2023 09:10) (129/83 - 133/83)  RR: 16 (18 May 2023 09:10) (16 - 16)  SpO2: 98% (18 May 2023 09:10) (98% - 99%)      EXAM  General: Comfortable in bed   HENT: PERRL, EOM grossly in tact.,   Cardiac: Regular rate.  Pulm:  clear  Abd: Soft, non-tender, rounded. +BS  Ext--no ulcers    Neuro:   Alert and awake, Dysarthria, hypophonia, Stuttering speech, tremors less prominent, speech getting clearer  RUE 5/5, LUE 4+/5. Resting tremor, both hands L>R  Sensation grossly intact to light touch  MMT--UE 4/5  Lower extremities 3/5  Extremities: Skin is warm, perfused.     LABS:                        12.8   5.60  )-----------( 214      ( 18 May 2023 06:40 )             39.2     05-18    141  |  106  |  16  ----------------------------<  99  3.7   |  27  |  1.47<H>    Ca    9.4      18 May 2023 06:40    TPro  6.7  /  Alb  3.3  /  TBili  0.3  /  DBili  x   /  AST  12  /  ALT  24  /  AlkPhos  53  05-18      MEDICATIONS  (STANDING):  amantadine Syrup 100 milliGRAM(s) Oral two times a day  amLODIPine   Tablet 10 milliGRAM(s) Oral daily  AQUAPHOR (petrolatum Ointment) 1 Application(s) Topical two times a day  artificial  tears Solution 1 Drop(s) Both EYES every 4 hours  carbidopa/levodopa  25/250 1 Tablet(s) Oral <User Schedule>  enoxaparin Injectable 40 milliGRAM(s) SubCutaneous every 24 hours  entacapone 200 milliGRAM(s) Oral <User Schedule>  levETIRAcetam 1000 milliGRAM(s) Oral two times a day  lidocaine   4% Patch 1 Patch Transdermal every 24 hours  melatonin 9 milliGRAM(s) Oral at bedtime  polyethylene glycol 3350 17 Gram(s) Oral two times a day  senna 2 Tablet(s) Oral at bedtime  tiotropium 2.5 MICROgram(s) Inhaler 2 Puff(s) Inhalation daily    MEDICATIONS  (PRN):  acetaminophen   Oral Liquid .. 650 milliGRAM(s) Oral every 6 hours PRN Mild Pain (1 - 3)  acetylcysteine 20%  Inhalation 4 milliLiter(s) Inhalation every 6 hours PRN congestion  albuterol    90 MICROgram(s) HFA Inhaler 2 Puff(s) Inhalation every 6 hours PRN Shortness of Breath and/or Wheezing

## 2023-05-19 LAB
APPEARANCE UR: CLEAR — SIGNIFICANT CHANGE UP
BILIRUB UR-MCNC: NEGATIVE — SIGNIFICANT CHANGE UP
COLOR SPEC: YELLOW — SIGNIFICANT CHANGE UP
DIFF PNL FLD: NEGATIVE — SIGNIFICANT CHANGE UP
GLUCOSE UR QL: NEGATIVE MG/DL — SIGNIFICANT CHANGE UP
KETONES UR-MCNC: NEGATIVE MG/DL — SIGNIFICANT CHANGE UP
LEUKOCYTE ESTERASE UR-ACNC: NEGATIVE — SIGNIFICANT CHANGE UP
NITRITE UR-MCNC: NEGATIVE — SIGNIFICANT CHANGE UP
PH UR: 6.5 — SIGNIFICANT CHANGE UP (ref 5–8)
PROT UR-MCNC: NEGATIVE MG/DL — SIGNIFICANT CHANGE UP
SP GR SPEC: 1.01 — SIGNIFICANT CHANGE UP (ref 1–1.03)
UROBILINOGEN FLD QL: 0.2 MG/DL — SIGNIFICANT CHANGE UP (ref 0.2–1)

## 2023-05-19 PROCEDURE — 99232 SBSQ HOSP IP/OBS MODERATE 35: CPT

## 2023-05-19 RX ORDER — ENOXAPARIN SODIUM 100 MG/ML
40 INJECTION SUBCUTANEOUS EVERY 24 HOURS
Refills: 0 | Status: DISCONTINUED | OUTPATIENT
Start: 2023-05-19 | End: 2023-05-25

## 2023-05-19 RX ADMIN — Medication 1 DROP(S): at 06:53

## 2023-05-19 RX ADMIN — Medication 1 DROP(S): at 23:03

## 2023-05-19 RX ADMIN — SENNA PLUS 2 TABLET(S): 8.6 TABLET ORAL at 23:03

## 2023-05-19 RX ADMIN — Medication 1 APPLICATION(S): at 18:13

## 2023-05-19 RX ADMIN — AMLODIPINE BESYLATE 10 MILLIGRAM(S): 2.5 TABLET ORAL at 06:54

## 2023-05-19 RX ADMIN — Medication 100 MILLIGRAM(S): at 18:13

## 2023-05-19 RX ADMIN — Medication 100 MILLIGRAM(S): at 06:54

## 2023-05-19 RX ADMIN — LIDOCAINE 1 PATCH: 4 CREAM TOPICAL at 19:06

## 2023-05-19 RX ADMIN — LEVETIRACETAM 1000 MILLIGRAM(S): 250 TABLET, FILM COATED ORAL at 18:13

## 2023-05-19 RX ADMIN — Medication 1 DROP(S): at 18:13

## 2023-05-19 RX ADMIN — Medication 1 DROP(S): at 16:08

## 2023-05-19 RX ADMIN — Medication 10 MILLIGRAM(S): at 18:13

## 2023-05-19 RX ADMIN — POLYETHYLENE GLYCOL 3350 17 GRAM(S): 17 POWDER, FOR SOLUTION ORAL at 18:13

## 2023-05-19 RX ADMIN — ENTACAPONE 200 MILLIGRAM(S): 200 TABLET, FILM COATED ORAL at 16:05

## 2023-05-19 RX ADMIN — LIDOCAINE 1 PATCH: 4 CREAM TOPICAL at 08:06

## 2023-05-19 RX ADMIN — ENTACAPONE 200 MILLIGRAM(S): 200 TABLET, FILM COATED ORAL at 12:09

## 2023-05-19 RX ADMIN — ENTACAPONE 200 MILLIGRAM(S): 200 TABLET, FILM COATED ORAL at 08:06

## 2023-05-19 RX ADMIN — Medication 1 APPLICATION(S): at 06:54

## 2023-05-19 RX ADMIN — LIDOCAINE 1 PATCH: 4 CREAM TOPICAL at 20:06

## 2023-05-19 RX ADMIN — CARBIDOPA AND LEVODOPA 1 TABLET(S): 25; 100 TABLET ORAL at 16:04

## 2023-05-19 RX ADMIN — CARBIDOPA AND LEVODOPA 1 TABLET(S): 25; 100 TABLET ORAL at 12:09

## 2023-05-19 RX ADMIN — CARBIDOPA AND LEVODOPA 1 TABLET(S): 25; 100 TABLET ORAL at 19:53

## 2023-05-19 RX ADMIN — LEVETIRACETAM 1000 MILLIGRAM(S): 250 TABLET, FILM COATED ORAL at 06:54

## 2023-05-19 RX ADMIN — Medication 9 MILLIGRAM(S): at 23:03

## 2023-05-19 RX ADMIN — Medication 1 DROP(S): at 10:41

## 2023-05-19 RX ADMIN — CARBIDOPA AND LEVODOPA 1 TABLET(S): 25; 100 TABLET ORAL at 08:06

## 2023-05-19 RX ADMIN — ENOXAPARIN SODIUM 40 MILLIGRAM(S): 100 INJECTION SUBCUTANEOUS at 10:41

## 2023-05-19 NOTE — PROGRESS NOTE ADULT - SUBJECTIVE AND OBJECTIVE BOX
Patient is a 68y old  Male who presents with a chief complaint of Functional deficits s/p non-traumatic ICH (19 May 2023 10:22)      Patient seen and examined at bedside. No events overnight. Patient denies acute complaints at this time, no chest pain, sob, abd pain, headache, changes in vision, nausea or vomiting. Yesterday patient noted to be off after PT session and increased stuttering, CT head was performed which was stable and with slightly improved findings. Lovenox was held yesterday however due to changes in mental status, patient improved today and appears similar to beginning of this week     ALLERGIES:  No Known Allergies    MEDICATIONS  (STANDING):  amantadine Syrup 100 milliGRAM(s) Oral two times a day  amLODIPine   Tablet 10 milliGRAM(s) Oral daily  AQUAPHOR (petrolatum Ointment) 1 Application(s) Topical two times a day  artificial  tears Solution 1 Drop(s) Both EYES every 4 hours  bisacodyl Suppository 10 milliGRAM(s) Rectal once  carbidopa/levodopa  25/250 1 Tablet(s) Oral <User Schedule>  enoxaparin Injectable 40 milliGRAM(s) SubCutaneous every 24 hours  entacapone 200 milliGRAM(s) Oral <User Schedule>  levETIRAcetam 1000 milliGRAM(s) Oral two times a day  lidocaine   4% Patch 1 Patch Transdermal every 24 hours  melatonin 9 milliGRAM(s) Oral at bedtime  polyethylene glycol 3350 17 Gram(s) Oral two times a day  senna 2 Tablet(s) Oral at bedtime  tiotropium 2.5 MICROgram(s) Inhaler 2 Puff(s) Inhalation daily    MEDICATIONS  (PRN):  acetaminophen   Oral Liquid .. 650 milliGRAM(s) Oral every 6 hours PRN Mild Pain (1 - 3)  acetylcysteine 20%  Inhalation 4 milliLiter(s) Inhalation every 6 hours PRN congestion  albuterol    90 MICROgram(s) HFA Inhaler 2 Puff(s) Inhalation every 6 hours PRN Shortness of Breath and/or Wheezing    Vital Signs Last 24 Hrs  T(F): 98.5 (19 May 2023 08:00), Max: 98.5 (19 May 2023 08:00)  HR: 55 (19 May 2023 08:00) (55 - 73)  BP: 129/80 (19 May 2023 08:00) (129/80 - 132/87)  RR: 16 (19 May 2023 08:00) (16 - 16)  SpO2: 98% (19 May 2023 08:00) (96% - 98%)  I&O's Summary      PHYSICAL EXAM:  GENERAL: NAD, laying in bed  ENMT: Moist mucous membranes, no thrush  NECK: Supple, No JVD  CHEST/LUNG: Clear to auscultation bilaterally, good air entry, non-labored breathing  HEART: RRR; S1/S2, No murmur  ABDOMEN: Soft, Nontender, Nondistended; Bowel sounds present  EXTREMITIES: No calf tenderness, No cyanosis, No edema  SKIN: Warm, perfused  PSYCH: Normal mood, Normal affect    LABS:                        12.8   5.60  )-----------( 214      ( 18 May 2023 06:40 )             39.2     -    141  |  106  |  16  ----------------------------<  99  3.7   |  27  |  1.47    Ca    9.4      18 May 2023 06:40    TPro  6.7  /  Alb  3.3  /  TBili  0.3  /  DBili  x   /  AST  12  /  ALT  24  /  AlkPhos  53  -              04-23 Chol 151 mg/dL LDL -- HDL 55 mg/dL Trig 115 mg/dL                  Urinalysis Basic - ( 19 May 2023 08:04 )    Color: Yellow / Appearance: Clear / S.009 / pH: x  Gluc: x / Ketone: Negative mg/dL  / Bili: Negative / Urobili: 0.2 mg/dL   Blood: x / Protein: Negative mg/dL / Nitrite: Negative   Leuk Esterase: Negative / RBC: x / WBC x   Sq Epi: x / Non Sq Epi: x / Bacteria: x        COVID-19 PCR: NotDetec (23 @ 23:15)      RADIOLOGY & ADDITIONAL TESTS:    Care Discussed with Consultants/Other Providers:

## 2023-05-19 NOTE — PROGRESS NOTE ADULT - SUBJECTIVE AND OBJECTIVE BOX
Subjective   Patient seen and examined at bedside this AM.  Admits to sleeping well.  Last BM on 5/15, per staff.  Noted to have increased dysarthria and stuttering today.  Appears less talkative this morning.  No other complaints at this time.    ROS  Denies headache, dizziness, visual changes, chest pain, SOB/NEVILLE, abdominal pain, nausea, vomiting, diarrhea, dysuria, numbness or tingling.     Therapy--increased prominence of slurred speech after initial improvement  Slow in therapy, but ambulating increasing distance  Observed walking in therapies with RW, R foot inversion, R adduction stretching     Vital Signs Last 24 Hrs  T(C): 36.9 (19 May 2023 08:00), Max: 36.9 (19 May 2023 08:00)  T(F): 98.5 (19 May 2023 08:00), Max: 98.5 (19 May 2023 08:00)  HR: 55 (19 May 2023 08:00) (55 - 73)  BP: 129/80 (19 May 2023 08:00) (129/80 - 132/87)  BP(mean): --  RR: 16 (19 May 2023 08:00) (16 - 16)  SpO2: 98% (19 May 2023 08:00) (96% - 98%)      EXAM  General: Comfortable in bed   HENT: PERRL, EOM grossly in tact.,   Cardiac: Regular rate.  Pulm:  clear  Abd: Soft, non-tender, rounded. +BS  Ext--no ulcers    Neuro:   Alert and awake, Dysarthria, hypophonia, Stuttering speech, tremors less prominent, speech getting clearer  RUE 5/5, LUE 4+/5. Resting tremor, both hands L>R  Sensation grossly intact to light touch  MMT--UE 4/5  Lower extremities 3/5  Extremities: Skin is warm, perfused.     LABS:                        12.8   5.60  )-----------( 214      ( 18 May 2023 06:40 )             39.2     05-18    141  |  106  |  16  ----------------------------<  99  3.7   |  27  |  1.47<H>    Ca    9.4      18 May 2023 06:40    TPro  6.7  /  Alb  3.3  /  TBili  0.3  /  DBili  x   /  AST  12  /  ALT  24  /  AlkPhos  53  05-18      MEDICATIONS  (STANDING):  amantadine Syrup 100 milliGRAM(s) Oral two times a day  amLODIPine   Tablet 10 milliGRAM(s) Oral daily  AQUAPHOR (petrolatum Ointment) 1 Application(s) Topical two times a day  artificial  tears Solution 1 Drop(s) Both EYES every 4 hours  carbidopa/levodopa  25/250 1 Tablet(s) Oral <User Schedule>  enoxaparin Injectable 40 milliGRAM(s) SubCutaneous every 24 hours  entacapone 200 milliGRAM(s) Oral <User Schedule>  levETIRAcetam 1000 milliGRAM(s) Oral two times a day  lidocaine   4% Patch 1 Patch Transdermal every 24 hours  melatonin 9 milliGRAM(s) Oral at bedtime  polyethylene glycol 3350 17 Gram(s) Oral two times a day  senna 2 Tablet(s) Oral at bedtime  tiotropium 2.5 MICROgram(s) Inhaler 2 Puff(s) Inhalation daily    MEDICATIONS  (PRN):  acetaminophen   Oral Liquid .. 650 milliGRAM(s) Oral every 6 hours PRN Mild Pain (1 - 3)  acetylcysteine 20%  Inhalation 4 milliLiter(s) Inhalation every 6 hours PRN congestion  albuterol    90 MICROgram(s) HFA Inhaler 2 Puff(s) Inhalation every 6 hours PRN Shortness of Breath and/or Wheezing     Subjective   Patient seen and examined at bedside this AM.  Admits to sleeping well.  Last BM on 5/15, per staff.  Noted to have increased dysarthria and stuttering today.  Appears less talkative this morning.  No other complaints at this time.    ROS  Denies headache, dizziness, visual changes, chest pain, SOB/NEVILLE, abdominal pain, nausea, vomiting, diarrhea, dysuria, numbness or tingling.     Therapy--increased prominence of slurred speech after initial improvement  Slow in therapy, but ambulating increasing distance  Observed walking in therapies with RW, R foot inversion, R thigh adduction noted,   will get stretching or right thigh adductor muscles     Vital Signs Last 24 Hrs  T(C): 36.9 (19 May 2023 08:00), Max: 36.9 (19 May 2023 08:00)  T(F): 98.5 (19 May 2023 08:00), Max: 98.5 (19 May 2023 08:00)  HR: 55 (19 May 2023 08:00) (55 - 73)  BP: 129/80 (19 May 2023 08:00) (129/80 - 132/87)  BP(mean): --  RR: 16 (19 May 2023 08:00) (16 - 16)  SpO2: 98% (19 May 2023 08:00) (96% - 98%)      EXAM  General: Comfortable in bed   HENT: PERRL, EOM grossly in tact.,   Cardiac: Regular rate.  Pulm:  clear  Abd: Soft, non-tender, rounded. +BS  Ext--no ulcers    Neuro:   Alert and awake, Dysarthria, hypophonia, Stuttering speech, tremors less prominent, speech getting clearer  RUE 5/5, LUE 4+/5. Resting tremor, both hands L>R  Sensation grossly intact to light touch  MMT--UE 4/5  Lower extremities 3/5  Extremities: Skin is warm, perfused.     LABS:                        12.8   5.60  )-----------( 214      ( 18 May 2023 06:40 )             39.2     05-18    141  |  106  |  16  ----------------------------<  99  3.7   |  27  |  1.47<H>    Ca    9.4      18 May 2023 06:40    TPro  6.7  /  Alb  3.3  /  TBili  0.3  /  DBili  x   /  AST  12  /  ALT  24  /  AlkPhos  53  05-18      MEDICATIONS  (STANDING):  amantadine Syrup 100 milliGRAM(s) Oral two times a day  amLODIPine   Tablet 10 milliGRAM(s) Oral daily  AQUAPHOR (petrolatum Ointment) 1 Application(s) Topical two times a day  artificial  tears Solution 1 Drop(s) Both EYES every 4 hours  carbidopa/levodopa  25/250 1 Tablet(s) Oral <User Schedule>  enoxaparin Injectable 40 milliGRAM(s) SubCutaneous every 24 hours  entacapone 200 milliGRAM(s) Oral <User Schedule>  levETIRAcetam 1000 milliGRAM(s) Oral two times a day  lidocaine   4% Patch 1 Patch Transdermal every 24 hours  melatonin 9 milliGRAM(s) Oral at bedtime  polyethylene glycol 3350 17 Gram(s) Oral two times a day  senna 2 Tablet(s) Oral at bedtime  tiotropium 2.5 MICROgram(s) Inhaler 2 Puff(s) Inhalation daily    MEDICATIONS  (PRN):  acetaminophen   Oral Liquid .. 650 milliGRAM(s) Oral every 6 hours PRN Mild Pain (1 - 3)  acetylcysteine 20%  Inhalation 4 milliLiter(s) Inhalation every 6 hours PRN congestion  albuterol    90 MICROgram(s) HFA Inhaler 2 Puff(s) Inhalation every 6 hours PRN Shortness of Breath and/or Wheezing

## 2023-05-19 NOTE — PROGRESS NOTE ADULT - ASSESSMENT
68 year old male PMH seizure, HTN, Parkinson's disease (Dx in 2012, wheelchair bound since 2020) and frequent falls; presented to [facility] on 4/17 for elective L craniotomy for meningioma resection with Dr. D'Amico. His procedure was tolerated well and was discharged to [facility] on 4/21. During transit to , he complained of incisional headache with episodes of nausea and vomiting requiring Zofran and Oxycodone, but his symptoms persisted. Upon arrival to , an RRT was called for worsening mental status and a CTH revealed an acute large left frontal hemorrhage with IVH, cerebral edema with midline shift and some effacement of L frontal horn. He was transferred back to [facility].  He underwent EVD placement on 4/22. He was extubated on 4/23, and placed on HFNC with eventual wean to NC.  His hospital course was complicated by RLL consolidation (s/p Zosyn), HTN, bradycardia (self-resolved), hyponatremia, NINFA, and L cheek herpetic lesion (requiring 7 day course of Valtrex). S/p EVD removal on 5/1. Patient now admitted for a multidisciplinary rehab program    #Debility s/p non-traumatic ICH  - CTH revealed Left frontal lobe hemorrhage with intraventricular hemorrhage and increased size of Rt lateral ventricle  - repeat CT head 5/18 with slowly improving L frontal hemorrhage, no acute findings. Findings were discussed with neurosurg, no intervention at this time. Mental status improved can restart lovenox  - S/p EVD placement on 4/22, removed on 5/1   - S/p Decadron taper  - Continue Amantadine  - continue comprehensive acute rehab program    #hyponatremia  - resolved  - monitor off NaCl tabs    #HSV-1 Infection  - Left cheek lesion  - completed valtrex course    #PNA  - S/p Zosyn course  - Albuterol/Ipratropium Nebulizer prn    #Parkinson's Disease  - Continue Sinemet, Entacapone  - Melatonin  - movement disorder specialist following    #Seizures  - Keppra BID  - seizure precautions     #HTN  - reviewed flow sheets systolic ranging 120-150s  - continue Amlodipine    #DVT ppx  - Lovenox

## 2023-05-19 NOTE — PROGRESS NOTE ADULT - ASSESSMENT
MAHDI KELY is a 68 year old male with PMH of seizure, HTN, Parkinson's disease (Dx in 2012, wheelchair bound since 2020) and frequent falls; presented to [facility] on 4/17 for elective L craniotomy for meningioma resection with Dr. D'Amico. His procedure was tolerated well and was discharged to [facility] on 4/21. During transit to , he complained of incisional headache with episodes of nausea and vomiting requiring Zofran and Oxycodone, but his symptoms persisted. Upon arrival to , an RRT was called for worsening mental status and a CTH revealed an acute large left frontal hemorrhage with IVH, cerebral edema with midline shift and some effacement of L frontal horn. He was transferred back to [facility].  He underwent EVD placement on 4/22. He was extubated on 4/23, and placed on HFNC with eventual wean to NC.  His hospital course was complicated by RLL consolidation (s/p Zosyn), HTN, bradycardia (self-resolved), hyponatremia, NINFA, and L cheek herpetic lesion (requiring 7 day course of Valtrex). S/p EVD removal on 5/1. Patient now admitted for a multidisciplinary rehab program. 05-03-23    * Noted to have increased stuttering and tremors - CTH 5/18 negative- continue to monitor * Continue to monitor rehab progress *    * Plan for DC home 5/25     Rehab Management/MEDICAL MANAGEMENT   #Functional deficits s/p non-traumatic ICH  - Gait Instability, ADL impairments and Functional impairments: start Comprehensive Rehab Program of PT/OT/SLP  - 3 hours a day, 5 days a week  - P&O as needed   - CTH revealed L frontal lobe hemorrhage with intraventricular hemorrhage and increased size of R lateral ventricle  - S/p EVD placement on 4/22, removed on 5/1   - S/p Decadron taper x1 week, completed  - Serial CTH 5/4, for interval AMS showed reduction of left frontal hemorrhage  - Continue Amantadine  - Sodium Chloride tabs to q8h, Na >135, taper  - 5/18: Stuttering and tremors noted - repeat CTH- no new acute process, slowy resolving L frontal hemorrhage     * Fall episode in his room, no injury 5/10-- CTH reduced size of intracranial hemorrhage  * Impaired safety awareness ,but no agitation, probably due to disinhibition due to effects of brain injury  --Move to room near nursing station when possible    #HSV-1 Infection, Left cheek s/p valtrex completed 5/5  - F/u awaiting result of HIV testing, done during acute care    #PNA  - S/p Zosyn (4/22-4/27)   - Albuterol/Ipratropium Nebulizer prn  - Incentive Spirometer    #Parkinson's Disease  - Continue Sinemet 4x per day, Entacapone 200mg q8h  - Melatonin  - bowel regimen  -- Neurology review for Parkinson's disease, progressive hand tremors 5/11  ---Amantadine 100mg BID - tolerating well. Consider starting SSRI (mirtazepine or lexapro) for anxiety,  --- Or consider trial of benzodiazepine (clonazepam) and rivastigmine will be considered afterwards, while considering risk ot reducing seizure threshold    #Seizures  - Keppra BID  -seizure precautions     #HTN  - Amlodipine  - Goal SBP: 100-160    #Skin  - Skin: scalp incision healing well, well approximated  - Pustular rash b/l armpits-ID eval requested  - Pressure injury/Skin: OOB to Chair, PT/OT     #Pain Mgmt   - Tylenol PRN  - Lidocaine patch  - Home: Gabapentin-on hold per NSx    #GI/Bowel Mgmt   - Continent c/w Senna, Miralax BID    #/Bladder Mgmt --Voiding     #FEN   - Diet - Soft & Bite Sized   - Dysphagia  SLP - evaluation and treatment    #Precautions / PROPHYLAXIS:   - Falls  - ortho: Weight bearing status: WBAT   - Lungs: Aspiration, Incentive Spirometer   - DVT: Lovenox  -------------------------------------------------------------  Dr. Cassidy's Liason with family/providers:     Liaison with family 5/9--i called family--spoke with patient's daughter (with ph number in EMR for spouse), she reports that patient's on, will be visiting patient tomorrow am and will discuss update with me  There was no response to my call to patient's son on ph     5/10--I called on ph and d/w Mr Hearn, son, as noted above  5/16--Called patient' family on phone, discussed treatment update, functional progress and dc plan   -------------------------------------------------------------  IDT conference on  5/16   TDD: 5/25 to home  Barriers: Safety, difficulty managing turning  Social Work: Lives with daughter and son  OT: Max to total A for ADLs.   PT: Min-mod A for transfers. Ambulated 10 ft with RW with min A and WCF.  SLP: Soft & bite sized with TLs.  Will need family training  ------------------------------------------------------  FOLLOW UP/OUTPATIENT:    D'Amico, Randy  [phone]    Dianelys Nguyen  [address] [place] [address]  Phone: [phone]  Fax: (   )    -  Follow Up Time:    D'Amico, Randy  [facility]  NEUROSURG [address] [place] [address] S  Scheduled Appointment: 05/10/2023  ------------------------------------------------------------- MAHDI KELY is a 68 year old male with PMH of seizure, HTN, Parkinson's disease (Dx in 2012, wheelchair bound since 2020) and frequent falls; presented to [facility] on 4/17 for elective L craniotomy for meningioma resection with Dr. D'Amico. His procedure was tolerated well and was discharged to [facility] on 4/21. During transit to , he complained of incisional headache with episodes of nausea and vomiting requiring Zofran and Oxycodone, but his symptoms persisted. Upon arrival to , an RRT was called for worsening mental status and a CTH revealed an acute large left frontal hemorrhage with IVH, cerebral edema with midline shift and some effacement of L frontal horn. He was transferred back to [facility].  He underwent EVD placement on 4/22. He was extubated on 4/23, and placed on HFNC with eventual wean to NC.  His hospital course was complicated by RLL consolidation (s/p Zosyn), HTN, bradycardia (self-resolved), hyponatremia, NINFA, and L cheek herpetic lesion (requiring 7 day course of Valtrex). S/p EVD removal on 5/1. Patient now admitted for a multidisciplinary rehab program. 05-03-23    * Noted to have increased stuttering and tremors - CTH 5/18 negative- continue to monitor * Continue to monitor rehab progress    * Plan for DC home 5/25   * UA -ve     Rehab Management/MEDICAL MANAGEMENT   #Functional deficits s/p non-traumatic ICH  - Gait Instability, ADL impairments and Functional impairments: start Comprehensive Rehab Program of PT/OT/SLP  - 3 hours a day, 5 days a week  - P&O as needed   - CTH revealed L frontal lobe hemorrhage with intraventricular hemorrhage and increased size of R lateral ventricle  - S/p EVD placement on 4/22, removed on 5/1   - S/p Decadron taper x1 week, completed  - Serial CTH 5/4, for interval AMS showed reduction of left frontal hemorrhage  - Continue Amantadine  - Sodium Chloride tabs to q8h, Na >135, taper  - 5/18: Stuttering and tremors noted - repeat CTH- no new acute process, slowy resolving L frontal hemorrhage     * Fall episode in his room, no injury 5/10-- CTH reduced size of intracranial hemorrhage  * Impaired safety awareness ,but no agitation, probably due to disinhibition due to effects of brain injury  --Move to room near nursing station when possible    #HSV-1 Infection, Left cheek s/p valtrex completed 5/5  - F/u awaiting result of HIV testing, done during acute care    #PNA  - S/p Zosyn (4/22-4/27)   - Albuterol/Ipratropium Nebulizer prn  - Incentive Spirometer    #Parkinson's Disease  - Continue Sinemet 4x per day, Entacapone 200mg q8h  - Melatonin  - bowel regimen  -- Neurology review for Parkinson's disease, progressive hand tremors 5/11  ---Amantadine 100mg BID - tolerating well. Consider starting SSRI (mirtazepine or lexapro) for anxiety,  --- Or consider trial of benzodiazepine (clonazepam) and rivastigmine will be considered afterwards, while considering risk ot reducing seizure threshold    #Seizures  - Keppra BID  -seizure precautions     #HTN  - Amlodipine  - Goal SBP: 100-160    #Skin  - Skin: scalp incision healing well, well approximated  - Pustular rash b/l armpits-ID eval requested  - Pressure injury/Skin: OOB to Chair, PT/OT     #Pain Mgmt   - Tylenol PRN  - Lidocaine patch  - Home: Gabapentin-on hold per NSx    #GI/Bowel Mgmt   - Continent c/w Senna, Miralax BID    #/Bladder Mgmt --Voiding     #FEN   - Diet - Soft & Bite Sized   - Dysphagia  SLP - evaluation and treatment    #Precautions / PROPHYLAXIS:   - Falls  - ortho: Weight bearing status: WBAT   - Lungs: Aspiration, Incentive Spirometer   - DVT: Lovenox  -------------------------------------------------------------  Dr. Cassidy's Liason with family/providers:     Liaison with family 5/9--i called family--spoke with patient's daughter (with ph number in EMR for spouse), she reports that patient's on, will be visiting patient tomorrow am and will discuss update with me  There was no response to my call to patient's son on ph     5/10--I called on ph and d/w Mr Hearn, son, as noted above  5/16--Called patient' family on phone, discussed treatment update, functional progress and dc plan   -------------------------------------------------------------  IDT conference on  5/16   TDD: 5/25 to home  Barriers: Safety, difficulty managing turning  Social Work: Lives with daughter and son  OT: Max to total A for ADLs.   PT: Min-mod A for transfers. Ambulated 10 ft with RW with min A and WCF.  SLP: Soft & bite sized with TLs.  Will need family training  ------------------------------------------------------  FOLLOW UP/OUTPATIENT:    D'Amico, Randy  [phone]    Dianelys Nguyen  [address] [place] [address]  Phone: [phone]  Fax: (   )    -  Follow Up Time:    D'Amico, Randy  [facility]  NEUROSURG [address] [place] [address] S  Scheduled Appointment: 05/10/2023  -------------------------------------------------------------

## 2023-05-20 PROCEDURE — 99232 SBSQ HOSP IP/OBS MODERATE 35: CPT

## 2023-05-20 RX ADMIN — Medication 100 MILLIGRAM(S): at 05:19

## 2023-05-20 RX ADMIN — ENTACAPONE 200 MILLIGRAM(S): 200 TABLET, FILM COATED ORAL at 08:08

## 2023-05-20 RX ADMIN — CARBIDOPA AND LEVODOPA 1 TABLET(S): 25; 100 TABLET ORAL at 15:59

## 2023-05-20 RX ADMIN — Medication 1 APPLICATION(S): at 05:19

## 2023-05-20 RX ADMIN — LEVETIRACETAM 1000 MILLIGRAM(S): 250 TABLET, FILM COATED ORAL at 18:00

## 2023-05-20 RX ADMIN — Medication 1 DROP(S): at 15:59

## 2023-05-20 RX ADMIN — CARBIDOPA AND LEVODOPA 1 TABLET(S): 25; 100 TABLET ORAL at 19:46

## 2023-05-20 RX ADMIN — Medication 1 DROP(S): at 18:00

## 2023-05-20 RX ADMIN — CARBIDOPA AND LEVODOPA 1 TABLET(S): 25; 100 TABLET ORAL at 12:03

## 2023-05-20 RX ADMIN — Medication 1 APPLICATION(S): at 18:01

## 2023-05-20 RX ADMIN — AMLODIPINE BESYLATE 10 MILLIGRAM(S): 2.5 TABLET ORAL at 05:19

## 2023-05-20 RX ADMIN — ENTACAPONE 200 MILLIGRAM(S): 200 TABLET, FILM COATED ORAL at 12:03

## 2023-05-20 RX ADMIN — Medication 100 MILLIGRAM(S): at 18:00

## 2023-05-20 RX ADMIN — CARBIDOPA AND LEVODOPA 1 TABLET(S): 25; 100 TABLET ORAL at 08:08

## 2023-05-20 RX ADMIN — LEVETIRACETAM 1000 MILLIGRAM(S): 250 TABLET, FILM COATED ORAL at 05:19

## 2023-05-20 RX ADMIN — ENOXAPARIN SODIUM 40 MILLIGRAM(S): 100 INJECTION SUBCUTANEOUS at 11:13

## 2023-05-20 RX ADMIN — POLYETHYLENE GLYCOL 3350 17 GRAM(S): 17 POWDER, FOR SOLUTION ORAL at 18:01

## 2023-05-20 RX ADMIN — Medication 1 DROP(S): at 05:19

## 2023-05-20 RX ADMIN — POLYETHYLENE GLYCOL 3350 17 GRAM(S): 17 POWDER, FOR SOLUTION ORAL at 05:20

## 2023-05-20 RX ADMIN — Medication 1 DROP(S): at 22:20

## 2023-05-20 RX ADMIN — ENTACAPONE 200 MILLIGRAM(S): 200 TABLET, FILM COATED ORAL at 15:59

## 2023-05-20 RX ADMIN — Medication 1 DROP(S): at 11:15

## 2023-05-20 NOTE — PROGRESS NOTE ADULT - SUBJECTIVE AND OBJECTIVE BOX
Patient is a 68y old  Male who presents with a chief complaint of Functional deficits s/p non-traumatic ICH (19 May 2023 12:16)      Subjective and overnight events:  Patient seen and examined at bedside. no complaints. slept well. denies headache, dizziness, sob, cp, abd pain.     ALLERGIES:  No Known Allergies    MEDICATIONS  (STANDING):  amantadine Syrup 100 milliGRAM(s) Oral two times a day  amLODIPine   Tablet 10 milliGRAM(s) Oral daily  AQUAPHOR (petrolatum Ointment) 1 Application(s) Topical two times a day  artificial  tears Solution 1 Drop(s) Both EYES every 4 hours  carbidopa/levodopa  25/250 1 Tablet(s) Oral <User Schedule>  enoxaparin Injectable 40 milliGRAM(s) SubCutaneous every 24 hours  entacapone 200 milliGRAM(s) Oral <User Schedule>  levETIRAcetam 1000 milliGRAM(s) Oral two times a day  lidocaine   4% Patch 1 Patch Transdermal every 24 hours  melatonin 9 milliGRAM(s) Oral at bedtime  polyethylene glycol 3350 17 Gram(s) Oral two times a day  saline laxative (FLEET) Rectal Enema 1 Enema Rectal once  senna 2 Tablet(s) Oral at bedtime  tiotropium 2.5 MICROgram(s) Inhaler 2 Puff(s) Inhalation daily    MEDICATIONS  (PRN):  acetaminophen   Oral Liquid .. 650 milliGRAM(s) Oral every 6 hours PRN Mild Pain (1 - 3)  acetylcysteine 20%  Inhalation 4 milliLiter(s) Inhalation every 6 hours PRN congestion  albuterol    90 MICROgram(s) HFA Inhaler 2 Puff(s) Inhalation every 6 hours PRN Shortness of Breath and/or Wheezing    Vital Signs Last 24 Hrs  T(F): 97.6 (20 May 2023 07:45), Max: 97.6 (20 May 2023 07:45)  HR: 58 (20 May 2023 07:45) (53 - 60)  BP: 137/87 (20 May 2023 07:45) (137/87 - 143/85)  RR: 16 (20 May 2023 07:45) (16 - 16)  SpO2: 97% (20 May 2023 07:45) (95% - 99%)  I&O's Summary    PHYSICAL EXAM:  General: NAD, Awake alert. +stutter  ENT: MMM  Neck: Supple, No JVD  Lungs: Clear to auscultation bilaterally  Cardio: RRR, S1/S2, No murmurs  Abdomen: Soft, Nontender, Nondistended; Bowel sounds present  Extremities: No calf tenderness, No pitting edema    LABS:                        12.8   5.60  )-----------( 214      ( 18 May 2023 06:40 )             39.2     -    141  |  106  |  16  ----------------------------<  99  3.7   |  27  |  1.47    Ca    9.4      18 May 2023 06:40    TPro  6.7  /  Alb  3.3  /  TBili  0.3  /  DBili  x   /  AST  12  /  ALT  24  /  AlkPhos  53  -      04-23 Chol 151 mg/dL LDL -- HDL 55 mg/dL Trig 115 mg/dL      Urinalysis Basic - ( 19 May 2023 08:04 )    Color: Yellow / Appearance: Clear / S.009 / pH: x  Gluc: x / Ketone: Negative mg/dL  / Bili: Negative / Urobili: 0.2 mg/dL   Blood: x / Protein: Negative mg/dL / Nitrite: Negative   Leuk Esterase: Negative / RBC: x / WBC x   Sq Epi: x / Non Sq Epi: x / Bacteria: x            RADIOLOGY & ADDITIONAL TESTS:    Care Discussed with Consultants/Other Providers:

## 2023-05-20 NOTE — PROGRESS NOTE ADULT - SUBJECTIVE AND OBJECTIVE BOX
No overnight events.      REVIEW OF SYSTEMS  Constitutional - No fever,  No fatigue  Neurological - No headaches, No loss of strength  Musculoskeletal - No joint pain, No joint swelling, No muscle pain    VITALS  T(C): 36.4 (23 @ 07:45), Max: 36.4 (23 @ 07:45)  HR: 58 (23 @ 07:45) (53 - 60)  BP: 137/87 (23 @ 07:45) (137/87 - 143/85)  RR: 16 (23 @ 07:45) (16 - 16)  SpO2: 97% (23 @ 07:45) (95% - 99%)  Wt(kg): --       MEDICATIONS   acetaminophen   Oral Liquid .. 650 milliGRAM(s) every 6 hours PRN  acetylcysteine 20%  Inhalation 4 milliLiter(s) every 6 hours PRN  albuterol    90 MICROgram(s) HFA Inhaler 2 Puff(s) every 6 hours PRN  amantadine Syrup 100 milliGRAM(s) two times a day  amLODIPine   Tablet 10 milliGRAM(s) daily  AQUAPHOR (petrolatum Ointment) 1 Application(s) two times a day  artificial  tears Solution 1 Drop(s) every 4 hours  carbidopa/levodopa  25/250 1 Tablet(s) <User Schedule>  enoxaparin Injectable 40 milliGRAM(s) every 24 hours  entacapone 200 milliGRAM(s) <User Schedule>  levETIRAcetam 1000 milliGRAM(s) two times a day  lidocaine   4% Patch 1 Patch every 24 hours  melatonin 9 milliGRAM(s) at bedtime  polyethylene glycol 3350 17 Gram(s) two times a day  saline laxative (FLEET) Rectal Enema 1 Enema once  senna 2 Tablet(s) at bedtime  tiotropium 2.5 MICROgram(s) Inhaler 2 Puff(s) daily      RECENT LABS/IMAGING            Urinalysis Basic - ( 19 May 2023 08:04 )    Color: Yellow / Appearance: Clear / S.009 / pH: x  Gluc: x / Ketone: Negative mg/dL  / Bili: Negative / Urobili: 0.2 mg/dL   Blood: x / Protein: Negative mg/dL / Nitrite: Negative   Leuk Esterase: Negative / RBC: x / WBC x   Sq Epi: x / Non Sq Epi: x / Bacteria: x          < from: CT Head No Cont (23 @ 14:19) >    IMPRESSION: Acute parenchymal hemorrhage involving left frontal region is   again seen and does demonstrate minimal expected evolutionary changes.      < end of copied text >        ---------  PHYSICAL EXAM  Constitutional - NAD, Comfortable, in bed   Pulm - Breathing comfortably, No wheezing  Abd - Soft, NTND  Extremities - No edema, No calf tenderness  Neurologic Exam -                    Cognitive - Awake, Alert     Communication - dysarthria, hypophonia      Motor - moves all ext      Sensory - Intact to LT  Psychiatric - Mood WNL, Affect WNL    ASSESSMENT/PLAN  68y Male seizure, HTN, parkinson's with functional deficits after meningioma resection   acute left frontal hemorrhage   keppra for seizure prophylaxis   on amantadine  dysphagia   Continue current medical management  Pain - Tylenol PRN  DVT PPX - lovenox   Continue 3hrs a day of comprehensive rehab program.

## 2023-05-21 PROCEDURE — 99232 SBSQ HOSP IP/OBS MODERATE 35: CPT

## 2023-05-21 RX ADMIN — Medication 1 DROP(S): at 10:56

## 2023-05-21 RX ADMIN — LEVETIRACETAM 1000 MILLIGRAM(S): 250 TABLET, FILM COATED ORAL at 05:31

## 2023-05-21 RX ADMIN — ENTACAPONE 200 MILLIGRAM(S): 200 TABLET, FILM COATED ORAL at 16:08

## 2023-05-21 RX ADMIN — Medication 1 DROP(S): at 20:12

## 2023-05-21 RX ADMIN — Medication 100 MILLIGRAM(S): at 17:43

## 2023-05-21 RX ADMIN — Medication 1 DROP(S): at 17:43

## 2023-05-21 RX ADMIN — ENTACAPONE 200 MILLIGRAM(S): 200 TABLET, FILM COATED ORAL at 08:00

## 2023-05-21 RX ADMIN — Medication 1 APPLICATION(S): at 17:43

## 2023-05-21 RX ADMIN — CARBIDOPA AND LEVODOPA 1 TABLET(S): 25; 100 TABLET ORAL at 08:00

## 2023-05-21 RX ADMIN — ENTACAPONE 200 MILLIGRAM(S): 200 TABLET, FILM COATED ORAL at 11:53

## 2023-05-21 RX ADMIN — AMLODIPINE BESYLATE 10 MILLIGRAM(S): 2.5 TABLET ORAL at 05:31

## 2023-05-21 RX ADMIN — LEVETIRACETAM 1000 MILLIGRAM(S): 250 TABLET, FILM COATED ORAL at 17:43

## 2023-05-21 RX ADMIN — ENOXAPARIN SODIUM 40 MILLIGRAM(S): 100 INJECTION SUBCUTANEOUS at 10:56

## 2023-05-21 RX ADMIN — Medication 1 DROP(S): at 16:08

## 2023-05-21 RX ADMIN — CARBIDOPA AND LEVODOPA 1 TABLET(S): 25; 100 TABLET ORAL at 16:07

## 2023-05-21 RX ADMIN — Medication 1 APPLICATION(S): at 05:36

## 2023-05-21 RX ADMIN — Medication 1 DROP(S): at 05:32

## 2023-05-21 RX ADMIN — CARBIDOPA AND LEVODOPA 1 TABLET(S): 25; 100 TABLET ORAL at 20:06

## 2023-05-21 RX ADMIN — Medication 9 MILLIGRAM(S): at 20:12

## 2023-05-21 RX ADMIN — Medication 100 MILLIGRAM(S): at 05:31

## 2023-05-21 RX ADMIN — CARBIDOPA AND LEVODOPA 1 TABLET(S): 25; 100 TABLET ORAL at 11:53

## 2023-05-21 NOTE — PROGRESS NOTE ADULT - ASSESSMENT
68 year old male PMH seizure, HTN, Parkinson's disease (Dx in 2012, wheelchair bound since 2020) and frequent falls; presented to [facility] on 4/17 for elective L craniotomy for meningioma resection with Dr. D'Amico. His procedure was tolerated well and was discharged to [facility] on 4/21. During transit to , he complained of incisional headache with episodes of nausea and vomiting requiring Zofran and Oxycodone, but his symptoms persisted. Upon arrival to , an RRT was called for worsening mental status and a CTH revealed an acute large left frontal hemorrhage with IVH, cerebral edema with midline shift and some effacement of L frontal horn. He was transferred back to [facility].  He underwent EVD placement on 4/22. He was extubated on 4/23, and placed on HFNC with eventual wean to NC.  His hospital course was complicated by RLL consolidation (s/p Zosyn), HTN, bradycardia (self-resolved), hyponatremia, NINFA, and L cheek herpetic lesion (requiring 7 day course of Valtrex). S/p EVD removal on 5/1. Patient now admitted for a multidisciplinary rehab program    #Debility s/p non-traumatic ICH  - CTH revealed Left frontal lobe hemorrhage with intraventricular hemorrhage and increased size of Rt lateral ventricle  - repeat CT head 5/18 with slowly improving L frontal hemorrhage, no acute findings   - S/p EVD placement on 4/22, removed on 5/1   - S/p Decadron taper  - Continue Amantadine  - DVT ppx restarted   - continue comprehensive acute rehab program      #HSV-1 Infection  - Left cheek lesion  - completed valtrex course      #Parkinson's Disease  - Continue Sinemet, Entacapone  - Melatonin  - movement disorder specialist following    #Seizures  - Keppra BID  - seizure precautions     #HTN  - continue Amlodipine 10mg     #DVT ppx  - Lovenox

## 2023-05-21 NOTE — PROGRESS NOTE ADULT - SUBJECTIVE AND OBJECTIVE BOX
Patient is a 68y old  Male who presents with a chief complaint of Functional deficits s/p non-traumatic ICH (21 May 2023 11:38)      Subjective and overnight events:  Patient seen and examined at bedside. no complaints. slept well, + BM. no fever, chills, headache, dizziness, sob, cp.     ALLERGIES:  No Known Allergies    MEDICATIONS  (STANDING):  amantadine Syrup 100 milliGRAM(s) Oral two times a day  amLODIPine   Tablet 10 milliGRAM(s) Oral daily  AQUAPHOR (petrolatum Ointment) 1 Application(s) Topical two times a day  artificial  tears Solution 1 Drop(s) Both EYES every 4 hours  carbidopa/levodopa  25/250 1 Tablet(s) Oral <User Schedule>  enoxaparin Injectable 40 milliGRAM(s) SubCutaneous every 24 hours  entacapone 200 milliGRAM(s) Oral <User Schedule>  levETIRAcetam 1000 milliGRAM(s) Oral two times a day  lidocaine   4% Patch 1 Patch Transdermal every 24 hours  melatonin 9 milliGRAM(s) Oral at bedtime  polyethylene glycol 3350 17 Gram(s) Oral two times a day  saline laxative (FLEET) Rectal Enema 1 Enema Rectal once  senna 2 Tablet(s) Oral at bedtime  tiotropium 2.5 MICROgram(s) Inhaler 2 Puff(s) Inhalation daily    MEDICATIONS  (PRN):  acetaminophen   Oral Liquid .. 650 milliGRAM(s) Oral every 6 hours PRN Mild Pain (1 - 3)  acetylcysteine 20%  Inhalation 4 milliLiter(s) Inhalation every 6 hours PRN congestion  albuterol    90 MICROgram(s) HFA Inhaler 2 Puff(s) Inhalation every 6 hours PRN Shortness of Breath and/or Wheezing    Vital Signs Last 24 Hrs  T(F): 98.1 (20 May 2023 19:48), Max: 98.1 (20 May 2023 19:48)  HR: 58 (21 May 2023 07:36) (58 - 64)  BP: 137/84 (21 May 2023 07:36) (114/74 - 137/85)  RR: 16 (21 May 2023 07:36) (16 - 16)  SpO2: 98% (21 May 2023 07:36) (98% - 99%)  I&O's Summary    PHYSICAL EXAM:  General: NAD, awake alert and answering questions appropriately   ENT: MMM  Neck: Supple, No JVD  Lungs: Clear to auscultation bilaterally  Cardio: RRR, S1/S2, No murmurs  Abdomen: Soft, Nontender, Nondistended; Bowel sounds present  Extremities: No calf tenderness, No pitting edema    LABS:             Chol 151 mg/dL LDL -- HDL 55 mg/dL Trig 115 mg/dL            Urinalysis Basic - ( 19 May 2023 08:04 )    Color: Yellow / Appearance: Clear / S.009 / pH: x  Gluc: x / Ketone: Negative mg/dL  / Bili: Negative / Urobili: 0.2 mg/dL   Blood: x / Protein: Negative mg/dL / Nitrite: Negative   Leuk Esterase: Negative / RBC: x / WBC x   Sq Epi: x / Non Sq Epi: x / Bacteria: x            RADIOLOGY & ADDITIONAL TESTS:    Care Discussed with Consultants/Other Providers:

## 2023-05-21 NOTE — PROGRESS NOTE ADULT - SUBJECTIVE AND OBJECTIVE BOX
No overnight events.      REVIEW OF SYSTEMS  Constitutional - No fever,  No fatigue  Neurological - No headaches, No loss of strength  Musculoskeletal - No joint pain, No joint swelling, No muscle pain    VITALS  T(C): 36.7 (05-20-23 @ 19:48), Max: 36.7 (05-20-23 @ 19:48)  HR: 58 (05-21-23 @ 07:36) (58 - 64)  BP: 137/84 (05-21-23 @ 07:36) (114/74 - 137/85)  RR: 16 (05-21-23 @ 07:36) (16 - 16)  SpO2: 98% (05-21-23 @ 07:36) (98% - 99%)  Wt(kg): --       MEDICATIONS   acetaminophen   Oral Liquid .. 650 milliGRAM(s) every 6 hours PRN  acetylcysteine 20%  Inhalation 4 milliLiter(s) every 6 hours PRN  albuterol    90 MICROgram(s) HFA Inhaler 2 Puff(s) every 6 hours PRN  amantadine Syrup 100 milliGRAM(s) two times a day  amLODIPine   Tablet 10 milliGRAM(s) daily  AQUAPHOR (petrolatum Ointment) 1 Application(s) two times a day  artificial  tears Solution 1 Drop(s) every 4 hours  carbidopa/levodopa  25/250 1 Tablet(s) <User Schedule>  enoxaparin Injectable 40 milliGRAM(s) every 24 hours  entacapone 200 milliGRAM(s) <User Schedule>  levETIRAcetam 1000 milliGRAM(s) two times a day  lidocaine   4% Patch 1 Patch every 24 hours  melatonin 9 milliGRAM(s) at bedtime  polyethylene glycol 3350 17 Gram(s) two times a day  saline laxative (FLEET) Rectal Enema 1 Enema once  senna 2 Tablet(s) at bedtime  tiotropium 2.5 MICROgram(s) Inhaler 2 Puff(s) daily      RECENT LABS/IMAGING            ----  PHYSICAL EXAM  Constitutional - NAD, Comfortable, in bed   Pulm - Breathing comfortably, No wheezing  Abd - Soft, NTND  Extremities - No edema, No calf tenderness  Neurologic Exam -                    Cognitive - Awake, Alert     Communication - dysarthria, hypophonia      Motor - moves all ext      Sensory - Intact to LT  Psychiatric - Mood WNL, Affect WNL    ASSESSMENT/PLAN  68y Male seizure, HTN, parkinson's with functional deficits after meningioma resection   acute left frontal hemorrhage   keppra for seizure prophylaxis   on amantadine  dysphagia   Continue current medical management  Pain - Tylenol PRN  DVT PPX - lovenox   Continue 3hrs a day of comprehensive rehab program.

## 2023-05-22 LAB
ALBUMIN SERPL ELPH-MCNC: 3.4 G/DL — SIGNIFICANT CHANGE UP (ref 3.3–5)
ALP SERPL-CCNC: 59 U/L — SIGNIFICANT CHANGE UP (ref 40–120)
ALT FLD-CCNC: 19 U/L — SIGNIFICANT CHANGE UP (ref 10–45)
ANION GAP SERPL CALC-SCNC: 8 MMOL/L — SIGNIFICANT CHANGE UP (ref 5–17)
AST SERPL-CCNC: 13 U/L — SIGNIFICANT CHANGE UP (ref 10–40)
BASOPHILS # BLD AUTO: 0.02 K/UL — SIGNIFICANT CHANGE UP (ref 0–0.2)
BASOPHILS NFR BLD AUTO: 0.6 % — SIGNIFICANT CHANGE UP (ref 0–2)
BILIRUB SERPL-MCNC: 0.4 MG/DL — SIGNIFICANT CHANGE UP (ref 0.2–1.2)
BUN SERPL-MCNC: 15 MG/DL — SIGNIFICANT CHANGE UP (ref 7–23)
CALCIUM SERPL-MCNC: 9.6 MG/DL — SIGNIFICANT CHANGE UP (ref 8.4–10.5)
CHLORIDE SERPL-SCNC: 107 MMOL/L — SIGNIFICANT CHANGE UP (ref 96–108)
CO2 SERPL-SCNC: 29 MMOL/L — SIGNIFICANT CHANGE UP (ref 22–31)
CREAT SERPL-MCNC: 1.24 MG/DL — SIGNIFICANT CHANGE UP (ref 0.5–1.3)
EGFR: 63 ML/MIN/1.73M2 — SIGNIFICANT CHANGE UP
EOSINOPHIL # BLD AUTO: 0.06 K/UL — SIGNIFICANT CHANGE UP (ref 0–0.5)
EOSINOPHIL NFR BLD AUTO: 1.8 % — SIGNIFICANT CHANGE UP (ref 0–6)
GLUCOSE SERPL-MCNC: 99 MG/DL — SIGNIFICANT CHANGE UP (ref 70–99)
HCT VFR BLD CALC: 42.3 % — SIGNIFICANT CHANGE UP (ref 39–50)
HGB BLD-MCNC: 13.6 G/DL — SIGNIFICANT CHANGE UP (ref 13–17)
IMM GRANULOCYTES NFR BLD AUTO: 0.3 % — SIGNIFICANT CHANGE UP (ref 0–0.9)
LYMPHOCYTES # BLD AUTO: 1.82 K/UL — SIGNIFICANT CHANGE UP (ref 1–3.3)
LYMPHOCYTES # BLD AUTO: 53.8 % — HIGH (ref 13–44)
MCHC RBC-ENTMCNC: 31.7 PG — SIGNIFICANT CHANGE UP (ref 27–34)
MCHC RBC-ENTMCNC: 32.2 GM/DL — SIGNIFICANT CHANGE UP (ref 32–36)
MCV RBC AUTO: 98.6 FL — SIGNIFICANT CHANGE UP (ref 80–100)
MONOCYTES # BLD AUTO: 0.4 K/UL — SIGNIFICANT CHANGE UP (ref 0–0.9)
MONOCYTES NFR BLD AUTO: 11.8 % — SIGNIFICANT CHANGE UP (ref 2–14)
NEUTROPHILS # BLD AUTO: 1.07 K/UL — LOW (ref 1.8–7.4)
NEUTROPHILS NFR BLD AUTO: 31.7 % — LOW (ref 43–77)
NRBC # BLD: 0 /100 WBCS — SIGNIFICANT CHANGE UP (ref 0–0)
PLATELET # BLD AUTO: 238 K/UL — SIGNIFICANT CHANGE UP (ref 150–400)
POTASSIUM SERPL-MCNC: 3.9 MMOL/L — SIGNIFICANT CHANGE UP (ref 3.5–5.3)
POTASSIUM SERPL-SCNC: 3.9 MMOL/L — SIGNIFICANT CHANGE UP (ref 3.5–5.3)
PROT SERPL-MCNC: 7 G/DL — SIGNIFICANT CHANGE UP (ref 6–8.3)
RBC # BLD: 4.29 M/UL — SIGNIFICANT CHANGE UP (ref 4.2–5.8)
RBC # FLD: 15.1 % — HIGH (ref 10.3–14.5)
SODIUM SERPL-SCNC: 144 MMOL/L — SIGNIFICANT CHANGE UP (ref 135–145)
WBC # BLD: 3.38 K/UL — LOW (ref 3.8–10.5)
WBC # FLD AUTO: 3.38 K/UL — LOW (ref 3.8–10.5)

## 2023-05-22 PROCEDURE — 99232 SBSQ HOSP IP/OBS MODERATE 35: CPT

## 2023-05-22 RX ADMIN — SENNA PLUS 2 TABLET(S): 8.6 TABLET ORAL at 22:34

## 2023-05-22 RX ADMIN — Medication 9 MILLIGRAM(S): at 22:35

## 2023-05-22 RX ADMIN — Medication 1 APPLICATION(S): at 17:33

## 2023-05-22 RX ADMIN — Medication 100 MILLIGRAM(S): at 17:33

## 2023-05-22 RX ADMIN — CARBIDOPA AND LEVODOPA 1 TABLET(S): 25; 100 TABLET ORAL at 16:11

## 2023-05-22 RX ADMIN — Medication 1 APPLICATION(S): at 05:18

## 2023-05-22 RX ADMIN — ENTACAPONE 200 MILLIGRAM(S): 200 TABLET, FILM COATED ORAL at 08:05

## 2023-05-22 RX ADMIN — ENTACAPONE 200 MILLIGRAM(S): 200 TABLET, FILM COATED ORAL at 12:22

## 2023-05-22 RX ADMIN — Medication 1 DROP(S): at 05:17

## 2023-05-22 RX ADMIN — CARBIDOPA AND LEVODOPA 1 TABLET(S): 25; 100 TABLET ORAL at 12:22

## 2023-05-22 RX ADMIN — CARBIDOPA AND LEVODOPA 1 TABLET(S): 25; 100 TABLET ORAL at 08:05

## 2023-05-22 RX ADMIN — Medication 1 DROP(S): at 17:33

## 2023-05-22 RX ADMIN — Medication 1 DROP(S): at 22:34

## 2023-05-22 RX ADMIN — LEVETIRACETAM 1000 MILLIGRAM(S): 250 TABLET, FILM COATED ORAL at 05:17

## 2023-05-22 RX ADMIN — POLYETHYLENE GLYCOL 3350 17 GRAM(S): 17 POWDER, FOR SOLUTION ORAL at 17:33

## 2023-05-22 RX ADMIN — LEVETIRACETAM 1000 MILLIGRAM(S): 250 TABLET, FILM COATED ORAL at 17:32

## 2023-05-22 RX ADMIN — AMLODIPINE BESYLATE 10 MILLIGRAM(S): 2.5 TABLET ORAL at 05:17

## 2023-05-22 RX ADMIN — ENOXAPARIN SODIUM 40 MILLIGRAM(S): 100 INJECTION SUBCUTANEOUS at 08:06

## 2023-05-22 RX ADMIN — ENTACAPONE 200 MILLIGRAM(S): 200 TABLET, FILM COATED ORAL at 16:11

## 2023-05-22 RX ADMIN — Medication 100 MILLIGRAM(S): at 08:05

## 2023-05-22 RX ADMIN — CARBIDOPA AND LEVODOPA 1 TABLET(S): 25; 100 TABLET ORAL at 19:46

## 2023-05-22 NOTE — PROGRESS NOTE ADULT - SUBJECTIVE AND OBJECTIVE BOX
Subjective   Patient seen and examined at bedside this AM.  Reports no acute distress  Patient was engaging and interactive  Tolerating oral diet    ROS  Denies headache, dizziness, visual changes, chest pain, SOB/NEVILLE, abdominal pain, nausea, vomiting, diarrhea, dysuria, numbness or tingling.   [facility] 5/20      Therapy--Observed, during therapy, ambulating with walker  D/W therapists, was able to perform body care in bathroom, with minimal help   Speech is clearer    Labs reviewed--normal CBC and renal/liver fxn    Vital Signs Last 24 Hrs  T(C): 36.3 (22 May 2023 08:18), Max: 36.7 (21 May 2023 22:30)  T(F): 97.3 (22 May 2023 08:18), Max: 98 (21 May 2023 22:30)  HR: 62 (22 May 2023 08:18) (57 - 62)  BP: 120/81 (22 May 2023 08:18) (120/81 - 148/84)  RR: 16 (22 May 2023 08:18) (16 - 16)  SpO2: 99% (22 May 2023 08:18) (99% - 99%)  O2 Parameters below as of 22 May 2023 08:18  Patient On (Oxygen Delivery Method): room air      EXAM  General: Comfortable, interactive  HENT: PERRL, EOM grossly in tact.,   Cardiac: Regular rate.  Pulm:  clear  Abd: Soft, non-tender, rounded. +BS  Ext--no ulcers    Neuro:   Alert and awake, still Dysarthria, hypophonia, but speech is clearer today, tremors are minimal  RUE 5/5, LUE 4+/5. Resting tremor, both hands L>R  Sensation grossly intact to light touch  MMT--UE 4/5  Lower extremities 3/5  Extremities: Skin is warm, perfused.     RECENT LABS/IMAGING                        13.6   3.38  )-----------( 238      ( 22 May 2023 06:13 )             42.3     05-22    144  |  107  |  15  ----------------------------<  99  3.9   |  29  |  1.24    Ca    9.6      22 May 2023 06:13    TPro  7.0  /  Alb  3.4  /  TBili  0.4  /  DBili  x   /  AST  13  /  ALT  19  /  AlkPhos  59  05-22      MEDICATIONS  (STANDING):  amantadine Syrup 100 milliGRAM(s) Oral two times a day  amLODIPine   Tablet 10 milliGRAM(s) Oral daily  AQUAPHOR (petrolatum Ointment) 1 Application(s) Topical two times a day  artificial  tears Solution 1 Drop(s) Both EYES every 4 hours  carbidopa/levodopa  25/250 1 Tablet(s) Oral <User Schedule>  enoxaparin Injectable 40 milliGRAM(s) SubCutaneous every 24 hours  entacapone 200 milliGRAM(s) Oral <User Schedule>  levETIRAcetam 1000 milliGRAM(s) Oral two times a day  lidocaine   4% Patch 1 Patch Transdermal every 24 hours  melatonin 9 milliGRAM(s) Oral at bedtime  polyethylene glycol 3350 17 Gram(s) Oral two times a day  saline laxative (FLEET) Rectal Enema 1 Enema Rectal once  senna 2 Tablet(s) Oral at bedtime  tiotropium 2.5 MICROgram(s) Inhaler 2 Puff(s) Inhalation daily    MEDICATIONS  (PRN):  acetaminophen   Oral Liquid .. 650 milliGRAM(s) Oral every 6 hours PRN Mild Pain (1 - 3)  acetylcysteine 20%  Inhalation 4 milliLiter(s) Inhalation every 6 hours PRN congestion  albuterol    90 MICROgram(s) HFA Inhaler 2 Puff(s) Inhalation every 6 hours PRN Shortness of Breath and/or Wheezing

## 2023-05-22 NOTE — PROGRESS NOTE ADULT - ASSESSMENT
68 year old male PMH seizure, HTN, Parkinson's disease (Dx in 2012, wheelchair bound since 2020) and frequent falls; presented to [facility] on 4/17 for elective L craniotomy for meningioma resection with Dr. D'Amico. His procedure was tolerated well and was discharged to [facility] on 4/21. During transit to , he complained of incisional headache with episodes of nausea and vomiting requiring Zofran and Oxycodone, but his symptoms persisted. Upon arrival to , an RRT was called for worsening mental status and a CTH revealed an acute large left frontal hemorrhage with IVH, cerebral edema with midline shift and some effacement of L frontal horn. He was transferred back to [facility].  He underwent EVD placement on 4/22. He was extubated on 4/23, and placed on HFNC with eventual wean to NC.  His hospital course was complicated by RLL consolidation (s/p Zosyn), HTN, bradycardia (self-resolved), hyponatremia, NINFA, and L cheek herpetic lesion (requiring 7 day course of Valtrex). S/p EVD removal on 5/1. Patient now admitted for a multidisciplinary rehab program    #Debility s/p non-traumatic ICH  - CTH revealed Left frontal lobe hemorrhage with intraventricular hemorrhage and increased size of Rt lateral ventricle  - repeat CT head 5/18 with slowly improving L frontal hemorrhage, no acute findings   - S/p EVD placement on 4/22, removed on 5/1   - S/p Decadron taper  - Continue Amantadine  - DVT ppx restarted   - continue comprehensive acute rehab program    #HSV-1 Infection  - Left cheek lesion  - completed valtrex course    #Parkinson's Disease  - Continue Sinemet, Entacapone  - Melatonin  - movement disorder specialist following    #Seizures  - Keppra BID  - seizure precautions     #HTN  - continue Amlodipine 10mg     #DVT ppx  - Lovenox

## 2023-05-22 NOTE — PROGRESS NOTE ADULT - SUBJECTIVE AND OBJECTIVE BOX
Patient is a 68y old  Male who presents with a chief complaint of Functional deficits s/p non-traumatic ICH (22 May 2023 09:34)      Patient seen and examined at bedside. No events overnight. Patient denies acute complaints at this time, no chest pain, sob, abd pain, headache, changes in vision, nausea or vomiting.    ALLERGIES:  No Known Allergies    MEDICATIONS  (STANDING):  amantadine Syrup 100 milliGRAM(s) Oral two times a day  amLODIPine   Tablet 10 milliGRAM(s) Oral daily  AQUAPHOR (petrolatum Ointment) 1 Application(s) Topical two times a day  artificial  tears Solution 1 Drop(s) Both EYES every 4 hours  carbidopa/levodopa  25/250 1 Tablet(s) Oral <User Schedule>  enoxaparin Injectable 40 milliGRAM(s) SubCutaneous every 24 hours  entacapone 200 milliGRAM(s) Oral <User Schedule>  levETIRAcetam 1000 milliGRAM(s) Oral two times a day  lidocaine   4% Patch 1 Patch Transdermal every 24 hours  melatonin 9 milliGRAM(s) Oral at bedtime  polyethylene glycol 3350 17 Gram(s) Oral two times a day  saline laxative (FLEET) Rectal Enema 1 Enema Rectal once  senna 2 Tablet(s) Oral at bedtime  tiotropium 2.5 MICROgram(s) Inhaler 2 Puff(s) Inhalation daily    MEDICATIONS  (PRN):  acetaminophen   Oral Liquid .. 650 milliGRAM(s) Oral every 6 hours PRN Mild Pain (1 - 3)  acetylcysteine 20%  Inhalation 4 milliLiter(s) Inhalation every 6 hours PRN congestion  albuterol    90 MICROgram(s) HFA Inhaler 2 Puff(s) Inhalation every 6 hours PRN Shortness of Breath and/or Wheezing    Vital Signs Last 24 Hrs  T(F): 97.3 (22 May 2023 08:18), Max: 98 (21 May 2023 22:30)  HR: 62 (22 May 2023 08:18) (57 - 62)  BP: 120/81 (22 May 2023 08:18) (120/81 - 148/84)  RR: 16 (22 May 2023 08:18) (16 - 16)  SpO2: 99% (22 May 2023 08:18) (99% - 99%)  I&O's Summary    PHYSICAL EXAM:  GENERAL: NAD, laying in bed  ENMT: Moist mucous membranes, no thrush  NECK: Supple, No JVD  CHEST/LUNG: Clear to auscultation bilaterally, good air entry, non-labored breathing  HEART: RRR; S1/S2, No murmur  ABDOMEN: Soft, Nontender, Nondistended; Bowel sounds present  EXTREMITIES: No calf tenderness, No cyanosis, No edema  SKIN: Warm, perfused  PSYCH: Normal mood, Normal affect    LABS:                        13.6   3.38  )-----------( 238      ( 22 May 2023 06:13 )             42.3     05-22    144  |  107  |  15  ----------------------------<  99  3.9   |  29  |  1.24    Ca    9.6      22 May 2023 06:13    TPro  7.0  /  Alb  3.4  /  TBili  0.4  /  DBili  x   /  AST  13  /  ALT  19  /  AlkPhos  59  05-22              04-23 Chol 151 mg/dL LDL -- HDL 55 mg/dL Trig 115 mg/dL                          RADIOLOGY & ADDITIONAL TESTS:    Care Discussed with Consultants/Other Providers:

## 2023-05-22 NOTE — PROGRESS NOTE ADULT - ASSESSMENT
MAHDI KELY is a 68 year old male with PMH of seizure, HTN, Parkinson's disease (Dx in 2012, wheelchair bound since 2020) and frequent falls; presented to [facility] on 4/17 for elective L craniotomy for meningioma resection with Dr. D'Amico. His procedure was tolerated well and was discharged to [facility] on 4/21. During transit to , he complained of incisional headache with episodes of nausea and vomiting requiring Zofran and Oxycodone, but his symptoms persisted. Upon arrival to , an RRT was called for worsening mental status and a CTH revealed an acute large left frontal hemorrhage with IVH, cerebral edema with midline shift and some effacement of L frontal horn. He was transferred back to [facility].  He underwent EVD placement on 4/22. He was extubated on 4/23, and placed on HFNC with eventual wean to NC.  His hospital course was complicated by RLL consolidation (s/p Zosyn), HTN, bradycardia (self-resolved), hyponatremia, NINFA, and L cheek herpetic lesion (requiring 7 day course of Valtrex). S/p EVD removal on 5/1. Patient now admitted for a multidisciplinary rehab program. 05-03-23    * Improved motor function, clearer speech  * Increasing ambulatory distance  * Update d/w family  * Dc planing    Rehab Management/MEDICAL MANAGEMENT   #Functional deficits s/p non-traumatic ICH  - Gait Instability, ADL impairments and Functional impairments: start Comprehensive Rehab Program of PT/OT/SLP  - 3 hours a day, 5 days a week  - P&O as needed   - CTH revealed L frontal lobe hemorrhage with intraventricular hemorrhage and increased size of R lateral ventricle  - S/p EVD placement on 4/22, removed on 5/1   - S/p Decadron taper x1 week, completed  - Serial CTH 5/4, for interval AMS showed reduction of left frontal hemorrhage  - Continue Amantadine  - Sodium Chloride tabs to q8h, Na >135, taper  - 5/18: Stuttering and tremors noted - repeat CTH- no new acute process, slowy resolving L frontal hemorrhage     * Fall episode in his room, no injury 5/10-- CTH reduced size of intracranial hemorrhage  * Impaired safety awareness ,but no agitation, probably due to disinhibition due to effects of brain injury  --Move to room near nursing station when possible    #HSV-1 Infection, Left cheek s/p valtrex completed 5/5  - F/u awaiting result of HIV testing, done during acute care    #PNA  - S/p Zosyn (4/22-4/27)   - Albuterol/Ipratropium Nebulizer prn  - Incentive Spirometer    #Parkinson's Disease  - Continue Sinemet 4x per day, Entacapone 200mg q8h  - Melatonin  - bowel regimen  -- Neurology review for Parkinson's disease, progressive hand tremors 5/11  ---Amantadine 100mg BID - tolerating well. Consider starting SSRI (mirtazepine or lexapro) for anxiety,  --- Or consider trial of benzodiazepine (clonazepam) and rivastigmine will be considered afterwards, while considering risk ot reducing seizure threshold    #Seizures  - Keppra BID  -seizure precautions     #HTN  - Amlodipine  - Goal SBP: 100-160    #Skin  - Skin: scalp incision healing well, well approximated  - Pustular rash b/l armpits-ID eval requested  - Pressure injury/Skin: OOB to Chair, PT/OT     #Pain Mgmt   - Tylenol PRN  - Lidocaine patch  - Home: Gabapentin-on hold per NSx    #GI/Bowel Mgmt   - Continent c/w Senna, Miralax BID    #/Bladder Mgmt --Voiding     #FEN   - Diet - Soft & Bite Sized   - Dysphagia  SLP - evaluation and treatment    #Precautions / PROPHYLAXIS:   - Falls  - ortho: Weight bearing status: WBAT   - Lungs: Aspiration, Incentive Spirometer   - DVT: Lovenox  -------------------------------------------------------------  Dr. Cassidy's Liason with family/providers:     Liaison with family 5/9--i called family--spoke with patient's daughter (with ph number in EMR for spouse), she reports that patient's on, will be visiting patient tomorrow am and will discuss update with me  There was no response to my call to patient's son on ph     5/10--I called on ph and d/w Mr Hearn, son, as noted above  5/16--Called patient' family on phone, discussed treatment update, functional progress and dc plan   -------------------------------------------------------------  IDT conference on  5/16   TDD: 5/25 to home  Barriers: Safety, difficulty managing turning  Social Work: Lives with daughter and son  OT: Max to total A for ADLs.   PT: Min-mod A for transfers. Ambulated 10 ft with RW with min A and WCF.  SLP: Soft & bite sized with TLs.  Will need family training  ------------------------------------------------------  FOLLOW UP/OUTPATIENT:    D'Amico, Randy  [phone]    Dianelys Nguyen  [address] [place] [address]  Phone: [phone]  Fax: (   )    -  Follow Up Time:    D'Amico, Randy  [facility] Physician [facility]  NEUROSURG [address] [place] [address] S  Scheduled Appointment: 05/10/2023  -------------------------------------------------------------    Non critical care  Time-based billing   Spent 45 mins--patient review, observation in therapy, discussion with family on function, dc plan  Co ordination of care  Discussion of lab results and liaison with other providers

## 2023-05-23 PROCEDURE — 99232 SBSQ HOSP IP/OBS MODERATE 35: CPT

## 2023-05-23 RX ADMIN — Medication 9 MILLIGRAM(S): at 20:24

## 2023-05-23 RX ADMIN — CARBIDOPA AND LEVODOPA 1 TABLET(S): 25; 100 TABLET ORAL at 12:08

## 2023-05-23 RX ADMIN — LEVETIRACETAM 1000 MILLIGRAM(S): 250 TABLET, FILM COATED ORAL at 18:35

## 2023-05-23 RX ADMIN — Medication 1 DROP(S): at 05:50

## 2023-05-23 RX ADMIN — Medication 1 DROP(S): at 20:24

## 2023-05-23 RX ADMIN — Medication 100 MILLIGRAM(S): at 18:35

## 2023-05-23 RX ADMIN — AMLODIPINE BESYLATE 10 MILLIGRAM(S): 2.5 TABLET ORAL at 05:50

## 2023-05-23 RX ADMIN — ENTACAPONE 200 MILLIGRAM(S): 200 TABLET, FILM COATED ORAL at 08:00

## 2023-05-23 RX ADMIN — LEVETIRACETAM 1000 MILLIGRAM(S): 250 TABLET, FILM COATED ORAL at 05:50

## 2023-05-23 RX ADMIN — ENTACAPONE 200 MILLIGRAM(S): 200 TABLET, FILM COATED ORAL at 12:08

## 2023-05-23 RX ADMIN — Medication 1 DROP(S): at 10:30

## 2023-05-23 RX ADMIN — ENOXAPARIN SODIUM 40 MILLIGRAM(S): 100 INJECTION SUBCUTANEOUS at 12:08

## 2023-05-23 RX ADMIN — CARBIDOPA AND LEVODOPA 1 TABLET(S): 25; 100 TABLET ORAL at 15:52

## 2023-05-23 RX ADMIN — Medication 1 APPLICATION(S): at 18:35

## 2023-05-23 RX ADMIN — POLYETHYLENE GLYCOL 3350 17 GRAM(S): 17 POWDER, FOR SOLUTION ORAL at 18:35

## 2023-05-23 RX ADMIN — Medication 100 MILLIGRAM(S): at 05:51

## 2023-05-23 RX ADMIN — ENTACAPONE 200 MILLIGRAM(S): 200 TABLET, FILM COATED ORAL at 15:52

## 2023-05-23 RX ADMIN — CARBIDOPA AND LEVODOPA 1 TABLET(S): 25; 100 TABLET ORAL at 20:07

## 2023-05-23 RX ADMIN — Medication 1 APPLICATION(S): at 05:56

## 2023-05-23 RX ADMIN — CARBIDOPA AND LEVODOPA 1 TABLET(S): 25; 100 TABLET ORAL at 08:05

## 2023-05-23 NOTE — PROGRESS NOTE ADULT - SUBJECTIVE AND OBJECTIVE BOX
Patient is a 68y old  Male who presents with a chief complaint of Functional deficits s/p non-traumatic ICH (23 May 2023 09:21)      Patient seen and examined at bedside. No events overnight. Patient denies acute complaints at this time, no chest pain, sob, abd pain, headache, changes in vision, nausea or vomiting.    ALLERGIES:  No Known Allergies    MEDICATIONS  (STANDING):  amantadine Syrup 100 milliGRAM(s) Oral two times a day  amLODIPine   Tablet 10 milliGRAM(s) Oral daily  AQUAPHOR (petrolatum Ointment) 1 Application(s) Topical two times a day  artificial  tears Solution 1 Drop(s) Both EYES every 4 hours  carbidopa/levodopa  25/250 1 Tablet(s) Oral <User Schedule>  enoxaparin Injectable 40 milliGRAM(s) SubCutaneous every 24 hours  entacapone 200 milliGRAM(s) Oral <User Schedule>  levETIRAcetam 1000 milliGRAM(s) Oral two times a day  lidocaine   4% Patch 1 Patch Transdermal every 24 hours  melatonin 9 milliGRAM(s) Oral at bedtime  polyethylene glycol 3350 17 Gram(s) Oral two times a day  saline laxative (FLEET) Rectal Enema 1 Enema Rectal once  senna 2 Tablet(s) Oral at bedtime  tiotropium 2.5 MICROgram(s) Inhaler 2 Puff(s) Inhalation daily    MEDICATIONS  (PRN):  acetaminophen   Oral Liquid .. 650 milliGRAM(s) Oral every 6 hours PRN Mild Pain (1 - 3)  acetylcysteine 20%  Inhalation 4 milliLiter(s) Inhalation every 6 hours PRN congestion  albuterol    90 MICROgram(s) HFA Inhaler 2 Puff(s) Inhalation every 6 hours PRN Shortness of Breath and/or Wheezing    Vital Signs Last 24 Hrs  T(F): 97.6 (23 May 2023 08:05), Max: 97.9 (22 May 2023 19:44)  HR: 69 (23 May 2023 08:05) (51 - 69)  BP: 124/74 (23 May 2023 08:05) (124/74 - 145/88)  RR: 16 (23 May 2023 08:05) (16 - 16)  SpO2: 99% (23 May 2023 08:05) (98% - 100%)  I&O's Summary      PHYSICAL EXAM:  GENERAL: NAD, sitting in chair  ENMT: Moist mucous membranes, no thrush  NECK: Supple, No JVD  CHEST/LUNG: Clear to auscultation bilaterally, good air entry, non-labored breathing  HEART: RRR; S1/S2, No murmur  ABDOMEN: Soft, Nontender, Nondistended; Bowel sounds present  EXTREMITIES: No calf tenderness, No cyanosis, No edema  SKIN: Warm, perfused  PSYCH: Normal mood, Normal affect    LABS:                        13.6   3.38  )-----------( 238      ( 22 May 2023 06:13 )             42.3     05-22    144  |  107  |  15  ----------------------------<  99  3.9   |  29  |  1.24    Ca    9.6      22 May 2023 06:13    TPro  7.0  /  Alb  3.4  /  TBili  0.4  /  DBili  x   /  AST  13  /  ALT  19  /  AlkPhos  59  05-22              04-23 Chol 151 mg/dL LDL -- HDL 55 mg/dL Trig 115 mg/dL                          RADIOLOGY & ADDITIONAL TESTS:    Care Discussed with Consultants/Other Providers:

## 2023-05-23 NOTE — PROGRESS NOTE ADULT - ASSESSMENT
68 year old male PMH seizure, HTN, Parkinson's disease (Dx in 2012, wheelchair bound since 2020) and frequent falls; presented to [facility] on 4/17 for elective L craniotomy for meningioma resection with Dr. D'Amico. His procedure was tolerated well and was discharged to [facility] on 4/21. During transit to , he complained of incisional headache with episodes of nausea and vomiting requiring Zofran and Oxycodone, but his symptoms persisted. Upon arrival to , an RRT was called for worsening mental status and a CTH revealed an acute large left frontal hemorrhage with IVH, cerebral edema with midline shift and some effacement of L frontal horn. He was transferred back to [facility].  He underwent EVD placement on 4/22. He was extubated on 4/23, and placed on HFNC with eventual wean to NC.  His hospital course was complicated by RLL consolidation (s/p Zosyn), HTN, bradycardia (self-resolved), hyponatremia, NINFA, and L cheek herpetic lesion (requiring 7 day course of Valtrex). S/p EVD removal on 5/1. Patient now admitted for a multidisciplinary rehab program    #Debility s/p non-traumatic ICH  - CTH revealed Left frontal lobe hemorrhage with intraventricular hemorrhage and increased size of Rt lateral ventricle  - repeat CT head 5/18 with slowly improving L frontal hemorrhage, no acute findings   - S/p EVD placement on 4/22, removed on 5/1   - S/p Decadron taper  - Continue Amantadine  - DVT ppx restarted   - continue comprehensive acute rehab program    #HSV-1 Infection  - Left cheek lesion  - completed valtrex course    #Parkinson's Disease  - Continue Sinemet, Entacapone  - Melatonin  - movement disorder specialist following    #Seizures  - Keppra BID  - seizure precautions     #HTN  - continue Amlodipine 10mg     #DVT ppx  - Lovenox

## 2023-05-23 NOTE — CHART NOTE - NSCHARTNOTEFT_GEN_A_CORE
Nutrition Follow Up Note  Source: Medical Record [X] Patient [X] Family [ ]         Diet: Easy to chew, Ensure Plus High Protein (provides 350 kcal, 20 g protein/serving) BID   Pt tolerating diet with good PO intake, eating >75% of meals per nursing flow sheets. Pt observed during meal rounds with good PO intake. Pt is receiving fresh fruit to promote regular bowel movements.     Enteral/Parenteral Nutrition: N/A    Current Weight: 182.1 lbs (5/22)  181.4 lbs (5/20)    Pertinent Medications: MEDICATIONS  (STANDING):  amantadine Syrup 100 milliGRAM(s) Oral two times a day  amLODIPine   Tablet 10 milliGRAM(s) Oral daily  AQUAPHOR (petrolatum Ointment) 1 Application(s) Topical two times a day  artificial  tears Solution 1 Drop(s) Both EYES every 4 hours  carbidopa/levodopa  25/250 1 Tablet(s) Oral <User Schedule>  enoxaparin Injectable 40 milliGRAM(s) SubCutaneous every 24 hours  entacapone 200 milliGRAM(s) Oral <User Schedule>  levETIRAcetam 1000 milliGRAM(s) Oral two times a day  lidocaine   4% Patch 1 Patch Transdermal every 24 hours  melatonin 9 milliGRAM(s) Oral at bedtime  polyethylene glycol 3350 17 Gram(s) Oral two times a day  saline laxative (FLEET) Rectal Enema 1 Enema Rectal once  senna 2 Tablet(s) Oral at bedtime  tiotropium 2.5 MICROgram(s) Inhaler 2 Puff(s) Inhalation daily    MEDICATIONS  (PRN):  acetaminophen   Oral Liquid .. 650 milliGRAM(s) Oral every 6 hours PRN Mild Pain (1 - 3)  acetylcysteine 20%  Inhalation 4 milliLiter(s) Inhalation every 6 hours PRN congestion  albuterol    90 MICROgram(s) HFA Inhaler 2 Puff(s) Inhalation every 6 hours PRN Shortness of Breath and/or Wheezing      Pertinent Labs:  05-22 Na144 mmol/L Glu 99 mg/dL K+ 3.9 mmol/L Cr  1.24 mg/dL BUN 15 mg/dL 05-22 Alb 3.4 g/dL        Skin: incontinence associated dermatitis per nursing flow sheets.     Edema: No edema per nursing flow sheets     Last BM: on 5/21 Per nursing flowsheets     Estimated Needs:   [X] No Change since Previous Assessment  [ ] Recalculated:     Previous Nutrition Diagnosis:   No Active Nutrition Dx @ This Present Time    Nutrition Diagnosis is   [X] Not Applicable      New Nutrition Diagnosis: [X] Not Applicable  [ ] Inadequate Protein Energy Intake   [ ] Inadequate Oral Intake   [ ] Excessive Energy Intake   [ ] Increased Nutrient Needs   [ ] Obesity   [ ] Altered GI Function   [ ] Unintended Weight Loss   [ ] Food & Nutrition Related Knowledge Deficit  [ ] Limited Adherence to nutrition related recommendations   [ ] Malnutrition      Interventions:   1. Recommend continuing with current plan of care    Monitoring & Evaluation:   [X] Weights   [X] PO Intake   [X] Follow Up (Per Protocol)  [X] Tolerance to Diet Prescription   [X] Other: Labs     RD Remains Available.  Jovana Ng RD

## 2023-05-23 NOTE — PROGRESS NOTE ADULT - ASSESSMENT
MAHDI KELY is a 68 year old male with PMH of seizure, HTN, Parkinson's disease (Dx in 2012, wheelchair bound since 2020) and frequent falls; presented to [facility] on 4/17 for elective L craniotomy for meningioma resection with Dr. D'Amico. His procedure was tolerated well and was discharged to [facility] on 4/21. During transit to , he complained of incisional headache with episodes of nausea and vomiting requiring Zofran and Oxycodone, but his symptoms persisted. Upon arrival to , an RRT was called for worsening mental status and a CTH revealed an acute large left frontal hemorrhage with IVH, cerebral edema with midline shift and some effacement of L frontal horn. He was transferred back to [facility].  He underwent EVD placement on 4/22. He was extubated on 4/23, and placed on HFNC with eventual wean to NC.  His hospital course was complicated by RLL consolidation (s/p Zosyn), HTN, bradycardia (self-resolved), hyponatremia, NINFA, and L cheek herpetic lesion (requiring 7 day course of Valtrex). S/p EVD removal on 5/1. Patient now admitted for a multidisciplinary rehab program. 05-03-23    * Improved motor function, clearer speech   * Increasing ambulatory distance  * Update d/w family   * DC planing    Rehab Management/MEDICAL MANAGEMENT   #Functional deficits s/p non-traumatic ICH  - Gait Instability, ADL impairments and Functional impairments: start Comprehensive Rehab Program of PT/OT/SLP  - 3 hours a day, 5 days a week  - P&O as needed   - CTH revealed L frontal lobe hemorrhage with intraventricular hemorrhage and increased size of R lateral ventricle  - S/p EVD placement on 4/22, removed on 5/1   - S/p Decadron taper x1 week, completed  - Serial CTH 5/4, for interval AMS showed reduction of left frontal hemorrhage  - Continue Amantadine  - Sodium Chloride tabs to q8h, Na >135, taper  - 5/18: Stuttering and tremors noted -Repeat CTH- no new acute process, slowy resolving L frontal hemorrhage     * Fall episode in his room, no injury 5/10-- CTH reduced size of intracranial hemorrhage  * Impaired safety awareness ,but no agitation, probably due to disinhibition due to effects of brain injury  --Move to room near nursing station when possible    #HSV-1 Infection, Left cheek s/p valtrex completed 5/5  - F/u awaiting result of HIV testing, done during acute care    #PNA  - S/p Zosyn (4/22-4/27)   - Albuterol/Ipratropium Nebulizer prn  - Incentive Spirometer    #Parkinson's Disease  - Continue Sinemet 4x per day, Entacapone 200mg q8h  - Melatonin  - bowel regimen  -- Neurology review for Parkinson's disease, progressive hand tremors 5/11  ---Amantadine 100mg BID - tolerating well. Consider starting SSRI (mirtazepine or lexapro) for anxiety,  --- Or consider trial of benzodiazepine (clonazepam) and rivastigmine will be considered afterwards, while considering risk ot reducing seizure threshold    #Seizures  - Keppra BID  -seizure precautions     #HTN  - Amlodipine  - Goal SBP: 100-160    #Skin  - Skin: scalp incision healing well, well approximated  - Pustular rash b/l armpits-ID eval requested  - Pressure injury/Skin: OOB to Chair, PT/OT     #Pain Mgmt   - Tylenol PRN  - Lidocaine patch  - Home: Gabapentin-on hold per NSx    #GI/Bowel Mgmt   - Continent c/w Senna, Miralax BID    #/Bladder Mgmt --Voiding     #FEN   - Diet - Soft & Bite Sized   - Dysphagia  SLP - evaluation and treatment    #Precautions / PROPHYLAXIS:   - Falls  - ortho: Weight bearing status: WBAT   - Lungs: Aspiration, Incentive Spirometer   - DVT: Lovenox  -------------------------------------------------------------  Dr. Cassidy's Liason with family/providers:     Liaison with family 5/9--i called family--spoke with patient's daughter (with ph number in EMR for spouse), she reports that patient's on, will be visiting patient tomorrow am and will discuss update with me  There was no response to my call to patient's son on ph     5/10--I called on ph and d/w Mr Hearn, son, as noted above  5/16--Called patient' family on phone, discussed treatment update, functional progress and dc plan   -------------------------------------------------------------  IDT conference on  5/16   TDD: 5/25 to home  Barriers: Safety, difficulty managing turning  Social Work: Lives with daughter and son  OT: Max to total A for ADLs.   PT: Min-mod A for transfers. Ambulated 10 ft with RW with min A and WCF.  SLP: Soft & bite sized with TLs.  Will need family training  ------------------------------------------------------  FOLLOW UP/OUTPATIENT:    D'Amico, Randy  [phone]    Dianelys Nguyen  [address] [place] [address]  Phone: [phone]  Fax: (   )    -  Follow Up Time:    D'Amico, Randy  [facility] Physician [facility]  NEUROSURG [address] [place] [address] S  Scheduled Appointment: 05/10/2023  -------------------------------------------------------------     MAHDI KELY is a 68 year old male with PMH of seizure, HTN, Parkinson's disease (Dx in 2012, wheelchair bound since 2020) and frequent falls; presented to [facility] on 4/17 for elective L craniotomy for meningioma resection with Dr. D'Amico. His procedure was tolerated well and was discharged to [facility] on 4/21. During transit to , he complained of incisional headache with episodes of nausea and vomiting requiring Zofran and Oxycodone, but his symptoms persisted. Upon arrival to , an RRT was called for worsening mental status and a CTH revealed an acute large left frontal hemorrhage with IVH, cerebral edema with midline shift and some effacement of L frontal horn. He was transferred back to [facility].  He underwent EVD placement on 4/22. He was extubated on 4/23, and placed on HFNC with eventual wean to NC.  His hospital course was complicated by RLL consolidation (s/p Zosyn), HTN, bradycardia (self-resolved), hyponatremia, NINFA, and L cheek herpetic lesion (requiring 7 day course of Valtrex). S/p EVD removal on 5/1. Patient now admitted for a multidisciplinary rehab program. 05-03-23    * Improved motor function, clearer speech   * Increasing ambulatory distance  * Update d/w family   * DC planing    Rehab Management/MEDICAL MANAGEMENT   #Functional deficits s/p non-traumatic ICH  - Gait Instability, ADL impairments and Functional impairments: start Comprehensive Rehab Program of PT/OT/SLP  - 3 hours a day, 5 days a week  - P&O as needed   - CTH revealed L frontal lobe hemorrhage with intraventricular hemorrhage and increased size of R lateral ventricle  - S/p EVD placement on 4/22, removed on 5/1   - S/p Decadron taper x1 week, completed  - Serial CTH 5/4, for interval AMS showed reduction of left frontal hemorrhage  - Continue Amantadine  - Sodium Chloride tabs to q8h, Na >135, taper  - 5/18: Stuttering and tremors noted -Repeat CTH- no new acute process, slowy resolving L frontal hemorrhage     * Fall episode in his room, no injury 5/10-- CTH reduced size of intracranial hemorrhage  * Impaired safety awareness ,but no agitation, probably due to disinhibition due to effects of brain injury  --Move to room near nursing station when possible    #HSV-1 Infection, Left cheek s/p valtrex completed 5/5  - F/u awaiting result of HIV testing, done during acute care    #PNA  - S/p Zosyn (4/22-4/27)   - Albuterol/Ipratropium Nebulizer prn  - Incentive Spirometer    #Parkinson's Disease  - Continue Sinemet 4x per day, Entacapone 200mg q8h  - Melatonin  - bowel regimen  -- Neurology review for Parkinson's disease, progressive hand tremors 5/11  ---Amantadine 100mg BID - tolerating well. Consider starting SSRI (mirtazepine or lexapro) for anxiety,  --- Or consider trial of benzodiazepine (clonazepam) and rivastigmine will be considered afterwards, while considering risk ot reducing seizure threshold    #Seizures  - Keppra BID  -seizure precautions     #HTN  - Amlodipine  - Goal SBP: 100-160    #Skin  - Skin: scalp incision healing well, well approximated  - Pustular rash b/l armpits-ID eval requested  - Pressure injury/Skin: OOB to Chair, PT/OT     #Pain Mgmt   - Tylenol PRN  - Lidocaine patch  - Home: Gabapentin-on hold per NSx    #GI/Bowel Mgmt   - Continent c/w Senna, Miralax BID    #/Bladder Mgmt --Voiding     #FEN   - Diet - Soft & Bite Sized   - Dysphagia  SLP - evaluation and treatment    #Precautions / PROPHYLAXIS:   - Falls  - ortho: Weight bearing status: WBAT   - Lungs: Aspiration, Incentive Spirometer   - DVT: Lovenox  -------------------------------------------------------------  Dr. Cassidy's Liason with family/providers:     Liaison with family 5/9--i called family--spoke with patient's daughter (with ph number in EMR for spouse), she reports that patient's on, will be visiting patient tomorrow am and will discuss update with me  There was no response to my call to patient's son on ph     5/10--I called on ph and d/w Mr Hearn, son, as noted above  5/16--Called patient' family on phone, discussed treatment update, functional progress and dc plan   -------------------------------------------------------------  IDT conference on  5/23   TDD: 5/25 to home  Barriers: Safety, difficulty managing turning  Social Work: Lives with daughter and son  OT: CG-min A for BLD, bathing, toilet transfer, toileting, tub bench transfer.   PTCG-min A for transfers. Ambulated 120ft with RW with min A for turns and CGA for straight distances. Negotiated 8 stairs with CG-min A.   SLP: Easy to chew with TLs. Dysarthria. Cognitive and memory within functional limits.   Plan for family training same day as DC.   ------------------------------------------------------  FOLLOW UP/OUTPATIENT:    D'Amico, Randy  [phone]    Dianelys Nguyen  [address] [place] [address]  Phone: [phone]  Fax: (   )    -  Follow Up Time:    D'Amico, Randy  [facility]  NEUROSURG [address] [place] [address] S  Scheduled Appointment: 05/10/2023  -------------------------------------------------------------

## 2023-05-23 NOTE — PROGRESS NOTE ADULT - SUBJECTIVE AND OBJECTIVE BOX
Subjective   Patient seen and examined at bedside this AM.  Reports no acute distress, had normal BM yesterday  Patient was engaging and interactive    ROS  Denies headache, dizziness, visual changes, chest pain, SOB/NEVILLE, abdominal pain, nausea, vomiting, diarrhea, dysuria, numbness or tingling.   LBM 5/22    Therapy--Observed, during therapy, ambulating with walker, increasing distance, tremors are minimal  Rigidity is much improved    Labs reviewed--normal CBC and renal/liver fxn    Vital Signs Last 24 Hrs  T(C): 36.4 (23 May 2023 08:05), Max: 36.6 (22 May 2023 19:44)  T(F): 97.6 (23 May 2023 08:05), Max: 97.9 (22 May 2023 19:44)  HR: 69 (23 May 2023 08:05) (51 - 69)  BP: 124/74 (23 May 2023 08:05) (124/74 - 145/88)  RR: 16 (23 May 2023 08:05) (16 - 16)  SpO2: 99% (23 May 2023 08:05) (98% - 100%)  O2 Parameters below as of 23 May 2023 08:05  Patient On (Oxygen Delivery Method): room air      EXAM  General: Comfortable, interactive  HENT: PERRL, EOM grossly in tact.,   Cardiac: Regular rate.  Pulm:  clear  Abd: Soft, non-tender, rounded. +BS  Ext--no ulcers    Neuro:   Alert and awake, still Dysarthria, hypophonia, continued improvement, tremors are minimal  RUE 5/5, LUE 4+/5. no hand tremors during review  Sensation grossly intact to light touch  MMT--UE 4/5  Lower extremities 3/5  Extremities: Skin is warm, perfused    RECENT LABS/IMAGING                        13.6   3.38  )-----------( 238      ( 22 May 2023 06:13 )             42.3     05-22    144  |  107  |  15  ----------------------------<  99  3.9   |  29  |  1.24    Ca    9.6      22 May 2023 06:13    TPro  7.0  /  Alb  3.4  /  TBili  0.4  /  DBili  x   /  AST  13  /  ALT  19  /  AlkPhos  59  05-22      MEDICATIONS  (STANDING):  amantadine Syrup 100 milliGRAM(s) Oral two times a day  amLODIPine   Tablet 10 milliGRAM(s) Oral daily  AQUAPHOR (petrolatum Ointment) 1 Application(s) Topical two times a day  artificial  tears Solution 1 Drop(s) Both EYES every 4 hours  carbidopa/levodopa  25/250 1 Tablet(s) Oral <User Schedule>  enoxaparin Injectable 40 milliGRAM(s) SubCutaneous every 24 hours  entacapone 200 milliGRAM(s) Oral <User Schedule>  levETIRAcetam 1000 milliGRAM(s) Oral two times a day  lidocaine   4% Patch 1 Patch Transdermal every 24 hours  melatonin 9 milliGRAM(s) Oral at bedtime  polyethylene glycol 3350 17 Gram(s) Oral two times a day  saline laxative (FLEET) Rectal Enema 1 Enema Rectal once  senna 2 Tablet(s) Oral at bedtime  tiotropium 2.5 MICROgram(s) Inhaler 2 Puff(s) Inhalation daily    MEDICATIONS  (PRN):  acetaminophen   Oral Liquid .. 650 milliGRAM(s) Oral every 6 hours PRN Mild Pain (1 - 3)  acetylcysteine 20%  Inhalation 4 milliLiter(s) Inhalation every 6 hours PRN congestion  albuterol    90 MICROgram(s) HFA Inhaler 2 Puff(s) Inhalation every 6 hours PRN Shortness of Breath and/or Wheezing

## 2023-05-24 ENCOUNTER — TRANSCRIPTION ENCOUNTER (OUTPATIENT)
Age: 68
End: 2023-05-24

## 2023-05-24 PROCEDURE — 99232 SBSQ HOSP IP/OBS MODERATE 35: CPT

## 2023-05-24 RX ORDER — PETROLATUM,WHITE
1 JELLY (GRAM) TOPICAL
Qty: 0 | Refills: 0 | DISCHARGE
Start: 2023-05-24

## 2023-05-24 RX ORDER — CARBIDOPA AND LEVODOPA 25; 100 MG/1; MG/1
1 TABLET ORAL
Qty: 120 | Refills: 0
Start: 2023-05-24 | End: 2023-06-22

## 2023-05-24 RX ORDER — LEVETIRACETAM 250 MG/1
1 TABLET, FILM COATED ORAL
Qty: 60 | Refills: 0
Start: 2023-05-24 | End: 2023-06-22

## 2023-05-24 RX ORDER — CARBIDOPA AND LEVODOPA 25; 100 MG/1; MG/1
1 TABLET ORAL
Refills: 0 | DISCHARGE

## 2023-05-24 RX ORDER — TIOTROPIUM BROMIDE 18 UG/1
2 CAPSULE ORAL; RESPIRATORY (INHALATION)
Qty: 1 | Refills: 0
Start: 2023-05-24 | End: 2023-06-22

## 2023-05-24 RX ORDER — ENTACAPONE 200 MG/1
1 TABLET, FILM COATED ORAL
Qty: 90 | Refills: 0
Start: 2023-05-24 | End: 2023-06-22

## 2023-05-24 RX ORDER — SENNA PLUS 8.6 MG/1
2 TABLET ORAL
Qty: 0 | Refills: 0 | DISCHARGE
Start: 2023-05-24

## 2023-05-24 RX ORDER — AMLODIPINE BESYLATE 2.5 MG/1
1 TABLET ORAL
Qty: 30 | Refills: 0
Start: 2023-05-24 | End: 2023-06-22

## 2023-05-24 RX ORDER — ACETAMINOPHEN 500 MG
20.31 TABLET ORAL
Qty: 0 | Refills: 0 | DISCHARGE
Start: 2023-05-24

## 2023-05-24 RX ORDER — AMANTADINE HCL 100 MG
10 CAPSULE ORAL
Qty: 600 | Refills: 0
Start: 2023-05-24 | End: 2023-06-22

## 2023-05-24 RX ORDER — LANOLIN ALCOHOL/MO/W.PET/CERES
3 CREAM (GRAM) TOPICAL
Qty: 0 | Refills: 0 | DISCHARGE
Start: 2023-05-24

## 2023-05-24 RX ORDER — LIDOCAINE 4 G/100G
1 CREAM TOPICAL
Qty: 0 | Refills: 0 | DISCHARGE
Start: 2023-05-24

## 2023-05-24 RX ORDER — POLYETHYLENE GLYCOL 3350 17 G/17G
17 POWDER, FOR SOLUTION ORAL
Qty: 0 | Refills: 0 | DISCHARGE
Start: 2023-05-24

## 2023-05-24 RX ORDER — ALBUTEROL 90 UG/1
2 AEROSOL, METERED ORAL
Qty: 1 | Refills: 0
Start: 2023-05-24 | End: 2023-06-22

## 2023-05-24 RX ADMIN — Medication 100 MILLIGRAM(S): at 08:10

## 2023-05-24 RX ADMIN — Medication 1 DROP(S): at 20:32

## 2023-05-24 RX ADMIN — CARBIDOPA AND LEVODOPA 1 TABLET(S): 25; 100 TABLET ORAL at 16:02

## 2023-05-24 RX ADMIN — ENTACAPONE 200 MILLIGRAM(S): 200 TABLET, FILM COATED ORAL at 12:03

## 2023-05-24 RX ADMIN — LEVETIRACETAM 1000 MILLIGRAM(S): 250 TABLET, FILM COATED ORAL at 06:02

## 2023-05-24 RX ADMIN — AMLODIPINE BESYLATE 10 MILLIGRAM(S): 2.5 TABLET ORAL at 06:02

## 2023-05-24 RX ADMIN — Medication 1 DROP(S): at 14:33

## 2023-05-24 RX ADMIN — LIDOCAINE 1 PATCH: 4 CREAM TOPICAL at 08:22

## 2023-05-24 RX ADMIN — Medication 1 APPLICATION(S): at 06:02

## 2023-05-24 RX ADMIN — CARBIDOPA AND LEVODOPA 1 TABLET(S): 25; 100 TABLET ORAL at 08:06

## 2023-05-24 RX ADMIN — Medication 1 DROP(S): at 06:02

## 2023-05-24 RX ADMIN — POLYETHYLENE GLYCOL 3350 17 GRAM(S): 17 POWDER, FOR SOLUTION ORAL at 17:30

## 2023-05-24 RX ADMIN — LEVETIRACETAM 1000 MILLIGRAM(S): 250 TABLET, FILM COATED ORAL at 17:30

## 2023-05-24 RX ADMIN — ENOXAPARIN SODIUM 40 MILLIGRAM(S): 100 INJECTION SUBCUTANEOUS at 12:03

## 2023-05-24 RX ADMIN — SENNA PLUS 2 TABLET(S): 8.6 TABLET ORAL at 20:31

## 2023-05-24 RX ADMIN — ENTACAPONE 200 MILLIGRAM(S): 200 TABLET, FILM COATED ORAL at 16:01

## 2023-05-24 RX ADMIN — Medication 1 DROP(S): at 17:30

## 2023-05-24 RX ADMIN — ENTACAPONE 200 MILLIGRAM(S): 200 TABLET, FILM COATED ORAL at 08:10

## 2023-05-24 RX ADMIN — Medication 9 MILLIGRAM(S): at 20:32

## 2023-05-24 RX ADMIN — Medication 100 MILLIGRAM(S): at 17:30

## 2023-05-24 RX ADMIN — CARBIDOPA AND LEVODOPA 1 TABLET(S): 25; 100 TABLET ORAL at 20:08

## 2023-05-24 RX ADMIN — CARBIDOPA AND LEVODOPA 1 TABLET(S): 25; 100 TABLET ORAL at 12:03

## 2023-05-24 NOTE — PROGRESS NOTE ADULT - NS ATTEND AMEND GEN_ALL_CORE FT
Seen and examined with NP Manuel Sanchez    Patient reports no acute symptoms  Review and observation in therapy showed retrogression of speech    Noted to be fatigue prior to therapy, but during session, noted to have reasonable motor function, ambulating with walker, increasing distance    Speech slurred on exam  Tremors on hands more prominent      Vitals normal  No change of motor function     Last CTH showed some residual bleed and patient has recent meningioma excision    Continue Therapy  Repeat CTH to r/o interval changes
Seen and examined, findings as noted    Patient     Observed in therapy--ambulating with walker, increasing distance but Rt thigh adduction, thoracic kyphosis, Rt foot intoeing noted  being addressed     UA -VE for infection    Speech still slow, tremors minimal    CTH showed reduced size of know bleed    Continue therapy  Continue lovenox, for DVT ppx
Seen and examined  Note revised    Sustained improvement in therapy, clearer speech, reduced frequency of tremors  Main deficits is imbalance during turns    Clinically stable    Update given to family and discussed dc plan    Continue therapy  DC tomorrow as planned to home
Seen and examined    Findings as noted    Some improvement with speech and reduced intensity of hand tremors  Tolerating increased dose of amantadine. no overt adverse effects    Labs--normal renal function, and CBC  LFT normal    Observed in therapy--ambulating increasing distance but still requiring Mod assistance with walker x 1      Continue therapy  Continue all supportive treatments  DC date revised to 5/25
Seen and examined with NP Manuel Sanchez  Patient has no acute complaint  Tolerating oral diet    Engaging in therapy, marked progress in speech and hand tremors  Now working on rigidity and ambulatory distance    Family given update on treatment with detailed explanation    Continue Therapy  Working on rigidity and ambulatory distance  Continue all supportive treatments

## 2023-05-24 NOTE — PROGRESS NOTE ADULT - SUBJECTIVE AND OBJECTIVE BOX
Subjective   Patient seen and examined at bedside this AM.  Admits to sleeping well last night.  Last BM on 5/22, per staff.  Stuttering and tremors improved this morning.  No other complaints at this time.    ROS  Denies headache, dizziness, visual changes, chest pain, SOB/NEVILLE, abdominal pain, nausea, vomiting, diarrhea, dysuria, numbness or tingling.     Therapy--Observed, during therapy, ambulating with walker, increasing distance, tremors are minimal  Rigidity is much improved        Vital Signs Last 24 Hrs  T(C): 36.7 (24 May 2023 10:36), Max: 36.7 (24 May 2023 10:36)  T(F): 98.1 (24 May 2023 10:36), Max: 98.1 (24 May 2023 10:36)  HR: 60 (24 May 2023 10:36) (54 - 60)  BP: 112/71 (24 May 2023 10:36) (112/71 - 121/71)  BP(mean): --  RR: 16 (24 May 2023 10:36) (16 - 16)  SpO2: 95% (24 May 2023 10:36) (95% - 97%)      EXAM  General: Comfortable, interactive  HENT: PERRL, EOM grossly in tact.,   Cardiac: Regular rate.  Pulm:  clear  Abd: Soft, non-tender, rounded. +BS  Ext--no ulcers    Neuro:   Alert and awake, still Dysarthria, hypophonia, continued improvement, tremors are minimal  RUE 5/5, LUE 4+/5. no hand tremors during review  Sensation grossly intact to light touch  MMT--UE 4/5  Lower extremities 3/5  Extremities: Skin is warm, perfused    RECENT LABS/IMAGING                        13.6   3.38  )-----------( 238      ( 22 May 2023 06:13 )             42.3     05-22    144  |  107  |  15  ----------------------------<  99  3.9   |  29  |  1.24    Ca    9.6      22 May 2023 06:13    TPro  7.0  /  Alb  3.4  /  TBili  0.4  /  DBili  x   /  AST  13  /  ALT  19  /  AlkPhos  59  05-22      MEDICATIONS  (STANDING):  amantadine Syrup 100 milliGRAM(s) Oral two times a day  amLODIPine   Tablet 10 milliGRAM(s) Oral daily  AQUAPHOR (petrolatum Ointment) 1 Application(s) Topical two times a day  artificial  tears Solution 1 Drop(s) Both EYES every 4 hours  carbidopa/levodopa  25/250 1 Tablet(s) Oral <User Schedule>  enoxaparin Injectable 40 milliGRAM(s) SubCutaneous every 24 hours  entacapone 200 milliGRAM(s) Oral <User Schedule>  levETIRAcetam 1000 milliGRAM(s) Oral two times a day  lidocaine   4% Patch 1 Patch Transdermal every 24 hours  melatonin 9 milliGRAM(s) Oral at bedtime  polyethylene glycol 3350 17 Gram(s) Oral two times a day  saline laxative (FLEET) Rectal Enema 1 Enema Rectal once  senna 2 Tablet(s) Oral at bedtime  tiotropium 2.5 MICROgram(s) Inhaler 2 Puff(s) Inhalation daily    MEDICATIONS  (PRN):  acetaminophen   Oral Liquid .. 650 milliGRAM(s) Oral every 6 hours PRN Mild Pain (1 - 3)  acetylcysteine 20%  Inhalation 4 milliLiter(s) Inhalation every 6 hours PRN congestion  albuterol    90 MICROgram(s) HFA Inhaler 2 Puff(s) Inhalation every 6 hours PRN Shortness of Breath and/or Wheezing         Subjective   Patient seen and examined at bedside this AM.  Admits to sleeping well last night.  Last BM on 5/22, per staff.  Stuttering and tremors improved this morning.  No other complaints at this time.    ROS  Denies headache, dizziness, visual changes, chest pain, SOB/NEVILLE, abdominal pain, nausea, vomiting, diarrhea, dysuria, numbness or tingling.     Therapy--Observed, during therapy, ambulating with walker, increasing distance, tremors are minimal  Sustained improvement, observed during therapy with exercise bicycle    Vital Signs Last 24 Hrs  T(C): 36.7 (24 May 2023 10:36), Max: 36.7 (24 May 2023 10:36)  T(F): 98.1 (24 May 2023 10:36), Max: 98.1 (24 May 2023 10:36)  HR: 60 (24 May 2023 10:36) (54 - 60)  BP: 112/71 (24 May 2023 10:36) (112/71 - 121/71)  RR: 16 (24 May 2023 10:36) (16 - 16)  SpO2: 95% (24 May 2023 10:36) (95% - 97%)      EXAM  General: Comfortable, interactive  HENT: PERRL, EOM grossly in tact.,   Cardiac: Regular rate.  Pulm:  clear  Abd: Soft, non-tender, rounded. +BS  Ext--no ulcers    Neuro:   Alert and awake, still Dysarthria, hypophonia, continued improvement, tremors are minimal  RUE 5/5, LUE 4+/5. no hand tremors during review  Sensation grossly intact to light touch  MMT--UE 4/5  Lower extremities 3/5  Extremities: Skin is warm, perfused    RECENT LABS/IMAGING                        13.6   3.38  )-----------( 238      ( 22 May 2023 06:13 )             42.3     05-22    144  |  107  |  15  ----------------------------<  99  3.9   |  29  |  1.24    Ca    9.6      22 May 2023 06:13    TPro  7.0  /  Alb  3.4  /  TBili  0.4  /  DBili  x   /  AST  13  /  ALT  19  /  AlkPhos  59  05-22      MEDICATIONS  (STANDING):  amantadine Syrup 100 milliGRAM(s) Oral two times a day  amLODIPine   Tablet 10 milliGRAM(s) Oral daily  AQUAPHOR (petrolatum Ointment) 1 Application(s) Topical two times a day  artificial  tears Solution 1 Drop(s) Both EYES every 4 hours  carbidopa/levodopa  25/250 1 Tablet(s) Oral <User Schedule>  enoxaparin Injectable 40 milliGRAM(s) SubCutaneous every 24 hours  entacapone 200 milliGRAM(s) Oral <User Schedule>  levETIRAcetam 1000 milliGRAM(s) Oral two times a day  lidocaine   4% Patch 1 Patch Transdermal every 24 hours  melatonin 9 milliGRAM(s) Oral at bedtime  polyethylene glycol 3350 17 Gram(s) Oral two times a day  saline laxative (FLEET) Rectal Enema 1 Enema Rectal once  senna 2 Tablet(s) Oral at bedtime  tiotropium 2.5 MICROgram(s) Inhaler 2 Puff(s) Inhalation daily    MEDICATIONS  (PRN):  acetaminophen   Oral Liquid .. 650 milliGRAM(s) Oral every 6 hours PRN Mild Pain (1 - 3)  acetylcysteine 20%  Inhalation 4 milliLiter(s) Inhalation every 6 hours PRN congestion  albuterol    90 MICROgram(s) HFA Inhaler 2 Puff(s) Inhalation every 6 hours PRN Shortness of Breath and/or Wheezing

## 2023-05-24 NOTE — PROGRESS NOTE ADULT - SUBJECTIVE AND OBJECTIVE BOX
Patient is a 68y old  Male who presents with a chief complaint of Functional deficits s/p non-traumatic ICH (23 May 2023 09:56)    Patient seen and examined at bedside. No events overnight. Patient denies acute complaints at this time, no chest pain, sob, abd pain, headache, changes in vision, nausea or vomiting.    ALLERGIES:  No Known Allergies    MEDICATIONS  (STANDING):  amantadine Syrup 100 milliGRAM(s) Oral two times a day  amLODIPine   Tablet 10 milliGRAM(s) Oral daily  AQUAPHOR (petrolatum Ointment) 1 Application(s) Topical two times a day  artificial  tears Solution 1 Drop(s) Both EYES every 4 hours  carbidopa/levodopa  25/250 1 Tablet(s) Oral <User Schedule>  enoxaparin Injectable 40 milliGRAM(s) SubCutaneous every 24 hours  entacapone 200 milliGRAM(s) Oral <User Schedule>  levETIRAcetam 1000 milliGRAM(s) Oral two times a day  lidocaine   4% Patch 1 Patch Transdermal every 24 hours  melatonin 9 milliGRAM(s) Oral at bedtime  polyethylene glycol 3350 17 Gram(s) Oral two times a day  saline laxative (FLEET) Rectal Enema 1 Enema Rectal once  senna 2 Tablet(s) Oral at bedtime  tiotropium 2.5 MICROgram(s) Inhaler 2 Puff(s) Inhalation daily    MEDICATIONS  (PRN):  acetaminophen   Oral Liquid .. 650 milliGRAM(s) Oral every 6 hours PRN Mild Pain (1 - 3)  acetylcysteine 20%  Inhalation 4 milliLiter(s) Inhalation every 6 hours PRN congestion  albuterol    90 MICROgram(s) HFA Inhaler 2 Puff(s) Inhalation every 6 hours PRN Shortness of Breath and/or Wheezing    Vital Signs Last 24 Hrs  T(F): 98.1 (24 May 2023 10:36), Max: 98.1 (24 May 2023 10:36)  HR: 60 (24 May 2023 10:36) (54 - 60)  BP: 112/71 (24 May 2023 10:36) (112/71 - 121/71)  RR: 16 (24 May 2023 10:36) (16 - 16)  SpO2: 95% (24 May 2023 10:36) (95% - 97%)  I&O's Summary      PHYSICAL EXAM:  GENERAL: NAD, sitting in chair  ENMT: Moist mucous membranes, no thrush  NECK: Supple, No JVD  CHEST/LUNG: Clear to auscultation bilaterally, good air entry, non-labored breathing  HEART: RRR; S1/S2, No murmur  ABDOMEN: Soft, Nontender, Nondistended; Bowel sounds present  EXTREMITIES: No calf tenderness, No cyanosis, No edema  SKIN: Warm, perfused  PSYCH: Normal mood, Normal affect    LABS:                        13.6   3.38  )-----------( 238      ( 22 May 2023 06:13 )             42.3     05-22    144  |  107  |  15  ----------------------------<  99  3.9   |  29  |  1.24    Ca    9.6      22 May 2023 06:13    TPro  7.0  /  Alb  3.4  /  TBili  0.4  /  DBili  x   /  AST  13  /  ALT  19  /  AlkPhos  59  05-22              04-23 Chol 151 mg/dL LDL -- HDL 55 mg/dL Trig 115 mg/dL                          RADIOLOGY & ADDITIONAL TESTS:    Care Discussed with Consultants/Other Providers:

## 2023-05-24 NOTE — DISCHARGE NOTE PROVIDER - PROVIDER TOKENS
PROVIDER:[TOKEN:[[id number]],FOLLOWUP:[1 month]],PROVIDER:[TOKEN:[[id number]],FOLLOWUP:[1 week]]

## 2023-05-24 NOTE — PROGRESS NOTE ADULT - ASSESSMENT
MAHDI KELY is a 68 year old male with PMH of seizure, HTN, Parkinson's disease (Dx in 2012, wheelchair bound since 2020) and frequent falls; presented to [facility] on 4/17 for elective L craniotomy for meningioma resection with Dr. D'Amico. His procedure was tolerated well and was discharged to [facility] on 4/21. During transit to , he complained of incisional headache with episodes of nausea and vomiting requiring Zofran and Oxycodone, but his symptoms persisted. Upon arrival to , an RRT was called for worsening mental status and a CTH revealed an acute large left frontal hemorrhage with IVH, cerebral edema with midline shift and some effacement of L frontal horn. He was transferred back to [facility].  He underwent EVD placement on 4/22. He was extubated on 4/23, and placed on HFNC with eventual wean to NC.  His hospital course was complicated by RLL consolidation (s/p Zosyn), HTN, bradycardia (self-resolved), hyponatremia, NINFA, and L cheek herpetic lesion (requiring 7 day course of Valtrex). S/p EVD removal on 5/1. Patient now admitted for a multidisciplinary rehab program. 05-03-23    * Improved motor function, clearer speech   * Increasing ambulatory distance  * Update d/w family   * DC planing for 5/25 to home     Rehab Management/MEDICAL MANAGEMENT   #Functional deficits s/p non-traumatic ICH  - Gait Instability, ADL impairments and Functional impairments: start Comprehensive Rehab Program of PT/OT/SLP  - 3 hours a day, 5 days a week  - P&O as needed   - CTH revealed L frontal lobe hemorrhage with intraventricular hemorrhage and increased size of R lateral ventricle  - S/p EVD placement on 4/22, removed on 5/1   - S/p Decadron taper x1 week, completed  - Serial CTH 5/4, for interval AMS showed reduction of left frontal hemorrhage  - Continue Amantadine  - Sodium Chloride tabs to q8h, Na >135, taper  - 5/18: Stuttering and tremors noted -Repeat CTH- no new acute process, slowy resolving L frontal hemorrhage     * Fall episode in his room, no injury 5/10-- CTH reduced size of intracranial hemorrhage  * Impaired safety awareness ,but no agitation, probably due to disinhibition due to effects of brain injury  --Move to room near nursing station when possible    #HSV-1 Infection, Left cheek s/p valtrex completed 5/5  - F/u awaiting result of HIV testing, done during acute care    #PNA  - S/p Zosyn (4/22-4/27)   - Albuterol/Ipratropium Nebulizer prn  - Incentive Spirometer    #Parkinson's Disease  - Continue Sinemet 4x per day, Entacapone 200mg q8h  - Melatonin  - bowel regimen  -- Neurology review for Parkinson's disease, progressive hand tremors 5/11  ---Amantadine 100mg BID - tolerating well. Consider starting SSRI (mirtazepine or lexapro) for anxiety,  --- Or consider trial of benzodiazepine (clonazepam) and rivastigmine will be considered afterwards, while considering risk ot reducing seizure threshold    #Seizures  - Keppra BID  -seizure precautions     #HTN  - Amlodipine  - Goal SBP: 100-160    #Skin  - Skin: scalp incision healing well, well approximated  - Pustular rash b/l armpits-ID eval requested  - Pressure injury/Skin: OOB to Chair, PT/OT     #Pain Mgmt   - Tylenol PRN  - Lidocaine patch  - Home: Gabapentin-on hold per NSx    #GI/Bowel Mgmt   - Continent c/w Senna, Miralax BID    #/Bladder Mgmt --Voiding     #FEN   - Diet - Soft & Bite Sized   - Dysphagia  SLP - evaluation and treatment    #Precautions / PROPHYLAXIS:   - Falls  - ortho: Weight bearing status: WBAT   - Lungs: Aspiration, Incentive Spirometer   - DVT: Lovenox  -------------------------------------------------------------  Dr. Cassidy's Liason with family/providers:     Liaison with family 5/9--i called family--spoke with patient's daughter (with ph number in EMR for spouse), she reports that patient's on, will be visiting patient tomorrow am and will discuss update with me  There was no response to my call to patient's son on ph     5/10--I called on ph and d/w Mr Hearn, son, as noted above  5/16--Called patient' family on phone, discussed treatment update, functional progress and dc plan   -------------------------------------------------------------  IDT conference on  5/23   TDD: 5/25 to home  Barriers: Safety, difficulty managing turning  Social Work: Lives with daughter and son  OT: CG-min A for BLD, bathing, toilet transfer, toileting, tub bench transfer.   PTCG-min A for transfers. Ambulated 120ft with RW with min A for turns and CGA for straight distances. Negotiated 8 stairs with CG-min A.   SLP: Easy to chew with TLs. Dysarthria. Cognitive and memory within functional limits.   Plan for family training same day as DC.   ------------------------------------------------------  FOLLOW UP/OUTPATIENT:    D'Amico, Randy  [phone]    Dianelys Nguyen  [address] [place] [address]  Phone: [phone]  Fax: (   )    -  Follow Up Time:    D'Amico, Randy  [facility]  NEUROSURG [address] [place] [address] S  Scheduled Appointment: 05/10/2023  -------------------------------------------------------------     MAHDI KELY is a 68 year old male with PMH of seizure, HTN, Parkinson's disease (Dx in 2012, wheelchair bound since 2020) and frequent falls; presented to [facility] on 4/17 for elective L craniotomy for meningioma resection with Dr. D'Amico. His procedure was tolerated well and was discharged to [facility] on 4/21. During transit to , he complained of incisional headache with episodes of nausea and vomiting requiring Zofran and Oxycodone, but his symptoms persisted. Upon arrival to , an RRT was called for worsening mental status and a CTH revealed an acute large left frontal hemorrhage with IVH, cerebral edema with midline shift and some effacement of L frontal horn. He was transferred back to [facility].  He underwent EVD placement on 4/22. He was extubated on 4/23, and placed on HFNC with eventual wean to NC.  His hospital course was complicated by RLL consolidation (s/p Zosyn), HTN, bradycardia (self-resolved), hyponatremia, NINFA, and L cheek herpetic lesion (requiring 7 day course of Valtrex). S/p EVD removal on 5/1. Patient now admitted for a multidisciplinary rehab program. 05-03-23    * Improved motor function, clearer speech   * Increasing ambulatory distance  * Update d/w family   * DC planing for 5/25 to home     Rehab Management/MEDICAL MANAGEMENT   #Functional deficits s/p non-traumatic ICH  - Gait Instability, ADL impairments and Functional impairments: start Comprehensive Rehab Program of PT/OT/SLP  - 3 hours a day, 5 days a week  - P&O as needed   - CTH revealed L frontal lobe hemorrhage with intraventricular hemorrhage and increased size of R lateral ventricle  - S/p EVD placement on 4/22, removed on 5/1   - S/p Decadron taper x1 week, completed  - Serial CTH 5/4, for interval AMS showed reduction of left frontal hemorrhage  - Continue Amantadine  - Sodium Chloride tabs to q8h, Na >135, taper  - 5/18: Stuttering and tremors noted -Repeat CTH- no new acute process, slowy resolving L frontal hemorrhage     * Fall episode in his room, no injury 5/10-- CTH reduced size of intracranial hemorrhage  * Impaired safety awareness ,but no agitation, probably due to disinhibition due to effects of brain injury  --Move to room near nursing station when possible    #HSV-1 Infection, Left cheek s/p valtrex completed 5/5  - F/u awaiting result of HIV testing, done during acute care    #PNA  - S/p Zosyn (4/22-4/27)   - Albuterol/Ipratropium Nebulizer prn  - Incentive Spirometer    #Parkinson's Disease  - Continue Sinemet 4x per day, Entacapone 200mg q8h  - Melatonin  - bowel regimen  -- Neurology review for Parkinson's disease, progressive hand tremors 5/11  ---Amantadine 100mg BID - tolerating well. Consider starting SSRI (mirtazepine or lexapro) for anxiety,  --- Or consider trial of benzodiazepine (clonazepam) and rivastigmine will be considered afterwards, while considering risk ot reducing seizure threshold    #Seizures  - Keppra BID  -seizure precautions     #HTN  - Amlodipine  - Goal SBP: 100-160    #Skin  - Skin: scalp incision healing well, well approximated  - Pustular rash b/l armpits-ID eval requested  - Pressure injury/Skin: OOB to Chair, PT/OT     #Pain Mgmt   - Tylenol PRN  - Lidocaine patch  - Home: Gabapentin-on hold per NSx    #GI/Bowel Mgmt   - Continent c/w Senna, Miralax BID    #/Bladder Mgmt --Voiding     #FEN   - Diet - Soft & Bite Sized   - Dysphagia  SLP - evaluation and treatment    #Precautions / PROPHYLAXIS:   - Falls  - ortho: Weight bearing status: WBAT   - Lungs: Aspiration, Incentive Spirometer   - DVT: Lovenox  -------------------------------------------------------------  Dr. Cassidy's Liason with family/providers:     Liaison with family 5/9--i called family--spoke with patient's daughter (with ph number in EMR for spouse), she reports that patient's on, will be visiting patient tomorrow am and will discuss update with me  There was no response to my call to patient's son on ph     5/10--I called on ph and d/w Mr Hearn, son, as noted above  5/16--Called patient' family on phone, discussed treatment update, functional progress and dc plan   -------------------------------------------------------------  IDT conference on  5/23   TDD: 5/25 to home  Barriers: Safety, difficulty managing turning  Social Work: Lives with daughter and son  OT: CG-min A for BLD, bathing, toilet transfer, toileting, tub bench transfer.   PTCG-min A for transfers. Ambulated 120ft with RW with min A for turns and CGA for straight distances. Negotiated 8 stairs with CG-min A.   SLP: Easy to chew with TLs. Dysarthria. Cognitive and memory within functional limits.   Plan for family training same day as DC.   ------------------------------------------------------  FOLLOW UP/OUTPATIENT:    D'Amico, Randy  [phone]    Dianelys Nguyen  [address] [place] [address]  Phone: [phone]  Fax: (   )    -  Follow Up Time:    D'Amico, Randy  [facility]  NEUROSURG [address]  Scheduled Appointment: 05/10/2023  -------------------------------------------------------------    Non critical care  Time based billing  Spent 40 mins on chart and patient review, observation in therapy  Care co ordination, discussion with family and dc planning

## 2023-05-24 NOTE — DISCHARGE NOTE PROVIDER - NSDCMRMEDTOKEN_GEN_ALL_CORE_FT
acetaminophen 325 mg oral tablet: 2 tab(s) orally every 6 hours As needed Mild Pain (1 - 3)  acetylcysteine 20% inhalation solution: 4 milliliter(s) inhaled every 6 hours As needed congestion  amantadine 50 mg/5 mL oral syrup: 10 milliliter(s) orally every 24 hours  amLODIPine 10 mg oral tablet: 1 tab(s) orally once a day  carbidopa-levodopa 25 mg-250 mg oral tablet: 1 orally 4 times a day  enoxaparin: 40 milligram(s) subcutaneous once a day for DVT prevention while at rehab  entacapone 200 mg oral tablet: 1 tab(s) orally every 8 hours  ipratropium-albuterol 0.5 mg-2.5 mg/3 mL inhalation solution: 3 milliliter(s) inhaled every 6 hours As needed Shortness of Breath and/or Wheezing  levETIRAcetam 1000 mg oral tablet: 1 tab(s) orally 2 times a day  lidocaine 4% topical film: Apply topically to affected area once a day as needed for L thigh pain  Occupational therapy: Therapy for 2x/wk x 6 wks, addressing ADLs deficits and fine motor deficits, Precautions--fall.  53. Duration of treatment 99.  Medicaid ID [id number] MDD: 1  ocular lubricant ophthalmic solution: 1 drop(s) to each affected eye every 4 hours  Physical therapy: Addressing spasticity, muscle weakness and gait instability, Precautions--fall. Medicaid ID [id number],  53. Duration of treatment 99.   Physical therapy 2x/wk x 6 wks  polyethylene glycol 3350 oral powder for reconstitution: 17 gram(s) orally once a day  senna leaf extract oral tablet: 2 tab(s) orally once a day (at bedtime)  Shower chair: USE daily for shower. Duration of use 99. Medicaid ID [id number] [id number].  53  valACYclovir 1 g oral tablet: 1 tab(s) orally every 12 hours for L cheek herpes lesion, last day = 23   acetaminophen 160 mg/5 mL oral suspension: 20.31 milliliter(s) orally every 6 hours as needed for Mild Pain (1 - 3)  lidocaine 4% topical film: Apply topically to affected area once a day Okay to substitute OTC Salonpas patches.  melatonin 3 mg oral tablet: 3 tab(s) orally once a day (at bedtime)  Occupational therapy: Therapy for 2x/wk x 6 wks, addressing ADLs deficits and fine motor deficits, Precautions--fall.  53. Duration of treatment 99.  Medicaid ID [id number] MDD: 1  ocular lubricant ophthalmic solution: 1 drop(s) to each affected eye every 4 hours  petrolatum topical ointment: 1 Apply topically to affected area 2 times a day  Physical therapy: Addressing spasticity, muscle weakness and gait instability, Precautions--fall. Medicaid ID [id number],  53. Duration of treatment 99.   Physical therapy 2x/wk x 6 wks  polyethylene glycol 3350 oral powder for reconstitution: 17 gram(s) orally 2 times a day  senna leaf extract oral tablet: 2 tab(s) orally once a day (at bedtime)  Shower chair: USE daily for shower. Duration of use 99. Medicaid ID MZ [id number].  53  sodium biphosphate-sodium phosphate 19 g-7 g rectal enema: 1 each rectal once   acetaminophen 160 mg/5 mL oral suspension: 20.31 milliliter(s) orally every 6 hours as needed for Mild Pain (1 - 3)  albuterol 90 mcg/inh inhalation aerosol: 2 puff(s) inhaled every 6 hours as needed for Shortness of Breath and/or Wheezing  amantadine 50 mg/5 mL oral syrup: 10 milliliter(s) orally 2 times a day  amLODIPine 10 mg oral tablet: 1 tab(s) orally once a day  carbidopa-levodopa 25 mg-250 mg oral tablet: 1 tab(s) orally 4 times a day 8AM, 12PM, 4PM, 8PM  entacapone 200 mg oral tablet: 1 tab(s) orally 3 times a day 8AM, 12PM, 4PM  levETIRAcetam 1000 mg oral tablet: 1 tab(s) orally 2 times a day  lidocaine 4% topical film: Apply topically to affected area once a day Okay to substitute OTC Salonpas patches.  melatonin 3 mg oral tablet: 3 tab(s) orally once a day (at bedtime)  Occupational therapy: Therapy for 2x/wk x 6 wks, addressing ADLs deficits and fine motor deficits, Precautions--fall.  53. Duration of treatment 99.  Medicaid ID [id number] MDD: 1  ocular lubricant ophthalmic solution: 1 drop(s) to each affected eye every 4 hours  petrolatum topical ointment: 1 Apply topically to affected area 2 times a day  Physical therapy: Addressing spasticity, muscle weakness and gait instability, Precautions--fall. Medicaid ID [id number],  53. Duration of treatment 99.   Physical therapy 2x/wk x 6 wks  polyethylene glycol 3350 oral powder for reconstitution: 17 gram(s) orally 2 times a day  senna leaf extract oral tablet: 2 tab(s) orally once a day (at bedtime)  Shower chair: USE daily for shower. Duration of use 99. Medicaid ID MZ [id number].  53  sodium biphosphate-sodium phosphate 19 g-7 g rectal enema: 1 each rectal once  tiotropium 2.5 mcg/inh inhalation aerosol: 2 puff(s) inhaled once a day

## 2023-05-24 NOTE — DISCHARGE NOTE PROVIDER - NSDCFUSCHEDAPPT_GEN_ALL_CORE_FT
D'Amico, Randy  [facility] Physician [facility]  NEUROSURG [address] [facility] [address]  Scheduled Appointment: 05/31/2023

## 2023-05-24 NOTE — DISCHARGE NOTE PROVIDER - HOSPITAL COURSE
HPI:  67 yo male with PMHx of seizure, HTN, Parkinson's disease (Dx in 2012, wheelchair bound since 2020) and frequent falls; presented to [facility] on 4/17 for elective L craniotomy for meningioma resection with Dr. D'Amico. Tolerated well, discharged to [facility] on 4/21. During transit to , he complained of incisional headache with episodes of nausea and vomiting requiring Zofran and Oxycodone. Upon arrival to , an RRT was called for worsening mental status and CTH revealed an acute large left frontal hemorrhage with IVH, cerebral edema with midline shift and some effacement of Left frontal horn, transferred back to [facility]. He underwent EVD placement on 4/22. Extubated on 4/23, wean to NC. Hospital course was complicated by RLL PNA (s/p Zosyn), HTN, bradycardia, hyponatremia on salt tabs, NINFA, and Left cheek herpetic lesion ( 7 day Valtrex). S/p EVD removal on 5/1. CTH was stable on 5/2 and cleared for Lovenox sq. Amantadine added. PM&R was consulted and deemed him an appropriate candidate for IRF. Cleared for discharge to [facility] on 5/3.  (03 May 2023 11:48)      Patient was evaluated by PM&R and therapy for gait/ADL impairments and recommended acute rehabilitation. Patient was medically optimized for discharge to [facility] on 5/3/23. Admitted with gait instability, ADL, and functional impairments.     Rehab course significant for:   5/4: Await ID recs. Melatonin to 9 mg  5/5: Aquaphor to BL LEs. Remove 1 IV line.   5/16: Miralax BID. Sodium chloride DCd. Okay to have salt in diet.   5/18: Increased stuttering and decreased engagement Repeat CTH.  5/19: Easy to chew diet. R adductor stretching. Dulcolax suppository.     All other medical co-morbidities were stable. Patient tolerated course of inpatient PT/OT/SLP rehab with significant improvements and met rehab goals prior to discharge. Patient was medically cleared on 5/25/23 for discharge to home with home care. HPI:  67 yo male with PMHx of seizure, HTN, Parkinson's disease (Dx in 2012, wheelchair bound since 2020) and frequent falls; presented to [facility] on 4/17 for elective L craniotomy for meningioma resection with Dr. D'Amico. Tolerated well, discharged to [facility] on 4/21. During transit to , he complained of incisional headache with episodes of nausea and vomiting requiring Zofran and Oxycodone. Upon arrival to , an RRT was called for worsening mental status and CTH revealed an acute large left frontal hemorrhage with IVH, cerebral edema with midline shift and some effacement of Left frontal horn, transferred back to [facility]. He underwent EVD placement on 4/22. Extubated on 4/23, wean to NC. Hospital course was complicated by RLL PNA (s/p Zosyn), HTN, bradycardia, hyponatremia on salt tabs, NINFA, and Left cheek herpetic lesion ( 7 day Valtrex). S/p EVD removal on 5/1. CTH was stable on 5/2 and cleared for Lovenox sq. Amantadine added. PM&R was consulted and deemed him an appropriate candidate for IRF. Cleared for discharge to [facility] on 5/3.  (03 May 2023 11:48)      Patient was evaluated by PM&R and therapy for gait/ADL impairments and recommended acute rehabilitation. Patient was medically optimized for discharge to [facility] on 5/3/23. Admitted with gait instability, ADL, and functional impairments.     Rehab course significant for:   5/4: Await ID recs. Melatonin to 9 mg  5/5: Aquaphor to BL LEs. Remove 1 IV line.   5/16: Miralax BID. Sodium chloride DCd. Okay to have salt in diet.   5/18: Increased stuttering and decreased engagement Repeat CTH.  5/19: Easy to chew diet. R adductor stretching. Dulcolax suppository.     You made sustained clinical and functional progress with therapy  You had neurology review and dose of amantadine was increased with subsequent reduction of intensity of tremors  Your electrolytes normalized  You will follow  with your neurologist     IDT conference on  5/23   TDD: 5/25 to home  Barriers: Safety, difficulty managing turning  Social Work: Lives with daughter and son  OT: CG-min A for BLD, bathing, toilet transfer, toileting, tub bench transfer.   PTCG-min A for transfers. Ambulated 120ft with RW with min A for turns and CGA for straight distances. Negotiated 8 stairs with CG-min A.   SLP: Easy to chew with TLs. Dysarthria. Cognitive and memory within functional limits.   Plan for family training same day as DC.     All other medical co-morbidities were stable. Patient tolerated course of inpatient PT/OT/SLP rehab with significant improvements and met rehab goals prior to discharge. Patient was medically cleared on 5/25/23 for discharge to home with home care.

## 2023-05-24 NOTE — DISCHARGE NOTE PROVIDER - NSDCFUADDAPPT_GEN_ALL_CORE_FT
Please follow up with your PCP as soon as possible.    If you are in need of a PCP or a specialist in your area: contact the [facility] Physician Referral Service at [phone].  Please follow up with the following Provider:   Dianelys Nguyen  [address] [facility] [address]  [place] [address]  Phone: [phone]    Please follow up with your PCP as soon as possible.    If you are in need of a PCP or a specialist in your area: contact the [facility] Physician Referral Service at [phone].

## 2023-05-24 NOTE — DISCHARGE NOTE PROVIDER - CARE PROVIDER_API CALL
Walter Cassidy  Physical/Rehab Medicine  [address] [place] [address]  Phone: [phone]  Fax: [phone]  Follow Up Time: 1 month    DAmico, Randy S (MD)  Neurosurgery  [address] [place] [address]  Phone: [phone]  Fax: [phone]  Follow Up Time: 1 week

## 2023-05-24 NOTE — DISCHARGE NOTE PROVIDER - NSDCCPCAREPLAN_GEN_ALL_CORE_FT
PRINCIPAL DISCHARGE DIAGNOSIS  Diagnosis: ICH (intracerebral hemorrhage)  Assessment and Plan of Treatment: You presented to the hospital on 4/17 for elective L craniotomy surgery.  In transit to [facility], you developed a brain bleed and were sent back to [facility]. Once stabilized, you were sent to  acute rehab to help improve your strength and overall function. Please follow up with the following providers listed in this packet for further management.      SECONDARY DISCHARGE DIAGNOSES  Diagnosis: Hypertension  Assessment and Plan of Treatment: Please continue to take your antihypertensive medications as prescribed. Please follow up with your PCP/Cardiologist for further management.    Diagnosis: Parkinsons  Assessment and Plan of Treatment: Please continue to take your Parkinson's medication regimen as prescribed according to this packet. Please follow up with your PCP or Neurologist for further management.

## 2023-05-24 NOTE — PROGRESS NOTE ADULT - ASSESSMENT
68 year old male PMH seizure, HTN, Parkinson's disease (Dx in 2012, wheelchair bound since 2020) and frequent falls; presented to [facility] on 4/17 for elective L craniotomy for meningioma resection with Dr. D'Amico. His procedure was tolerated well and was discharged to [facility] on 4/21. During transit to , he complained of incisional headache with episodes of nausea and vomiting requiring Zofran and Oxycodone, but his symptoms persisted. Upon arrival to , an RRT was called for worsening mental status and a CTH revealed an acute large left frontal hemorrhage with IVH, cerebral edema with midline shift and some effacement of L frontal horn. He was transferred back to [facility].  He underwent EVD placement on 4/22. He was extubated on 4/23, and placed on HFNC with eventual wean to NC.  His hospital course was complicated by RLL consolidation (s/p Zosyn), HTN, bradycardia (self-resolved), hyponatremia, NINFA, and L cheek herpetic lesion (requiring 7 day course of Valtrex). S/p EVD removal on 5/1. Patient now admitted for a multidisciplinary rehab program    #Debility s/p non-traumatic ICH  - CTH revealed Left frontal lobe hemorrhage with intraventricular hemorrhage and increased size of Rt lateral ventricle  - repeat CT head 5/18 with slowly improving L frontal hemorrhage, no acute findings   - S/p EVD placement on 4/22, removed on 5/1   - S/p Decadron taper  - Continue Amantadine  - DVT ppx restarted   - continue comprehensive acute rehab program    #HSV-1 Infection  - Left cheek lesion  - completed valtrex course    #Parkinson's Disease  - Continue Sinemet, Entacapone  - Melatonin  - movement disorder specialist following    #Seizures  - Keppra BID  - seizure precautions     #HTN  - continue Amlodipine 10mg     #DVT ppx  - Lovenox

## 2023-05-25 ENCOUNTER — TRANSCRIPTION ENCOUNTER (OUTPATIENT)
Age: 68
End: 2023-05-25

## 2023-05-25 VITALS
SYSTOLIC BLOOD PRESSURE: 100 MMHG | TEMPERATURE: 98 F | HEART RATE: 75 BPM | DIASTOLIC BLOOD PRESSURE: 62 MMHG | RESPIRATION RATE: 16 BRPM | OXYGEN SATURATION: 100 %

## 2023-05-25 LAB
ALBUMIN SERPL ELPH-MCNC: 3.7 G/DL — SIGNIFICANT CHANGE UP (ref 3.3–5)
ALP SERPL-CCNC: 64 U/L — SIGNIFICANT CHANGE UP (ref 40–120)
ALT FLD-CCNC: 16 U/L — SIGNIFICANT CHANGE UP (ref 10–45)
ANION GAP SERPL CALC-SCNC: 8 MMOL/L — SIGNIFICANT CHANGE UP (ref 5–17)
AST SERPL-CCNC: 17 U/L — SIGNIFICANT CHANGE UP (ref 10–40)
BASOPHILS # BLD AUTO: 0.02 K/UL — SIGNIFICANT CHANGE UP (ref 0–0.2)
BASOPHILS NFR BLD AUTO: 0.5 % — SIGNIFICANT CHANGE UP (ref 0–2)
BILIRUB SERPL-MCNC: 0.2 MG/DL — SIGNIFICANT CHANGE UP (ref 0.2–1.2)
BUN SERPL-MCNC: 20 MG/DL — SIGNIFICANT CHANGE UP (ref 7–23)
CALCIUM SERPL-MCNC: 9.7 MG/DL — SIGNIFICANT CHANGE UP (ref 8.4–10.5)
CHLORIDE SERPL-SCNC: 106 MMOL/L — SIGNIFICANT CHANGE UP (ref 96–108)
CO2 SERPL-SCNC: 30 MMOL/L — SIGNIFICANT CHANGE UP (ref 22–31)
CREAT SERPL-MCNC: 1.39 MG/DL — HIGH (ref 0.5–1.3)
EGFR: 54 ML/MIN/1.73M2 — LOW
EOSINOPHIL # BLD AUTO: 0.04 K/UL — SIGNIFICANT CHANGE UP (ref 0–0.5)
EOSINOPHIL NFR BLD AUTO: 1 % — SIGNIFICANT CHANGE UP (ref 0–6)
GLUCOSE SERPL-MCNC: 108 MG/DL — HIGH (ref 70–99)
HCT VFR BLD CALC: 46 % — SIGNIFICANT CHANGE UP (ref 39–50)
HGB BLD-MCNC: 14.7 G/DL — SIGNIFICANT CHANGE UP (ref 13–17)
IMM GRANULOCYTES NFR BLD AUTO: 0.2 % — SIGNIFICANT CHANGE UP (ref 0–0.9)
LYMPHOCYTES # BLD AUTO: 1.94 K/UL — SIGNIFICANT CHANGE UP (ref 1–3.3)
LYMPHOCYTES # BLD AUTO: 47.9 % — HIGH (ref 13–44)
MCHC RBC-ENTMCNC: 31.6 PG — SIGNIFICANT CHANGE UP (ref 27–34)
MCHC RBC-ENTMCNC: 32 GM/DL — SIGNIFICANT CHANGE UP (ref 32–36)
MCV RBC AUTO: 98.9 FL — SIGNIFICANT CHANGE UP (ref 80–100)
MONOCYTES # BLD AUTO: 0.43 K/UL — SIGNIFICANT CHANGE UP (ref 0–0.9)
MONOCYTES NFR BLD AUTO: 10.6 % — SIGNIFICANT CHANGE UP (ref 2–14)
NEUTROPHILS # BLD AUTO: 1.61 K/UL — LOW (ref 1.8–7.4)
NEUTROPHILS NFR BLD AUTO: 39.8 % — LOW (ref 43–77)
NRBC # BLD: 0 /100 WBCS — SIGNIFICANT CHANGE UP (ref 0–0)
PLATELET # BLD AUTO: 256 K/UL — SIGNIFICANT CHANGE UP (ref 150–400)
POTASSIUM SERPL-MCNC: 4.1 MMOL/L — SIGNIFICANT CHANGE UP (ref 3.5–5.3)
POTASSIUM SERPL-SCNC: 4.1 MMOL/L — SIGNIFICANT CHANGE UP (ref 3.5–5.3)
PROT SERPL-MCNC: 7.6 G/DL — SIGNIFICANT CHANGE UP (ref 6–8.3)
RBC # BLD: 4.65 M/UL — SIGNIFICANT CHANGE UP (ref 4.2–5.8)
RBC # FLD: 15 % — HIGH (ref 10.3–14.5)
SODIUM SERPL-SCNC: 144 MMOL/L — SIGNIFICANT CHANGE UP (ref 135–145)
WBC # BLD: 4.05 K/UL — SIGNIFICANT CHANGE UP (ref 3.8–10.5)
WBC # FLD AUTO: 4.05 K/UL — SIGNIFICANT CHANGE UP (ref 3.8–10.5)

## 2023-05-25 PROCEDURE — 99232 SBSQ HOSP IP/OBS MODERATE 35: CPT

## 2023-05-25 PROCEDURE — 99238 HOSP IP/OBS DSCHRG MGMT 30/<: CPT

## 2023-05-25 RX ORDER — UMECLIDINIUM 62.5 UG/1
2 AEROSOL, POWDER ORAL
Qty: 2 | Refills: 0
Start: 2023-05-25 | End: 2023-06-23

## 2023-05-25 RX ADMIN — CARBIDOPA AND LEVODOPA 1 TABLET(S): 25; 100 TABLET ORAL at 12:02

## 2023-05-25 RX ADMIN — Medication 1 DROP(S): at 05:49

## 2023-05-25 RX ADMIN — Medication 1 DROP(S): at 12:03

## 2023-05-25 RX ADMIN — AMLODIPINE BESYLATE 10 MILLIGRAM(S): 2.5 TABLET ORAL at 05:49

## 2023-05-25 RX ADMIN — LEVETIRACETAM 1000 MILLIGRAM(S): 250 TABLET, FILM COATED ORAL at 05:49

## 2023-05-25 RX ADMIN — ENTACAPONE 200 MILLIGRAM(S): 200 TABLET, FILM COATED ORAL at 12:02

## 2023-05-25 RX ADMIN — Medication 1 APPLICATION(S): at 05:49

## 2023-05-25 RX ADMIN — ENOXAPARIN SODIUM 40 MILLIGRAM(S): 100 INJECTION SUBCUTANEOUS at 12:03

## 2023-05-25 RX ADMIN — Medication 100 MILLIGRAM(S): at 07:46

## 2023-05-25 RX ADMIN — ENTACAPONE 200 MILLIGRAM(S): 200 TABLET, FILM COATED ORAL at 07:46

## 2023-05-25 RX ADMIN — CARBIDOPA AND LEVODOPA 1 TABLET(S): 25; 100 TABLET ORAL at 07:46

## 2023-05-25 NOTE — PROGRESS NOTE ADULT - PROVIDER SPECIALTY LIST ADULT
Hospitalist
Neuro Rehabilitation
Rehab Medicine
Rehab Medicine
Hospitalist
Hospitalist
Rehab Medicine
Hospitalist
Neuro Rehabilitation
Rehab Medicine
Hospitalist
Rehab Medicine

## 2023-05-25 NOTE — DISCHARGE NOTE NURSING/CASE MANAGEMENT/SOCIAL WORK - PATIENT PORTAL LINK FT
You can access the FollowMyHealth Patient Portal offered by [facility] by registering at the following website: [email or web address]. By joining [employer]’s FollowMyHealth portal, you will also be able to view your health information using other applications (apps) compatible with our system.

## 2023-05-25 NOTE — PROGRESS NOTE ADULT - SUBJECTIVE AND OBJECTIVE BOX
Subjective   Patient seen and examined at bedside this AM  He reports no acute medical events  Reports uneventful sleep. tolerating oral diet  Able to communicate his needs  Speech slow rate    ROS  Denies headache, dizziness, visual changes, chest pain, SOB/NEVILLE, abdominal pain, nausea, vomiting, diarrhea, dysuria, numbness or tingling. [facility] 5/24    Therapy--Successful family meeting with sons today    ICU Vital Signs Last 24 Hrs  T(C): 36.7 (25 May 2023 09:36), Max: 36.7 (24 May 2023 20:52)  T(F): 98.1 (25 May 2023 09:36), Max: 98.1 (24 May 2023 20:52)  HR: 75 (25 May 2023 09:36) (67 - 75)  BP: 100/62 (25 May 2023 09:36) (100/62 - 143/80)  RR: 16 (25 May 2023 09:36) (16 - 16)  SpO2: 100% (25 May 2023 09:36) (96% - 100%)  O2 Parameters below as of 25 May 2023 09:36  Patient On (Oxygen Delivery Method): room air      EXAM  General: Comfortable, interactive  HENT: PERRL, EOM grossly in tact.,   Cardiac: Regular rate.  Pulm:  clear  Abd: Soft, non-tender, rounded. +BS  Ext--no ulcers    Neuro:   Alert and awake, still Dysarthria, hypophonia, continued improvement, tremors are minimal  RUE 5/5, LUE 4+/5. no hand tremors during review  Sensation grossly intact to light touch  MMT--UE 4/5  Lower extremities 3/5  Extremities: Skin is warm, perfused    RECENT LABS/IMAGING                        14.7   4.05  )-----------( 256      ( 25 May 2023 08:28 )             46.0     05-25    144  |  106  |  20  ----------------------------<  108<H>  4.1   |  30  |  1.39<H>    Ca    9.7      25 May 2023 08:28    TPro  7.6  /  Alb  3.7  /  TBili  0.2  /  DBili  x   /  AST  17  /  ALT  16  /  AlkPhos  64  05-25      MEDICATIONS  (STANDING):  amantadine Syrup 100 milliGRAM(s) Oral two times a day  amLODIPine   Tablet 10 milliGRAM(s) Oral daily  AQUAPHOR (petrolatum Ointment) 1 Application(s) Topical two times a day  artificial  tears Solution 1 Drop(s) Both EYES every 4 hours  carbidopa/levodopa  25/250 1 Tablet(s) Oral <User Schedule>  enoxaparin Injectable 40 milliGRAM(s) SubCutaneous every 24 hours  entacapone 200 milliGRAM(s) Oral <User Schedule>  levETIRAcetam 1000 milliGRAM(s) Oral two times a day  lidocaine   4% Patch 1 Patch Transdermal every 24 hours  melatonin 9 milliGRAM(s) Oral at bedtime  polyethylene glycol 3350 17 Gram(s) Oral two times a day  saline laxative (FLEET) Rectal Enema 1 Enema Rectal once  senna 2 Tablet(s) Oral at bedtime  tiotropium 2.5 MICROgram(s) Inhaler 2 Puff(s) Inhalation daily    MEDICATIONS  (PRN):  acetaminophen   Oral Liquid .. 650 milliGRAM(s) Oral every 6 hours PRN Mild Pain (1 - 3)  acetylcysteine 20%  Inhalation 4 milliLiter(s) Inhalation every 6 hours PRN congestion  albuterol    90 MICROgram(s) HFA Inhaler 2 Puff(s) Inhalation every 6 hours PRN Shortness of Breath and/or Wheezing

## 2023-05-25 NOTE — DISCHARGE NOTE NURSING/CASE MANAGEMENT/SOCIAL WORK - NSDCFUADDAPPT_GEN_ALL_CORE_FT
Please follow up with the following Provider:   Dianelys Nguyen  [address] [facility] [address]  [place] [address]  Phone: [phone]    Please follow up with your PCP as soon as possible    If you are in need of a PCP or a specialist in your area: contact the [facility] [place] [facility] Physician Referral Service at [phone].

## 2023-05-25 NOTE — DISCHARGE NOTE NURSING/CASE MANAGEMENT/SOCIAL WORK - NSDCPEFALRISK_GEN_ALL_CORE
For information on Fall & Injury Prevention, visit: [email or web address] OR  [email or web address] OR  https://www.cdc.gov/steadi/patient.html

## 2023-05-25 NOTE — PROGRESS NOTE ADULT - ASSESSMENT
68 year old male PMH seizure, HTN, Parkinson's disease (Dx in 2012, wheelchair bound since 2020) and frequent falls; presented to [facility] on 4/17 for elective L craniotomy for meningioma resection with Dr. D'Amico. His procedure was tolerated well and was discharged to [facility] on 4/21. During transit to , he complained of incisional headache with episodes of nausea and vomiting requiring Zofran and Oxycodone, but his symptoms persisted. Upon arrival to , an RRT was called for worsening mental status and a CTH revealed an acute large left frontal hemorrhage with IVH, cerebral edema with midline shift and some effacement of L frontal horn. He was transferred back to [facility].  He underwent EVD placement on 4/22. He was extubated on 4/23, and placed on HFNC with eventual wean to NC.  His hospital course was complicated by RLL consolidation (s/p Zosyn), HTN, bradycardia (self-resolved), hyponatremia, NINFA, and L cheek herpetic lesion (requiring 7 day course of Valtrex). S/p EVD removal on 5/1. Patient now admitted for a multidisciplinary rehab program    #Debility s/p non-traumatic ICH  - CTH revealed Left frontal lobe hemorrhage with intraventricular hemorrhage and increased size of Rt lateral ventricle  - repeat CT head 5/18 with slowly improving L frontal hemorrhage, no acute findings   - S/p EVD placement on 4/22, removed on 5/1   - S/p Decadron taper  - Continue Amantadine  - DVT ppx restarted   - continue comprehensive acute rehab program    #HSV-1 Infection  - Left cheek lesion  - completed valtrex course    #Parkinson's Disease  - Continue Sinemet, Entacapone  - Melatonin  - movement disorder specialist following    #Seizures  - Keppra BID  - seizure precautions     #HTN  - continue Amlodipine 10mg     #DVT ppx  - Lovenox

## 2023-05-25 NOTE — PROGRESS NOTE ADULT - NUTRITIONAL ASSESSMENT
Diet, Soft and Bite Sized:   Supplement Feeding Modality:  Oral  Ensure Plus High Protein Cans or Servings Per Day:  1       Frequency:  Two Times a day (05-04-23 @ 12:08) [Active]

## 2023-05-25 NOTE — PROGRESS NOTE ADULT - ASSESSMENT
MAHDI KELY is a 68 year old male with PMH of seizure, HTN, Parkinson's disease (Dx in 2012, wheelchair bound since 2020) and frequent falls; presented to [facility] on 4/17 for elective L craniotomy for meningioma resection with Dr. D'Amico. His procedure was tolerated well and was discharged to [facility] on 4/21. During transit to , he complained of incisional headache with episodes of nausea and vomiting requiring Zofran and Oxycodone, but his symptoms persisted. Upon arrival to , an RRT was called for worsening mental status and a CTH revealed an acute large left frontal hemorrhage with IVH, cerebral edema with midline shift and some effacement of L frontal horn. He was transferred back to [facility].  He underwent EVD placement on 4/22. He was extubated on 4/23, and placed on HFNC with eventual wean to NC.  His hospital course was complicated by RLL consolidation (s/p Zosyn), HTN, bradycardia (self-resolved), hyponatremia, NINFA, and L cheek herpetic lesion (requiring 7 day course of Valtrex). S/p EVD removal on 5/1. Patient now admitted for a multidisciplinary rehab program. 05-03-23    * Sustained improvement, dc home today  * Had successful family meeting today    Rehab Management/MEDICAL MANAGEMENT   #Functional deficits s/p non-traumatic ICH  - Gait Instability, ADL impairments and Functional impairments: start Comprehensive Rehab Program of PT/OT/SLP  - 3 hours a day, 5 days a week  - P&O as needed   - CTH revealed L frontal lobe hemorrhage with intraventricular hemorrhage and increased size of R lateral ventricle  - S/p EVD placement on 4/22, removed on 5/1   - S/p Decadron taper x1 week, completed  - Serial CTH 5/4, for interval AMS showed reduction of left frontal hemorrhage  - Continue Amantadine  - 5/18: Stuttering and tremors noted -Repeat CTH- no new acute process, slowy resolving L frontal hemorrhage     * Fall episode in his room, no injury 5/10-- CTH reduced size of intracranial hemorrhage  * Impaired safety awareness ,but no agitation, probably due to disinhibition due to effects of brain injury  --Move to room near nursing station when possible    #HSV-1 Infection, Left cheek s/p valtrex completed 5/5  - F/u awaiting result of HIV testing, done during acute care    #PNA  - S/p Zosyn (4/22-4/27)   - Albuterol/Ipratropium Nebulizer prn  - Incentive Spirometer    #Parkinson's Disease  - Continue Sinemet 4x per day, Entacapone 200mg q8h  - Melatonin  - bowel regimen  -- Neurology review for Parkinson's disease, progressive hand tremors 5/11  ---Amantadine 100mg BID - tolerating well. Consider starting SSRI (mirtazepine or lexapro) for anxiety,  --- Or consider trial of benzodiazepine (clonazepam) and rivastigmine will be considered afterwards, while considering risk ot reducing seizure threshold    #Seizures  - Keppra BID  -seizure precautions     #HTN  - Amlodipine  - Goal SBP: 100-160    #Skin  - Skin: scalp incision healing well, well approximated  - Pustular rash b/l armpits-ID eval requested  - Pressure injury/Skin: OOB to Chair, PT/OT     #Pain Mgmt   - Tylenol PRN  - Lidocaine patch  - Home: Gabapentin-on hold per NSx    #GI/Bowel Mgmt   - Continent c/w Senna, Miralax BID    #/Bladder Mgmt --Voiding     #FEN   - Diet - Soft & Bite Sized   - Dysphagia  SLP - evaluation and treatment    #Precautions / PROPHYLAXIS:   - Falls  - ortho: Weight bearing status: WBAT   - Lungs: Aspiration, Incentive Spirometer   - DVT: Lovenox  -------------------------------------------------------------  Dr. Cassidy's Liason with family/providers:     Liaison with family 5/9--i called family--spoke with patient's daughter (with ph number in EMR for spouse), she reports that patient's on, will be visiting patient tomorrow am and will discuss update with me  There was no response to my call to patient's son on ph     5/10--I called on ph and d/w Mr Hearn, son, as noted above  5/16--Called patient' family on phone, discussed treatment update, functional progress and dc plan   -------------------------------------------------------------  IDT conference on  5/23   TDD: 5/25 to home  Barriers: Safety, difficulty managing turning  Social Work: Lives with daughter and son  OT: CG-min A for BLD, bathing, toilet transfer, toileting, tub bench transfer.   PTCG-min A for transfers. Ambulated 120ft with RW with min A for turns and CGA for straight distances. Negotiated 8 stairs with CG-min A.   SLP: Easy to chew with TLs. Dysarthria. Cognitive and memory within functional limits.   Plan for family training same day as DC.   ------------------------------------------------------  FOLLOW UP/OUTPATIENT:    D'Amico, Randy  [phone]    Dianelys Nguyen  [address] [place] [address], [place] [address]  Phone: [phone]  Fax: (   )    -  Follow Up Time:    D'Amico, Randy  [facility]  NEUROSURG [address] [place] [address] S  Scheduled Appointment: 05/10/2023  -------------------------------------------------------------

## 2023-05-25 NOTE — PROGRESS NOTE ADULT - SUBJECTIVE AND OBJECTIVE BOX
Patient is a 68y old  Male who presents with a chief complaint of Functional deficits s/p non-traumatic ICH (24 May 2023 12:10)    Patient seen and examined in hallway. No events overnight. Patient denies acute complaints at this time, no chest pain, sob, abd pain, headache, changes in vision, nausea or vomiting.    ALLERGIES:  No Known Allergies    MEDICATIONS  (STANDING):  amantadine Syrup 100 milliGRAM(s) Oral two times a day  amLODIPine   Tablet 10 milliGRAM(s) Oral daily  AQUAPHOR (petrolatum Ointment) 1 Application(s) Topical two times a day  artificial  tears Solution 1 Drop(s) Both EYES every 4 hours  carbidopa/levodopa  25/250 1 Tablet(s) Oral <User Schedule>  enoxaparin Injectable 40 milliGRAM(s) SubCutaneous every 24 hours  entacapone 200 milliGRAM(s) Oral <User Schedule>  levETIRAcetam 1000 milliGRAM(s) Oral two times a day  lidocaine   4% Patch 1 Patch Transdermal every 24 hours  melatonin 9 milliGRAM(s) Oral at bedtime  polyethylene glycol 3350 17 Gram(s) Oral two times a day  saline laxative (FLEET) Rectal Enema 1 Enema Rectal once  senna 2 Tablet(s) Oral at bedtime  tiotropium 2.5 MICROgram(s) Inhaler 2 Puff(s) Inhalation daily    MEDICATIONS  (PRN):  acetaminophen   Oral Liquid .. 650 milliGRAM(s) Oral every 6 hours PRN Mild Pain (1 - 3)  acetylcysteine 20%  Inhalation 4 milliLiter(s) Inhalation every 6 hours PRN congestion  albuterol    90 MICROgram(s) HFA Inhaler 2 Puff(s) Inhalation every 6 hours PRN Shortness of Breath and/or Wheezing    Vital Signs Last 24 Hrs  T(F): 98.1 (25 May 2023 09:36), Max: 98.1 (24 May 2023 10:36)  HR: 75 (25 May 2023 09:36) (60 - 75)  BP: 100/62 (25 May 2023 09:36) (100/62 - 143/80)  RR: 16 (25 May 2023 09:36) (16 - 16)  SpO2: 100% (25 May 2023 09:36) (95% - 100%)  I&O's Summary      PHYSICAL EXAM:  GENERAL: NAD, sitting in chair  ENMT: Moist mucous membranes, no thrush  NECK: Supple, No JVD  CHEST/LUNG: Clear to auscultation bilaterally, good air entry, non-labored breathing  HEART: RRR; S1/S2, No murmur  ABDOMEN: Soft, Nontender, Nondistended; Bowel sounds present  EXTREMITIES: No calf tenderness, No cyanosis, No edema  SKIN: Warm, perfused  PSYCH: Normal mood, Normal affect    LABS:                        14.7   4.05  )-----------( 256      ( 25 May 2023 08:28 )             46.0     05-25    144  |  106  |  20  ----------------------------<  108  4.1   |  30  |  1.39    Ca    9.7      25 May 2023 08:28    TPro  7.6  /  Alb  3.7  /  TBili  0.2  /  DBili  x   /  AST  17  /  ALT  16  /  AlkPhos  64  05-25              04-23 Chol 151 mg/dL LDL -- HDL 55 mg/dL Trig 115 mg/dL                          RADIOLOGY & ADDITIONAL TESTS:    Care Discussed with Consultants/Other Providers:

## 2023-05-25 NOTE — PROGRESS NOTE ADULT - REASON FOR ADMISSION
Functional deficits s/p non-traumatic ICH

## 2023-05-29 PROBLEM — Z51.89 VISIT FOR WOUND CHECK: Status: ACTIVE | Noted: 2023-04-26

## 2023-05-31 ENCOUNTER — APPOINTMENT (OUTPATIENT)
Dept: NEUROSURGERY | Facility: CLINIC | Age: 68
End: 2023-05-31
Payer: MEDICARE

## 2023-05-31 DIAGNOSIS — Z51.89 ENCOUNTER FOR OTHER SPECIFIED AFTERCARE: ICD-10-CM

## 2023-05-31 PROCEDURE — 99024 POSTOP FOLLOW-UP VISIT: CPT

## 2023-05-31 NOTE — DATA REVIEWED
[de-identified] : \par \par \par \par ACC: [id number] EXAM: CT BRAIN ORDERED BY: ÁLVARO AZUL\par \par PROCEDURE DATE: 05/04/2023\par \par \par \par INTERPRETATION: CLINICAL INDICATION: Follow-up left frontal parenchymal hemorrhage\par \par 5mm axial sections of the brain were obtained from base to vertex, without the intravenous administration of contrast material. Coronal and sagittal computer generated reconstructed views are available.\par \par Comparison is made with the prior CT of 5/2/2023.\par \par The large left frontal parenchymal hemorrhage is slightly smaller and slightly less dense compared with the prior exam without change in vasogenic edema. There is no change in mass effect on the left frontal horn or midline shift to the right. There is a left frontal craniotomy defect Mild atrophy is identified with ventricular and sulcal prominence. Extensive small vessel white matter ischemic changes are noted.\par \par IMPRESSION: Slightly decreased size and density to the large left frontal parenchymal hemorrhage compared with 5/2/2023. No change in mass effect on the left frontal horn or midline shift to the right. Left frontal craniotomy.\par \par --- End of Report ---\par \par \par  [de-identified] : \par ACC: [id number] EXAM: MR BRAIN WAW IC ORDERED BY: COLLEEN LEE\par \par PROCEDURE DATE: 04/18/2023\par \par \par \par INTERPRETATION: PROCEDURE INFORMATION:\par Exam: MR Head Without and With Contrast\par Exam date and time: 4/18/2023 9:59 PM\par Age: 68 years old\par Clinical indication: Pain; Other: Postop\par \par TECHNIQUE:\par Imaging protocol: Magnetic resonance imaging of the head without and with contrast.\par \par IV contrast: 10 mL IV Gadavist.\par \par COMPARISON:\par MR BRAIN WITHOUT AND WITH IV CONTRAST 3/25/2023 12:11 PM\par \par FINDINGS:\par Brain: Status post resection of the previously demonstrated left frontal lobe extra-axial mass. There is a surgical cavity in the left frontal lobe with air and blood products. There is a small left cerebral convexity subdural fluid collection secondary to postsurgical changes. There is focal restricted diffusion surrounding the surgical resection cavity extending to the genu of the corpus callosum on the left consistent with devitalized brain parenchyma from postsurgical changes. There is no evidence of nodular enhancement to suggest residual meningioma. There is persistent vasogenic edema in the left frontal lobe and mass effect on the left frontal horn with stable left right midline shift.\par \par Bones/joints: Status post left frontal craniotomy. There is a subcutaneous fluid collection containing air and hemorrhagic products secondary postsurgical changes..\par \par Paranasal sinuses: Normal as visualized. No acute sinusitis.\par Mastoid air cells: Normal as visualized. No mastoid effusion.\par Orbital cavities: Unremarkable.\par Soft tissues: Unremarkable.\par \par IMPRESSION:\par 1. Since the prior study of 03/25/2023, status post left frontal craniotomy for resection of the left frontal lobe extra-axial mass. No evidence of residual tumor.\par \par 2. Associated postsurgical changes with focal restricted diffusion surrounding the surgical resection cavity compatible with devitalized brain parenchyma.\par \par Addendum created by Dr. Jasvir Ballard MD on 4/19/2023 5:09:53 AM EDT\par Results of this examination were discussed with JEREMIAH Gotti at 4:08 a.m.\par a.m. central standard time on 04/19/2023.\par \par --- End of Report ---\par \par \par

## 2023-05-31 NOTE — REASON FOR VISIT
[Family Member] : family member [de-identified] : Left craniotomy for resection of brain mass   [de-identified] : 4/17/23

## 2023-05-31 NOTE — PHYSICAL EXAM
[General Appearance - Alert] : alert [General Appearance - In No Acute Distress] : in no acute distress [Clean] : clean [Dry] : dry [Well-Healed] : well-healed [Person] : oriented to person [Place] : oriented to place [Time] : oriented to time [Sclera] : the sclera and conjunctiva were normal [PERRL With Normal Accommodation] : pupils were equal in size, round, reactive to light, with normal accommodation [Extraocular Movements] : extraocular movements were intact [Neck Appearance] : the appearance of the neck was normal [] : no respiratory distress [Respiration, Rhythm And Depth] : normal respiratory rhythm and effort [Skin Color & Pigmentation] : normal skin color and pigmentation [FreeTextEntry5] : CN II- XII grossly intact [FreeTextEntry6] : BUE4/5; BLE 3+/5

## 2023-05-31 NOTE — HISTORY OF PRESENT ILLNESS
[FreeTextEntry1] : 69 y/o Male with PMhx of seizures, HTN, poorly controlled Parkinson's disease w/ progressive inability to walk since Nov 2022. Found to have large Left frontal dural based lesion with associated mass effect and edema. \par S/p Left crani for meningioma resection (4/17/23). \par PATH: meningioma, CNS WHO grade 1\par \par Pt discharged to [facility] on 4/21/23. \par On arrival to rehab c/o headache with persistent nausea/vomiting. CTH revealed new large left frontal IPH with IVH, with edema and midline shift. Intubated for airway protection and transferred back to [facility]. EVD placed- removed on 5/1/23. CTH stable. \par DC back to [facility] 5/3/23\par \par TODAY patient presents for post op follow- up. \par He is now home from rehab x 4 days. \par Endorses improvement in strength and walking since rehab. Continues to have some forgetfulness. \par Denies signs of any postop wound infection which could include but not limited to redness, swelling, purulent drainage. \par  \par

## 2023-05-31 NOTE — ASSESSMENT
[FreeTextEntry1] : 68M with history of poorly controlled parkinson's with progressive inability to ambulate since november found to have large left frontal meningioma now status post bicoronal incision and left frontal craniotomy for resection of the lesion, complicated by delayed venous infarct with intracerebral hemorrhage and intraventricular hemorrhage requiring ICU stay with a ventriculostomy.  The patient has recovered almost completely to his baseline and is doing well.  Incision clean dry and intact.  Family does report some persistent memory issues, but otherwise significant improvement from preoperative neurologic status.  Has yet to follow with Dr. Herrera regarding Parkinson's treatment.  Will obtain 3-month follow-up MRI.  Path consistent with grade 1 meningioma.  We will follow with serial imaging.\par \par Dr. D' Amico independently reviewed all available images with patient. \par \par PLAN: \par - 3 month post op MRI'\par - RTC after MRI to review\par - Continue f/u with neurologist Dr. Ambrosio in the interim \par \par Patient verbalizes understanding of today’s discussion and next steps in treatment plan. \par \par \par A total of 15 minutes was spent reviewing the labs, imaging and physical examination of the patient. We discussed the diagnosis, and the plan. The patient's questions were answered. The patient demonstrated an excellent understanding of the plan.

## 2023-06-08 ENCOUNTER — APPOINTMENT (OUTPATIENT)
Dept: INTERNAL MEDICINE | Facility: CLINIC | Age: 68
End: 2023-06-08

## 2023-06-09 ENCOUNTER — APPOINTMENT (OUTPATIENT)
Dept: INTERNAL MEDICINE | Facility: CLINIC | Age: 68
End: 2023-06-09
Payer: MEDICARE

## 2023-06-09 ENCOUNTER — NON-APPOINTMENT (OUTPATIENT)
Age: 68
End: 2023-06-09

## 2023-06-09 VITALS
HEIGHT: 66 IN | BODY MASS INDEX: 36.8 KG/M2 | WEIGHT: 229 LBS | HEART RATE: 68 BPM | DIASTOLIC BLOOD PRESSURE: 75 MMHG | OXYGEN SATURATION: 97 % | TEMPERATURE: 97.1 F | SYSTOLIC BLOOD PRESSURE: 111 MMHG

## 2023-06-09 DIAGNOSIS — R11.0 NAUSEA: ICD-10-CM

## 2023-06-09 DIAGNOSIS — K59.00 CONSTIPATION, UNSPECIFIED: ICD-10-CM

## 2023-06-09 DIAGNOSIS — Z09 ENCOUNTER FOR FOLLOW-UP EXAMINATION AFTER COMPLETED TREATMENT FOR CONDITIONS OTHER THAN MALIGNANT NEOPLASM: ICD-10-CM

## 2023-06-09 PROCEDURE — 36415 COLL VENOUS BLD VENIPUNCTURE: CPT

## 2023-06-09 PROCEDURE — 99495 TRANSJ CARE MGMT MOD F2F 14D: CPT | Mod: 25,GC

## 2023-06-09 RX ORDER — DIAPER,BRIEF,ADULT, DISPOSABLE
EACH MISCELLANEOUS
Qty: 5 | Refills: 3 | Status: ACTIVE | COMMUNITY
Start: 2023-06-09 | End: 1900-01-01

## 2023-06-09 RX ORDER — ONDANSETRON 8 MG/1
8 TABLET, ORALLY DISINTEGRATING ORAL TWICE DAILY
Qty: 30 | Refills: 1 | Status: COMPLETED | COMMUNITY
Start: 2023-06-09

## 2023-06-09 RX ORDER — AMLODIPINE BESYLATE 2.5 MG/1
2.5 TABLET ORAL DAILY
Qty: 30 | Refills: 2 | Status: DISCONTINUED | COMMUNITY
Start: 2023-04-07 | End: 2023-06-09

## 2023-06-09 RX ORDER — GABAPENTIN 400 MG/1
400 CAPSULE ORAL 4 TIMES DAILY
Refills: 0 | Status: DISCONTINUED | COMMUNITY
Start: 2023-04-07 | End: 2023-06-09

## 2023-06-12 PROBLEM — Z09 HOSPITAL DISCHARGE FOLLOW-UP: Status: ACTIVE | Noted: 2023-06-12

## 2023-06-13 ENCOUNTER — NON-APPOINTMENT (OUTPATIENT)
Age: 68
End: 2023-06-13

## 2023-06-13 LAB
ALBUMIN SERPL ELPH-MCNC: 4.5 G/DL
ALP BLD-CCNC: 72 U/L
ALT SERPL-CCNC: 17 U/L
ANION GAP SERPL CALC-SCNC: 11 MMOL/L
APPEARANCE: CLEAR
AST SERPL-CCNC: 13 U/L
BACTERIA: NEGATIVE /HPF
BILIRUB SERPL-MCNC: 0.3 MG/DL
BILIRUBIN URINE: NEGATIVE
BLOOD URINE: NEGATIVE
BUN SERPL-MCNC: 12 MG/DL
CALCIUM SERPL-MCNC: 9.9 MG/DL
CAST: 0 /LPF
CHLORIDE SERPL-SCNC: 108 MMOL/L
CO2 SERPL-SCNC: 28 MMOL/L
COLOR: NORMAL
CREAT SERPL-MCNC: 1.36 MG/DL
EGFR: 56 ML/MIN/1.73M2
EPITHELIAL CELLS: 1 /HPF
GLUCOSE QUALITATIVE U: NEGATIVE MG/DL
GLUCOSE SERPL-MCNC: 97 MG/DL
KETONES URINE: ABNORMAL MG/DL
LEUKOCYTE ESTERASE URINE: NEGATIVE
MICROSCOPIC-UA: NORMAL
NITRITE URINE: NEGATIVE
PH URINE: 6
POTASSIUM SERPL-SCNC: 4.4 MMOL/L
PROT SERPL-MCNC: 7.4 G/DL
PROTEIN URINE: NEGATIVE MG/DL
PSA FREE FLD-MCNC: 7 %
PSA FREE SERPL-MCNC: 4.99 NG/ML
PSA SERPL-MCNC: 69.5 NG/ML
RED BLOOD CELLS URINE: 2 /HPF
SODIUM SERPL-SCNC: 147 MMOL/L
SPECIFIC GRAVITY URINE: 1.02
UROBILINOGEN URINE: 0.2 MG/DL
WHITE BLOOD CELLS URINE: 0 /HPF

## 2023-06-23 ENCOUNTER — APPOINTMENT (OUTPATIENT)
Dept: INTERNAL MEDICINE | Facility: CLINIC | Age: 68
End: 2023-06-23

## 2023-06-27 ENCOUNTER — APPOINTMENT (OUTPATIENT)
Dept: INTERNAL MEDICINE | Facility: CLINIC | Age: 68
End: 2023-06-27

## 2023-07-10 ENCOUNTER — NON-APPOINTMENT (OUTPATIENT)
Age: 68
End: 2023-07-10

## 2023-07-10 ENCOUNTER — APPOINTMENT (OUTPATIENT)
Dept: OPHTHALMOLOGY | Facility: CLINIC | Age: 68
End: 2023-07-10
Payer: MEDICARE

## 2023-07-10 PROCEDURE — 92004 COMPRE OPH EXAM NEW PT 1/>: CPT

## 2023-07-26 ENCOUNTER — OUTPATIENT (OUTPATIENT)
Dept: OUTPATIENT SERVICES | Facility: HOSPITAL | Age: 68
LOS: 1 days | End: 2023-07-26
Payer: MEDICARE

## 2023-07-26 ENCOUNTER — APPOINTMENT (OUTPATIENT)
Dept: MRI IMAGING | Facility: HOSPITAL | Age: 68
End: 2023-07-26

## 2023-07-26 ENCOUNTER — APPOINTMENT (OUTPATIENT)
Dept: NEUROSURGERY | Facility: CLINIC | Age: 68
End: 2023-07-26
Payer: MEDICARE

## 2023-07-26 VITALS
TEMPERATURE: 97.8 F | OXYGEN SATURATION: 98 % | DIASTOLIC BLOOD PRESSURE: 91 MMHG | SYSTOLIC BLOOD PRESSURE: 148 MMHG | HEART RATE: 54 BPM

## 2023-07-26 PROCEDURE — 92523 SPEECH SOUND LANG COMPREHEN: CPT

## 2023-07-26 PROCEDURE — 85025 COMPLETE CBC W/AUTO DIFF WBC: CPT

## 2023-07-26 PROCEDURE — 92526 ORAL FUNCTION THERAPY: CPT

## 2023-07-26 PROCEDURE — 97116 GAIT TRAINING THERAPY: CPT

## 2023-07-26 PROCEDURE — 85610 PROTHROMBIN TIME: CPT

## 2023-07-26 PROCEDURE — 97535 SELF CARE MNGMENT TRAINING: CPT

## 2023-07-26 PROCEDURE — 83735 ASSAY OF MAGNESIUM: CPT

## 2023-07-26 PROCEDURE — 70553 MRI BRAIN STEM W/O & W/DYE: CPT | Mod: 26,MH

## 2023-07-26 PROCEDURE — 97167 OT EVAL HIGH COMPLEX 60 MIN: CPT

## 2023-07-26 PROCEDURE — 36415 COLL VENOUS BLD VENIPUNCTURE: CPT

## 2023-07-26 PROCEDURE — 80053 COMPREHEN METABOLIC PANEL: CPT

## 2023-07-26 PROCEDURE — 84146 ASSAY OF PROLACTIN: CPT

## 2023-07-26 PROCEDURE — 85730 THROMBOPLASTIN TIME PARTIAL: CPT

## 2023-07-26 PROCEDURE — A9585: CPT

## 2023-07-26 PROCEDURE — 82962 GLUCOSE BLOOD TEST: CPT

## 2023-07-26 PROCEDURE — 84100 ASSAY OF PHOSPHORUS: CPT

## 2023-07-26 PROCEDURE — 85027 COMPLETE CBC AUTOMATED: CPT

## 2023-07-26 PROCEDURE — 92507 TX SP LANG VOICE COMM INDIV: CPT

## 2023-07-26 PROCEDURE — 97530 THERAPEUTIC ACTIVITIES: CPT

## 2023-07-26 PROCEDURE — 97163 PT EVAL HIGH COMPLEX 45 MIN: CPT

## 2023-07-26 PROCEDURE — 99212 OFFICE O/P EST SF 10 MIN: CPT

## 2023-07-26 PROCEDURE — 97112 NEUROMUSCULAR REEDUCATION: CPT

## 2023-07-26 PROCEDURE — 70553 MRI BRAIN STEM W/O & W/DYE: CPT

## 2023-07-26 PROCEDURE — 92610 EVALUATE SWALLOWING FUNCTION: CPT

## 2023-07-26 PROCEDURE — 97110 THERAPEUTIC EXERCISES: CPT

## 2023-07-26 PROCEDURE — 70450 CT HEAD/BRAIN W/O DYE: CPT

## 2023-07-26 NOTE — REVIEW OF SYSTEMS
[As Noted in HPI] : as noted in HPI [Confused or Disoriented] : confusion [Memory Lapses or Loss] : memory loss [Fever] : no fever [Chills] : no chills [Chest Pain] : no chest pain [Shortness Of Breath] : no shortness of breath

## 2023-07-26 NOTE — ASSESSMENT
[FreeTextEntry1] : 68M with history of poorly controlled parkinson's with progressive inability to ambulate since november found to have large left frontal meningioma now status post bicoronal incision and left frontal craniotomy for resection of the lesion, complicated by delayed venous infarct with intracerebral hemorrhage and intraventricular hemorrhage requiring ICU stay with a ventriculostomy.  The patient has recovered almost completely to his baseline and is doing well.  Incision clean dry and intact.  Family does report some persistent memory issues, but otherwise significant improvement from preoperative neurologic status.  Has yet to follow with Dr. Herrera regarding Parkinson's treatment.  Will obtain 6-month follow-up MRI.  Path consistent with grade 1 meningioma.  We will follow with serial imaging.\par \par Dr. D' Amico independently reviewed all available images with patient. \par \par PLAN: \par - F/u with neurologist Dr. Ambrosio for cognitive deficits/ Parkinson medication management \par - Physiatry referral\par - Repeat MRI brain w/wo in 6 months (Jan 2024)\par - RTC after MRI to review \par \par Patient verbalizes understanding of today’s discussion and next steps in treatment plan. \par \par \par A total of 15 minutes was spent reviewing the labs, imaging and physical examination of the patient. We discussed the diagnosis, and the plan. The patient's questions were answered. The patient demonstrated an excellent understanding of the plan. \par \par \par A total of 15 minutes was spent reviewing the labs, imaging and physical examination of the patient. We discussed the diagnosis, and the plan. The patient's questions were answered. The patient demonstrated an excellent understanding of the plan.

## 2023-07-26 NOTE — REASON FOR VISIT
[Follow-Up: _____] : a [unfilled] follow-up visit [Family Member] : family member [FreeTextEntry1] : s/p left crani for resect of meningioma. Review 3 month post op MRI

## 2023-07-26 NOTE — PHYSICAL EXAM
[General Appearance - Alert] : alert [General Appearance - In No Acute Distress] : in no acute distress [Clean] : clean [Dry] : dry [Well-Healed] : well-healed [Person] : oriented to person [Place] : oriented to place [Sclera] : the sclera and conjunctiva were normal [Neck Appearance] : the appearance of the neck was normal [] : no respiratory distress [Respiration, Rhythm And Depth] : normal respiratory rhythm and effort [Skin Color & Pigmentation] : normal skin color and pigmentation [FreeTextEntry5] : CN II- XII grossly intact [FreeTextEntry6] : BUE4/5; BLE 3+/5

## 2023-07-28 NOTE — ASSESSMENT
[FreeTextEntry1] : EKG 4/7/2023 NSR normal EKG \par \par A/P\par 1. Pre Operative Cardiac Examination\par 2. Dyspnea on Exertion\par Hx as above\par No ASCVD risk factors\par  Longstanding subjective NEVILLE, now at minimal effort, but basically wheelchair bound due to advanced Parkinsons, so unable to assess exercise tolerance. Euvolemic on exam, EKG unremarkable\par Resting echo shows normal LV and RV function, mild LVH\par \par \par As per Neuro note 4/7/23, MRI now shows expanding mass w edema and mass effect. as resting echo today shows, even thou unable to assess exercise capacity, in absence of ASCVD risk factors, feel that benefit of timely surgery are priority and would proceed w surgery as outlined by NeuroSurgery without further testing.

## 2023-07-28 NOTE — REVIEW OF SYSTEMS
[As Noted in HPI] : as noted in HPI [Difficulty with Language] : ~M difficulty with language [Negative] : Heme/Lymph [de-identified] : unable to walk

## 2023-07-28 NOTE — ASSESSMENT
[FreeTextEntry1] : patient with symptomatic Parkinson's disease and a large meningioma causing mass effect and hemiparesis that is further lending to disability and impaired gait. \par Refer patient to see Dr. Damico from neurosurgery. \par improved management of Parkinson's disease

## 2023-07-28 NOTE — PHYSICAL EXAM
[FreeTextEntry1] : The patient is alert and oriented x3, naming intact x3, repetition normal, follows three-step commands, and is able to participate fully in the history taking.\par Speech is normal with no evidence of dysarthria.\par Memory is intact: Immediate recall 3 out of 3, short-term 3 out of 3, remote memory intact\par Cranial nerves II through XII intact\par Motor exam: Upper and lower extremities 4 out of 5 power on the left , rigidity. resting tremor\par Sensory exam: Intact to light touch and pinprick. Romberg negative.\par Coordination and vestibular exam: Finger to nose intact, no evidence of truncal or appendicular ataxia. No evidence of nystagmus. no vestibular symptoms elicited with head turning during ambulation.\par Gait: wheelchair bound. unable to ambulate independently\par \par HEENT: Normocephalic atraumatic\par Funduscopic: No disc edema\par Neck supple with no JVD. No evidence of carotid bruit.\par Cardiac: S1-S2 regular rate and rhythm, no murmur noted.\par Chest: Clear to auscultation\par Abdomen: nontender, normal bowel sounds, soft\par Extremity: No edema, normal pulses.\par \par

## 2023-07-28 NOTE — PHYSICAL EXAM
[Edema ___] : edema [unfilled] [Normal] : alert and oriented, normal memory [de-identified] : unablke to assess [de-identified] : unable to assess

## 2023-08-04 ENCOUNTER — APPOINTMENT (OUTPATIENT)
Dept: NEUROLOGY | Facility: CLINIC | Age: 68
End: 2023-08-04
Payer: MEDICARE

## 2023-08-04 VITALS
DIASTOLIC BLOOD PRESSURE: 64 MMHG | TEMPERATURE: 97.8 F | HEIGHT: 66 IN | SYSTOLIC BLOOD PRESSURE: 102 MMHG | OXYGEN SATURATION: 97 % | HEART RATE: 74 BPM

## 2023-08-04 PROCEDURE — 99214 OFFICE O/P EST MOD 30 MIN: CPT

## 2023-08-04 NOTE — HISTORY OF PRESENT ILLNESS
[FreeTextEntry1] : Interval hx 8/4/23 Patient takes the Sinemet 4x a day, provided by his son. Patient walks with a cane around the house and has been talking more as well. Patient still taking Entacapone once daily and Keppra twice daily. Meningioma was removed in April 2023 with Dr. D'Amico and patient tolerated well. No seizures since last visit  He is now making more decisions for him.  __ Patient here for follow up of his parkinsons disease for 8 years.  Slowly got worse, First time the tumor was discovered was 2019. Hospitalized in [facility] in November 26th 2023 for not being able to walk. family was told that the tumor got bigger.   Parkinsons disease  - Carbidopa /levodopa increased to three times a day.  - 8 am / 2 pm and 8 pm before bed.  Takes with a full stomach    last seizure was in November.

## 2023-08-04 NOTE — PHYSICAL EXAM
[FreeTextEntry1] : mask like facies  bradykinesia and bradyphrenia Rigidity legs greater than arms with cogwheeling improved UE 4/5 LE 3 +/5 proximally and 4/5 distally able to get out of wheelchair and walk with some assistance mild language aphasia

## 2023-08-04 NOTE — ASSESSMENT
[FreeTextEntry1] : Plan --Continue Levodopa Carbidopa QID and take on an empty stomach. Consider cutting down the dose during next visit 25/250 8/12/4/8 --Continue entacapone for now.  --Consider parkinson rehab after meningioma addressed.  --Try to walk as much as you can with a cane in the house --Will consider Speech and PT at next visit  Seizure --Will reduce his keppra to 750 BID  Follow up in 2 months

## 2023-08-10 ENCOUNTER — APPOINTMENT (OUTPATIENT)
Dept: INTERNAL MEDICINE | Facility: CLINIC | Age: 68
End: 2023-08-10
Payer: MEDICARE

## 2023-08-10 VITALS
OXYGEN SATURATION: 98 % | SYSTOLIC BLOOD PRESSURE: 106 MMHG | RESPIRATION RATE: 14 BRPM | HEIGHT: 66 IN | BODY MASS INDEX: 33.75 KG/M2 | DIASTOLIC BLOOD PRESSURE: 66 MMHG | HEART RATE: 63 BPM | WEIGHT: 210 LBS | TEMPERATURE: 97 F

## 2023-08-10 DIAGNOSIS — R32 UNSPECIFIED URINARY INCONTINENCE: ICD-10-CM

## 2023-08-10 PROCEDURE — 99214 OFFICE O/P EST MOD 30 MIN: CPT

## 2023-08-10 NOTE — PHYSICAL EXAM
[Normal Sclera/Conjunctiva] : normal sclera/conjunctiva [Normal Outer Ear/Nose] : the outer ears and nose were normal in appearance [Normal] : soft, non-tender, non-distended, no masses palpated, no HSM and normal bowel sounds [de-identified] : speaks with stutter

## 2023-08-10 NOTE — REVIEW OF SYSTEMS
[Dizziness] : dizziness [Unsteady Walk] : ataxia [Memory Loss] : memory loss [Negative] : Heme/Lymph

## 2023-08-10 NOTE — END OF VISIT
[FreeTextEntry3] : 69 yo male with hx HTN, preDM, parkinsons, seizures, meningioma s/p resection here for f/u and requesting increased hours HHA. He is mostly wheelchair bound but uses walker in a limited manner.  1) impaired mobility - will write letter for increased HHA hours 2) incontinence - reviewed labs, PSA 69, referred to urology

## 2023-08-10 NOTE — HISTORY OF PRESENT ILLNESS
[de-identified] : 68M PMH HTN, DM, Parkinson's disease, meningioma s/p resection (4/17/23) c/b rehospitalization for IPH/IVH, seizures presents for urinary incontinence and increase in HHA hours. He feels well at this time. Complains of feelings of depression every day related to isolation at home. Also reports insomnia but appetite has improved since last visit. Denies dysuria, abdominal pain, fevers, chills.

## 2023-08-10 NOTE — HEALTH RISK ASSESSMENT
[2] : 1) Little interest or pleasure doing things for more than half of the days (2) [3] : 2) Feeling down, depressed, or hopeless for nearly every day (3) [Nearly Every Day (3)] : 3.) Trouble falling asleep, or sleeping too much? Nearly every day [1/2 of Days or More (2)] : 4.) Feeling tired or having little energy? Half the days or more [Several Days (1)] : 7.) Trouble concentrating on things, such as reading a newspaper or watching television? Several days [Not at All (0)] : 9.) Thoughts that you would be off dead or of hurting yourself in some way? Not at all [Moderate] : severity of depression is moderate [[id number]] : 10

## 2023-09-08 ENCOUNTER — APPOINTMENT (OUTPATIENT)
Dept: UROLOGY | Facility: CLINIC | Age: 68
End: 2023-09-08
Payer: MEDICARE

## 2023-09-08 VITALS
OXYGEN SATURATION: 99 % | HEIGHT: 66 IN | TEMPERATURE: 98 F | BODY MASS INDEX: 33.75 KG/M2 | HEART RATE: 64 BPM | SYSTOLIC BLOOD PRESSURE: 111 MMHG | DIASTOLIC BLOOD PRESSURE: 72 MMHG | WEIGHT: 210 LBS

## 2023-09-08 LAB
BILIRUB UR QL STRIP: NORMAL
CLARITY UR: CLEAR
COLLECTION METHOD: NORMAL
GLUCOSE UR-MCNC: ABNORMAL
HCG UR QL: 1 EU/DL
HGB UR QL STRIP.AUTO: 5.5
KETONES UR-MCNC: ABNORMAL
LEUKOCYTE ESTERASE UR QL STRIP: NORMAL
NITRITE UR QL STRIP: NORMAL
PH UR STRIP: 5.5
PROT UR STRIP-MCNC: ABNORMAL
SP GR UR STRIP: 1.03

## 2023-09-08 PROCEDURE — 51798 US URINE CAPACITY MEASURE: CPT

## 2023-09-08 PROCEDURE — 99204 OFFICE O/P NEW MOD 45 MIN: CPT

## 2023-09-08 NOTE — PHYSICAL EXAM
[General Appearance - Well Developed] : well developed [General Appearance - Well Nourished] : well nourished [Normal Appearance] : normal appearance [Well Groomed] : well groomed [General Appearance - In No Acute Distress] : no acute distress [Edema] : no peripheral edema [Respiration, Rhythm And Depth] : normal respiratory rhythm and effort [Exaggerated Use Of Accessory Muscles For Inspiration] : no accessory muscle use [Abdomen Soft] : soft [Abdomen Tenderness] : non-tender [Costovertebral Angle Tenderness] : no ~M costovertebral angle tenderness [Urethral Meatus] : meatus normal [Urinary Bladder Findings] : the bladder was normal on palpation [Scrotum] : the scrotum was normal [Testes Mass (___cm)] : there were no testicular masses [Prostate Size ___ gm] : prostate size [unfilled] gm [FreeTextEntry1] : Bilateral firm nodules [Normal Station and Gait] : the gait and station were normal for the patient's age [] : no rash [No Focal Deficits] : no focal deficits [Oriented To Time, Place, And Person] : oriented to person, place, and time [Affect] : the affect was normal [Mood] : the mood was normal [Not Anxious] : not anxious

## 2023-09-08 NOTE — LETTER BODY
[Dear  ___] : Dear  [unfilled], [Courtesy Letter:] : I had the pleasure of seeing your patient, [unfilled], in my office today. [Please see my note below.] : Please see my note below. [Consult Closing:] : Thank you very much for allowing me to participate in the care of this patient.  If you have any questions, please do not hesitate to contact me. [Sincerely,] : Sincerely, [FreeTextEntry3] : Rebecca Puga MD

## 2023-09-08 NOTE — HISTORY OF PRESENT ILLNESS
[FreeTextEntry1] : 68 year old man with history of Parkinson's, meningioma s/p resection presents with long standing LUTS and elevated PSA.  He has frequency, urgency, weak stream, incomplete emptying, straining, incontinence. No prior prostate medications.  IPSS 34, QOL 4, PVR 114cc  PSA 69.5 in 6/2023. No recent PSAs. No prior prostate MRI or biopsy. No family history of prostate cancer.

## 2023-09-08 NOTE — ASSESSMENT
[FreeTextEntry1] : 68 year old man with Parkinson's, meningioma resection, presents with elevated PSA and LUTS, both obstructive and irritative. Discussed that his symptoms may be related to BPH, prostate cancer, OAB or Parkinson's or a combination of those. Will trial flomax for LUTS to start and consider addition of anti-cholinergic if no improvement.  For highly elevated PSA of 69.5 and abnormal BRYANT with bilateral nodules, will start with prostate MRI. Most likely indicative of prostate cancer.   - Begin flomax 0.4 mg QD  - Prostate MRI  - F/U to discuss result

## 2023-09-11 ENCOUNTER — RX RENEWAL (OUTPATIENT)
Age: 68
End: 2023-09-11

## 2023-09-18 ENCOUNTER — APPOINTMENT (OUTPATIENT)
Dept: MRI IMAGING | Facility: HOSPITAL | Age: 68
End: 2023-09-18

## 2023-09-18 ENCOUNTER — OUTPATIENT (OUTPATIENT)
Dept: OUTPATIENT SERVICES | Facility: HOSPITAL | Age: 68
LOS: 1 days | End: 2023-09-18
Payer: MEDICARE

## 2023-09-18 PROCEDURE — 72197 MRI PELVIS W/O & W/DYE: CPT | Mod: 26,MH

## 2023-09-18 PROCEDURE — 72197 MRI PELVIS W/O & W/DYE: CPT

## 2023-09-18 PROCEDURE — A9585: CPT

## 2023-09-21 ENCOUNTER — LABORATORY RESULT (OUTPATIENT)
Age: 68
End: 2023-09-21

## 2023-09-22 ENCOUNTER — APPOINTMENT (OUTPATIENT)
Dept: UROLOGY | Facility: CLINIC | Age: 68
End: 2023-09-22
Payer: MEDICARE

## 2023-09-22 ENCOUNTER — NON-APPOINTMENT (OUTPATIENT)
Age: 68
End: 2023-09-22

## 2023-09-22 DIAGNOSIS — R39.9 UNSPECIFIED SYMPTOMS AND SIGNS INVOLVING THE GENITOURINARY SYSTEM: ICD-10-CM

## 2023-09-22 PROCEDURE — 99214 OFFICE O/P EST MOD 30 MIN: CPT

## 2023-09-25 LAB
ALBUMIN SERPL ELPH-MCNC: 4.7 G/DL
ALP BLD-CCNC: 71 U/L
ALT SERPL-CCNC: 8 U/L
ANION GAP SERPL CALC-SCNC: 13 MMOL/L
APPEARANCE: CLEAR
AST SERPL-CCNC: 11 U/L
BACTERIA UR CULT: NORMAL
BACTERIA: NEGATIVE /HPF
BASOPHILS # BLD AUTO: 0.02 K/UL
BASOPHILS NFR BLD AUTO: 0.5 %
BILIRUB SERPL-MCNC: 0.4 MG/DL
BILIRUBIN URINE: NEGATIVE
BLOOD URINE: NEGATIVE
BUN SERPL-MCNC: 19 MG/DL
CALCIUM SERPL-MCNC: 10.4 MG/DL
CAST: 0 /LPF
CHLORIDE SERPL-SCNC: 105 MMOL/L
CO2 SERPL-SCNC: 29 MMOL/L
COLOR: YELLOW
CREAT SERPL-MCNC: 1.25 MG/DL
EGFR: 62 ML/MIN/1.73M2
EOSINOPHIL # BLD AUTO: 0.03 K/UL
EOSINOPHIL NFR BLD AUTO: 0.7 %
EPITHELIAL CELLS: 1 /HPF
GLUCOSE QUALITATIVE U: NEGATIVE MG/DL
GLUCOSE SERPL-MCNC: 87 MG/DL
HCT VFR BLD CALC: 48.6 %
HGB BLD-MCNC: 14.6 G/DL
IMM GRANULOCYTES NFR BLD AUTO: 0.2 %
INR PPP: 0.99 RATIO
KETONES URINE: ABNORMAL MG/DL
LEUKOCYTE ESTERASE URINE: ABNORMAL
LYMPHOCYTES # BLD AUTO: 2.01 K/UL
LYMPHOCYTES NFR BLD AUTO: 46.2 %
MAN DIFF?: NORMAL
MCHC RBC-ENTMCNC: 29.9 PG
MCHC RBC-ENTMCNC: 30 GM/DL
MCV RBC AUTO: 99.4 FL
MICROSCOPIC-UA: NORMAL
MONOCYTES # BLD AUTO: 0.43 K/UL
MONOCYTES NFR BLD AUTO: 9.9 %
NEUTROPHILS # BLD AUTO: 1.85 K/UL
NEUTROPHILS NFR BLD AUTO: 42.5 %
NITRITE URINE: NEGATIVE
PH URINE: 5.5
PLATELET # BLD AUTO: 180 K/UL
POTASSIUM SERPL-SCNC: 4.3 MMOL/L
PROT SERPL-MCNC: 7.5 G/DL
PROTEIN URINE: NORMAL MG/DL
PT BLD: 11.2 SEC
RBC # BLD: 4.89 M/UL
RBC # FLD: 14 %
RED BLOOD CELLS URINE: 0 /HPF
SODIUM SERPL-SCNC: 148 MMOL/L
SPECIFIC GRAVITY URINE: 1.03
UROBILINOGEN URINE: 1 MG/DL
WBC # FLD AUTO: 4.35 K/UL
WHITE BLOOD CELLS URINE: 0 /HPF

## 2023-10-02 ENCOUNTER — RX RENEWAL (OUTPATIENT)
Age: 68
End: 2023-10-02

## 2023-10-05 ENCOUNTER — RX RENEWAL (OUTPATIENT)
Age: 68
End: 2023-10-05

## 2023-10-12 ENCOUNTER — APPOINTMENT (OUTPATIENT)
Dept: INTERNAL MEDICINE | Facility: CLINIC | Age: 68
End: 2023-10-12
Payer: MEDICARE

## 2023-10-12 DIAGNOSIS — R26.9 UNSPECIFIED ABNORMALITIES OF GAIT AND MOBILITY: ICD-10-CM

## 2023-10-12 PROCEDURE — 99443: CPT | Mod: GC,95

## 2023-10-13 ENCOUNTER — RX RENEWAL (OUTPATIENT)
Age: 68
End: 2023-10-13

## 2023-10-23 ENCOUNTER — APPOINTMENT (OUTPATIENT)
Dept: INTERNAL MEDICINE | Facility: CLINIC | Age: 68
End: 2023-10-23
Payer: MEDICARE

## 2023-10-23 VITALS
DIASTOLIC BLOOD PRESSURE: 95 MMHG | HEART RATE: 64 BPM | BODY MASS INDEX: 29.41 KG/M2 | TEMPERATURE: 98.5 F | HEIGHT: 66 IN | WEIGHT: 183 LBS | SYSTOLIC BLOOD PRESSURE: 177 MMHG | OXYGEN SATURATION: 91 %

## 2023-10-23 DIAGNOSIS — R09.02 HYPOXEMIA: ICD-10-CM

## 2023-10-23 DIAGNOSIS — Z01.818 ENCOUNTER FOR OTHER PREPROCEDURAL EXAMINATION: ICD-10-CM

## 2023-10-23 PROCEDURE — 99215 OFFICE O/P EST HI 40 MIN: CPT

## 2023-10-25 PROBLEM — Z01.818 PREOP TESTING: Status: ACTIVE | Noted: 2023-04-07

## 2023-10-30 ENCOUNTER — APPOINTMENT (OUTPATIENT)
Dept: UROLOGY | Facility: AMBULATORY SURGERY CENTER | Age: 68
End: 2023-10-30

## 2023-11-28 NOTE — HISTORY OF PRESENT ILLNESS
[FreeTextEntry1] : 67 y/o Male with PMhx of seizures, HTN, poorly controlled Parkinson's disease w/ progressive inability to walk since Nov 2022. Found to have large Left frontal dural based lesion with associated mass effect and edema. \par S/p Left crani for meningioma resection (4/17/23). \par PATH: meningioma, CNS WHO grade 1\par \par Pt discharged to [facility] on 4/21/23. \par On arrival to rehab c/o headache with persistent nausea/vomiting. CTH revealed new large left frontal IPH with IVH, with edema and midline shift. Intubated for airway protection and transferred back to [facility]. EVD placed- removed on 5/1/23. CTH stable. \par DC back to [facility] 5/3/23\par \par 5/31/2023 pt returned for post op follow- up after dc home from rehab. Improvement in strength and walking. Continued forgetfulness. incision CDI. \par \par TODAY pt returns for 3 month post op follow- up and MRI review. \par Overall doing well. Notes intermittent left hand  weakness. Continues to have forgetfulness with waves of confusion that comes and goes.  no fever and no chills.

## 2023-12-22 ENCOUNTER — RX RENEWAL (OUTPATIENT)
Age: 68
End: 2023-12-22

## 2024-01-12 ENCOUNTER — NON-APPOINTMENT (OUTPATIENT)
Age: 69
End: 2024-01-12

## 2024-01-25 PROBLEM — I61.5 INTRAVENTRICULAR HEMORRHAGE: Status: ACTIVE | Noted: 2023-05-10

## 2024-01-25 PROBLEM — I61.9 INTRAPARENCHYMAL HEMORRHAGE OF BRAIN: Status: ACTIVE | Noted: 2023-05-10

## 2024-01-31 ENCOUNTER — APPOINTMENT (OUTPATIENT)
Dept: MRI IMAGING | Facility: HOSPITAL | Age: 69
End: 2024-01-31

## 2024-01-31 ENCOUNTER — OUTPATIENT (OUTPATIENT)
Dept: OUTPATIENT SERVICES | Facility: HOSPITAL | Age: 69
LOS: 1 days | End: 2024-01-31
Payer: MEDICARE

## 2024-01-31 ENCOUNTER — APPOINTMENT (OUTPATIENT)
Dept: NEUROSURGERY | Facility: CLINIC | Age: 69
End: 2024-01-31
Payer: MEDICARE

## 2024-01-31 DIAGNOSIS — I61.9 NONTRAUMATIC INTRACEREBRAL HEMORRHAGE, UNSPECIFIED: ICD-10-CM

## 2024-01-31 DIAGNOSIS — I61.5 NONTRAUMATIC INTRACEREBRAL HEMORRHAGE, INTRAVENTRICULAR: ICD-10-CM

## 2024-01-31 PROCEDURE — 70553 MRI BRAIN STEM W/O & W/DYE: CPT

## 2024-01-31 PROCEDURE — 99212 OFFICE O/P EST SF 10 MIN: CPT

## 2024-01-31 PROCEDURE — A9585: CPT

## 2024-01-31 PROCEDURE — 70553 MRI BRAIN STEM W/O & W/DYE: CPT | Mod: 26,MH

## 2024-01-31 NOTE — PHYSICAL EXAM
[General Appearance - Alert] : alert [General Appearance - In No Acute Distress] : in no acute distress [Clean] : clean [Dry] : dry [Well-Healed] : well-healed [Person] : oriented to person [Place] : oriented to place [Time] : oriented to time [Motor Tone] : muscle tone was normal in all four extremities [Motor Strength] : muscle strength was normal in all four extremities [Sclera] : the sclera and conjunctiva were normal [Neck Appearance] : the appearance of the neck was normal [] : no respiratory distress [Respiration, Rhythm And Depth] : normal respiratory rhythm and effort [Skin Color & Pigmentation] : normal skin color and pigmentation [FreeTextEntry5] : CN II-XII grossly intact

## 2024-01-31 NOTE — ASSESSMENT
[FreeTextEntry1] : 68M with history of poorly controlled parkinson's with progressive inability to ambulate since november found to have large left frontal meningioma now status post bicoronal incision and left frontal craniotomy for resection of the lesion, complicated by delayed venous infarct with intracerebral hemorrhage and intraventricular hemorrhage requiring ICU stay with a ventriculostomy.  The patient has recovered almost completely to his baseline and is doing well.  Incision clean dry and intact.  Family does report some persistent memory issues, but otherwise significant improvement from preoperative neurologic status.  Has yet to follow with Dr. Herrera regarding Parkinson's treatment.  Will obtain 6-month follow-up MRI.  Path consistent with grade 1 meningioma.  We will follow with serial imaging in 1 year.  Dr. D'Amico independently reviewed all available images with patient and his son.    PLAN: - Help facilitate f/u with neurologist for Parkinson eval/management  - Repeat MRI 1 year - RTC after MRI for review   Patient verbalizes understanding of today's discussion and next steps in treatment plan.     A total of 15 minutes was spent reviewing the labs, imaging and physical examination of the patient. We discussed the diagnosis, and the plan. The patient's questions were answered. The patient demonstrated an excellent understanding of the plan.

## 2024-01-31 NOTE — HISTORY OF PRESENT ILLNESS
[FreeTextEntry1] : 69 y/o Male with PMhx of seizures, HTN, poorly controlled Parkinson's disease w/ progressive inability to walk since Nov 2022. Found to have large Left frontal dural based lesion with associated mass effect and edema. S/p Left crani for meningioma resection (4/17/23). PATH: meningioma, CNS WHO grade 1  4/21/23 pt dc to [facility].  On arrival to rehab c/o headache with persistent nausea/vomiting. CTH revealed new large left frontal IPH with IVH, with edema and midline shift. Intubated for airway protection and transferred back to [facility]. EVD placed- removed on 5/1/23. CTH stable. 5/3/23 DC back to [facility].   5/31/2023 pt returned for post op follow- up after dc home from rehab. Improvement in strength and walking. Continued forgetfulness. incision CDI.  7/26/23 pt returned for 3 month post op follow- up and MRI review which was stable without residual or recurrent meningioma. Improved left frontal hematoma. Pt overall doing well with left hand  weakness improving. Continues to have forgetfulness with waves of confusion that comes and goes. Referred back to Dr. Ambrosio for cognitive deficits and Parkinson medication management with plan to repeat MRI in 6 months.   Returns TODAY for 6 month f/u and MRI review.  He is without complaints for today's visit. Some cognitive improvement since last visit.  Some forgetfulness but overall back to his baseline.  Has not been able to see Dr. Ambrosio for parkinson's follow- up.  Denies headaches, dizziness, seizure activity, visual changes, or new/worsening focal neuro deficits.    Neurologist: Dr. Mariah Ambrosio

## 2024-02-16 ENCOUNTER — APPOINTMENT (OUTPATIENT)
Dept: INTERNAL MEDICINE | Facility: CLINIC | Age: 69
End: 2024-02-16
Payer: MEDICARE

## 2024-02-16 VITALS
TEMPERATURE: 97.6 F | HEART RATE: 53 BPM | OXYGEN SATURATION: 98 % | SYSTOLIC BLOOD PRESSURE: 145 MMHG | DIASTOLIC BLOOD PRESSURE: 85 MMHG

## 2024-02-16 DIAGNOSIS — Z85.89 PERSONAL HISTORY OF MALIGNANT NEOPLASM OF OTHER ORGANS AND SYSTEMS: ICD-10-CM

## 2024-02-16 DIAGNOSIS — G93.89 OTHER SPECIFIED DISORDERS OF BRAIN: ICD-10-CM

## 2024-02-16 DIAGNOSIS — Z86.79 PERSONAL HISTORY OF OTHER DISEASES OF THE CIRCULATORY SYSTEM: ICD-10-CM

## 2024-02-16 DIAGNOSIS — Z98.890 OTHER SPECIFIED POSTPROCEDURAL STATES: ICD-10-CM

## 2024-02-16 DIAGNOSIS — Z86.69 PERSONAL HISTORY OF OTHER DISEASES OF THE NERVOUS SYSTEM AND SENSE ORGANS: ICD-10-CM

## 2024-02-16 DIAGNOSIS — D32.9 BENIGN NEOPLASM OF MENINGES, UNSPECIFIED: ICD-10-CM

## 2024-02-16 DIAGNOSIS — Z78.9 OTHER SPECIFIED HEALTH STATUS: ICD-10-CM

## 2024-02-16 PROCEDURE — 99214 OFFICE O/P EST MOD 30 MIN: CPT | Mod: GC

## 2024-02-16 PROCEDURE — G2211 COMPLEX E/M VISIT ADD ON: CPT

## 2024-02-17 NOTE — HISTORY OF PRESENT ILLNESS
[Family Member] : family member [FreeTextEntry1] : BP f/u  [de-identified] : 68M PMH HTN, DM, Parkinson's disease, meningioma s/p resection (4/17/23) c/b rehospitalization for IPH/IVH, seizures presents for f/u for BP follow up. Patient was found to have elevated BP during a pre-op clearance for prostate surgery for an incidental find of enlarged prostate cancer found on imaging. Patient was found to have elevated BP of 177/95 on 10/23/23. Patient is chronically on amlodipine 10mg but after 10/23/23 patient was prescribed labetalol 100mg q12 prn for SBP >150. Patient has ran out of labetalol, last dose was 4 days ago. Patient and family states they like to avoid surgery for now and would like like to treat his enlarged prostate via medications. Reports intermittent vertigo at times but denies HA, vision changes, CP, SOB, abd pain, N/V/D.

## 2024-02-17 NOTE — END OF VISIT
[] : Resident [FreeTextEntry3] :  69 year old M presenting for follow up and refills, accompanied by his son. On exam, well appearing, seated in wheelchair. BP above goal. Will stop labetalol, continue amlodipine. Add losartan. RTC 2-4 weeks for BP check and labs. Advised pt to contact neurology for seizure and PD med refills.

## 2024-02-17 NOTE — PHYSICAL EXAM
[Normal Sclera/Conjunctiva] : normal sclera/conjunctiva [EOMI] : extraocular movements intact [Normal Outer Ear/Nose] : the outer ears and nose were normal in appearance [Supple] : supple [Normal] : soft, non-tender, non-distended, no masses palpated, no HSM and normal bowel sounds [No CVA Tenderness] : no CVA  tenderness [No Rash] : no rash [de-identified] : No tremors at rest or w/ arms extended.

## 2024-02-17 NOTE — HEALTH RISK ASSESSMENT
[0] : 2) Feeling down, depressed, or hopeless: Not at all (0) [PHQ-2 Negative - No further assessment needed] : PHQ-2 Negative - No further assessment needed [[id number]] : 0

## 2024-02-17 NOTE — REVIEW OF SYSTEMS
[Negative] : Genitourinary [Skin Rash] : no skin rash [Redness] : no redness [Headache] : no headache [Dizziness] : no dizziness

## 2024-02-17 NOTE — ASSESSMENT
[FreeTextEntry1] : 68M PMH HTN, DM, Parkinson's disease, meningioma s/p resection (4/17/23) c/b rehospitalization for IPH/IVH, seizures presents to office for BP check.  #HTN Patient w/ history of HTN and on amlodipine 10mg at home. Found to have uncontrolled BP at the time of pre-op clearance on 10/2023. Was prescribed a short course of labetalol 100mg q12 prn, and has been taking it for SBP >150. Patient has finished his course and last dose of labetalol was 4 days ago. BP this admission was 145/85 and HR this admission was bradycardic at 53.  PLAN: - c/w amlodipine 10mg qd - Stop labetalol 100mg q12 iso bradycardia  - Start Losartan 25mg qd. Cr 1.25 w/ eGFR 62.  - f/u in 2-4 weeks for BMP.   #Parkinsons disease, #Seizure History of Parkinson's disease and takes Entacapone 200mg, Carbidopa-levodopa , and Keppra 750mg for seizures. f/u w/ Mariah Roque as his neurologist. PLAN: - f/u w/ Dr. Ambrosio for medication refills, next scheduled appointment is in June 2024.   #High suspicion for prostate CA. patient w/ prostate mass found on imaging. MRI PIRADS score 5. Patient and family wishes to hold surgery. Takes tamsulosin 0.4mg  Urologist Rebecca Quarles.  PLAN: - f/u urology

## 2024-03-19 ENCOUNTER — APPOINTMENT (OUTPATIENT)
Dept: INTERNAL MEDICINE | Facility: CLINIC | Age: 69
End: 2024-03-19
Payer: MEDICARE

## 2024-03-19 VITALS
WEIGHT: 183 LBS | HEIGHT: 66 IN | HEART RATE: 70 BPM | SYSTOLIC BLOOD PRESSURE: 106 MMHG | OXYGEN SATURATION: 91 % | BODY MASS INDEX: 29.41 KG/M2 | DIASTOLIC BLOOD PRESSURE: 72 MMHG | TEMPERATURE: 97.2 F

## 2024-03-19 DIAGNOSIS — G20.A1 PARKINSON'S DISEASE WITHOUT DYSKINESIA, WITHOUT MENTION OF FLUCTUATIONS: ICD-10-CM

## 2024-03-19 PROCEDURE — 36415 COLL VENOUS BLD VENIPUNCTURE: CPT

## 2024-03-19 PROCEDURE — 99214 OFFICE O/P EST MOD 30 MIN: CPT | Mod: GC,25

## 2024-03-19 RX ORDER — POLYETHYLENE GLYCOL 3350 17 G/17G
17 POWDER, FOR SOLUTION ORAL
Qty: 1 | Refills: 3 | Status: DISCONTINUED | COMMUNITY
Start: 2023-09-11 | End: 2024-03-19

## 2024-03-19 RX ORDER — AMANTADINE HYDROCHLORIDE 50 MG/5ML
50 SOLUTION ORAL
Refills: 0 | Status: DISCONTINUED | COMMUNITY
End: 2024-03-19

## 2024-03-19 RX ORDER — ENTACAPONE 200 MG/1
200 TABLET, FILM COATED ORAL
Qty: 30 | Refills: 2 | Status: ACTIVE | COMMUNITY
Start: 2023-09-21 | End: 1900-01-01

## 2024-03-19 RX ORDER — TAMSULOSIN HYDROCHLORIDE 0.4 MG/1
0.4 CAPSULE ORAL
Qty: 90 | Refills: 3 | Status: ACTIVE | COMMUNITY
Start: 2023-09-08 | End: 1900-01-01

## 2024-03-19 RX ORDER — ONDANSETRON 8 MG/1
8 TABLET ORAL DAILY
Qty: 30 | Refills: 0 | Status: ACTIVE | COMMUNITY
Start: 2023-06-09 | End: 1900-01-01

## 2024-03-19 RX ORDER — POLYETHYLENE GLYCOL 3350 17 G/17G
17 POWDER, FOR SOLUTION ORAL DAILY
Qty: 30 | Refills: 3 | Status: DISCONTINUED | COMMUNITY
Start: 2023-06-09 | End: 2024-03-19

## 2024-03-19 RX ORDER — SENNOSIDES 8.6 MG TABLETS 8.6 MG/1
8.6 TABLET ORAL DAILY
Qty: 30 | Refills: 3 | Status: DISCONTINUED | COMMUNITY
Start: 2023-06-09 | End: 2024-03-19

## 2024-03-20 ENCOUNTER — RX RENEWAL (OUTPATIENT)
Age: 69
End: 2024-03-20

## 2024-03-20 NOTE — HISTORY OF PRESENT ILLNESS
[FreeTextEntry1] : Pt is here to follow up on blood pressure and get refills sent to his pharmacy.  [de-identified] : 68 y/o M with pmhx of HTN, Parkinson's disease, meningioma s/p resection (4/17/23) c/b rehospitalization for IPH/IVH also with resultant seizures (last seizure was 3 months ago), who presents for f/u for BP. Patient was found to have elevated BP during a pre-op clearance for prostate surgery for an incidental find of enlarged prostate cancer found on imaging. Patient was found to have elevated BP of 177/95 on 10/23/23. Patient is chronically on amlodipine 10mg but after 10/23/23 patient was prescribed labetalol 100mg q12 prn for SBP >150. Patient later returned to clinic and was started on losartan 25mg qd. As per son, patient has been taking his medication regularly and is almost out of all of his medications, now requesting refills.

## 2024-03-20 NOTE — PHYSICAL EXAM
[No Acute Distress] : no acute distress [Well Nourished] : well nourished [Well Developed] : well developed [Normal Sclera/Conjunctiva] : normal sclera/conjunctiva [Normal Outer Ear/Nose] : the outer ears and nose were normal in appearance [No Respiratory Distress] : no respiratory distress  [No Accessory Muscle Use] : no accessory muscle use [Clear to Auscultation] : lungs were clear to auscultation bilaterally [Normal Rate] : normal rate  [Regular Rhythm] : with a regular rhythm [Normal S1, S2] : normal S1 and S2 [Soft] : abdomen soft [Non Tender] : non-tender [Non-distended] : non-distended [No Rash] : no rash [Alert and Oriented x3] : oriented to person, place, and time [de-identified] : aphasia  [de-identified] : sitting on a wheelchair, as per son patient is mainly wheelchair bound but can get up at times and ambulate if not feeling weak

## 2024-03-20 NOTE — ASSESSMENT
[FreeTextEntry1] : #HTN Patient w/ history of HTN and on amlodipine 10mg at home. Found to have uncontrolled BP at the time of pre-op clearance on 10/2023. Was prescribed a short course of labetalol 100mg q12 prn, and has been taking it for SBP >150. BP at following appointment was 145/85. Patient was instructed to stop taking labetalol 100mg BID and instead start taking losartan 25mg qd, however during today's visit patient's son reports that patient has been taking labetalol, losartan, and amlodipine but is running out of all of his medications and would like refills.  BP on arrival was 106/72, repeat BP sitting was 110/68, standing 112/73.   - recommended that patient decrease labetalol to half a tablet of the 100mg (50mg) BID for 1 week. Patient should return to clinic in 1 week to check BP and further discuss BP goals.  - c/w amlodipine 5mg qd and losartan 25mg qd  - BMP drawn in office today, will discuss results at next visit  - recommended that son continue to check his father's BP at home and keep a log to avoid hypotensive episodes.   #Parkinsons disease, #Seizure History of Parkinson's disease and takes Entacapone 200mg, Carbidopa-levodopa , and Keppra 750mg for seizures. f/u w/ Dr. Ambrosio, neurologist.   - sent refills of Entacapone 200mg, Carbidopa-levodopa , and Keppra 750mg to patient's pharmacy on file that should be enough until his appointment with Dr. Ambrosio in June 2024.     #High suspicion for prostate CA. patient w/ prostate mass found on imaging. MRI PIRADS score 5. Patient and family wishes to hold surgery. Patient on tamsulosin 0.4mg. Urologist Rebecca Quarles.  - urology referral sent, requested  staff to mail to patient's address  - recommended that son take patient to follow up with urology given high suspicion for prostate cancer and the benefit of obtaining a prostate biopsy to see progression of disease.  - c/w tamsulosin 0.4mg qhs    #HCM  - refills sent to patient's pharmacy on file  - son and patient unsure if colonoscopy was ever done. Patient's son states he will ask his other siblings and will have more information regarding prior colonoscopy at the next visit.  - as per son, patient received flu vaccine and Pneumovax in 2023   RTC in 1 week to recheck BP and adjust BP med regimen and to go over BMP result.

## 2024-03-20 NOTE — REVIEW OF SYSTEMS
[Incontinence] : incontinence [Memory Loss] : memory loss [Depression] : depression [Chills] : no chills [Fever] : no fever [Palpitations] : no palpitations [Chest Pain] : no chest pain [Shortness Of Breath] : no shortness of breath [Abdominal Pain] : no abdominal pain [Constipation] : no constipation [Nausea] : no nausea [Vomiting] : no vomiting [Diarrhea] : no diarrhea [Hematuria] : no hematuria [Dysuria] : no dysuria [de-identified] : showers multiple times a day but forgetful of previous showers

## 2024-03-20 NOTE — HEALTH RISK ASSESSMENT
[No] : No [No falls in past year] : Patient reported no falls in the past year [0] : 1) Little interest or pleasure doing things: Not at all (0) [1] : 2) Feeling down, depressed, or hopeless for several days (1) [PHQ-2 Negative - No further assessment needed] : PHQ-2 Negative - No further assessment needed [Never] : Never [[id number]] : 1

## 2024-03-21 LAB
ANION GAP SERPL CALC-SCNC: 13 MMOL/L
BUN SERPL-MCNC: 15 MG/DL
CALCIUM SERPL-MCNC: 10.3 MG/DL
CHLORIDE SERPL-SCNC: 109 MMOL/L
CO2 SERPL-SCNC: 27 MMOL/L
CREAT SERPL-MCNC: 1.31 MG/DL
EGFR: 58 ML/MIN/1.73M2
GLUCOSE SERPL-MCNC: 87 MG/DL
POTASSIUM SERPL-SCNC: 4.5 MMOL/L
SODIUM SERPL-SCNC: 149 MMOL/L

## 2024-03-28 ENCOUNTER — APPOINTMENT (OUTPATIENT)
Dept: INTERNAL MEDICINE | Facility: CLINIC | Age: 69
End: 2024-03-28
Payer: MEDICARE

## 2024-03-28 VITALS
TEMPERATURE: 98 F | HEIGHT: 66 IN | HEART RATE: 64 BPM | WEIGHT: 183 LBS | SYSTOLIC BLOOD PRESSURE: 128 MMHG | BODY MASS INDEX: 29.41 KG/M2 | DIASTOLIC BLOOD PRESSURE: 70 MMHG | OXYGEN SATURATION: 97 %

## 2024-03-28 DIAGNOSIS — F32.A DEPRESSION, UNSPECIFIED: ICD-10-CM

## 2024-03-28 PROCEDURE — G2211 COMPLEX E/M VISIT ADD ON: CPT

## 2024-03-28 PROCEDURE — 99214 OFFICE O/P EST MOD 30 MIN: CPT

## 2024-03-28 PROCEDURE — 36415 COLL VENOUS BLD VENIPUNCTURE: CPT

## 2024-03-28 RX ORDER — LABETALOL HYDROCHLORIDE 100 MG/1
100 TABLET, FILM COATED ORAL
Qty: 10 | Refills: 0 | Status: DISCONTINUED | COMMUNITY
Start: 2023-10-23 | End: 2024-03-28

## 2024-03-28 RX ORDER — LOSARTAN POTASSIUM 25 MG/1
25 TABLET, FILM COATED ORAL DAILY
Qty: 30 | Refills: 3 | Status: DISCONTINUED | COMMUNITY
Start: 2024-02-16 | End: 2024-03-28

## 2024-03-28 NOTE — PHYSICAL EXAM
[No Acute Distress] : no acute distress [Well Nourished] : well nourished [Well Developed] : well developed [Normal Sclera/Conjunctiva] : normal sclera/conjunctiva [Normal Outer Ear/Nose] : the outer ears and nose were normal in appearance [No Respiratory Distress] : no respiratory distress  [No Accessory Muscle Use] : no accessory muscle use [Clear to Auscultation] : lungs were clear to auscultation bilaterally [Normal Rate] : normal rate  [Regular Rhythm] : with a regular rhythm [Normal S1, S2] : normal S1 and S2 [Non Tender] : non-tender [Soft] : abdomen soft [Non-distended] : non-distended [No Rash] : no rash [Speech Grossly Normal] : speech grossly normal [Memory Grossly Normal] : memory grossly normal [Normal Affect] : the affect was normal [Alert and Oriented x3] : oriented to person, place, and time [Normal Mood] : the mood was normal [Normal Insight/Judgement] : insight and judgment were intact [de-identified] : sitting on a wheelchair, can ambulate out of chair as needed, has a shower chair at home  [de-identified] : aphasia

## 2024-03-28 NOTE — HISTORY OF PRESENT ILLNESS
[FreeTextEntry1] : Pt is here for a follow up [de-identified] : Patient is a 68 y/o M with pmhx of HTN, Parkinson's disease, meningioma s/p resection (4/17/23) c/b rehospitalization for IPH/IVH also with resultant seizures (last seizure was 3 months ago), who presents for f/u for BP. Patient was seen about 1 week prior and there was some noted confusion about his BP medication regimen so patient was instructed to return to clinic today with all of his medications so we can go over them and better adjust his BP medication regimen. Patient was accompanied by his daughter Amanda who was his caretaker for a while, as well as his son who is his recent caretaker.  Daughter also expressed interest in weaning patient off of sertraline. She also wanted to discuss the elevated PSA and futher workup.

## 2024-03-28 NOTE — ASSESSMENT
[FreeTextEntry1] : Patient is a 70 y/o M with pmhx of HTN, Parkinson's disease, meningioma s/p resection (4/17/23) c/b rehospitalization for IPH/IVH also with resultant seizures (last seizure was 3 months ago), who presents for f/u for BP.   #HTN Patient's son states he has only been giving patient amlodipine 10mg qd and losartan 25mg qd (at the same time) and has not been giving him labetalol. BP in office today is 128/70, HR 64. Daughter and son concerned that patient is taking too many BP meds and would like to cut down on these medications to avoid hypotension.   - Discontinued labetalol - patiet has not been taking it since last visit.  - Discontinued losartan starting today - continue with amlodipine 10mg qd  - recommended that patient and son keep a log of BP twice daily and follow up with us in 1 week via telemedicine in order to go over BP log. IF BP is consistently SBP<150, continue with amlodipine only. IF SBP >150, then patient should resume losartan 25mg qd. Spoke to patient, son, and daughter about plan and they are all in agreement. They state they will keep a log until next telemedicine appointment.   #Depression Patient has been taking sertraline 25mg qd, PHQ 2 score 1 negative. Daughter expressed interest in weaning patient off sertraline.  - discussed wean with patient and family at bedside- plan to cut 25mg tablet in half and take half daily (12.5mg qd) for 2 weeks. If patient feels fine, then we can discontinue sertraline after 2 weeks of 12.5mg qd. If patient starts to experience symptoms of agitation, depression, anxiety, restlessness, etc, then we can prolong the taper course. Family in agreement.   #Elevated PSA #Concern for prostate cancer Discussed with patient and family the concern for prostate cancer given PSA elevation at 69, as well as hx of BRYANT with bilateral nodules. Prostate MRI showing PIRADS 5 very high suspicion. Discussed these findings with family and recommended prostate biopsy to be discussed with urologist Dr. Puga. Family prefers to obtain repeat PSA today to ensure true elevation.  - f/u repeat PSA  - advised patient to schedule appointment with Dr. Puga urgently to discuss prostate biopsy given all of the findings suggestive of prostate cancer. Family will attempt to call the office to schedule an appointment.  - c/w tamsulosin 0.4mg qhs   #HCM - UTD on flu vaccine and pneumovax  RTC in 1 week for telemedicine appointment to go over BP log while on amlodipine 10mg qd. IF SBP<150, can continue with amlodipine 10mg qd. IF SBP >150, continue with amlodipine 10mg qd and resume losartan 25mg qd.

## 2024-03-28 NOTE — END OF VISIT
[] : Resident [Time Spent: ___ minutes] : I have spent [unfilled] minutes of time on the encounter. [FreeTextEntry3] : 69M w/htn, parkinsons, meningioma s/p resection c/b seizure here for followup on BP and meds.  HTN - BP well controlled, was unclear on which meds pt was actually taking, currently on amlodipine 10mg and losartan 25mg. son reports pt reports dizziness sometimes, for now will dc Losartan for now, have them check BP, VEB next week. Continue medications.   Depression - pt/dgt want to taper off, medication, slow taper over 2-4 weeks.  Elevated PSA - family was unsure about prostate biospy w/uro, however after discussion regarding risk of cancer d/t abnormal BRYANT w/nodlues and elevated PSAs, they will consider and f/u with urology .

## 2024-03-28 NOTE — REVIEW OF SYSTEMS
[Dizziness] : dizziness [Unsteady Walk] : ataxia [Chills] : no chills [Fever] : no fever [Chest Pain] : no chest pain [Palpitations] : no palpitations [Shortness Of Breath] : no shortness of breath [Cough] : no cough [Dyspnea on Exertion] : not dyspnea on exertion [Nausea] : no nausea [Abdominal Pain] : no abdominal pain [Constipation] : no constipation [Diarrhea] : no diarrhea [Vomiting] : no vomiting [Back Pain] : no back pain

## 2024-03-28 NOTE — HEALTH RISK ASSESSMENT
[No] : No [No falls in past year] : Patient reported no falls in the past year [0] : 1) Little interest or pleasure doing things: Not at all (0) [Little interest or pleasure doing things] : 1) Little interest or pleasure doing things [Feeling down, depressed, or hopeless] : 2) Feeling down, depressed, or hopeless [PHQ-2 Negative - No further assessment needed] : PHQ-2 Negative - No further assessment needed [1] : 2) Feeling down, depressed, or hopeless for several days (1) [Never] : Never [[id number]] : 1 Island Pedicle Flap With Canthal Suspension Text: The defect edges were debeveled with a #15 scalpel blade.  Given the location of the defect, shape of the defect and the proximity to free margins an island pedicle advancement flap was deemed most appropriate.  Using a sterile surgical marker, an appropriate advancement flap was drawn incorporating the defect, outlining the appropriate donor tissue and placing the expected incisions within the relaxed skin tension lines where possible. The area thus outlined was incised deep to adipose tissue with a #15 scalpel blade.  The skin margins were undermined to an appropriate distance in all directions around the primary defect and laterally outward around the island pedicle utilizing iris scissors.  There was minimal undermining beneath the pedicle flap. A suspension suture was placed in the canthal tendon to prevent tension and prevent ectropion.

## 2024-03-29 ENCOUNTER — NON-APPOINTMENT (OUTPATIENT)
Age: 69
End: 2024-03-29

## 2024-03-29 LAB
PSA FREE FLD-MCNC: 7 %
PSA FREE SERPL-MCNC: 6.61 NG/ML
PSA SERPL-MCNC: 89.4 NG/ML

## 2024-04-02 ENCOUNTER — APPOINTMENT (OUTPATIENT)
Dept: INTERNAL MEDICINE | Facility: CLINIC | Age: 69
End: 2024-04-02
Payer: MEDICARE

## 2024-04-02 DIAGNOSIS — I10 ESSENTIAL (PRIMARY) HYPERTENSION: ICD-10-CM

## 2024-04-02 PROCEDURE — 99442: CPT | Mod: 93

## 2024-04-02 PROCEDURE — G2211 COMPLEX E/M VISIT ADD ON: CPT | Mod: NC

## 2024-04-02 NOTE — HISTORY OF PRESENT ILLNESS
[Home] : at home, [unfilled] , at the time of the visit. [Family Member] : family member [Verbal consent obtained from patient] : the patient, [unfilled] [de-identified] : MAHDI EDGE, an established patient at this office, reached out to this provider for [ ]. No recent visit with the last 7 days. A visit is not scheduled within the next 7 days at this time. Discussed with patient: You have chosen to receive care through the use of tele-media. Tele-media enables health care providers at different locations to provide safe, effective and convenient care through the use of technology. Please note that this is a billable encounter. As with any health care service, there are risks associated with the use of tele-media, including equipment failure, poor image and/or resolution, and  issues. You understand that I cannot physically examine you and that you may need to come to the clinic to complete the assessment. Patient agreed verbally to understanding the risks and benefits of tele-media as explained. All questions regarding tele-media encounters were answered. Total time spent with the patient on the phone was [ ].  This is a 69M with PMHx of HTN, Parkinsons Dx, meningioma s/p resection (4/2023) who presents via TTM for BP check. On last visit 4/28, he was c/o dizziness and BP was controlled SBP <120, so losartan was discontinued. His BP log at home has been 131/74, 128/74, and 115/92 and reports dizziness has improved but still occurs maybe once per week while walking. He has no other complaints today. Continues with sertraline taper at 12.5mg qd , tolerating well at this time.

## 2024-04-03 ENCOUNTER — APPOINTMENT (OUTPATIENT)
Dept: UROLOGY | Facility: CLINIC | Age: 69
End: 2024-04-03
Payer: MEDICARE

## 2024-04-03 VITALS
HEART RATE: 69 BPM | BODY MASS INDEX: 29.41 KG/M2 | WEIGHT: 183 LBS | DIASTOLIC BLOOD PRESSURE: 80 MMHG | HEIGHT: 66 IN | SYSTOLIC BLOOD PRESSURE: 140 MMHG | TEMPERATURE: 97.3 F

## 2024-04-03 PROCEDURE — 99213 OFFICE O/P EST LOW 20 MIN: CPT

## 2024-04-03 NOTE — HISTORY OF PRESENT ILLNESS
[FreeTextEntry1] : 9/8/23: 68 year old man with history of Parkinson's, meningioma s/p resection presents with long standing LUTS and elevated PSA.  He has frequency, urgency, weak stream, incomplete emptying, straining, incontinence. No prior prostate medications.  IPSS 34, QOL 4, PVR 114cc  PSA 69.5 in 6/2023. No recent PSAs. No prior prostate MRI or biopsy. No family history of prostate cancer.  ****************** 9/22/23: Here for f/u. He started the tamsulosin and has had mild improvement in his urinary symptoms.  Patient here to review prostate MRI as well.   MRI  9/8/23.  48.1 cc prostate with 2 lesions.  #1--PIRADS-5 lesion measuring 22 mm at the right lateral, posterior PZ at apex, No EPE but abutment  #2-- PIRADS-3 lesion measuring 3.4 mm at the left posterior lateral PZ midgland, NO EPE   No LAD, No EPE, No Bony Lesions.    4/2/24: previously scheduled for prostate biopsy. Did not occur for unclear reasons. Most recent PSA up ot 89.4

## 2024-04-03 NOTE — LETTER BODY
[Dear  ___] : Dear  [unfilled], [Consult Closing:] : Thank you very much for allowing me to participate in the care of this patient.  If you have any questions, please do not hesitate to contact me. [Please see my note below.] : Please see my note below. [Courtesy Letter:] : I had the pleasure of seeing your patient, [unfilled], in my office today. [Sincerely,] : Sincerely, [FreeTextEntry3] : Rebecca Puga MD

## 2024-04-03 NOTE — PHYSICAL EXAM
[General Appearance - Well Developed] : well developed [General Appearance - Well Nourished] : well nourished [Normal Appearance] : normal appearance [Well Groomed] : well groomed [General Appearance - In No Acute Distress] : no acute distress [Edema] : no peripheral edema [Respiration, Rhythm And Depth] : normal respiratory rhythm and effort [Exaggerated Use Of Accessory Muscles For Inspiration] : no accessory muscle use [Abdomen Soft] : soft [Abdomen Tenderness] : non-tender [Costovertebral Angle Tenderness] : no ~M costovertebral angle tenderness [Urethral Meatus] : meatus normal [Urinary Bladder Findings] : the bladder was normal on palpation [Scrotum] : the scrotum was normal [Testes Mass (___cm)] : there were no testicular masses [Prostate Size ___ gm] : prostate size [unfilled] gm [Normal Station and Gait] : the gait and station were normal for the patient's age [] : no rash [No Focal Deficits] : no focal deficits [Oriented To Time, Place, And Person] : oriented to person, place, and time [Mood] : the mood was normal [Affect] : the affect was normal [Not Anxious] : not anxious [FreeTextEntry1] : wheelchair

## 2024-04-03 NOTE — ASSESSMENT
[FreeTextEntry1] : 68-year-old man with Parkinson's, meningioma resection, presents with elevated PSA and LUTS, both obstructive and irritative. Discussed that his symptoms may be related to BPH, prostate cancer, OAB or Parkinson's or a combination of those. Has had improvement on tamsulosin, will continue.   For highly elevated PSA of 89 and abnormal BRYANT with bilateral nodules, MRI prostate was ordered and showed 2 lesions, PIRADS-5 and PIRADS-3. It is highly likely that patient has prostate cancer, and concern is high for metastatic prostate cancer. Given co-morbidities, it may be safer to determine extent of disease with PSMA scan prior to performing prostate biopsy. If PSMA demonstrates metastatic prostate cancer, can consider systemic treatment. If no concern for metastases, can plan for prostate biopsy.   - PSMA PET/CT  - F/U to discuss

## 2024-05-13 ENCOUNTER — RX RENEWAL (OUTPATIENT)
Age: 69
End: 2024-05-13

## 2024-05-13 RX ORDER — LEVETIRACETAM 750 MG/1
750 TABLET, FILM COATED ORAL TWICE DAILY
Qty: 180 | Refills: 3 | Status: ACTIVE | COMMUNITY
Start: 2023-04-07 | End: 1900-01-01

## 2024-05-17 ENCOUNTER — APPOINTMENT (OUTPATIENT)
Dept: NUCLEAR MEDICINE | Facility: IMAGING CENTER | Age: 69
End: 2024-05-17

## 2024-05-21 ENCOUNTER — OUTPATIENT (OUTPATIENT)
Dept: OUTPATIENT SERVICES | Facility: HOSPITAL | Age: 69
LOS: 1 days | End: 2024-05-21
Payer: MEDICARE

## 2024-05-21 ENCOUNTER — APPOINTMENT (OUTPATIENT)
Dept: NUCLEAR MEDICINE | Facility: HOSPITAL | Age: 69
End: 2024-05-21

## 2024-05-21 PROCEDURE — 78816 PET IMAGE W/CT FULL BODY: CPT | Mod: 26,MH

## 2024-05-21 PROCEDURE — A9595: CPT

## 2024-05-21 PROCEDURE — 78816 PET IMAGE W/CT FULL BODY: CPT

## 2024-05-29 ENCOUNTER — RX RENEWAL (OUTPATIENT)
Age: 69
End: 2024-05-29

## 2024-05-29 ENCOUNTER — APPOINTMENT (OUTPATIENT)
Dept: NEUROLOGY | Facility: CLINIC | Age: 69
End: 2024-05-29

## 2024-05-29 VITALS
DIASTOLIC BLOOD PRESSURE: 70 MMHG | HEART RATE: 150 BPM | OXYGEN SATURATION: 95 % | HEIGHT: 66 IN | SYSTOLIC BLOOD PRESSURE: 115 MMHG | TEMPERATURE: 98.4 F

## 2024-05-29 PROCEDURE — 99214 OFFICE O/P EST MOD 30 MIN: CPT

## 2024-05-29 RX ORDER — CARBIDOPA AND LEVODOPA 25; 250 MG/1; MG/1
25-250 TABLET ORAL
Qty: 150 | Refills: 5 | Status: ACTIVE | COMMUNITY
Start: 2023-03-15 | End: 1900-01-01

## 2024-05-29 RX ORDER — INCONTINENCE PAD,LINER,DISP
EACH MISCELLANEOUS
Qty: 80 | Refills: 3 | Status: ACTIVE | COMMUNITY
Start: 2023-10-02 | End: 1900-01-01

## 2024-05-29 RX ORDER — AMLODIPINE BESYLATE 10 MG/1
10 TABLET ORAL
Qty: 30 | Refills: 5 | Status: ACTIVE | COMMUNITY
Start: 2023-10-13 | End: 1900-01-01

## 2024-05-29 NOTE — HISTORY OF PRESENT ILLNESS
[FreeTextEntry1] : Interval hx: 5/29/24 patient with increased tremor at 3.5 hours post Sinemet dose. Walking and cognitive function has improved once meningioma resected, and he recovered from the stroke.   Interval hx 8/4/23 Patient takes the Sinemet 4x a day, provided by his son. Patient walks with a cane around the house and has been talking more as well. Patient still taking Entacapone once daily and Keppra twice daily. Meningioma was removed in April 2023 with Dr. D'Amico and patient tolerated well. No seizures since last visit  He is now making more decisions for him.  __ Patient here for follow up of his parkinsons disease for 8 years.  Slowly got worse, First time the tumor was discovered was 2019. Hospitalized in [facility] in November 26th 2023 for not being able to walk. family was told that the tumor got bigger.   Parkinsons disease  - Carbidopa /levodopa increased to three times a day.  - 8 am / 2 pm and 8 pm before bed.  Takes with a full stomach    last seizure was in November.

## 2024-05-29 NOTE — ASSESSMENT
[FreeTextEntry1] : Plan --increase Levodopa Carbidopa 5/day  and take on an empty stomach. Consider cutting down the dose during next visit 8am, 11 am, 2 pm, 5 pm 8pm --Continue entacapone for now.  --Try to walk as much as you can with a cane in the house --Will consider Speech and PT at next visit  Seizure --Will reduce his keppra to 750 BID  Follow up in 2 months

## 2024-06-03 ENCOUNTER — APPOINTMENT (OUTPATIENT)
Dept: UROLOGY | Facility: CLINIC | Age: 69
End: 2024-06-03
Payer: MEDICARE

## 2024-06-03 DIAGNOSIS — R97.20 ELEVATED PROSTATE, SPECIFIC ANTIGEN [PSA]: ICD-10-CM

## 2024-06-03 PROCEDURE — 99441: CPT | Mod: 93

## 2024-06-03 NOTE — ASSESSMENT
[FreeTextEntry1] : 68-year-old man with Parkinson's, meningioma resection, presents with elevated PSA and LUTS, both obstructive and irritative. Discussed that his symptoms may be related to BPH, prostate cancer, OAB or Parkinson's or a combination of those. Has had improvement on tamsulosin, will continue.   For highly elevated PSA of 89 and abnormal BRYANT with bilateral nodules, MRI prostate was ordered and showed 2 lesions, PIRADS-5 and PIRADS-3. It is highly likely that patient has prostate cancer, and concern is high for metastatic prostate cancer. Given co-morbidities, I felt safer to determine extent of disease with PSMA scan prior to performing prostate biopsy. PSMA showed concern for metastatic disease to single subcentimeter retroperitoneal lymph node. Recommend follow up with medical oncology for consideration of systemic treatment and to determine need for tissue diagnosis.   - F/U with medical oncology  - Consider systemic treatment without need for prostate biopsy based on PSA, MRI and PSMA PET/CT  - F/U to discuss

## 2024-06-03 NOTE — HISTORY OF PRESENT ILLNESS
[Home] : at home, [unfilled] , at the time of the visit. [Medical Office: ([facility])___] : at the medical office located in  [Family Member] : family member [Verbal consent obtained from patient] : the patient, [unfilled] [FreeTextEntry1] : 9/8/23: 68 year old man with history of Parkinson's, meningioma s/p resection presents with long standing LUTS and elevated PSA.  He has frequency, urgency, weak stream, incomplete emptying, straining, incontinence. No prior prostate medications.  IPSS 34, QOL 4, PVR 114cc  PSA 69.5 in 6/2023. No recent PSAs. No prior prostate MRI or biopsy. No family history of prostate cancer.  ****************** 9/22/23: Here for f/u. He started the tamsulosin and has had mild improvement in his urinary symptoms.  Patient here to review prostate MRI as well.   MRI  9/8/23.  48.1 cc prostate with 2 lesions.  #1--PIRADS-5 lesion measuring 22 mm at the right lateral, posterior PZ at apex, No EPE but abutment  #2-- PIRADS-3 lesion measuring 3.4 mm at the left posterior lateral PZ midgland, NO EPE   No LAD, No EPE, No Bony Lesions.    4/2/24: previously scheduled for prostate biopsy. Did not occur for unclear reasons. Most recent PSA up ot 89.4  6/3/24: Follow up to discuss PSMA PET/CT 5/21/24: Multifocal activity in the prostate consistent with prostate cancer. Subcentimeter DCFPyl avid lower periaortic lymph node consistent with metastatic disease.

## 2024-06-07 ENCOUNTER — RX RENEWAL (OUTPATIENT)
Age: 69
End: 2024-06-07

## 2024-06-07 RX ORDER — SERTRALINE 25 MG/1
25 TABLET, FILM COATED ORAL DAILY
Qty: 30 | Refills: 3 | Status: ACTIVE | COMMUNITY
Start: 2023-06-09 | End: 1900-01-01

## 2024-06-08 ENCOUNTER — RX RENEWAL (OUTPATIENT)
Age: 69
End: 2024-06-08

## 2024-06-08 RX ORDER — AMANTADINE HYDROCHLORIDE 50 MG/5ML
50 SOLUTION ORAL
Qty: 1 | Refills: 2 | Status: ACTIVE | COMMUNITY
Start: 2024-03-19 | End: 1900-01-01

## 2024-06-09 NOTE — PROVIDER CONTACT NOTE (OTHER) - ACTION/TREATMENT ORDERED:
EEG, CSF fluid sample, CTH in AM, and 250 3 % percent bolus.
Give PRN hydralazine 10mg IVP & continue to monitor per PA order. No further interventions at this time.
Monitor UO for the next hour. JEREMIAH Garner assessed pts pupils are they have normal slight variation.
Notify provider if ICP>30. Any change in mental status or headache.
Unclamp EVD per PA & Surgeon order. Will continue to monitor. No further interventions at this time.
Universal Safety Interventions

## 2024-06-10 ENCOUNTER — RX RENEWAL (OUTPATIENT)
Age: 69
End: 2024-06-10

## 2024-06-10 DIAGNOSIS — G62.9 POLYNEUROPATHY, UNSPECIFIED: ICD-10-CM

## 2024-06-10 PROBLEM — N42.89 PROSTATE MASS: Status: ACTIVE | Noted: 2024-02-16

## 2024-06-10 RX ORDER — FACIAL MASK
EACH MISCELLANEOUS
Qty: 3 | Refills: 0 | Status: ACTIVE | COMMUNITY
Start: 2023-06-09 | End: 1900-01-01

## 2024-06-10 NOTE — HISTORY OF PRESENT ILLNESS
[de-identified] : Mr. Mas is here today to establish care for his recent diagnosis of prostate cancer.    In Fall 2023, patient established care with urology due to LUTS and PSA.    Patient underwent MR Prostate on 9/8/2023 which revealed a PIRADS 5- Very high due to a lesion right PZ. In May of this year, patient underwent a PSMA which revealed multifocal activity in the prostate with uptake in one lower periaortic node. Urology recommended that he follow up with Med Onc and did not recommend a biopsy due to PSA and PSMA findings.    PSA  - 6/13/2023: 69.50 ng/mL  - 3/28/2024: 89.40 ng/mL   IMAGING  - PSMA PET (5/21/2024): Multifocal activity within the prostate, consistent with prostate cancer. Sub centimeter DCFPyL avid lower periaortic node is consistent with metastatic disease. No evidence of osseous metastases.   - MR Prostate (9/18/2023)  Study degraded by patient motion artifact.  Size: 4.8 x 4.0 x 4.8 cm [transverse x AP x CC] cm.  Volume: 48.1 cc .   PSA density: 1.44 ng/mL/mL PSA density >0.15 ng/mL/mL: Yes   Hemorrhage: Foci of hemorrhage in the right lateral, right posterior lateral PZ towards base.   Central gland: Marked BPH. No MRI targetable lesion.   Peripheral zone:   LESION: #1 Location: Right lateral, right posterior lateral, right posterior medial PZ at apex   Slice#: Series 6 Image 20.   Size (transverse, AP, ): 13 x 22 mm.   T2-WI: Moderately hypointense   DWI: Markedly hypointense on ADC map and markedly hyperintense on DWI.  DCE: Early enhancement.   Extra-prostatic extension: No. Broad abutment on capsule.   Prior Comparison: None.   PI-RADS Assessment Category: 5   LESION: #2   Location: Left posterior lateral PZ mid gland   Slice#: Series 6, Image 17.   Size (transverse, AP, ): 3.4 x 2.9 mm.   T2-WI: Moderately hypointense   DWI: Moderately hypointense on ADC map and moderately hyperintense on DWI DCE: Questionable enhancement   Extra-prostatic extension: None   Prior Comparison: None.   PI-RADS Assessment Category: 3   MRI Targets: As above   Neurovascular bundle: No evidence of neurovascular bundle invasion.   Seminal vesicles: No seminal vesicle invasion.   Lymph nodes: No pelvic adenopathy.   Bones: No suspicious lesions identified.   Urinary bladder: Within normal limits.   Other: Colonic diverticulosis.    IMPRESSION: PIRADS 5- Very high. Lesion right PZ as described above. Recommend targeted biopsy. Markedly elevated PSA density.

## 2024-06-10 NOTE — ASSESSMENT
[FreeTextEntry1] : Mr. Mas is here today to establish care for his probable metastatic prostate cancer. PSMA PET from May 2024 revealed activity within the prostate and in a subcentimeter periaortic node. Urology stated that due to patient's comorbidities and findings on imaging and lab work, that patient has confirmed metastatic prostate cancer.   Typically, we like to confirm the presence of metastatic disease with a biopsy. However, the size of the andreia-aortic lymph node is too small to undergo biopsy at this time. As an alternative approach, we will initiate therapy for this patient and monitor their progress through imaging and PSA to evaluate for response.   Patient does not meet criteria for high risk/high volume disease.   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 We are recommending ADT therapy with Eligard injections. Patient will begin therapy this week and will return in one month. During his next clinic visit, we will discuss the addition of enzalutamide, apalutamide or abiraterone therapy.  Lastly, patient should be evaluated by radiation oncology to see if he qualifies for SBRT to the andreia-aortic lymph node??  Metastatic Prostate Cancer - Eligard Injections every 3 months. -- Eligard 22.5 mg every 3 months.  - We will follow up with patient in one month.

## 2024-06-10 NOTE — HISTORY OF PRESENT ILLNESS
[de-identified] : Mr. Mas is here today to establish care for his recent diagnosis of prostate cancer.    In Fall 2023, patient established care with urology due to LUTS and PSA.    Patient underwent MR Prostate on 9/8/2023 which revealed a PIRADS 5- Very high due to a lesion right PZ. In May of this year, patient underwent a PSMA which revealed multifocal activity in the prostate with uptake in one lower periaortic node. Urology recommended that he follow up with Med Onc and did not recommend a biopsy due to PSA and PSMA findings.    PSA  - 6/13/2023: 69.50 ng/mL  - 3/28/2024: 89.40 ng/mL   IMAGING  - PSMA PET (5/21/2024): Multifocal activity within the prostate, consistent with prostate cancer. Sub centimeter DCFPyL avid lower periaortic node is consistent with metastatic disease. No evidence of osseous metastases.   - MR Prostate (9/18/2023)  Study degraded by patient motion artifact.  Size: 4.8 x 4.0 x 4.8 cm [transverse x AP x CC] cm.  Volume: 48.1 cc .   PSA density: 1.44 ng/mL/mL PSA density >0.15 ng/mL/mL: Yes   Hemorrhage: Foci of hemorrhage in the right lateral, right posterior lateral PZ towards base.   Central gland: Marked BPH. No MRI targetable lesion.   Peripheral zone:   LESION: #1 Location: Right lateral, right posterior lateral, right posterior medial PZ at apex   Slice#: Series 6 Image 20.   Size (transverse, AP, ): 13 x 22 mm.   T2-WI: Moderately hypointense   DWI: Markedly hypointense on ADC map and markedly hyperintense on DWI.  DCE: Early enhancement.   Extra-prostatic extension: No. Broad abutment on capsule.   Prior Comparison: None.   PI-RADS Assessment Category: 5   LESION: #2   Location: Left posterior lateral PZ mid gland   Slice#: Series 6, Image 17.   Size (transverse, AP, ): 3.4 x 2.9 mm.   T2-WI: Moderately hypointense   DWI: Moderately hypointense on ADC map and moderately hyperintense on DWI DCE: Questionable enhancement   Extra-prostatic extension: None   Prior Comparison: None.   PI-RADS Assessment Category: 3   MRI Targets: As above   Neurovascular bundle: No evidence of neurovascular bundle invasion.   Seminal vesicles: No seminal vesicle invasion.   Lymph nodes: No pelvic adenopathy.   Bones: No suspicious lesions identified.   Urinary bladder: Within normal limits.   Other: Colonic diverticulosis.    IMPRESSION: PIRADS 5- Very high. Lesion right PZ as described above. Recommend targeted biopsy. Markedly elevated PSA density.

## 2024-06-12 ENCOUNTER — APPOINTMENT (OUTPATIENT)
Dept: HEMATOLOGY ONCOLOGY | Facility: CLINIC | Age: 69
End: 2024-06-12

## 2024-06-12 DIAGNOSIS — N42.89 OTHER SPECIFIED DISORDERS OF PROSTATE: ICD-10-CM

## 2024-11-11 ENCOUNTER — LABORATORY RESULT (OUTPATIENT)
Age: 69
End: 2024-11-11

## 2024-11-11 ENCOUNTER — APPOINTMENT (OUTPATIENT)
Dept: INTERNAL MEDICINE | Facility: CLINIC | Age: 69
End: 2024-11-11
Payer: MEDICARE

## 2024-11-11 VITALS
DIASTOLIC BLOOD PRESSURE: 69 MMHG | BODY MASS INDEX: 29.73 KG/M2 | SYSTOLIC BLOOD PRESSURE: 111 MMHG | HEIGHT: 66 IN | HEART RATE: 82 BPM | WEIGHT: 185 LBS | OXYGEN SATURATION: 80 % | TEMPERATURE: 98.4 F

## 2024-11-11 VITALS — OXYGEN SATURATION: 96 %

## 2024-11-11 DIAGNOSIS — R97.20 ELEVATED PROSTATE, SPECIFIC ANTIGEN [PSA]: ICD-10-CM

## 2024-11-11 DIAGNOSIS — Z00.00 ENCOUNTER FOR GENERAL ADULT MEDICAL EXAMINATION W/OUT ABNORMAL FINDINGS: ICD-10-CM

## 2024-11-11 DIAGNOSIS — G47.00 INSOMNIA, UNSPECIFIED: ICD-10-CM

## 2024-11-11 DIAGNOSIS — M79.10 MYALGIA, UNSPECIFIED SITE: ICD-10-CM

## 2024-11-11 DIAGNOSIS — Z12.11 ENCOUNTER FOR SCREENING FOR MALIGNANT NEOPLASM OF COLON: ICD-10-CM

## 2024-11-11 DIAGNOSIS — G20.A1 PARKINSON'S DISEASE WITHOUT DYSKINESIA, WITHOUT MENTION OF FLUCTUATIONS: ICD-10-CM

## 2024-11-11 PROCEDURE — 99215 OFFICE O/P EST HI 40 MIN: CPT | Mod: GC

## 2024-11-11 PROCEDURE — 36415 COLL VENOUS BLD VENIPUNCTURE: CPT

## 2024-11-11 PROCEDURE — G2211 COMPLEX E/M VISIT ADD ON: CPT

## 2024-11-12 LAB
ALBUMIN SERPL ELPH-MCNC: 4.6 G/DL
ALP BLD-CCNC: 72 U/L
ALT SERPL-CCNC: 13 U/L
ANION GAP SERPL CALC-SCNC: 15 MMOL/L
AST SERPL-CCNC: 11 U/L
BILIRUB SERPL-MCNC: 0.4 MG/DL
BUN SERPL-MCNC: 14 MG/DL
CALCIUM SERPL-MCNC: 10 MG/DL
CHLORIDE SERPL-SCNC: 104 MMOL/L
CK SERPL-CCNC: 176 U/L
CO2 SERPL-SCNC: 24 MMOL/L
CREAT SERPL-MCNC: 1.17 MG/DL
CRP SERPL-MCNC: <3 MG/L
EGFR: 67 ML/MIN/1.73M2
GLUCOSE SERPL-MCNC: 79 MG/DL
HCT VFR BLD CALC: 48.3 %
HGB BLD-MCNC: 15.6 G/DL
MCHC RBC-ENTMCNC: 30.5 PG
MCHC RBC-ENTMCNC: 32.3 G/DL
MCV RBC AUTO: 94.5 FL
PLATELET # BLD AUTO: NORMAL K/UL
POTASSIUM SERPL-SCNC: 4.2 MMOL/L
PROT SERPL-MCNC: 7.6 G/DL
PSA SERPL-MCNC: 145 NG/ML
RBC # BLD: 5.11 M/UL
RBC # FLD: 14 %
SODIUM SERPL-SCNC: 143 MMOL/L
WBC # FLD AUTO: 3.32 K/UL

## 2024-12-02 ENCOUNTER — APPOINTMENT (OUTPATIENT)
Dept: UROLOGY | Facility: CLINIC | Age: 69
End: 2024-12-02
Payer: MEDICARE

## 2024-12-02 VITALS
HEART RATE: 51 BPM | TEMPERATURE: 98 F | BODY MASS INDEX: 29.73 KG/M2 | WEIGHT: 185 LBS | SYSTOLIC BLOOD PRESSURE: 99 MMHG | DIASTOLIC BLOOD PRESSURE: 68 MMHG | HEIGHT: 66 IN

## 2024-12-02 DIAGNOSIS — R97.20 ELEVATED PROSTATE, SPECIFIC ANTIGEN [PSA]: ICD-10-CM

## 2024-12-02 PROCEDURE — G2211 COMPLEX E/M VISIT ADD ON: CPT

## 2024-12-02 PROCEDURE — 99214 OFFICE O/P EST MOD 30 MIN: CPT

## 2024-12-11 ENCOUNTER — APPOINTMENT (OUTPATIENT)
Dept: HEMATOLOGY ONCOLOGY | Facility: CLINIC | Age: 69
End: 2024-12-11
Payer: MEDICARE

## 2024-12-11 ENCOUNTER — RX RENEWAL (OUTPATIENT)
Age: 69
End: 2024-12-11

## 2024-12-11 VITALS
TEMPERATURE: 98.4 F | RESPIRATION RATE: 18 BRPM | BODY MASS INDEX: 29.41 KG/M2 | WEIGHT: 183 LBS | SYSTOLIC BLOOD PRESSURE: 122 MMHG | OXYGEN SATURATION: 98 % | HEART RATE: 70 BPM | DIASTOLIC BLOOD PRESSURE: 73 MMHG | HEIGHT: 66 IN

## 2024-12-11 DIAGNOSIS — R56.9 UNSPECIFIED CONVULSIONS: ICD-10-CM

## 2024-12-11 DIAGNOSIS — Z78.9 OTHER SPECIFIED HEALTH STATUS: ICD-10-CM

## 2024-12-11 DIAGNOSIS — N42.89 OTHER SPECIFIED DISORDERS OF PROSTATE: ICD-10-CM

## 2024-12-11 DIAGNOSIS — D75.9 DISEASE OF BLOOD AND BLOOD-FORMING ORGANS, UNSPECIFIED: ICD-10-CM

## 2024-12-11 DIAGNOSIS — Z86.69 PERSONAL HISTORY OF OTHER DISEASES OF THE NERVOUS SYSTEM AND SENSE ORGANS: ICD-10-CM

## 2024-12-11 PROCEDURE — 99215 OFFICE O/P EST HI 40 MIN: CPT

## 2024-12-12 PROBLEM — Z86.69 HISTORY OF PARKINSON'S DISEASE: Status: RESOLVED | Noted: 2023-03-08 | Resolved: 2024-12-12

## 2024-12-12 PROBLEM — D75.9 CYTOPENIA: Status: ACTIVE | Noted: 2024-12-12

## 2024-12-12 PROBLEM — Z78.9 RETIRED DUE TO DISABILITY: Status: ACTIVE | Noted: 2024-12-12

## 2024-12-12 PROBLEM — R56.9 SEIZURE: Status: ACTIVE | Noted: 2024-12-12

## 2024-12-12 LAB
ALBUMIN SERPL ELPH-MCNC: 4 G/DL
ALP BLD-CCNC: 55 U/L
ALT SERPL-CCNC: 9 U/L
ANION GAP SERPL CALC-SCNC: 5 MMOL/L
AST SERPL-CCNC: 17 U/L
BILIRUB SERPL-MCNC: 0.8 MG/DL
BUN SERPL-MCNC: 14 MG/DL
CALCIUM SERPL-MCNC: 10.2 MG/DL
CHLORIDE SERPL-SCNC: 109 MMOL/L
CO2 SERPL-SCNC: 27 MMOL/L
CREAT SERPL-MCNC: 1.2 MG/DL
EGFR: 65 ML/MIN/1.73M2
GLUCOSE SERPL-MCNC: 103 MG/DL
HCT VFR BLD CALC: 45.5 %
HGB BLD-MCNC: 15 G/DL
MCHC RBC-ENTMCNC: 30.6 PG
MCHC RBC-ENTMCNC: 33 G/DL
MCV RBC AUTO: 92.9 FL
PLATELET # BLD AUTO: 135 K/UL
POTASSIUM SERPL-SCNC: 3.7 MMOL/L
PROT SERPL-MCNC: 7.6 G/DL
PSA FREE FLD-MCNC: 7 %
PSA FREE SERPL-MCNC: 9.55 NG/ML
PSA SERPL-MCNC: 134 NG/ML
RBC # BLD: 4.9 M/UL
RBC # FLD: 14 %
SODIUM SERPL-SCNC: 141 MMOL/L
WBC # FLD AUTO: 3.3 K/UL

## 2024-12-18 ENCOUNTER — RX RENEWAL (OUTPATIENT)
Age: 69
End: 2024-12-18

## 2024-12-23 ENCOUNTER — APPOINTMENT (OUTPATIENT)
Dept: INTERNAL MEDICINE | Facility: CLINIC | Age: 69
End: 2024-12-23
Payer: MEDICARE

## 2024-12-23 ENCOUNTER — MED ADMIN CHARGE (OUTPATIENT)
Age: 69
End: 2024-12-23

## 2024-12-23 VITALS
HEART RATE: 62 BPM | OXYGEN SATURATION: 99 % | SYSTOLIC BLOOD PRESSURE: 121 MMHG | TEMPERATURE: 97.9 F | HEIGHT: 66 IN | DIASTOLIC BLOOD PRESSURE: 77 MMHG

## 2024-12-23 DIAGNOSIS — G47.00 INSOMNIA, UNSPECIFIED: ICD-10-CM

## 2024-12-23 DIAGNOSIS — Z23 ENCOUNTER FOR IMMUNIZATION: ICD-10-CM

## 2024-12-23 DIAGNOSIS — Z12.11 ENCOUNTER FOR SCREENING FOR MALIGNANT NEOPLASM OF COLON: ICD-10-CM

## 2024-12-23 DIAGNOSIS — R56.9 UNSPECIFIED CONVULSIONS: ICD-10-CM

## 2024-12-23 DIAGNOSIS — I10 ESSENTIAL (PRIMARY) HYPERTENSION: ICD-10-CM

## 2024-12-23 PROCEDURE — G0008: CPT

## 2024-12-23 PROCEDURE — 99214 OFFICE O/P EST MOD 30 MIN: CPT | Mod: GC

## 2024-12-23 PROCEDURE — 90662 IIV NO PRSV INCREASED AG IM: CPT

## 2024-12-23 PROCEDURE — G2211 COMPLEX E/M VISIT ADD ON: CPT

## 2024-12-27 ENCOUNTER — APPOINTMENT (OUTPATIENT)
Dept: NUCLEAR MEDICINE | Facility: HOSPITAL | Age: 69
End: 2024-12-27

## 2024-12-27 ENCOUNTER — OUTPATIENT (OUTPATIENT)
Dept: OUTPATIENT SERVICES | Facility: HOSPITAL | Age: 69
LOS: 1 days | End: 2024-12-27
Payer: MEDICARE

## 2024-12-27 PROCEDURE — A9595: CPT

## 2024-12-27 PROCEDURE — 78816 PET IMAGE W/CT FULL BODY: CPT | Mod: 26,MH

## 2024-12-27 PROCEDURE — 78816 PET IMAGE W/CT FULL BODY: CPT

## 2025-01-06 ENCOUNTER — NON-APPOINTMENT (OUTPATIENT)
Age: 70
End: 2025-01-06

## 2025-01-08 ENCOUNTER — APPOINTMENT (OUTPATIENT)
Dept: HEMATOLOGY ONCOLOGY | Facility: CLINIC | Age: 70
End: 2025-01-08
Payer: MEDICARE

## 2025-01-08 DIAGNOSIS — C61 MALIGNANT NEOPLASM OF PROSTATE: ICD-10-CM

## 2025-01-08 DIAGNOSIS — N42.89 OTHER SPECIFIED DISORDERS OF PROSTATE: ICD-10-CM

## 2025-01-08 DIAGNOSIS — D75.9 DISEASE OF BLOOD AND BLOOD-FORMING ORGANS, UNSPECIFIED: ICD-10-CM

## 2025-01-08 DIAGNOSIS — G20.A1 PARKINSON'S DISEASE WITHOUT DYSKINESIA, WITHOUT MENTION OF FLUCTUATIONS: ICD-10-CM

## 2025-01-08 PROCEDURE — 99212 OFFICE O/P EST SF 10 MIN: CPT

## 2025-01-08 RX ORDER — RELUGOLIX 120 MG/1
120 TABLET, FILM COATED ORAL
Qty: 32 | Refills: 2 | Status: ACTIVE | COMMUNITY
Start: 2025-01-08 | End: 1900-01-01

## 2025-02-26 ENCOUNTER — APPOINTMENT (OUTPATIENT)
Dept: HEMATOLOGY ONCOLOGY | Facility: CLINIC | Age: 70
End: 2025-02-26
Payer: MEDICARE

## 2025-02-26 ENCOUNTER — APPOINTMENT (OUTPATIENT)
Dept: NEUROSURGERY | Facility: CLINIC | Age: 70
End: 2025-02-26

## 2025-02-26 PROCEDURE — G2212 PROLONG OUTPT/OFFICE VIS: CPT

## 2025-02-26 PROCEDURE — 99215 OFFICE O/P EST HI 40 MIN: CPT

## 2025-02-27 ENCOUNTER — NON-APPOINTMENT (OUTPATIENT)
Age: 70
End: 2025-02-27

## 2025-02-27 VITALS
SYSTOLIC BLOOD PRESSURE: 96 MMHG | OXYGEN SATURATION: 99 % | RESPIRATION RATE: 18 BRPM | HEART RATE: 79 BPM | DIASTOLIC BLOOD PRESSURE: 79 MMHG

## 2025-03-03 ENCOUNTER — OUTPATIENT (OUTPATIENT)
Dept: OUTPATIENT SERVICES | Facility: HOSPITAL | Age: 70
LOS: 1 days | End: 2025-03-03
Payer: MEDICARE

## 2025-03-03 ENCOUNTER — APPOINTMENT (OUTPATIENT)
Dept: MRI IMAGING | Facility: HOSPITAL | Age: 70
End: 2025-03-03

## 2025-03-03 PROCEDURE — 70553 MRI BRAIN STEM W/O & W/DYE: CPT

## 2025-03-03 PROCEDURE — 70553 MRI BRAIN STEM W/O & W/DYE: CPT | Mod: 26

## 2025-03-03 PROCEDURE — A9585: CPT

## 2025-03-05 ENCOUNTER — APPOINTMENT (OUTPATIENT)
Dept: NEUROSURGERY | Facility: CLINIC | Age: 70
End: 2025-03-05
Payer: MEDICARE

## 2025-03-05 VITALS
HEIGHT: 66 IN | RESPIRATION RATE: 18 BRPM | BODY MASS INDEX: 29.41 KG/M2 | OXYGEN SATURATION: 98 % | DIASTOLIC BLOOD PRESSURE: 62 MMHG | HEART RATE: 57 BPM | SYSTOLIC BLOOD PRESSURE: 97 MMHG | WEIGHT: 183 LBS

## 2025-03-05 DIAGNOSIS — Z98.890 OTHER SPECIFIED POSTPROCEDURAL STATES: ICD-10-CM

## 2025-03-05 DIAGNOSIS — D32.9 BENIGN NEOPLASM OF MENINGES, UNSPECIFIED: ICD-10-CM

## 2025-03-05 PROCEDURE — 99212 OFFICE O/P EST SF 10 MIN: CPT

## 2025-04-11 ENCOUNTER — RX RENEWAL (OUTPATIENT)
Age: 70
End: 2025-04-11

## 2025-04-15 ENCOUNTER — NON-APPOINTMENT (OUTPATIENT)
Age: 70
End: 2025-04-15

## 2025-04-16 ENCOUNTER — APPOINTMENT (OUTPATIENT)
Dept: HEMATOLOGY ONCOLOGY | Facility: CLINIC | Age: 70
End: 2025-04-16
Payer: MEDICARE

## 2025-04-16 VITALS
WEIGHT: 184 LBS | TEMPERATURE: 97.5 F | HEART RATE: 55 BPM | DIASTOLIC BLOOD PRESSURE: 62 MMHG | SYSTOLIC BLOOD PRESSURE: 112 MMHG | HEIGHT: 66 IN | BODY MASS INDEX: 29.57 KG/M2 | OXYGEN SATURATION: 99 % | RESPIRATION RATE: 18 BRPM

## 2025-04-16 PROCEDURE — 99214 OFFICE O/P EST MOD 30 MIN: CPT

## 2025-04-17 ENCOUNTER — NON-APPOINTMENT (OUTPATIENT)
Age: 70
End: 2025-04-17

## 2025-04-17 ENCOUNTER — RX RENEWAL (OUTPATIENT)
Age: 70
End: 2025-04-17

## 2025-04-17 DIAGNOSIS — D75.9 DISEASE OF BLOOD AND BLOOD-FORMING ORGANS, UNSPECIFIED: ICD-10-CM

## 2025-04-17 DIAGNOSIS — C61 MALIGNANT NEOPLASM OF PROSTATE: ICD-10-CM

## 2025-05-06 ENCOUNTER — NON-APPOINTMENT (OUTPATIENT)
Age: 70
End: 2025-05-06

## 2025-05-07 ENCOUNTER — RX RENEWAL (OUTPATIENT)
Age: 70
End: 2025-05-07

## 2025-05-07 ENCOUNTER — APPOINTMENT (OUTPATIENT)
Dept: HEMATOLOGY ONCOLOGY | Facility: CLINIC | Age: 70
End: 2025-05-07
Payer: MEDICARE

## 2025-05-07 VITALS
DIASTOLIC BLOOD PRESSURE: 70 MMHG | SYSTOLIC BLOOD PRESSURE: 101 MMHG | TEMPERATURE: 96.9 F | HEART RATE: 55 BPM | OXYGEN SATURATION: 97 % | RESPIRATION RATE: 18 BRPM

## 2025-05-07 LAB
ALBUMIN SERPL ELPH-MCNC: 3.7 G/DL
ALP BLD-CCNC: 55 U/L
ALT SERPL-CCNC: 17 U/L
ANION GAP SERPL CALC-SCNC: 0 MMOL/L
AST SERPL-CCNC: 21 U/L
BILIRUB SERPL-MCNC: 0.6 MG/DL
BUN SERPL-MCNC: 16 MG/DL
CALCIUM SERPL-MCNC: 10.4 MG/DL
CHLORIDE SERPL-SCNC: 109 MMOL/L
CO2 SERPL-SCNC: 32 MMOL/L
CREAT SERPL-MCNC: 1.4 MG/DL
EGFRCR SERPLBLD CKD-EPI 2021: 53 ML/MIN/1.73M2
GLUCOSE SERPL-MCNC: 99 MG/DL
HCT VFR BLD CALC: 40.3 %
HGB BLD-MCNC: 13.5 G/DL
MCHC RBC-ENTMCNC: 30.3 PG
MCHC RBC-ENTMCNC: 33.5 G/DL
MCV RBC AUTO: 90.4 FL
PLATELET # BLD AUTO: 169 K/UL
POTASSIUM SERPL-SCNC: 4.5 MMOL/L
PROT SERPL-MCNC: 7.6 G/DL
PSA SERPL-MCNC: 16.2 NG/ML
RBC # BLD: 4.46 M/UL
RBC # FLD: 14.2 %
SODIUM SERPL-SCNC: 141 MMOL/L
WBC # FLD AUTO: 3.1 K/UL

## 2025-05-07 PROCEDURE — 99214 OFFICE O/P EST MOD 30 MIN: CPT

## 2025-05-08 ENCOUNTER — RX RENEWAL (OUTPATIENT)
Age: 70
End: 2025-05-08

## 2025-05-29 ENCOUNTER — APPOINTMENT (OUTPATIENT)
Dept: OPHTHALMOLOGY | Facility: CLINIC | Age: 70
End: 2025-05-29

## 2025-06-03 ENCOUNTER — RX RENEWAL (OUTPATIENT)
Age: 70
End: 2025-06-03

## 2025-06-11 NOTE — PATIENT PROFILE ADULT - NSPROMEDSADMININFO_GEN_A_NUR
It looks like at her annual exam her BP was high, so I had recommended a 3 month follow up for recheck. Since we also restarted the cholesterol medication we should repeat fasting lipids about 3 months after restarting. We can keep the office visit, so we can complete this if her appointment is early enough that she can fast for it. No need to repeat the A1c this soon.     Can you check and see if she had chicken pox as a child? If so, then does not need varicella. She could also get pneumonia vaccine as she has a history of mild asthma. Let me know if she needs any further clarifications or has other questions. Thanks.    no concerns

## 2025-07-01 NOTE — HISTORY OF PRESENT ILLNESS
1238: Handoff report received from IVON Charles     1300: Patient meets criteria for extended recovery    1343: Patient's spouse updated per telephone    1403: Handoff report given to room 733 RN    1414: Oxycodone 5 mg PO PRN given per order    1437: Patient transported to room 733 via stretcher, in stable condition with all belongings   [FreeTextEntry1] : CC Brain tumor, Parkinson second opinion. \par patient with many years of idiopathic Parkinsons\par also diagnosed 2-3 months ago with a large brain tumor - ? meningioma. no scan or reports available.  \par Patients main issues irght now hare his progressive aphasia and inability to walk. The latter he has been told ias due to parkinsons disease. \kathie Was admitted to [facility] where the diagnosis of large brain tumor was made and was started on steroids several weeks ago and is still on daily steroids at this time. Dose is unclear since family does not have full list of meds. In addition no brain imaging or

## 2025-09-11 ENCOUNTER — RX RENEWAL (OUTPATIENT)
Age: 70
End: 2025-09-11

## 2025-09-18 ENCOUNTER — RX RENEWAL (OUTPATIENT)
Age: 70
End: 2025-09-18

## (undated) DEVICE — MIDAS REX MR8 MATCH HEAD FLUTED LG BORE 3MM X 14CM

## (undated) DEVICE — DRSG TELFA .5 X 3

## (undated) DEVICE — SPONGE SURGICAL STRIP 1/4 X 6"

## (undated) DEVICE — TUBING SUCTION 20FT

## (undated) DEVICE — TUBING SMOKE EVAC 3/8" X 10FT FOR NEPTUNE

## (undated) DEVICE — ARACHNOID CIRCULAR SUPERFICIAL HANDLE 5"

## (undated) DEVICE — BIPOLAR FORCEP SYMMETRY BAYONET STR 8.25" X 1.5MM (BLUE)

## (undated) DEVICE — CODMAN PERFORATOR 14MM (BLUE)

## (undated) DEVICE — DRSG TEGADERM 4X4.75"

## (undated) DEVICE — PREP BETADINE SPONGE STICKS

## (undated) DEVICE — STRYKER SONOPET TIP UNIVERSAL STRAIGHT

## (undated) DEVICE — BIPOLAR ISOCOOL TIP 2MM

## (undated) DEVICE — MIDAS REX LEGEND TAPERED SM BORE 2.3MM X 8CM

## (undated) DEVICE — TUBING STRYKER SET AND EXTENDER FILTER TUBING

## (undated) DEVICE — SPONGE SURGICAL STRIP 1/2 X 6"

## (undated) DEVICE — ARACHNOID CIRCULAR LONG HANDLE 8"

## (undated) DEVICE — SPONGE SURGICAL STRIP 1" X 6"

## (undated) DEVICE — MIDAS REX MR8 TAPERED SM BORE 2.3MM X 7CM FOOTED

## (undated) DEVICE — DRAPE INSTRUMENT POUCH 6.75" X 11"

## (undated) DEVICE — SUT NUROLON 4-0 8-18" TF (POP-OFF)

## (undated) DEVICE — MIDAS REX MR8 TAPERED SM BORE 1.MM X 7CM

## (undated) DEVICE — LUBRICATING JELLY ONESHOT 1.25OZ

## (undated) DEVICE — SUT STRATAFIX SPIRAL MONOCRYL PLUS 3-0 30CM PS-1 UNDYED

## (undated) DEVICE — DRAPE MAGNETIC INSTRUMENT MEDIUM

## (undated) DEVICE — PACK CRANIOTOMY LNX SURGICOUNT

## (undated) DEVICE — LONE STAR RETRACTOR RING 12MM BLUNT DISP

## (undated) DEVICE — MIDAS REX MR8 BALL FLUTED LG BORE 5MM X 9CM

## (undated) DEVICE — STAPLER SKIN PROXIMATE

## (undated) DEVICE — ELCTR STRYKER NEPTUNE SMOKE EVACUATION PENCIL (GREEN)

## (undated) DEVICE — AESCULAP SCALPFIX 10 CLIPS

## (undated) DEVICE — SUT VICRYL PLUS 2-0 18" CT-1 (POP-OFF)

## (undated) DEVICE — DRAPE MAYO STAND 30"

## (undated) DEVICE — RUBBERBAND STRL LTX FR 200/CA 3X1/8IN

## (undated) DEVICE — STRYKER INSTRUMENT BATTERY

## (undated) DEVICE — MARKING PEN W RULER